# Patient Record
Sex: FEMALE | Race: WHITE | Employment: UNEMPLOYED | URBAN - NONMETROPOLITAN AREA
[De-identification: names, ages, dates, MRNs, and addresses within clinical notes are randomized per-mention and may not be internally consistent; named-entity substitution may affect disease eponyms.]

---

## 2017-01-17 DIAGNOSIS — G89.4 CHRONIC PAIN SYNDROME: ICD-10-CM

## 2017-01-17 DIAGNOSIS — M79.18 MYOFASCIAL PAIN DYSFUNCTION SYNDROME: ICD-10-CM

## 2017-01-17 DIAGNOSIS — M48.061 LUMBAR SPINAL STENOSIS: ICD-10-CM

## 2017-01-17 RX ORDER — OXYCODONE AND ACETAMINOPHEN 10; 325 MG/1; MG/1
1 TABLET ORAL EVERY 6 HOURS PRN
Qty: 120 TABLET | Refills: 0 | Status: SHIPPED | OUTPATIENT
Start: 2017-01-17 | End: 2017-02-16

## 2017-01-17 RX ORDER — TIZANIDINE 4 MG/1
4 TABLET ORAL EVERY 8 HOURS PRN
Qty: 90 TABLET | Refills: 0 | Status: SHIPPED | OUTPATIENT
Start: 2017-01-17 | End: 2017-02-17 | Stop reason: SDUPTHER

## 2017-02-17 DIAGNOSIS — M47.816 SPONDYLOSIS OF LUMBAR REGION WITHOUT MYELOPATHY OR RADICULOPATHY: ICD-10-CM

## 2017-02-17 DIAGNOSIS — M79.18 MYOFASCIAL PAIN DYSFUNCTION SYNDROME: ICD-10-CM

## 2017-02-17 DIAGNOSIS — G89.4 CHRONIC PAIN SYNDROME: ICD-10-CM

## 2017-02-17 DIAGNOSIS — M48.061 LUMBAR SPINAL STENOSIS: ICD-10-CM

## 2017-02-17 RX ORDER — TIZANIDINE 4 MG/1
4 TABLET ORAL EVERY 8 HOURS PRN
Qty: 90 TABLET | Refills: 0 | Status: SHIPPED | OUTPATIENT
Start: 2017-02-17 | End: 2017-03-16 | Stop reason: SDUPTHER

## 2017-02-17 RX ORDER — OXYCODONE AND ACETAMINOPHEN 10; 325 MG/1; MG/1
1 TABLET ORAL EVERY 6 HOURS PRN
Qty: 120 TABLET | Refills: 0 | Status: SHIPPED | OUTPATIENT
Start: 2017-02-17 | End: 2017-03-16 | Stop reason: SDUPTHER

## 2017-03-15 DIAGNOSIS — M48.061 LUMBAR SPINAL STENOSIS: ICD-10-CM

## 2017-03-15 DIAGNOSIS — G89.4 CHRONIC PAIN SYNDROME: ICD-10-CM

## 2017-03-15 DIAGNOSIS — M79.18 MYOFASCIAL PAIN DYSFUNCTION SYNDROME: ICD-10-CM

## 2017-03-16 RX ORDER — TIZANIDINE 4 MG/1
4 TABLET ORAL EVERY 8 HOURS PRN
Qty: 90 TABLET | Refills: 0 | Status: SHIPPED | OUTPATIENT
Start: 2017-03-16 | End: 2017-04-17 | Stop reason: SDUPTHER

## 2017-03-16 RX ORDER — OXYCODONE AND ACETAMINOPHEN 10; 325 MG/1; MG/1
1 TABLET ORAL EVERY 6 HOURS PRN
Qty: 120 TABLET | Refills: 0 | OUTPATIENT
Start: 2017-03-19 | End: 2017-04-18

## 2017-03-16 RX ORDER — TIZANIDINE 4 MG/1
4 TABLET ORAL EVERY 8 HOURS PRN
Qty: 90 TABLET | Refills: 0 | OUTPATIENT
Start: 2017-03-19

## 2017-03-16 RX ORDER — OXYCODONE AND ACETAMINOPHEN 10; 325 MG/1; MG/1
1 TABLET ORAL EVERY 6 HOURS PRN
Qty: 120 TABLET | Refills: 0 | Status: SHIPPED | OUTPATIENT
Start: 2017-03-16 | End: 2017-04-17 | Stop reason: SDUPTHER

## 2017-04-17 ENCOUNTER — OFFICE VISIT (OUTPATIENT)
Dept: PHYSICAL MEDICINE AND REHAB | Age: 47
End: 2017-04-17

## 2017-04-17 VITALS
BODY MASS INDEX: 34.87 KG/M2 | WEIGHT: 217 LBS | SYSTOLIC BLOOD PRESSURE: 146 MMHG | HEIGHT: 66 IN | HEART RATE: 98 BPM | DIASTOLIC BLOOD PRESSURE: 97 MMHG

## 2017-04-17 DIAGNOSIS — M79.18 MYOFASCIAL PAIN DYSFUNCTION SYNDROME: ICD-10-CM

## 2017-04-17 DIAGNOSIS — M48.061 LUMBAR SPINAL STENOSIS: Primary | ICD-10-CM

## 2017-04-17 DIAGNOSIS — G89.4 CHRONIC PAIN SYNDROME: ICD-10-CM

## 2017-04-17 DIAGNOSIS — M47.816 SPONDYLOSIS OF LUMBAR REGION WITHOUT MYELOPATHY OR RADICULOPATHY: ICD-10-CM

## 2017-04-17 PROCEDURE — 99213 OFFICE O/P EST LOW 20 MIN: CPT | Performed by: NURSE PRACTITIONER

## 2017-04-17 RX ORDER — TIZANIDINE 4 MG/1
4 TABLET ORAL EVERY 8 HOURS PRN
Qty: 90 TABLET | Refills: 0 | Status: SHIPPED | OUTPATIENT
Start: 2017-04-17 | End: 2017-06-16 | Stop reason: SDUPTHER

## 2017-04-17 RX ORDER — GABAPENTIN 600 MG/1
600 TABLET ORAL 4 TIMES DAILY
Qty: 120 TABLET | Refills: 2 | Status: SHIPPED | OUTPATIENT
Start: 2017-04-17 | End: 2017-07-17 | Stop reason: SDUPTHER

## 2017-04-17 RX ORDER — OXYCODONE AND ACETAMINOPHEN 10; 325 MG/1; MG/1
1 TABLET ORAL EVERY 6 HOURS PRN
Qty: 120 TABLET | Refills: 0 | Status: SHIPPED | OUTPATIENT
Start: 2017-04-17 | End: 2017-05-12 | Stop reason: SDUPTHER

## 2017-04-17 ASSESSMENT — ENCOUNTER SYMPTOMS
ABDOMINAL PAIN: 0
TROUBLE SWALLOWING: 0
RECTAL PAIN: 0
NAUSEA: 0
FACIAL SWELLING: 0
SINUS PRESSURE: 0
CHOKING: 0
COUGH: 0
APNEA: 0
BLOOD IN STOOL: 0
RHINORRHEA: 0
PHOTOPHOBIA: 0
ABDOMINAL DISTENTION: 0
CHEST TIGHTNESS: 0
VOMITING: 0
EYE DISCHARGE: 0
STRIDOR: 0
BACK PAIN: 1
DIARRHEA: 0
WHEEZING: 0
EYE PAIN: 0
VOICE CHANGE: 0
SHORTNESS OF BREATH: 0
COLOR CHANGE: 0
CONSTIPATION: 0
ANAL BLEEDING: 0
SORE THROAT: 0
EYE ITCHING: 0
EYE REDNESS: 0

## 2017-05-12 DIAGNOSIS — M47.816 SPONDYLOSIS OF LUMBAR REGION WITHOUT MYELOPATHY OR RADICULOPATHY: ICD-10-CM

## 2017-05-12 DIAGNOSIS — G89.4 CHRONIC PAIN SYNDROME: ICD-10-CM

## 2017-05-12 DIAGNOSIS — M48.061 LUMBAR SPINAL STENOSIS: ICD-10-CM

## 2017-05-12 DIAGNOSIS — M79.18 MYOFASCIAL PAIN DYSFUNCTION SYNDROME: ICD-10-CM

## 2017-05-12 RX ORDER — OXYCODONE AND ACETAMINOPHEN 10; 325 MG/1; MG/1
1 TABLET ORAL EVERY 6 HOURS PRN
Qty: 120 TABLET | Refills: 0 | Status: SHIPPED | OUTPATIENT
Start: 2017-05-12 | End: 2017-06-11

## 2017-05-30 ENCOUNTER — TELEPHONE (OUTPATIENT)
Dept: PHYSICAL MEDICINE AND REHAB | Age: 47
End: 2017-05-30

## 2017-05-30 DIAGNOSIS — M48.061 LUMBAR SPINAL STENOSIS: ICD-10-CM

## 2017-05-30 DIAGNOSIS — M51.36 DDD (DEGENERATIVE DISC DISEASE), LUMBAR: Primary | ICD-10-CM

## 2017-06-16 DIAGNOSIS — M47.816 SPONDYLOSIS OF LUMBAR REGION WITHOUT MYELOPATHY OR RADICULOPATHY: ICD-10-CM

## 2017-06-16 DIAGNOSIS — G89.4 CHRONIC PAIN SYNDROME: ICD-10-CM

## 2017-06-16 DIAGNOSIS — M79.18 MYOFASCIAL PAIN DYSFUNCTION SYNDROME: ICD-10-CM

## 2017-06-16 DIAGNOSIS — M48.061 LUMBAR SPINAL STENOSIS: ICD-10-CM

## 2017-06-16 RX ORDER — OXYCODONE AND ACETAMINOPHEN 10; 325 MG/1; MG/1
1 TABLET ORAL EVERY 6 HOURS PRN
Qty: 120 TABLET | Refills: 0 | Status: SHIPPED | OUTPATIENT
Start: 2017-06-16 | End: 2017-07-16

## 2017-06-16 RX ORDER — TIZANIDINE 4 MG/1
4 TABLET ORAL EVERY 8 HOURS PRN
Qty: 90 TABLET | Refills: 0 | Status: SHIPPED | OUTPATIENT
Start: 2017-06-16 | End: 2017-07-17 | Stop reason: SDUPTHER

## 2017-07-11 ENCOUNTER — OFFICE VISIT (OUTPATIENT)
Dept: CARDIOLOGY | Age: 47
End: 2017-07-11

## 2017-07-11 VITALS
HEART RATE: 55 BPM | HEIGHT: 67 IN | BODY MASS INDEX: 32.96 KG/M2 | WEIGHT: 210 LBS | SYSTOLIC BLOOD PRESSURE: 138 MMHG | DIASTOLIC BLOOD PRESSURE: 70 MMHG

## 2017-07-11 DIAGNOSIS — R07.82 INTERCOSTAL PAIN: Primary | ICD-10-CM

## 2017-07-11 DIAGNOSIS — I10 HYPERTENSION, BENIGN: ICD-10-CM

## 2017-07-11 PROCEDURE — 99203 OFFICE O/P NEW LOW 30 MIN: CPT | Performed by: INTERNAL MEDICINE

## 2017-07-11 PROCEDURE — 93000 ELECTROCARDIOGRAM COMPLETE: CPT | Performed by: INTERNAL MEDICINE

## 2017-07-11 RX ORDER — TRAZODONE HYDROCHLORIDE 150 MG/1
150 TABLET ORAL NIGHTLY
COMMUNITY
Start: 2017-04-27 | End: 2019-03-08

## 2017-07-11 RX ORDER — TRIAMTERENE AND HYDROCHLOROTHIAZIDE 37.5; 25 MG/1; MG/1
1 TABLET ORAL DAILY
COMMUNITY
Start: 2017-06-19 | End: 2019-06-11

## 2017-07-11 RX ORDER — PROPRANOLOL HYDROCHLORIDE 80 MG/1
80 TABLET ORAL 2 TIMES DAILY
COMMUNITY
End: 2021-07-29

## 2017-07-13 ENCOUNTER — TELEPHONE (OUTPATIENT)
Dept: ADMINISTRATIVE | Age: 47
End: 2017-07-13

## 2017-07-13 DIAGNOSIS — R07.82 INTERCOSTAL PAIN: Primary | ICD-10-CM

## 2017-07-17 DIAGNOSIS — G89.4 CHRONIC PAIN SYNDROME: ICD-10-CM

## 2017-07-17 DIAGNOSIS — M47.816 SPONDYLOSIS OF LUMBAR REGION WITHOUT MYELOPATHY OR RADICULOPATHY: ICD-10-CM

## 2017-07-17 DIAGNOSIS — M79.18 MYOFASCIAL PAIN DYSFUNCTION SYNDROME: ICD-10-CM

## 2017-07-17 DIAGNOSIS — M48.061 LUMBAR SPINAL STENOSIS: ICD-10-CM

## 2017-07-17 RX ORDER — OXYCODONE AND ACETAMINOPHEN 10; 325 MG/1; MG/1
1 TABLET ORAL EVERY 6 HOURS PRN
Qty: 120 TABLET | Refills: 0 | Status: SHIPPED | OUTPATIENT
Start: 2017-07-17 | End: 2017-08-16 | Stop reason: SDUPTHER

## 2017-07-17 RX ORDER — GABAPENTIN 600 MG/1
600 TABLET ORAL 4 TIMES DAILY
Qty: 120 TABLET | Refills: 2 | Status: SHIPPED | OUTPATIENT
Start: 2017-07-19 | End: 2017-10-13 | Stop reason: SDUPTHER

## 2017-07-17 RX ORDER — TIZANIDINE 4 MG/1
4 TABLET ORAL EVERY 8 HOURS PRN
Qty: 90 TABLET | Refills: 0 | Status: SHIPPED | OUTPATIENT
Start: 2017-07-17 | End: 2017-08-16 | Stop reason: SDUPTHER

## 2017-07-25 ENCOUNTER — HOSPITAL ENCOUNTER (OUTPATIENT)
Dept: GENERAL RADIOLOGY | Age: 47
Discharge: HOME | End: 2017-07-25
Payer: COMMERCIAL

## 2017-07-25 ENCOUNTER — HOSPITAL ENCOUNTER (OUTPATIENT)
Age: 47
Discharge: HOME | End: 2017-07-25
Payer: COMMERCIAL

## 2017-07-25 ENCOUNTER — OFFICE VISIT (OUTPATIENT)
Dept: PHYSICAL MEDICINE AND REHAB | Age: 47
End: 2017-07-25
Payer: COMMERCIAL

## 2017-07-25 VITALS
WEIGHT: 209 LBS | DIASTOLIC BLOOD PRESSURE: 92 MMHG | BODY MASS INDEX: 32.8 KG/M2 | HEIGHT: 67 IN | SYSTOLIC BLOOD PRESSURE: 142 MMHG | HEART RATE: 80 BPM

## 2017-07-25 DIAGNOSIS — M51.26 LUMBAR DISC HERNIATION: ICD-10-CM

## 2017-07-25 DIAGNOSIS — M79.671 RIGHT FOOT PAIN: ICD-10-CM

## 2017-07-25 DIAGNOSIS — M48.061 LUMBAR SPINAL STENOSIS: Primary | ICD-10-CM

## 2017-07-25 DIAGNOSIS — M79.18 MYOFASCIAL PAIN DYSFUNCTION SYNDROME: ICD-10-CM

## 2017-07-25 DIAGNOSIS — M47.816 SPONDYLOSIS OF LUMBAR REGION WITHOUT MYELOPATHY OR RADICULOPATHY: ICD-10-CM

## 2017-07-25 DIAGNOSIS — G89.4 CHRONIC PAIN SYNDROME: ICD-10-CM

## 2017-07-25 DIAGNOSIS — M51.36 DDD (DEGENERATIVE DISC DISEASE), LUMBAR: ICD-10-CM

## 2017-07-25 PROCEDURE — 99213 OFFICE O/P EST LOW 20 MIN: CPT | Performed by: NURSE PRACTITIONER

## 2017-07-25 PROCEDURE — 73630 X-RAY EXAM OF FOOT: CPT

## 2017-07-25 ASSESSMENT — ENCOUNTER SYMPTOMS
DIARRHEA: 0
VOMITING: 0
EYE PAIN: 0
BACK PAIN: 1
SORE THROAT: 0
CONSTIPATION: 0
CHEST TIGHTNESS: 0
SINUS PRESSURE: 0
COUGH: 0
RHINORRHEA: 0
SHORTNESS OF BREATH: 0
NAUSEA: 0
WHEEZING: 0
COLOR CHANGE: 0
PHOTOPHOBIA: 0
ABDOMINAL PAIN: 0

## 2017-07-28 ENCOUNTER — TELEPHONE (OUTPATIENT)
Dept: PHYSICAL MEDICINE AND REHAB | Age: 47
End: 2017-07-28

## 2017-07-28 DIAGNOSIS — F41.9 ANXIETY: Primary | ICD-10-CM

## 2017-07-28 DIAGNOSIS — F32.89 OTHER DEPRESSION: ICD-10-CM

## 2017-08-08 ENCOUNTER — TELEPHONE (OUTPATIENT)
Dept: CARDIOLOGY CLINIC | Age: 47
End: 2017-08-08

## 2017-08-08 DIAGNOSIS — R07.82 INTERCOSTAL PAIN: Primary | ICD-10-CM

## 2017-08-16 DIAGNOSIS — M47.816 SPONDYLOSIS OF LUMBAR REGION WITHOUT MYELOPATHY OR RADICULOPATHY: ICD-10-CM

## 2017-08-16 DIAGNOSIS — M79.18 MYOFASCIAL PAIN DYSFUNCTION SYNDROME: ICD-10-CM

## 2017-08-16 DIAGNOSIS — M48.061 LUMBAR SPINAL STENOSIS: ICD-10-CM

## 2017-08-16 DIAGNOSIS — G89.4 CHRONIC PAIN SYNDROME: ICD-10-CM

## 2017-08-16 RX ORDER — OXYCODONE AND ACETAMINOPHEN 10; 325 MG/1; MG/1
1 TABLET ORAL EVERY 6 HOURS PRN
Qty: 120 TABLET | Refills: 0 | Status: SHIPPED | OUTPATIENT
Start: 2017-08-16 | End: 2017-09-15

## 2017-08-16 RX ORDER — TIZANIDINE 4 MG/1
4 TABLET ORAL EVERY 8 HOURS PRN
Qty: 90 TABLET | Refills: 0 | Status: SHIPPED | OUTPATIENT
Start: 2017-08-16 | End: 2017-09-18 | Stop reason: SDUPTHER

## 2017-09-18 DIAGNOSIS — M48.061 LUMBAR SPINAL STENOSIS: ICD-10-CM

## 2017-09-18 DIAGNOSIS — G89.4 CHRONIC PAIN SYNDROME: ICD-10-CM

## 2017-09-18 DIAGNOSIS — M47.816 SPONDYLOSIS OF LUMBAR REGION WITHOUT MYELOPATHY OR RADICULOPATHY: ICD-10-CM

## 2017-09-18 DIAGNOSIS — M79.18 MYOFASCIAL PAIN DYSFUNCTION SYNDROME: ICD-10-CM

## 2017-09-18 RX ORDER — TIZANIDINE 4 MG/1
4 TABLET ORAL EVERY 8 HOURS PRN
Qty: 90 TABLET | Refills: 0 | Status: SHIPPED | OUTPATIENT
Start: 2017-09-18 | End: 2017-10-13 | Stop reason: SDUPTHER

## 2017-09-18 RX ORDER — OXYCODONE AND ACETAMINOPHEN 10; 325 MG/1; MG/1
1 TABLET ORAL EVERY 6 HOURS PRN
Qty: 120 TABLET | Refills: 0 | Status: SHIPPED | OUTPATIENT
Start: 2017-09-18 | End: 2017-10-13 | Stop reason: SDUPTHER

## 2017-10-04 ENCOUNTER — OFFICE VISIT (OUTPATIENT)
Dept: PSYCHIATRY | Age: 47
End: 2017-10-04
Payer: COMMERCIAL

## 2017-10-04 DIAGNOSIS — F33.1 MODERATE EPISODE OF RECURRENT MAJOR DEPRESSIVE DISORDER (HCC): ICD-10-CM

## 2017-10-04 DIAGNOSIS — F41.1 GAD (GENERALIZED ANXIETY DISORDER): ICD-10-CM

## 2017-10-04 PROCEDURE — 90792 PSYCH DIAG EVAL W/MED SRVCS: CPT | Performed by: PSYCHIATRY & NEUROLOGY
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
## 2017-10-04 NOTE — MR AVS SNAPSHOT
Additional Information about your Body Mass Index (BMI)           Your BMI as listed above is considered obese (30 or more). BMI is an estimate of body fat, calculated from your height and weight. The higher your BMI, the greater your risk of heart disease, high blood pressure, type 2 diabetes, stroke, gallstones, arthritis, sleep apnea, and certain cancers. BMI is not perfect. It may overestimate body fat in athletes and people who are more muscular. Even a small weight loss (between 5 and 10 percent of your current weight) by decreasing your calorie intake and becoming more physically active will help lower your risk of developing or worsening diseases associated with obesity. Learn more at: [employer]             Medications and Orders      Your Current Medications Are              tiZANidine (ZANAFLEX) 4 MG tablet Take 1 tablet by mouth every 8 hours as needed (muscle spasm)    oxyCODONE-acetaminophen (PERCOCET)  MG per tablet Take 1 tablet by mouth every 6 hours as needed for Pain .    gabapentin (NEURONTIN) 600 MG tablet Take 1 tablet by mouth 4 times daily    propranolol (INDERAL) 80 MG tablet Take 80 mg by mouth 2 times daily    traZODone (DESYREL) 150 MG tablet 150 mg nightly    triamterene-hydrochlorothiazide (MAXZIDE-25) 37.5-25 MG per tablet 37.5 tablets daily    pentosan polysulfate (ELMIRON) 100 MG capsule Take 1 capsule by mouth 3 times daily (before meals)    dexlansoprazole (DEXILANT) 60 MG CPDR capsule Take 1 capsule by mouth daily    topiramate (TOPAMAX) 100 MG tablet Take 1 tablet by mouth 2 times daily    albuterol (PROVENTIL HFA;VENTOLIN HFA) 108 (90 BASE) MCG/ACT inhaler Inhale 2 puffs into the lungs every 4 hours as needed for Wheezing or Shortness of Breath Whichever brand is covered by insurance, substitute as necessary.       Allergies              Adhesive Tape Other (See Comments)    blisters    Compazine [Prochlorperazine] Hives Patient has no issues with promethazine    Erythromycin     Lyrica [Pregabalin]     Morphine     Break out in hives    Reglan [Metoclopramide] Other (See Comments)    States \" made me crazy - kicking and screaming - very anxious\"    Sulfa Antibiotics Hives         Additional Information        Basic Information     Date Of Birth Sex Race Ethnicity Preferred Language    1970 Female White Non-/Non  English      Problem List as of 10/4/2017  Date Reviewed: 10/4/2017                Epigastric pain    Abdominal pain, right upper quadrant    GERD (gastroesophageal reflux disease)    Constipation    Moderate episode of recurrent major depressive disorder (HCC)    KELLI (generalized anxiety disorder)    Urinary retention    Gastroesophageal reflux disease with esophagitis    Cellulitis of left axilla    Gastrointestinal hemorrhage    Lumbar spinal stenosis    Major depression    Obesity    Asthma    Insomnia    Nuñez's esophagus    Chest pain    Cough    Migraine headache    Hypertension, benign      Immunizations as of 10/4/2017     Name Date    Influenza Virus Vaccine 11/4/2015, 11/20/2014    Influenza, Glynn Quarles, 3 Years and older, IM 10/19/2016    Pneumococcal Polysaccharide ([id number]) 11/20/2014      Preventive Care        Date Due    HIV screening is recommended for all people regardless of risk factors  aged 15-65 years at least once (lifetime) who have never been HIV tested. 11/26/1983    Tetanus Combination Vaccine (1 - Tdap) 11/26/1987    Pap Smear 11/26/1989    Diabetes Screening 11/26/2008    Yearly Flu Vaccine (1) 9/1/2017    Cholesterol Screening 5/8/2020            MyChart Signup           [employer] allows you to send messages to your doctor, view your test results, renew your prescriptions, schedule appointments, view visit notes, and more. How Do I Sign Up? 1. In your Internet browser, go to [email or web address]

## 2017-10-06 ENCOUNTER — TELEPHONE (OUTPATIENT)
Dept: PSYCHIATRY | Age: 47
End: 2017-10-06

## 2017-10-06 ENCOUNTER — HOSPITAL ENCOUNTER (OUTPATIENT)
Age: 47
Setting detail: OBSERVATION
Discharge: HOME | End: 2017-10-07
Attending: EMERGENCY MEDICINE | Admitting: NUCLEAR MEDICINE
Payer: COMMERCIAL

## 2017-10-06 ENCOUNTER — HOSPITAL ENCOUNTER (EMERGENCY)
Age: 47
Discharge: HOME | End: 2017-10-06
Payer: COMMERCIAL

## 2017-10-06 ENCOUNTER — APPOINTMENT (OUTPATIENT)
Dept: GENERAL RADIOLOGY | Age: 47
End: 2017-10-06
Payer: COMMERCIAL

## 2017-10-06 VITALS
TEMPERATURE: 97.8 F | RESPIRATION RATE: 16 BRPM | SYSTOLIC BLOOD PRESSURE: 146 MMHG | DIASTOLIC BLOOD PRESSURE: 83 MMHG | HEART RATE: 88 BPM | OXYGEN SATURATION: 96 %

## 2017-10-06 DIAGNOSIS — R07.9 CHEST PAIN, UNSPECIFIED TYPE: Primary | ICD-10-CM

## 2017-10-06 DIAGNOSIS — F41.0 ANXIETY ATTACK: ICD-10-CM

## 2017-10-06 LAB
ALBUMIN SERPL-MCNC: 4.2 G/DL (ref 3.5–5.1)
ALP BLD-CCNC: 67 U/L (ref 38–126)
ALT SERPL-CCNC: 42 U/L (ref 11–66)
ANION GAP SERPL CALCULATED.3IONS-SCNC: 13 MEQ/L (ref 8–16)
ANION GAP SERPL CALCULATED.3IONS-SCNC: 14 MEQ/L (ref 8–16)
ANISOCYTOSIS: ABNORMAL
AST SERPL-CCNC: 34 U/L (ref 5–40)
BASOPHILS # BLD: 0.5 %
BASOPHILS # BLD: 1 %
BASOPHILS ABSOLUTE: 0 THOU/MM3 (ref 0–0.1)
BASOPHILS ABSOLUTE: 0.1 THOU/MM3 (ref 0–0.1)
BILIRUB SERPL-MCNC: < 0.2 MG/DL (ref 0.3–1.2)
BILIRUBIN DIRECT: < 0.2 MG/DL (ref 0–0.3)
BUN BLDV-MCNC: 12 MG/DL (ref 7–22)
BUN BLDV-MCNC: 15 MG/DL (ref 7–22)
CALCIUM SERPL-MCNC: 9.2 MG/DL (ref 8.5–10.5)
CALCIUM SERPL-MCNC: 9.5 MG/DL (ref 8.5–10.5)
CHLORIDE BLD-SCNC: 104 MEQ/L (ref 98–111)
CHLORIDE BLD-SCNC: 105 MEQ/L (ref 98–111)
CO2: 25 MEQ/L (ref 23–33)
CO2: 26 MEQ/L (ref 23–33)
CREAT SERPL-MCNC: 0.6 MG/DL (ref 0.4–1.2)
CREAT SERPL-MCNC: 0.6 MG/DL (ref 0.4–1.2)
EOSINOPHIL # BLD: 1.3 %
EOSINOPHIL # BLD: 2.7 %
EOSINOPHILS ABSOLUTE: 0.1 THOU/MM3 (ref 0–0.4)
EOSINOPHILS ABSOLUTE: 0.2 THOU/MM3 (ref 0–0.4)
GFR SERPL CREATININE-BSD FRML MDRD: > 90 ML/MIN/1.73M2
GFR SERPL CREATININE-BSD FRML MDRD: > 90 ML/MIN/1.73M2
GLUCOSE BLD-MCNC: 96 MG/DL (ref 70–108)
GLUCOSE BLD-MCNC: 96 MG/DL (ref 70–108)
HCT VFR BLD CALC: 41.6 % (ref 37–47)
HCT VFR BLD CALC: 43.7 % (ref 37–47)
HEMOGLOBIN: 13.8 GM/DL (ref 12–16)
HEMOGLOBIN: 14.6 GM/DL (ref 12–16)
LIPASE: 44.2 U/L (ref 5.6–51.3)
LYMPHOCYTES # BLD: 39.9 %
LYMPHOCYTES # BLD: 40 %
LYMPHOCYTES ABSOLUTE: 2.1 THOU/MM3 (ref 1–4.8)
LYMPHOCYTES ABSOLUTE: 2.4 THOU/MM3 (ref 1–4.8)
MCH RBC QN AUTO: 28.4 PG (ref 27–31)
MCH RBC QN AUTO: 28.4 PG (ref 27–31)
MCHC RBC AUTO-ENTMCNC: 33.2 GM/DL (ref 33–37)
MCHC RBC AUTO-ENTMCNC: 33.3 GM/DL (ref 33–37)
MCV RBC AUTO: 85.1 FL (ref 81–99)
MCV RBC AUTO: 85.3 FL (ref 81–99)
MONOCYTES # BLD: 10 %
MONOCYTES # BLD: 7.6 %
MONOCYTES ABSOLUTE: 0.4 THOU/MM3 (ref 0.4–1.3)
MONOCYTES ABSOLUTE: 0.6 THOU/MM3 (ref 0.4–1.3)
NUCLEATED RED BLOOD CELLS: 0 /100 WBC
NUCLEATED RED BLOOD CELLS: 0 /100 WBC
OSMOLALITY CALCULATION: 282.7 MOSMOL/KG (ref 275–300)
OSMOLALITY CALCULATION: 289.4 MOSMOL/KG (ref 275–300)
PDW BLD-RTO: 14.3 % (ref 11.5–14.5)
PDW BLD-RTO: 15 % (ref 11.5–14.5)
PLATELET # BLD: 231 THOU/MM3 (ref 130–400)
PLATELET # BLD: 253 THOU/MM3 (ref 130–400)
PMV BLD AUTO: 9.2 MCM (ref 7.4–10.4)
PMV BLD AUTO: 9.9 MCM (ref 7.4–10.4)
POTASSIUM SERPL-SCNC: 3.6 MEQ/L (ref 3.5–5.2)
POTASSIUM SERPL-SCNC: 4.2 MEQ/L (ref 3.5–5.2)
RBC # BLD: 4.88 MILL/MM3 (ref 4.2–5.4)
RBC # BLD: 5.14 MILL/MM3 (ref 4.2–5.4)
RBC # BLD: NORMAL 10*6/UL
RBC # BLD: NORMAL 10*6/UL
SEG NEUTROPHILS: 46.4 %
SEG NEUTROPHILS: 50.6 %
SEGMENTED NEUTROPHILS ABSOLUTE COUNT: 2.7 THOU/MM3 (ref 1.8–7.7)
SEGMENTED NEUTROPHILS ABSOLUTE COUNT: 2.8 THOU/MM3 (ref 1.8–7.7)
SODIUM BLD-SCNC: 142 MEQ/L (ref 135–145)
SODIUM BLD-SCNC: 145 MEQ/L (ref 135–145)
TOTAL PROTEIN: 6.9 G/DL (ref 6.1–8)
TROPONIN T: < 0.01 NG/ML
WBC # BLD: 5.3 THOU/MM3 (ref 4.8–10.8)
WBC # BLD: 6 THOU/MM3 (ref 4.8–10.8)

## 2017-10-06 PROCEDURE — 6370000000 HC RX 637 (ALT 250 FOR IP): Performed by: NUCLEAR MEDICINE

## 2017-10-06 PROCEDURE — 85025 COMPLETE CBC W/AUTO DIFF WBC: CPT

## 2017-10-06 PROCEDURE — 6360000002 HC RX W HCPCS: Performed by: EMERGENCY MEDICINE

## 2017-10-06 PROCEDURE — 99220 PR INITIAL OBSERVATION CARE/DAY 70 MINUTES: CPT | Performed by: NUCLEAR MEDICINE

## 2017-10-06 PROCEDURE — 84484 ASSAY OF TROPONIN QUANT: CPT

## 2017-10-06 PROCEDURE — 93005 ELECTROCARDIOGRAM TRACING: CPT | Performed by: EMERGENCY MEDICINE

## 2017-10-06 PROCEDURE — 6370000000 HC RX 637 (ALT 250 FOR IP): Performed by: EMERGENCY MEDICINE

## 2017-10-06 PROCEDURE — 96376 TX/PRO/DX INJ SAME DRUG ADON: CPT

## 2017-10-06 PROCEDURE — 6360000002 HC RX W HCPCS: Performed by: NUCLEAR MEDICINE

## 2017-10-06 PROCEDURE — 80048 BASIC METABOLIC PNL TOTAL CA: CPT

## 2017-10-06 PROCEDURE — 99285 EMERGENCY DEPT VISIT HI MDM: CPT

## 2017-10-06 PROCEDURE — 83690 ASSAY OF LIPASE: CPT

## 2017-10-06 PROCEDURE — 36415 COLL VENOUS BLD VENIPUNCTURE: CPT

## 2017-10-06 PROCEDURE — 96375 TX/PRO/DX INJ NEW DRUG ADDON: CPT

## 2017-10-06 PROCEDURE — 71020 XR CHEST STANDARD TWO VW: CPT

## 2017-10-06 PROCEDURE — G0378 HOSPITAL OBSERVATION PER HR: HCPCS

## 2017-10-06 PROCEDURE — 96374 THER/PROPH/DIAG INJ IV PUSH: CPT

## 2017-10-06 PROCEDURE — 2580000003 HC RX 258: Performed by: NUCLEAR MEDICINE

## 2017-10-06 PROCEDURE — 96372 THER/PROPH/DIAG INJ SC/IM: CPT

## 2017-10-06 PROCEDURE — 82248 BILIRUBIN DIRECT: CPT

## 2017-10-06 PROCEDURE — 6370000000 HC RX 637 (ALT 250 FOR IP): Performed by: NURSE PRACTITIONER

## 2017-10-06 PROCEDURE — 80053 COMPREHEN METABOLIC PANEL: CPT

## 2017-10-06 PROCEDURE — 6360000002 HC RX W HCPCS: Performed by: NURSE PRACTITIONER

## 2017-10-06 RX ORDER — ASPIRIN 81 MG/1
324 TABLET, CHEWABLE ORAL ONCE
Status: COMPLETED | OUTPATIENT
Start: 2017-10-06 | End: 2017-10-06

## 2017-10-06 RX ORDER — ASPIRIN 81 MG/1
81 TABLET, CHEWABLE ORAL DAILY
Status: DISCONTINUED | OUTPATIENT
Start: 2017-10-07 | End: 2017-10-07 | Stop reason: HOSPADM

## 2017-10-06 RX ORDER — NITROGLYCERIN 0.4 MG/1
0.4 TABLET SUBLINGUAL EVERY 5 MIN PRN
Status: DISCONTINUED | OUTPATIENT
Start: 2017-10-06 | End: 2017-10-06 | Stop reason: HOSPADM

## 2017-10-06 RX ORDER — ONDANSETRON 2 MG/ML
4 INJECTION INTRAMUSCULAR; INTRAVENOUS EVERY 6 HOURS PRN
Status: DISCONTINUED | OUTPATIENT
Start: 2017-10-06 | End: 2017-10-07 | Stop reason: HOSPADM

## 2017-10-06 RX ORDER — TRIAMTERENE AND HYDROCHLOROTHIAZIDE 37.5; 25 MG/1; MG/1
1 TABLET ORAL DAILY
Status: DISCONTINUED | OUTPATIENT
Start: 2017-10-06 | End: 2017-10-07 | Stop reason: HOSPADM

## 2017-10-06 RX ORDER — NITROGLYCERIN 0.4 MG/1
0.4 TABLET SUBLINGUAL EVERY 5 MIN PRN
Status: DISCONTINUED | OUTPATIENT
Start: 2017-10-06 | End: 2017-10-07 | Stop reason: HOSPADM

## 2017-10-06 RX ORDER — OXYCODONE AND ACETAMINOPHEN 10; 325 MG/1; MG/1
1 TABLET ORAL EVERY 6 HOURS PRN
Status: DISCONTINUED | OUTPATIENT
Start: 2017-10-06 | End: 2017-10-07 | Stop reason: HOSPADM

## 2017-10-06 RX ORDER — LORAZEPAM 2 MG/ML
1 INJECTION INTRAMUSCULAR ONCE
Status: COMPLETED | OUTPATIENT
Start: 2017-10-06 | End: 2017-10-06

## 2017-10-06 RX ORDER — SODIUM CHLORIDE 0.9 % (FLUSH) 0.9 %
10 SYRINGE (ML) INJECTION PRN
Status: DISCONTINUED | OUTPATIENT
Start: 2017-10-06 | End: 2017-10-07 | Stop reason: HOSPADM

## 2017-10-06 RX ORDER — TIZANIDINE 4 MG/1
4 TABLET ORAL EVERY 8 HOURS PRN
Status: DISCONTINUED | OUTPATIENT
Start: 2017-10-06 | End: 2017-10-07 | Stop reason: HOSPADM

## 2017-10-06 RX ORDER — TOPIRAMATE 100 MG/1
100 TABLET, FILM COATED ORAL 2 TIMES DAILY
Status: DISCONTINUED | OUTPATIENT
Start: 2017-10-06 | End: 2017-10-07 | Stop reason: HOSPADM

## 2017-10-06 RX ORDER — GABAPENTIN 600 MG/1
600 TABLET ORAL 4 TIMES DAILY
Status: DISCONTINUED | OUTPATIENT
Start: 2017-10-06 | End: 2017-10-07 | Stop reason: HOSPADM

## 2017-10-06 RX ORDER — ACETAMINOPHEN 325 MG/1
650 TABLET ORAL EVERY 4 HOURS PRN
Status: DISCONTINUED | OUTPATIENT
Start: 2017-10-06 | End: 2017-10-07 | Stop reason: HOSPADM

## 2017-10-06 RX ORDER — PROMETHAZINE HYDROCHLORIDE 25 MG/1
12.5 TABLET ORAL ONCE
Status: COMPLETED | OUTPATIENT
Start: 2017-10-06 | End: 2017-10-06

## 2017-10-06 RX ORDER — PANTOPRAZOLE SODIUM 40 MG/1
40 TABLET, DELAYED RELEASE ORAL
Status: DISCONTINUED | OUTPATIENT
Start: 2017-10-06 | End: 2017-10-07 | Stop reason: HOSPADM

## 2017-10-06 RX ORDER — HYDROCODONE BITARTRATE AND ACETAMINOPHEN 5; 325 MG/1; MG/1
1 TABLET ORAL ONCE
Status: COMPLETED | OUTPATIENT
Start: 2017-10-06 | End: 2017-10-06

## 2017-10-06 RX ORDER — ONDANSETRON 2 MG/ML
4 INJECTION INTRAMUSCULAR; INTRAVENOUS ONCE
Status: COMPLETED | OUTPATIENT
Start: 2017-10-06 | End: 2017-10-06

## 2017-10-06 RX ORDER — ALPRAZOLAM 0.25 MG/1
0.25 TABLET ORAL ONCE
Status: COMPLETED | OUTPATIENT
Start: 2017-10-06 | End: 2017-10-06

## 2017-10-06 RX ORDER — DEXLANSOPRAZOLE 60 MG/1
60 CAPSULE, DELAYED RELEASE ORAL DAILY
Status: DISCONTINUED | OUTPATIENT
Start: 2017-10-06 | End: 2017-10-06 | Stop reason: CLARIF

## 2017-10-06 RX ORDER — ALPRAZOLAM 0.25 MG/1
0.25 TABLET ORAL 2 TIMES DAILY PRN
Status: DISCONTINUED | OUTPATIENT
Start: 2017-10-06 | End: 2017-10-07 | Stop reason: HOSPADM

## 2017-10-06 RX ORDER — SODIUM CHLORIDE 0.9 % (FLUSH) 0.9 %
10 SYRINGE (ML) INJECTION EVERY 12 HOURS SCHEDULED
Status: DISCONTINUED | OUTPATIENT
Start: 2017-10-06 | End: 2017-10-07 | Stop reason: HOSPADM

## 2017-10-06 RX ORDER — ALBUTEROL SULFATE 90 UG/1
2 AEROSOL, METERED RESPIRATORY (INHALATION) EVERY 4 HOURS PRN
Status: DISCONTINUED | OUTPATIENT
Start: 2017-10-06 | End: 2017-10-07 | Stop reason: HOSPADM

## 2017-10-06 RX ORDER — PROPRANOLOL HYDROCHLORIDE 40 MG/1
80 TABLET ORAL 2 TIMES DAILY
Status: DISCONTINUED | OUTPATIENT
Start: 2017-10-06 | End: 2017-10-07 | Stop reason: HOSPADM

## 2017-10-06 RX ORDER — TRIAMTERENE AND HYDROCHLOROTHIAZIDE 37.5; 25 MG/1; MG/1
37.5 TABLET ORAL DAILY
Status: DISCONTINUED | OUTPATIENT
Start: 2017-10-06 | End: 2017-10-06 | Stop reason: ALTCHOICE

## 2017-10-06 RX ADMIN — PENTOSAN POLYSULFATE SODIUM 100 MG: 100 CAPSULE, GELATIN COATED ORAL at 17:49

## 2017-10-06 RX ADMIN — PROPRANOLOL HYDROCHLORIDE 80 MG: 40 TABLET ORAL at 21:37

## 2017-10-06 RX ADMIN — TOPIRAMATE 100 MG: 100 TABLET, FILM COATED ORAL at 21:38

## 2017-10-06 RX ADMIN — ONDANSETRON 4 MG: 2 INJECTION INTRAMUSCULAR; INTRAVENOUS at 03:11

## 2017-10-06 RX ADMIN — ALPRAZOLAM 0.25 MG: 0.25 TABLET ORAL at 17:46

## 2017-10-06 RX ADMIN — TRAZODONE HYDROCHLORIDE 150 MG: 100 TABLET ORAL at 21:38

## 2017-10-06 RX ADMIN — NITROGLYCERIN 0.4 MG: 0.4 TABLET SUBLINGUAL at 02:31

## 2017-10-06 RX ADMIN — LORAZEPAM 1 MG: 2 INJECTION INTRAMUSCULAR; INTRAVENOUS at 14:19

## 2017-10-06 RX ADMIN — GABAPENTIN 600 MG: 600 TABLET ORAL at 21:38

## 2017-10-06 RX ADMIN — PANTOPRAZOLE SODIUM 40 MG: 40 TABLET, DELAYED RELEASE ORAL at 17:49

## 2017-10-06 RX ADMIN — HYDROCODONE BITARTRATE AND ACETAMINOPHEN 1 TABLET: 5; 325 TABLET ORAL at 15:07

## 2017-10-06 RX ADMIN — LORAZEPAM 1 MG: 2 INJECTION INTRAMUSCULAR; INTRAVENOUS at 03:11

## 2017-10-06 RX ADMIN — Medication 10 ML: at 21:38

## 2017-10-06 RX ADMIN — NITROGLYCERIN 0.4 MG: 0.4 TABLET SUBLINGUAL at 02:26

## 2017-10-06 RX ADMIN — TIZANIDINE 4 MG: 4 TABLET ORAL at 17:49

## 2017-10-06 RX ADMIN — ASPIRIN 81 MG 324 MG: 81 TABLET ORAL at 02:25

## 2017-10-06 RX ADMIN — ALPRAZOLAM 0.25 MG: 0.25 TABLET ORAL at 21:41

## 2017-10-06 RX ADMIN — ENOXAPARIN SODIUM 40 MG: 40 INJECTION SUBCUTANEOUS at 17:45

## 2017-10-06 RX ADMIN — PROMETHAZINE HYDROCHLORIDE 12.5 MG: 25 TABLET ORAL at 15:07

## 2017-10-06 RX ADMIN — ASPIRIN 81 MG 324 MG: 81 TABLET ORAL at 14:19

## 2017-10-06 RX ADMIN — OXYCODONE HYDROCHLORIDE AND ACETAMINOPHEN 1 TABLET: 10; 325 TABLET ORAL at 17:46

## 2017-10-06 RX ADMIN — TRIAMTERENE AND HYDROCHLOROTHIAZIDE 1 TABLET: 37.5; 25 TABLET ORAL at 17:49

## 2017-10-06 RX ADMIN — LORAZEPAM 1 MG: 2 INJECTION INTRAMUSCULAR; INTRAVENOUS at 04:06

## 2017-10-06 ASSESSMENT — ENCOUNTER SYMPTOMS
VOMITING: 0
RHINORRHEA: 0
ABDOMINAL PAIN: 0
BACK PAIN: 0
BACK PAIN: 0
WHEEZING: 0
VOMITING: 0
EYE PAIN: 0
SHORTNESS OF BREATH: 0
CHEST TIGHTNESS: 0
NAUSEA: 1
EYE PAIN: 0
RHINORRHEA: 0
SORE THROAT: 0
COUGH: 0
SHORTNESS OF BREATH: 0
COUGH: 0
ABDOMINAL PAIN: 0
NAUSEA: 1
DIARRHEA: 0
EYE DISCHARGE: 0
DIARRHEA: 0

## 2017-10-06 ASSESSMENT — PAIN DESCRIPTION - PAIN TYPE
TYPE: ACUTE PAIN

## 2017-10-06 ASSESSMENT — PAIN DESCRIPTION - DESCRIPTORS
DESCRIPTORS: JABBING;STABBING
DESCRIPTORS: CONSTANT;SHARP
DESCRIPTORS: CONSTANT;SHARP;STABBING

## 2017-10-06 ASSESSMENT — PAIN DESCRIPTION - ORIENTATION
ORIENTATION: LEFT

## 2017-10-06 ASSESSMENT — PAIN SCALES - GENERAL
PAINLEVEL_OUTOF10: 9
PAINLEVEL_OUTOF10: 8
PAINLEVEL_OUTOF10: 9
PAINLEVEL_OUTOF10: 9
PAINLEVEL_OUTOF10: 8
PAINLEVEL_OUTOF10: 7
PAINLEVEL_OUTOF10: 8
PAINLEVEL_OUTOF10: 7

## 2017-10-06 ASSESSMENT — PAIN DESCRIPTION - LOCATION
LOCATION: CHEST

## 2017-10-06 ASSESSMENT — PAIN DESCRIPTION - PROGRESSION
CLINICAL_PROGRESSION: NOT CHANGED

## 2017-10-06 ASSESSMENT — PAIN DESCRIPTION - FREQUENCY
FREQUENCY: CONTINUOUS

## 2017-10-06 ASSESSMENT — PAIN DESCRIPTION - ONSET
ONSET: ON-GOING
ONSET: ON-GOING

## 2017-10-06 NOTE — ED PROVIDER NOTES
[address]       Chief Complaint   Patient presents with    Chest Pain       Nurses Notes reviewed and I agree except as noted in the HPI. HISTORY OF PRESENT ILLNESS    Mena Johnson is a 48 y.o. female who presents chest pain. The patient states that she was here in the ER with her elderly mother when she received some surprising and distressing news about her mother's health and the chest pain began. Patient states that the pain is sharp and stabbing in the left side of her chest, and it radiates into her left arm. The patient states that she has a history of hypertension, and has had multiple stress tests, and cardiac catheterization, but denies any findings. The patient states that she feels anxious about her mother's health and she is trembling uncontrollably. The patient states that she is currently having a headache as well as palpitations and nausea. The patient denies any shortness of breath, vision changes, vomiting, or other symptoms. REVIEW OF SYSTEMS     Review of Systems   Constitutional: Negative for chills, fatigue and fever. HENT: Negative for congestion, ear discharge, ear pain, postnasal drip and rhinorrhea. Eyes: Negative for pain and visual disturbance. Respiratory: Negative for cough, chest tightness and shortness of breath. Cardiovascular: Positive for chest pain, palpitations and leg swelling. Gastrointestinal: Positive for nausea. Negative for abdominal pain, diarrhea and vomiting. Genitourinary: Negative for difficulty urinating, dysuria, enuresis, flank pain and hematuria. Musculoskeletal: Negative for back pain and joint swelling. Skin: Negative for pallor and rash. Neurological: Positive for tremors and headaches (frontal). Negative for dizziness, weakness, light-headedness and numbness. Psychiatric/Behavioral: Negative for agitation, behavioral problems and confusion. The patient is nervous/anxious. PAST MEDICAL HISTORY    has a past medical history of Anxiety; Arthritis; Asthma; Nuñez's esophagus; GERD (gastroesophageal reflux disease); Hypertension; Insomnia; Interstitial cystitis; Migraine; and Spinal stenosis of lumbar region. SURGICAL HISTORY      has a past surgical history that includes Abdomen surgery (93); ovarian cyst removal (93); Tubal ligation (93); Colonoscopy (2010); Endoscopy, colon, diagnostic (2010); Patellar tendon repair (Right, 1994); Dilatation, esophagus (2010); Cardiac catheterization (unsure); other surgical history (21 Nov 2014); Cholecystectomy; Appendectomy; other surgical history (N/A, 03-21-16); other surgical history (N/A, 04-04-16); other surgical history (Bilateral, 6-13-16); Upper gastrointestinal endoscopy (2012); and other surgical history (Left, 09/13/2016). CURRENT MEDICATIONS       Previous Medications    ALBUTEROL (PROVENTIL HFA;VENTOLIN HFA) 108 (90 BASE) MCG/ACT INHALER    Inhale 2 puffs into the lungs every 4 hours as needed for Wheezing or Shortness of Breath Whichever brand is covered by insurance, substitute as necessary. DEXLANSOPRAZOLE (DEXILANT) 60 MG CPDR CAPSULE    Take 1 capsule by mouth daily    GABAPENTIN (NEURONTIN) 600 MG TABLET    Take 1 tablet by mouth 4 times daily    OXYCODONE-ACETAMINOPHEN (PERCOCET)  MG PER TABLET    Take 1 tablet by mouth every 6 hours as needed for Pain .     PENTOSAN POLYSULFATE (ELMIRON) 100 MG CAPSULE    Take 1 capsule by mouth 3 times daily (before meals)    PROPRANOLOL (INDERAL) 80 MG TABLET    Take 80 mg by mouth 2 times daily    TIZANIDINE (ZANAFLEX) 4 MG TABLET    Take 1 tablet by mouth every 8 hours as needed (muscle spasm)    TOPIRAMATE (TOPAMAX) 100 MG TABLET    Take 1 tablet by mouth 2 times daily    TRAZODONE (DESYREL) 150 MG TABLET    150 mg nightly    TRIAMTERENE-HYDROCHLOROTHIAZIDE (MAXZIDE-25) 37.5-25 MG PER TABLET    37.5 tablets daily       ALLERGIES     is allergic to adhesive tape; compazine [prochlorperazine]; erythromycin; lyrica [pregabalin]; morphine; reglan [metoclopramide]; and sulfa antibiotics. FAMILY HISTORY     indicated that her mother is alive. She indicated that her father is . She indicated that the status of her brother is unknown.  family history includes Heart Attack in her brother; Heart Disease in her father and mother; Inflam Bowel Dis in her mother. SOCIAL HISTORY      reports that she has never smoked. She has never used smokeless tobacco. She reports that she drinks alcohol. She reports that she does not use illicit drugs. PHYSICAL EXAM     INITIAL VITALS:  temperature is 97.8 °F (36.6 °C). Her blood pressure is 146/83 (abnormal) and her pulse is 88. Her respiration is 16 and oxygen saturation is 96%. Physical Exam   Constitutional: She is oriented to person, place, and time. She appears distressed (emotionally). Patient appears anxious and is having some slight tremors. Patient is continuously gripping the left side of her chest with her right hand. HENT:   Head: Normocephalic and atraumatic. Eyes: Conjunctivae and lids are normal.   Pupils dilated bilaterally    Cardiovascular: S1 normal, S2 normal and intact distal pulses. An irregular rhythm present. Tachycardia present. Patient has 1+ pitting edema over both shins. Pulmonary/Chest: Breath sounds normal. No accessory muscle usage. No respiratory distress. Neurological: She is alert and oriented to person, place, and time. She has normal strength. No sensory deficit. Skin: Skin is warm and dry. No rash noted. Psychiatric: Her mood appears anxious. She is hyperactive.          DIFFERENTIAL DIAGNOSIS:   Including but not limited to anxiety attack, hypertensive crisis, less likely ACS    DIAGNOSTIC RESULTS     EKG: All EKG's are interpreted by the Emergency Department Physician who either signs or Co-signs this chart in the absence of a cardiologist.  none    RADIOLOGY: non-plain film images(s) such as CT, Ultrasound and MRI are read by the radiologist.  Plain radiographic images are visualized and preliminarily interpreted by the emergency physician unless otherwise stated below. XR CHEST STANDARD (2 VW)   Final Result   1. There is no acute cardiopulmonary process. **This report has been created using voice recognition software. It may contain minor errors which are inherent in voice recognition technology. **      Final report electronically signed by Dr. Rogers Moreno on 10/6/2017 5:20 AM            LABS:   Labs Reviewed   CBC WITH AUTO DIFFERENTIAL - Abnormal; Notable for the following:        Result Value    RDW 15.0 (*)     All other components within normal limits   HEPATIC FUNCTION PANEL - Abnormal; Notable for the following: Total Bilirubin <0.2 (*)     All other components within normal limits   BASIC METABOLIC PANEL   LIPASE   TROPONIN   GLOMERULAR FILTRATION RATE, ESTIMATED   ANION GAP   OSMOLALITY   TROPONIN         EMERGENCY DEPARTMENT COURSE AND MEDICAL DECISION MAKING:   Vitals:    Vitals:    10/06/17 0234 10/06/17 0425 10/06/17 0515 10/06/17 0632   BP: (!) 152/100 (!) 153/69 (!) 166/89 (!) 146/83   Pulse:  81 78 88   Resp:  18 16 16   Temp:       SpO2:  98% 98% 96%         Pertinent Labs & Imaging studies reviewed. (See chart for details)    The patient was seen and evaluated. Appropriate labs and imaging were ordered. Patient's symptoms were unrelieved by NTG. She was given IV ativan with some improvement. I greatly believe anxiety to be a large part of the patient's condition. Per her record review, the patient lives with her mother and they rely solely on her mom's social security. She has a lot of fear of her momther dying and her losing that source of income. Repeat troponin was ordered and the patient will be re-evaluated. Repeat troponin was negative. I discussed the results with Marlen APARICIO of cardiology.   He indicates that if the patient DEIRDRE Ochoa NP  10/06/17 [id number]

## 2017-10-06 NOTE — ED NOTES
Patient states last night she was given two PO nitro under the tongue and did not help her pain.       Emory Porter RN  10/06/17 [id number]

## 2017-10-06 NOTE — ED TRIAGE NOTES
Patient was in room 15 with mother when she was told news about her mothers condition painet started to hyperventilated and have chest pain patient brought to room 5 for chest pain resp easy and labored with a rate of 36 patient is tearful at bedside

## 2017-10-06 NOTE — ED NOTES
Patient transported to [facility] via cart in stable condition. Patient monitored on cardiac telemetry.      Contact made with  prior to transport        JESSICA Coe-P  10/06/17 0014

## 2017-10-06 NOTE — ED NOTES
Bed: 017A  Expected date: 10/6/17  Expected time: 12:34 PM  Means of arrival:   Comments:  Rapid response

## 2017-10-06 NOTE — ED AVS SNAPSHOT
After Visit Summary  (Discharge Instructions)    Medication List for Home    Based on the information you provided to us as well as any changes during this visit, the following is your updated medication list.  Compare this with your prescription bottles at home. If you have any questions or concerns, contact your primary care physician's office. Daily Medication List (This medication list can be shared with any Healthcare provider who is helping you manage your medications)      ASK your doctor about these medications if you have questions     albuterol sulfate  (90 Base) MCG/ACT inhaler   Inhale 2 puffs into the lungs every 4 hours as needed for Wheezing or Shortness of Breath Whichever brand is covered by insurance, substitute as necessary. dexlansoprazole 60 MG Cpdr delayed release capsule   Commonly known as:  DEXILANT   Take 1 capsule by mouth daily       gabapentin 600 MG tablet   Commonly known as:  NEURONTIN   Take 1 tablet by mouth 4 times daily       oxyCODONE-acetaminophen  MG per tablet   Commonly known as:  PERCOCET   Take 1 tablet by mouth every 6 hours as needed for Pain .        pentosan polysulfate 100 MG capsule   Commonly known as:  ELMIRON   Take 1 capsule by mouth 3 times daily (before meals)       propranolol 80 MG tablet   Commonly known as:  INDERAL   Take 80 mg by mouth 2 times daily       tiZANidine 4 MG tablet   Commonly known as:  ZANAFLEX   Take 1 tablet by mouth every 8 hours as needed (muscle spasm)       topiramate 100 MG tablet   Commonly known as:  TOPAMAX   Take 1 tablet by mouth 2 times daily       traZODone 150 MG tablet   Commonly known as:  DESYREL   150 mg nightly       triamterene-hydrochlorothiazide 37.5-25 MG per tablet   Commonly known as:  MAXZIDE-25   37.5 tablets daily               Allergies as of 10/6/2017        Reactions    Adhesive Tape Other (See Comments)    blisters    Compazine [Prochlorperazine] Hives possible medical care for you at that time. If you have any questions once you leave the hospital, please call the department phone number listed below. Diagnoses this visit     Your diagnoses were CHEST PAIN, UNSPECIFIED TYPE and ANXIETY ATTACK. Visit Information     Date of Visit Department Dept Phone    10/6/2017 [facility] EMERGENCY DEPT [phone]      You were seen by     You were seen by Stepan Begum NP. Follow-up Appointments    Below is a list of your follow-up and future appointments. This may not be a complete list as you may have made appointments directly with providers that we are not aware of or your providers may have made some for you. Please call your providers to confirm appointments. It is important to keep your appointments. Please bring your current insurance card, photo ID, co-pay, and all medication bottles to your appointment. If self-pay, payment is expected at the time of service. Follow-up Information     Follow up with Salomon De Oliveira MD. Schedule an appointment as soon as possible for a visit in 3 days. Specialty:  Family Medicine    Why:  For follow up    Contact information:    [address] [facility] [address]  [phone]          Follow up with [facility] [place] [place] In 1 day. Specialty:  Cardiology    Why:  For follow up @ 10 am    Contact information:    [address] [facility]  [address] [facility]  [phone]    Additional information:    From the [address] [facility]: Take [address], [place]. Exit at [address] [facility] ([id number]). Turn right onto [address] [facility] [address] signs, until you intersect [address] [place] [address] right onto [facility]. and continue through    downtown [place]. [email or web address] will be on your left past the intersection of [facility]. The office is located inside [address], in the [place] [address] on the second floor,  [address].       From the [place]: Appointment with Ishmael Duong MD at [facility] ([phone])   Please arrive 15 minutes prior to appointment time, bring insurance card and photo ID. Please arrive 15 minutes prior to appointment time, bring insurance card and photo ID.   [address]  [facility] [address]         Preventive Care        Date Due    HIV screening is recommended for all people regardless of risk factors  aged 15-65 years at least once (lifetime) who have never been HIV tested. 11/26/1983    Tetanus Combination Vaccine (1 - Tdap) 11/26/1987    Pap Smear 11/26/1989    Diabetes Screening 11/26/2008    Yearly Flu Vaccine (1) 9/1/2017    Cholesterol Screening 5/8/2020                 Care Plan Once You Return Home    This section includes instructions you will need to follow once you leave the hospital.  Your care team will discuss these with you, so you and those caring for you know how to best care for your health needs at home. This section may also include educational information about certain health topics that may be of help to you. Important Information if you smoke or are exposed to smoking       SMOKING: QUIT SMOKING. THIS IS THE MOST IMPORTANT ACTION YOU CAN TAKE TO IMPROVE YOUR CURRENT AND FUTURE HEALTH. Call the [address] [place] [address] at [place] NOW ([phone])    Smoking harms nonsmokers. When nonsmokers are around people who smoke, they absorb nicotine, carbon monoxide, and other ingredients of tobacco smoke. DO NOT SMOKE AROUND CHILDREN     Children exposed to secondhand smoke are at an increased risk of:  Sudden Infant Death Syndrome (SIDS), acute respiratory infections, inflammation of the middle ear, and severe asthma. Over a longer time, it causes heart disease and lung cancer. There is no safe level of exposure to secondhand smoke.                 [employer] Signup     [employer] allows you to send messages to your doctor, view your test results, renew your prescriptions, schedule appointments, view visit notes, and more. How Do I Sign Up? 1. In your Internet browser, go to [email or web address]  2. Click on the Sign Up Now link in the Sign In box. You will see the New Member Sign Up page. 3. Enter your Chompt Access Code exactly as it appears below. You will not need to use this code after youve completed the sign-up process. If you do not sign up before the expiration date, you must request a new code. Mixamo Access Code: [email or web address]  Expires: 12/3/2017  4:30 PM    4. Enter your Social Security Number ([id number]) and Date of Birth (mm/dd/yyyy) as indicated and click Submit. You will be taken to the next sign-up page. 5. Create a Mixamo ID. This will be your Mixamo login ID and cannot be changed, so think of one that is secure and easy to remember. 6. Create a Mixamo password. You can change your password at any time. 7. Enter your Password Reset Question and Answer. This can be used at a later time if you forget your password. 8. Enter your e-mail address. You will receive e-mail notification when new information is available in [address]. 9. Click Sign Up. You can now view your medical record. Additional Information  If you have questions, please contact the physician practice where you receive care. Remember, Mixamo is NOT to be used for urgent needs. For medical emergencies, dial 911. For questions regarding your Mixamo account call [phone]. If you have a clinical question, please call your doctor's office. View your information online  ? Review your current list of  medications, immunization, and allergies. ? Review your future test results online . ?  Review your discharge instructions provided by your caregivers at discharge    Certain functionality such as prescription refills, scheduling appointments or sending messages to your provider are not activated if your provider does not use [facility] in his/her office    For questions regarding your MyChart account call [phone]. If you have a clinical question, please call your doctor's office. The information on all pages of the After Visit Summary has been reviewed with me, the patient and/or responsible adult, by my health care provider(s). I had the opportunity to ask questions regarding this information. I understand I should dispose of my armband safely at home to protect my health information. A complete copy of the After Visit Summary has been given to me, the patient and/or responsible adult. Patient Signature/Responsible Adult: ___________________________________    Nurse Signature: ___________________________________  Resident/MLP Signature: ___________________________________  Attending Signature: ___________________________________    Date:____________Time:____________              Discharge Instructions            Chest Pain: Care Instructions  Your Care Instructions  There are many things that can cause chest pain. Some are not serious and will get better on their own in a few days. But some kinds of chest pain need more testing and treatment. Your doctor may have recommended a follow-up visit in the next 8 to 12 hours. If you are not getting better, you may need more tests or treatment. Even though your doctor has released you, you still need to watch for any problems. The doctor carefully checked you, but sometimes problems can develop later. If you have new symptoms or if your symptoms do not get better, get medical care right away. If you have worse or different chest pain or pressure that lasts more than 5 minutes or you passed out (lost consciousness), call 911 or seek other emergency help right away. A medical visit is only one step in your treatment.  Even if you feel better, you still need to do what your doctor recommends, such as going to all suggested follow-up appointments and taking medicines exactly as directed. This will help you recover and help prevent future problems. How can you care for yourself at home? · Rest until you feel better. · Take your medicine exactly as prescribed. Call your doctor if you think you are having a problem with your medicine. · Do not drive after taking a prescription pain medicine. When should you call for help? Call 911 if:  · You passed out (lost consciousness). · You have severe difficulty breathing. · You have symptoms of a heart attack. These may include:  ¨ Chest pain or pressure, or a strange feeling in your chest.  ¨ Sweating. ¨ Shortness of breath. ¨ Nausea or vomiting. ¨ Pain, pressure, or a strange feeling in your back, neck, jaw, or upper belly or in one or both shoulders or arms. ¨ Lightheadedness or sudden weakness. ¨ A fast or irregular heartbeat. After you call 911, the  may tell you to chew 1 adult-strength or 2 to 4 low-dose aspirin. Wait for an ambulance. Do not try to drive yourself. Call your doctor today if:  · You have any trouble breathing. · Your chest pain gets worse. · You are dizzy or lightheaded, or you feel like you may faint. · You are not getting better as expected. · You are having new or different chest pain. Where can you learn more? Go to [email or web address] and sign in to your [employer] Mark[employer] account. Enter A120 in the [facility] box to learn more about \"Chest Pain: Care Instructions. \"     If you do not have an account, please click on the \"Sign Up Now\" link. Current as of: March 20, 2017  Content Version: 11.3  © 5166-2738 [employer]. Care instructions adapted under license by [facility] ([facility]). If you have questions about a medical condition or this instruction, always ask your healthcare professional. [place] [address] any warranty or liability for your use of this information. Talking to others sometimes relieves stress. · Get involved in social groups, or volunteer to help others. Being alone sometimes makes things seem worse than they are. · Get at least 30 minutes of exercise on most days of the week to relieve stress. Walking is a good choice. You also may want to do other activities, such as running, swimming, cycling, or playing tennis or team sports. Relaxation techniques  Do relaxation exercises for 10 to 20 minutes a day. You can play soothing, relaxing music while you do them, if you wish. · Tell others in your house that you are going to do your relaxation exercises. Ask them not to disturb you. · Find a comfortable place, away from all distractions and noise. · Lie down on your back, or sit with your back straight. · Focus on your breathing. Make it slow and steady. · Breathe in through your nose. Breathe out through either your nose or mouth. · Breathe deeply, filling up the area between your navel and your rib cage. Breathe so that your belly goes up and down. · Do not hold your breath. · Breathe like this for 5 to 10 minutes. Notice the feeling of calmness throughout your whole body. As you continue to breathe slowly and deeply, relax by doing the following for another 5 to 10 minutes:  · Tighten and relax each muscle group in your body. You can begin at your toes and work your way up to your head. · Imagine your muscle groups relaxing and becoming heavy. · Empty your mind of all thoughts. · Let yourself relax more and more deeply. · Become aware of the state of calmness that surrounds you. · When your relaxation time is over, you can bring yourself back to alertness by moving your fingers and toes and then your hands and feet and then stretching and moving your entire body. Sometimes people fall asleep during relaxation, but they usually wake up shortly afterward.   · Always give yourself time to return to full alertness before you drive a car or do anything that might cause an accident if you are not fully alert. Never play a relaxation tape while driving a car. When should you call for help? Call 911 anytime you think you may need emergency care. For example, call if:  · You feel you cannot stop from hurting yourself or someone else. Watch closely for changes in your health, and be sure to contact your doctor if:  · Your panic attacks get worse. · You have new or different anxiety. · You are not getting better as expected. Where can you learn more? Go to https://Lightscape [email or web address] [email or web address] org and sign in to your [employer]ana account. Enter H601 in the [employer] box to learn more about \"Panic Attacks: Care Instructions. \"     If you do not have an account, please click on the \"Sign Up Now\" link. Current as of: July 26, 2016  Content Version: 11.3  © 7605-1586 [employer]. Care instructions adapted under license by [facility] ([facility]). If you have questions about a medical condition or this instruction, always ask your healthcare professional. [place] [address] any warranty or liability for your use of this information.

## 2017-10-06 NOTE — ED PROVIDER NOTES
[facility]     eMERGENCY dEPARTMENT eNCOUnter         Pt Name: Bunny Ortiz  MRN: [id number]  [place] 1970  Date of evaluation: 10/6/2017  Provider: Christopher Peters DO    CHIEF COMPLAINT       Chief Complaint   Patient presents with    Chest Pain    Anxiety       Nurses Notes reviewed and I agree except as noted in the HPI. HISTORY OF PRESENT ILLNESS    Bunny Ortiz is a 48 y.o. female who presents with chest pain, onset today. She rates it a 9/10 in severity and describes the pain as \"stabbing\". The patient was seen in our ED this morning, but was cleared. 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 She states her chest pain started at this time, and believes it is because of her anxiety. The patient admits to nausea and anxiety. She has history of DDD, lumbar spinal stenosis, and GERD. She denies shortness of breath, cough, vomiting, or other symptoms currently. This HPI was provided by the patient. REVIEW OF SYSTEMS     Review of Systems   Constitutional: Negative for appetite change, chills, fatigue and fever. HENT: Negative for congestion, ear pain, rhinorrhea and sore throat. Eyes: Negative for pain, discharge and visual disturbance. Respiratory: Negative for cough, shortness of breath and wheezing. Cardiovascular: Positive for chest pain. Negative for palpitations and leg swelling. Gastrointestinal: Positive for nausea. Negative for abdominal pain, diarrhea and vomiting. Genitourinary: Negative for difficulty urinating, dysuria and vaginal discharge. Musculoskeletal: Negative for arthralgias, back pain, joint swelling and neck pain. Skin: Negative for pallor and rash. Neurological: Negative for dizziness, syncope, weakness, light-headedness and headaches. Hematological: Negative for adenopathy.    Psychiatric/Behavioral: Negative for confusion, dysphoric mood and suicidal ideas. The patient is nervous/anxious. PAST MEDICAL HISTORY    has a past medical history of Anxiety; Arthritis; Asthma; Nuñez's esophagus; GERD (gastroesophageal reflux disease); Hypertension; Insomnia; Interstitial cystitis; Migraine; and Spinal stenosis of lumbar region. SURGICAL HISTORY      has a past surgical history that includes Abdomen surgery (93); ovarian cyst removal (93); Tubal ligation (93); Colonoscopy (2010); Endoscopy, colon, diagnostic (2010); Patellar tendon repair (Right, 1994); Dilatation, esophagus (2010); Cardiac catheterization (unsure); other surgical history (21 Nov 2014); Cholecystectomy; Appendectomy; other surgical history (N/A, 03-21-16); other surgical history (N/A, 04-04-16); other surgical history (Bilateral, 6-13-16); Upper gastrointestinal endoscopy (2012); and other surgical history (Left, 09/13/2016). CURRENT MEDICATIONS       Current Discharge Medication List      CONTINUE these medications which have NOT CHANGED    Details   tiZANidine (ZANAFLEX) 4 MG tablet Take 1 tablet by mouth every 8 hours as needed (muscle spasm)  Qty: 90 tablet, Refills: 0    Associated Diagnoses: Lumbar spinal stenosis; Myofascial pain dysfunction syndrome; Chronic pain syndrome      oxyCODONE-acetaminophen (PERCOCET)  MG per tablet Take 1 tablet by mouth every 6 hours as needed for Pain . Qty: 120 tablet, Refills: 0      gabapentin (NEURONTIN) 600 MG tablet Take 1 tablet by mouth 4 times daily  Qty: 120 tablet, Refills: 2    Associated Diagnoses: Lumbar spinal stenosis;  Myofascial pain dysfunction syndrome; Chronic pain syndrome; Spondylosis of lumbar region without myelopathy or radiculopathy      propranolol (INDERAL) 80 MG tablet Take 80 mg by mouth 2 times daily      traZODone (DESYREL) 150 MG tablet 150 mg nightly      triamterene-hydrochlorothiazide (MAXZIDE-25) 37.5-25 MG per tablet 37.5 tablets daily      pentosan polysulfate (ELMIRON) 100 MG capsule Take 1 capsule by mouth 3 times daily (before meals)  Qty: 90 capsule, Refills: 5    Associated Diagnoses: Interstitial cystitis      dexlansoprazole (DEXILANT) 60 MG CPDR capsule Take 1 capsule by mouth daily  Qty: 30 capsule, Refills: 5    Associated Diagnoses: Gastroesophageal reflux disease with esophagitis; Nuñez's esophagus with dysplasia; Gastrointestinal hemorrhage, unspecified gastrointestinal hemorrhage type      topiramate (TOPAMAX) 100 MG tablet Take 1 tablet by mouth 2 times daily  Qty: 60 tablet, Refills: 5      albuterol (PROVENTIL HFA;VENTOLIN HFA) 108 (90 BASE) MCG/ACT inhaler Inhale 2 puffs into the lungs every 4 hours as needed for Wheezing or Shortness of Breath Whichever brand is covered by insurance, substitute as necessary. Qty: 1 Inhaler, Refills: 2    Associated Diagnoses: Asthma             ALLERGIES     is allergic to adhesive tape; compazine [prochlorperazine]; erythromycin; lyrica [pregabalin]; morphine; reglan [metoclopramide]; and sulfa antibiotics. FAMILY HISTORY     indicated that her mother is alive. She indicated that her father is . She indicated that the status of her brother is unknown.  family history includes Heart Attack in her brother; Heart Disease in her father and mother; Inflam Bowel Dis in her mother. SOCIAL HISTORY      reports that she has never smoked. She has never used smokeless tobacco. She reports that she drinks alcohol. She reports that she does not use illicit drugs. PHYSICAL EXAM     ED Triage Vitals   BP Temp Temp Source Pulse Resp SpO2 Height Weight   10/06/17 1255 10/06/17 1250 10/06/17 1250 10/06/17 1250 10/06/17 1250 10/06/17 1250 10/06/17 1250 10/06/17 1250   154/120 98.4 °F (36.9 °C) Oral 83 22 100 % 5' 6\" (1.676 m) 195 lb (88.5 kg)      Physical Exam   Constitutional: She is oriented to person, place, and time. She appears well-developed and well-nourished. HENT:   Head: Normocephalic and atraumatic.    Right Ear: External ear normal.   Left Ear: External ear normal.   Eyes: Conjunctivae are normal. Right eye exhibits no discharge. Left eye exhibits no discharge. No scleral icterus. Neck: Normal range of motion. Neck supple. No JVD present. Cardiovascular: Normal rate, regular rhythm and normal heart sounds. Exam reveals no gallop and no friction rub. No murmur heard. Pulmonary/Chest: Effort normal and breath sounds normal. No respiratory distress. She has no decreased breath sounds. She has no wheezes. She has no rhonchi. She has no rales. She exhibits no tenderness. Abdominal: Soft. She exhibits no distension. There is no tenderness. There is no rebound and no guarding. Musculoskeletal: Normal range of motion. She exhibits no edema. Neurological: She is alert and oriented to person, place, and time. She exhibits normal muscle tone. She displays no seizure activity. GCS eye subscore is 4. GCS verbal subscore is 5. GCS motor subscore is 6. Skin: Skin is warm and dry. No rash noted. She is not diaphoretic. Psychiatric: She has a normal mood and affect. Her behavior is normal. Thought content normal.   Nursing note and vitals reviewed. DIFFERENTIAL DIAGNOSIS:   Including but not limited to: ACS, musculoskeletal disease or anxiety. DIAGNOSTIC RESULTS     EKG: All EKG's are interpreted by the Emergency Department Physician who either signs or Co-signs this chart in the absence of a cardiologist.  EKG interpreted by Jamal Barahona DO:    [place]. Rate: 84 bpm  OK interval: 150 ms  QRS duration: 86 ms  QTc: 456 ms  P-R-T axes: 34, 61, 25  Normal sinus rhythm  No STEMI    RADIOLOGY: non-plain film images(s) such as CT, Ultrasound and MRI are read by the radiologist.    XR CHEST STANDARD (2 VW)   Final Result   No acute findings, unchanged from the previous study done earlier in the day. **This report has been created using voice recognition software.   It may contain minor errors which are inherent in voice recognition technology. **      Final report electronically signed by Dr. Po Abdi on 10/6/2017 2:36 PM          [x] Visualized and interpreted by me   [x] Radiologist's Wet Read Report Reviewed   [] Discussed with RadiologistNatalia Harding:   Results for orders placed or performed during the hospital encounter of 10/06/17   CBC auto differential   Result Value Ref Range    WBC 5.3 4.8 - 10.8 thou/mm3    RBC 5.14 4.20 - 5.40 mill/mm3    Hemoglobin 14.6 12.0 - 16.0 gm/dl    Hematocrit 43.7 37.0 - 47.0 %    MCV 85.1 81.0 - 99.0 fL    MCH 28.4 27.0 - 31.0 pg    MCHC 33.3 33.0 - 37.0 gm/dl    RDW 14.3 11.5 - 14.5 %    Platelets 324 923 - 056 thou/mm3    MPV 9.2 7.4 - 10.4 mcm    RBC Morphology NORMAL     Seg Neutrophils 50.6 %    Lymphocytes 40.0 %    Monocytes 7.6 %    Eosinophils 1.3 %    Basophils 0.5 %    nRBC 0 /100 wbc    Segs Absolute 2.7 1.8 - 7.7 thou/mm3    Lymphocytes # 2.1 1.0 - 4.8 thou/mm3    Monocytes # 0.4 0.4 - 1.3 thou/mm3    Eosinophils # 0.1 0.0 - 0.4 thou/mm3    Basophils # 0.0 0.0 - 0.1 thou/mm3   Basic metabolic panel   Result Value Ref Range    Sodium 142 135 - 145 meq/L    Potassium 4.2 3.5 - 5.2 meq/L    Chloride 104 98 - 111 meq/L    CO2 25 23 - 33 meq/L    Glucose 96 70 - 108 mg/dL    BUN 12 7 - 22 mg/dL    CREATININE 0.6 0.4 - 1.2 mg/dL    Calcium 9.5 8.5 - 10.5 mg/dL   Troponin   Result Value Ref Range    Troponin T < 0.010 ng/ml   Anion Gap   Result Value Ref Range    Anion Gap 13.0 8.0 - 16.0 meq/L   Osmolality   Result Value Ref Range    Osmolality Calc 282.7 275.0 - 300 mOsmol/kg   Glomerular Filtration Rate, Estimated   Result Value Ref Range    Est, Glom Filt Rate >90 ml/min/1.73m2       EMERGENCY DEPARTMENT COURSE:   Vitals:    Vitals:    10/06/17 1250 10/06/17 1255 10/06/17 1418 10/06/17 1510   BP:  (!) 154/120 (!) 166/107 95/66   Pulse: 83  91 88   Resp: 22 17 16   Temp: 98.4 °F (36.9 °C)      TempSrc: Oral      SpO2: 100%  99% 100%   Weight: 195 lb (88.5 kg)      Height: 5' 6\" (1.676 m) Orders Placed This Encounter   Medications    aspirin chewable tablet 324 mg    LORazepam (ATIVAN) injection 1 mg    HYDROcodone-acetaminophen (NORCO) 5-325 MG per tablet 1 tablet    promethazine (PHENERGAN) tablet 12.5 mg       MDM: The patient is a 49-year-old female who is complaining of chest pain. Patient has had a cardiac catheter past but does not have any stents. Patient does appear very anxious on exam.  We will do a cardiac workup and obtain a CBC and BMP. We'll see the patient Ativan and nausea control. Emergency Department course: Workup was unremarkable. The patient's cardiologist is Dr. Zofia Dexter who scheduled to have the patient receive a stress test in the future. Patient was discussed with Dr. Yeimy Tierney who agreed to admit the patient. CRITICAL CARE:  None. CONSULTS:  None. PROCEDURES:  None. FINAL IMPRESSION      1. Chest pain, unspecified type          DISPOSITION/PLAN   admitted    PATIENT REFERRED TO:  Zak Garcia MD  [facility] [phone]            DISCHARGE MEDICATIONS:  Current Discharge Medication List          Scribe:  Shea Waters 10/6/17 1:46 PM Scribing for and in the presence of Jamal Barahona DO. Signed by: Clover Beal, 10/06/17 3:42 PM    Provider:  I personally performed the services described in the documentation, reviewed and edited the documentation which was dictated to the scribe in my presence, and it accurately records my words and actions.     Jamal Barahona DO 10/6/17 3:42 PM                   Jamal Barahona DO  Resident  10/06/17 [id number]

## 2017-10-06 NOTE — ED NOTES
Patient states \"the only thing that really helped me last night was the ativan\"     Carlos Medina RN  10/06/17 [id number]

## 2017-10-07 VITALS
HEIGHT: 66 IN | SYSTOLIC BLOOD PRESSURE: 106 MMHG | WEIGHT: 195 LBS | OXYGEN SATURATION: 98 % | TEMPERATURE: 98.7 F | BODY MASS INDEX: 31.34 KG/M2 | RESPIRATION RATE: 18 BRPM | DIASTOLIC BLOOD PRESSURE: 71 MMHG | HEART RATE: 67 BPM

## 2017-10-07 LAB
CHOLESTEROL, TOTAL: 172 MG/DL (ref 100–199)
EKG ATRIAL RATE: 84 BPM
EKG ATRIAL RATE: 87 BPM
EKG P AXIS: 34 DEGREES
EKG P AXIS: 71 DEGREES
EKG P-R INTERVAL: 150 MS
EKG P-R INTERVAL: 150 MS
EKG Q-T INTERVAL: 364 MS
EKG Q-T INTERVAL: 386 MS
EKG QRS DURATION: 82 MS
EKG QRS DURATION: 86 MS
EKG QTC CALCULATION (BAZETT): 438 MS
EKG QTC CALCULATION (BAZETT): 456 MS
EKG R AXIS: 61 DEGREES
EKG R AXIS: 67 DEGREES
EKG T AXIS: 16 DEGREES
EKG T AXIS: 25 DEGREES
EKG VENTRICULAR RATE: 84 BPM
EKG VENTRICULAR RATE: 87 BPM
HDLC SERPL-MCNC: 42 MG/DL
LDL CHOLESTEROL CALCULATED: 99 MG/DL
LV EF: 60 %
LVEF MODALITY: NORMAL
TRIGL SERPL-MCNC: 154 MG/DL (ref 0–199)

## 2017-10-07 PROCEDURE — 6360000002 HC RX W HCPCS: Performed by: NURSE PRACTITIONER

## 2017-10-07 PROCEDURE — 96375 TX/PRO/DX INJ NEW DRUG ADDON: CPT

## 2017-10-07 PROCEDURE — 78452 HT MUSCLE IMAGE SPECT MULT: CPT

## 2017-10-07 PROCEDURE — G0378 HOSPITAL OBSERVATION PER HR: HCPCS

## 2017-10-07 PROCEDURE — 93017 CV STRESS TEST TRACING ONLY: CPT

## 2017-10-07 PROCEDURE — 3430000000 HC RX DIAGNOSTIC RADIOPHARMACEUTICAL: Performed by: NUCLEAR MEDICINE

## 2017-10-07 PROCEDURE — 93306 TTE W/DOPPLER COMPLETE: CPT

## 2017-10-07 PROCEDURE — A9500 TC99M SESTAMIBI: HCPCS | Performed by: NUCLEAR MEDICINE

## 2017-10-07 PROCEDURE — 6360000002 HC RX W HCPCS

## 2017-10-07 PROCEDURE — 96374 THER/PROPH/DIAG INJ IV PUSH: CPT | Performed by: NUCLEAR MEDICINE

## 2017-10-07 PROCEDURE — 93017 CV STRESS TEST TRACING ONLY: CPT | Performed by: NUCLEAR MEDICINE

## 2017-10-07 PROCEDURE — 80061 LIPID PANEL: CPT

## 2017-10-07 PROCEDURE — 99217 PR OBSERVATION CARE DISCHARGE MANAGEMENT: CPT | Performed by: NURSE PRACTITIONER

## 2017-10-07 PROCEDURE — 36415 COLL VENOUS BLD VENIPUNCTURE: CPT

## 2017-10-07 PROCEDURE — 6370000000 HC RX 637 (ALT 250 FOR IP): Performed by: NUCLEAR MEDICINE

## 2017-10-07 PROCEDURE — 2580000003 HC RX 258: Performed by: NUCLEAR MEDICINE

## 2017-10-07 RX ORDER — KETOROLAC TROMETHAMINE 30 MG/ML
30 INJECTION, SOLUTION INTRAMUSCULAR; INTRAVENOUS ONCE
Status: COMPLETED | OUTPATIENT
Start: 2017-10-07 | End: 2017-10-07

## 2017-10-07 RX ORDER — METHYLPREDNISOLONE SODIUM SUCCINATE 40 MG/ML
40 INJECTION, POWDER, LYOPHILIZED, FOR SOLUTION INTRAMUSCULAR; INTRAVENOUS EVERY 6 HOURS
Status: DISCONTINUED | OUTPATIENT
Start: 2017-10-07 | End: 2017-10-07 | Stop reason: HOSPADM

## 2017-10-07 RX ADMIN — ASPIRIN 81 MG: 81 TABLET, CHEWABLE ORAL at 07:59

## 2017-10-07 RX ADMIN — PENTOSAN POLYSULFATE SODIUM 100 MG: 100 CAPSULE, GELATIN COATED ORAL at 11:59

## 2017-10-07 RX ADMIN — PROPRANOLOL HYDROCHLORIDE 80 MG: 40 TABLET ORAL at 11:34

## 2017-10-07 RX ADMIN — METHYLPREDNISOLONE SODIUM SUCCINATE 40 MG: 40 INJECTION, POWDER, FOR SOLUTION INTRAMUSCULAR; INTRAVENOUS at 11:31

## 2017-10-07 RX ADMIN — Medication 10 ML: at 08:05

## 2017-10-07 RX ADMIN — GABAPENTIN 600 MG: 600 TABLET ORAL at 08:03

## 2017-10-07 RX ADMIN — KETOROLAC TROMETHAMINE 30 MG: 30 INJECTION, SOLUTION INTRAMUSCULAR at 11:30

## 2017-10-07 RX ADMIN — ALPRAZOLAM 0.25 MG: 0.25 TABLET ORAL at 04:00

## 2017-10-07 RX ADMIN — TOPIRAMATE 100 MG: 100 TABLET, FILM COATED ORAL at 08:02

## 2017-10-07 RX ADMIN — PENTOSAN POLYSULFATE SODIUM 100 MG: 100 CAPSULE, GELATIN COATED ORAL at 08:03

## 2017-10-07 RX ADMIN — Medication 35 MILLICURIE: at 10:10

## 2017-10-07 RX ADMIN — OXYCODONE HYDROCHLORIDE AND ACETAMINOPHEN 1 TABLET: 10; 325 TABLET ORAL at 04:00

## 2017-10-07 RX ADMIN — TRIAMTERENE AND HYDROCHLOROTHIAZIDE 1 TABLET: 37.5; 25 TABLET ORAL at 11:35

## 2017-10-07 RX ADMIN — Medication 10.7 MILLICURIE: at 08:35

## 2017-10-07 RX ADMIN — PANTOPRAZOLE SODIUM 40 MG: 40 TABLET, DELAYED RELEASE ORAL at 08:03

## 2017-10-07 RX ADMIN — Medication 10 ML: at 11:38

## 2017-10-07 RX ADMIN — GABAPENTIN 600 MG: 600 TABLET ORAL at 11:30

## 2017-10-07 ASSESSMENT — PAIN SCALES - GENERAL
PAINLEVEL_OUTOF10: 7
PAINLEVEL_OUTOF10: 3
PAINLEVEL_OUTOF10: 3

## 2017-10-07 ASSESSMENT — PAIN DESCRIPTION - FREQUENCY: FREQUENCY: CONTINUOUS

## 2017-10-07 ASSESSMENT — PAIN DESCRIPTION - ORIENTATION: ORIENTATION: LEFT

## 2017-10-07 ASSESSMENT — PAIN DESCRIPTION - PAIN TYPE: TYPE: ACUTE PAIN

## 2017-10-07 ASSESSMENT — PAIN DESCRIPTION - ONSET: ONSET: ON-GOING

## 2017-10-07 ASSESSMENT — PAIN DESCRIPTION - LOCATION: LOCATION: CHEST

## 2017-10-07 ASSESSMENT — PAIN DESCRIPTION - DESCRIPTORS: DESCRIPTORS: SHARP;SHOOTING

## 2017-10-07 NOTE — PROGRESS NOTES
Educated on discharge instructions, medications, and follow up appointments. No further questions or concerns voiced. Discharged home.

## 2017-10-09 RX ORDER — LORAZEPAM 0.5 MG/1
0.5 TABLET ORAL EVERY 8 HOURS PRN
Qty: 21 TABLET | Refills: 0 | Status: SHIPPED | OUTPATIENT
Start: 2017-10-09 | End: 2017-10-16

## 2017-10-09 NOTE — TELEPHONE ENCOUNTER
----- Message from Michael Cook MD sent at 10/6/2017  5:12 PM EDT -----  Ativan 0.5 tid prn, 1 week only

## 2017-10-12 ENCOUNTER — TELEPHONE (OUTPATIENT)
Dept: PSYCHIATRY | Age: 47
End: 2017-10-12

## 2017-10-12 NOTE — TELEPHONE ENCOUNTER
Patient states she's still having really bad anxiety, stress and scared she's . Believe ATIVAN 0.5MG is not working, wants to increase dosage. Patient also states she is having chest pain and went to ED twice regarding chest pains. I did let patient know Ativan can take a few weeks before she starts to notices changes with condition. I also offered earlier appointment so patient can discuss with you but she wanted to wait until after encounter was addressed.

## 2017-10-13 DIAGNOSIS — M47.816 SPONDYLOSIS OF LUMBAR REGION WITHOUT MYELOPATHY OR RADICULOPATHY: ICD-10-CM

## 2017-10-13 DIAGNOSIS — M79.18 MYOFASCIAL PAIN DYSFUNCTION SYNDROME: ICD-10-CM

## 2017-10-13 DIAGNOSIS — G89.4 CHRONIC PAIN SYNDROME: ICD-10-CM

## 2017-10-13 DIAGNOSIS — M48.061 LUMBAR SPINAL STENOSIS: ICD-10-CM

## 2017-10-13 RX ORDER — OXYCODONE AND ACETAMINOPHEN 10; 325 MG/1; MG/1
1 TABLET ORAL EVERY 6 HOURS PRN
Qty: 120 TABLET | Refills: 0 | Status: SHIPPED | OUTPATIENT
Start: 2017-10-18 | End: 2017-11-13 | Stop reason: SDUPTHER

## 2017-10-13 RX ORDER — GABAPENTIN 600 MG/1
600 TABLET ORAL 4 TIMES DAILY
Qty: 120 TABLET | Refills: 2 | Status: SHIPPED | OUTPATIENT
Start: 2017-10-21 | End: 2018-02-19 | Stop reason: SDUPTHER

## 2017-10-13 RX ORDER — TIZANIDINE 4 MG/1
4 TABLET ORAL EVERY 8 HOURS PRN
Qty: 90 TABLET | Refills: 0 | Status: SHIPPED | OUTPATIENT
Start: 2017-10-18 | End: 2017-11-13 | Stop reason: SDUPTHER

## 2017-10-17 ENCOUNTER — TELEPHONE (OUTPATIENT)
Dept: PSYCHIATRY | Age: 47
End: 2017-10-17

## 2017-10-19 NOTE — DISCHARGE SUMMARY
triamterene-hydrochlorothiazide  1 tablet Oral Daily         Infusions Meds           PRN Medications       sodium chloride flush 10 mL PRN   acetaminophen 650 mg Q4H PRN   magnesium hydroxide 30 mL Daily PRN   ondansetron 4 mg Q6H PRN   nitroGLYCERIN 0.4 mg Q5 Min PRN   ALPRAZolam 0.25 mg BID PRN   albuterol sulfate HFA 2 puff Q4H PRN   oxyCODONE-acetaminophen 1 tablet Q6H PRN   tiZANidine 4 mg Q8H PRN            Diagnostics:  EK-OCT-2017 12:46:11 [facility]  Normal sinus rhythm  Normal ECG  When compared with ECG of 06-OCT-2017 01:21,  No significant change was found     Echo: 2015  Left ventricle:  Size was normal.  Systolic function was normal. Ejection fraction was estimated in the range of 55 % to 65 %. There were no regional wall motion abnormalities. Right ventricle:  Systolic pressure was mildly increased. Estimated peak pressure was in the range of 30 mmHg to 35 mmHg. Mitral valve: There was mild regurgitation. Tricuspid valve: There was mild to moderate regurgitation. Pericardium:  Cannot rule out a small pericardial effusion was along the right ventricular free wall.     Pulmonary arteries:  Systolic pressure was at the upper limits of normal.    Prepared and signed by    Dilia Barragan MD  Signed [id number]         Stress: has been scheduled but she never shown to have done     Left Heart Cath: unknown        Lab Data:     Cardiac Enzymes:  No results for input(s): CKTOTAL, CKMB, CKMBINDEX, TROPONINI in the last 72 hours.     CBC:         Lab Results   Component Value Date     WBC 5.3 10/06/2017     RBC 5.14 10/06/2017     HGB 14.6 10/06/2017     HCT 43.7 10/06/2017      10/06/2017               CMP:  Lab Results   Component Value Date      10/06/2017     K 4.2 10/06/2017      10/06/2017     CO2 25 10/06/2017     BUN 12 10/06/2017     CREATININE 0.6 10/06/2017     LABGLOM >90 10/06/2017     GLUCOSE 96 10/06/2017     CALCIUM 9.5 10/06/2017        Hepatic Function Panel:  Lab Results   Component Value Date     ALKPHOS 67 10/06/2017     ALT 42 10/06/2017     AST 34 10/06/2017     PROT 6.9 10/06/2017     BILITOT <0.2 10/06/2017     BILIDIR <0.2 10/06/2017     LABALBU 4.2 10/06/2017         Magnesium:          Lab Results   Component Value Date     MG 2.1 07/11/2017         PT/INR:          Lab Results   Component Value Date     INR 1.00 05/28/2017         HgBA1c:  No results found for: LABA1C           FLP:  Lab Results   Component Value Date     TRIG 154 10/07/2017     HDL 42 10/07/2017     LDLCALC 99 10/07/2017         TSH:          Lab Results   Component Value Date     TSH 1.220 07/11/2017            Assessment:     Chest pains - atypical                         Trop negative        Hx DDD  Hx anxiety / depression - followed by Dr. Mohsen Jane  Hx GERD / Barretts esophagus        Plan:     Stress and echo today  Chest pains are muscular in nature- treat with toradol and solumedrol     Should stress be normal then she can be DC and follow up as OP              Electronically signed by Cher Andrade CNP on 10/7/2017 at 7:46 AM

## 2017-11-13 ENCOUNTER — OFFICE VISIT (OUTPATIENT)
Dept: PHYSICAL MEDICINE AND REHAB | Age: 47
End: 2017-11-13
Payer: COMMERCIAL

## 2017-11-13 VITALS
HEART RATE: 89 BPM | DIASTOLIC BLOOD PRESSURE: 93 MMHG | SYSTOLIC BLOOD PRESSURE: 144 MMHG | BODY MASS INDEX: 31.36 KG/M2 | HEIGHT: 66 IN | WEIGHT: 195.11 LBS

## 2017-11-13 DIAGNOSIS — M51.36 DDD (DEGENERATIVE DISC DISEASE), LUMBAR: ICD-10-CM

## 2017-11-13 DIAGNOSIS — M51.26 LUMBAR DISC HERNIATION: ICD-10-CM

## 2017-11-13 DIAGNOSIS — M79.18 MYOFASCIAL PAIN DYSFUNCTION SYNDROME: ICD-10-CM

## 2017-11-13 DIAGNOSIS — M48.062 SPINAL STENOSIS OF LUMBAR REGION WITH NEUROGENIC CLAUDICATION: Primary | ICD-10-CM

## 2017-11-13 DIAGNOSIS — G89.4 CHRONIC PAIN SYNDROME: ICD-10-CM

## 2017-11-13 DIAGNOSIS — M47.816 SPONDYLOSIS OF LUMBAR REGION WITHOUT MYELOPATHY OR RADICULOPATHY: ICD-10-CM

## 2017-11-13 PROCEDURE — 1036F TOBACCO NON-USER: CPT | Performed by: NURSE PRACTITIONER

## 2017-11-13 PROCEDURE — G8484 FLU IMMUNIZE NO ADMIN: HCPCS | Performed by: NURSE PRACTITIONER

## 2017-11-13 PROCEDURE — G8427 DOCREV CUR MEDS BY ELIG CLIN: HCPCS | Performed by: NURSE PRACTITIONER

## 2017-11-13 PROCEDURE — 99213 OFFICE O/P EST LOW 20 MIN: CPT | Performed by: NURSE PRACTITIONER

## 2017-11-13 PROCEDURE — G8417 CALC BMI ABV UP PARAM F/U: HCPCS | Performed by: NURSE PRACTITIONER

## 2017-11-13 RX ORDER — OXYCODONE AND ACETAMINOPHEN 10; 325 MG/1; MG/1
1 TABLET ORAL EVERY 6 HOURS PRN
Qty: 120 TABLET | Refills: 0 | Status: SHIPPED | OUTPATIENT
Start: 2017-11-17 | End: 2017-12-17

## 2017-11-13 RX ORDER — TIZANIDINE 4 MG/1
4 TABLET ORAL EVERY 6 HOURS PRN
Qty: 120 TABLET | Refills: 0 | Status: SHIPPED | OUTPATIENT
Start: 2017-11-17 | End: 2017-12-18 | Stop reason: SDUPTHER

## 2017-11-13 ASSESSMENT — ENCOUNTER SYMPTOMS: BACK PAIN: 1

## 2017-11-13 NOTE — PROGRESS NOTES
[facility]  [address]  Dept: [phone]  Dept Fax: [phone]: [phone]    Visit Date: 11/13/2017    Functionality Assessment/Goals Worksheet     On a scale of 0 (Does not Interfere) to 10 (Completely Interferes)     1. Which number describes how during the past week pain has interfered with       the following:  A. General Activity:  6  B. Mood: 6  C. Walking Ability:  6  D. Normal Work (Includes both work outside the home and housework):  9  E. Relations with Other People:   2  F. Sleep:   6  G. Enjoyment of Life:   6    2. Patient Prefers to Take their Pain Medications:     [x]  On a regular basis   []  Only when necessary    []  Does not take pain medications    3. What are the Patient's Goals/Expectations for Visiting Pain Management? []  Learn about my pain    [x]  Receive Medication   []  Physical Therapy     []  Treat Depression   []  Receive Injections    []  Treat Sleep   []  Deal with Anxiety and Stress   []  Treat Opoid Dependence/Addiction   []  Other:      HPI:   Terry Sweet is a 48 y.o. female is here today for    Chief Complaint:  Lower back pain     HPI   3.5 month FU. Continues to have lower back pain radiates down bilateral legs. Patient states that she has lost 40 lbs so pain is improved since last visit. Patient trying to get a second opinion from another ortho physician. Percocet QID prn is effective along with Neurontin. Taking Mobic which helps     Medications reviewed. Patient denies  side effects with medications. Patient states she is  taking medications as prescribed. She denies receiving pain medications from other sources. She had 2 ER visits for chest pain any ER visits since last visit. Pain scale with out pain medications is 8/10. Pain scale with pain medications is  4/10.   Last dose of Percocet was 11/12/2017- pm     Drug screen reviewed from 7/25/2017- + for Oxycodone and Norhydrocodone- patient denies taking anything else but her medications- educated in past     The patient is allergic to adhesive tape; compazine [prochlorperazine]; erythromycin; lyrica [pregabalin]; morphine; reglan [metoclopramide]; and sulfa antibiotics. Past Medical History  Gail Quarles  has a past medical history of Anxiety; Arthritis; Asthma; Nuñez's esophagus; GERD (gastroesophageal reflux disease); Hypertension; Insomnia; Interstitial cystitis; Migraine; and Spinal stenosis of lumbar region. Past Surgical History  The patient  has a past surgical history that includes Abdomen surgery (93); ovarian cyst removal (93); Tubal ligation (93); Colonoscopy (2010); Endoscopy, colon, diagnostic (2010); Patellar tendon repair (Right, 1994); Dilatation, esophagus (2010); Cardiac catheterization (unsure); other surgical history (21 Nov 2014); Cholecystectomy; Appendectomy; other surgical history (N/A, 03-21-16); other surgical history (N/A, 04-04-16); other surgical history (Bilateral, 6-13-16); Upper gastrointestinal endoscopy (2012); and other surgical history (Left, 09/13/2016). Family History  This patient's family history includes Heart Attack in her brother; Heart Disease in her father and mother; Inflam Bowel Dis in her mother. Social History  Gail Quarles  reports that she has never smoked. She has never used smokeless tobacco. She reports that she drinks alcohol. She reports that she does not use drugs.     Medications    Current Outpatient Prescriptions:     [START ON 11/17/2017] tiZANidine (ZANAFLEX) 4 MG tablet, Take 1 tablet by mouth every 6 hours as needed (muscle spasm), Disp: 120 tablet, Rfl: 0    [START ON 11/17/2017] oxyCODONE-acetaminophen (PERCOCET)  MG per tablet, Take 1 tablet by mouth every 6 hours as needed for Pain ., Disp: 120 tablet, Rfl: 0    gabapentin (NEURONTIN) 600 MG tablet, Take 1 tablet by mouth 4 times daily Fill on or after 10/21/2017, Disp: 120 tablet, Rfl: 2    propranolol (INDERAL) 80 MG tablet, Take 80 mg by mouth 2 times daily, Disp: , Rfl:     traZODone (DESYREL) 150 MG tablet, 150 mg nightly, Disp: , Rfl:     triamterene-hydrochlorothiazide (MAXZIDE-25) 37.5-25 MG per tablet, 1 tablet daily , Disp: , Rfl:     pentosan polysulfate (ELMIRON) 100 MG capsule, Take 1 capsule by mouth 3 times daily (before meals), Disp: 90 capsule, Rfl: 5    dexlansoprazole (DEXILANT) 60 MG CPDR capsule, Take 1 capsule by mouth daily, Disp: 30 capsule, Rfl: 5    topiramate (TOPAMAX) 100 MG tablet, Take 1 tablet by mouth 2 times daily, Disp: 60 tablet, Rfl: 5    albuterol (PROVENTIL HFA;VENTOLIN HFA) 108 (90 BASE) MCG/ACT inhaler, Inhale 2 puffs into the lungs every 4 hours as needed for Wheezing or Shortness of Breath Whichever brand is covered by insurance, substitute as necessary. , Disp: 1 Inhaler, Rfl: 2    Subjective:      Review of Systems   Musculoskeletal: Positive for arthralgias, back pain, gait problem and myalgias. Neurological: Positive for weakness and numbness. Objective:     Vitals:    11/13/17 1140   BP: (!) 144/93   Site: Left Arm   Position: Sitting   Pulse: 89   Weight: 195 lb 1.7 oz (88.5 kg)   Height: 5' 5.75\" (1.67 m)       Physical Exam   Constitutional: She is oriented to person, place, and time. She appears well-developed and well-nourished. No distress. HENT:   Head: Normocephalic and atraumatic. Right Ear: External ear normal.   Left Ear: External ear normal.   Nose: Nose normal.   Mouth/Throat: Oropharynx is clear and moist. No oropharyngeal exudate. Eyes: Conjunctivae and EOM are normal. Pupils are equal, round, and reactive to light. Right eye exhibits no discharge. Left eye exhibits no discharge. No scleral icterus. Neck: Normal range of motion and full passive range of motion without pain. Neck supple. No muscular tenderness present. No neck rigidity. No edema, no erythema and normal range of motion present.  No thyromegaly 4. DDD (degenerative disc disease), lumbar    5. Lumbar disc herniation    6. Chronic pain syndrome            Plan:      · OARRS reviewed. · Discussed long term side effects of medications. · Discussed tolerance, dependency and addiction. · Previous UDS reviewed. · UDS preformed today for compliance. · Patient told can not receive any pain medications from any other source. · Discussed with pt.may not be pain free. · No evidence of abuse, diversion or aberrant behavior. · Medications and/or procedures improve function and quality of life. · Has had L-facet MBB and LESI with no improvement   · Nothing more to offer at this time with procedures   · Continue Percocet 10/325 QID prn  · Continue Neurontin  · Dr. Max Markham recommended surgery, patient wants a second opinion   · Ordered refill of medications  · Increased Zanaflex to 4 mg QID prn for spasms     Meds. Prescribed:   Orders Placed This Encounter   Medications    tiZANidine (ZANAFLEX) 4 MG tablet     Sig: Take 1 tablet by mouth every 6 hours as needed (muscle spasm)     Dispense:  120 tablet     Refill:  0    oxyCODONE-acetaminophen (PERCOCET)  MG per tablet     Sig: Take 1 tablet by mouth every 6 hours as needed for Pain . Dispense:  120 tablet     Refill:  0       Return in about 10 weeks (around 1/22/2018), or if symptoms worsen or fail to improve, for follow up  for medications.          Electronically signed by Gail Rosario CNP on 11/13/2017 at 12:17 PM

## 2017-12-18 DIAGNOSIS — G89.4 CHRONIC PAIN SYNDROME: ICD-10-CM

## 2017-12-18 DIAGNOSIS — M79.18 MYOFASCIAL PAIN DYSFUNCTION SYNDROME: ICD-10-CM

## 2017-12-18 DIAGNOSIS — M48.062 SPINAL STENOSIS OF LUMBAR REGION WITH NEUROGENIC CLAUDICATION: ICD-10-CM

## 2017-12-18 RX ORDER — TIZANIDINE 4 MG/1
4 TABLET ORAL EVERY 6 HOURS PRN
Qty: 120 TABLET | Refills: 0 | Status: SHIPPED | OUTPATIENT
Start: 2017-12-18 | End: 2018-01-18 | Stop reason: SDUPTHER

## 2017-12-18 RX ORDER — OXYCODONE AND ACETAMINOPHEN 10; 325 MG/1; MG/1
1 TABLET ORAL EVERY 6 HOURS PRN
Qty: 120 TABLET | Refills: 0 | Status: SHIPPED | OUTPATIENT
Start: 2017-12-18 | End: 2018-01-18 | Stop reason: SDUPTHER

## 2018-01-18 DIAGNOSIS — M79.18 MYOFASCIAL PAIN DYSFUNCTION SYNDROME: ICD-10-CM

## 2018-01-18 DIAGNOSIS — M48.062 SPINAL STENOSIS OF LUMBAR REGION WITH NEUROGENIC CLAUDICATION: ICD-10-CM

## 2018-01-18 DIAGNOSIS — G89.4 CHRONIC PAIN SYNDROME: ICD-10-CM

## 2018-01-18 RX ORDER — OXYCODONE AND ACETAMINOPHEN 10; 325 MG/1; MG/1
1 TABLET ORAL EVERY 6 HOURS PRN
Qty: 120 TABLET | Refills: 0 | Status: SHIPPED | OUTPATIENT
Start: 2018-01-18 | End: 2018-02-17

## 2018-01-18 RX ORDER — TIZANIDINE 4 MG/1
4 TABLET ORAL EVERY 6 HOURS PRN
Qty: 120 TABLET | Refills: 0 | Status: SHIPPED | OUTPATIENT
Start: 2018-01-18 | End: 2018-02-19 | Stop reason: SDUPTHER

## 2018-01-29 ENCOUNTER — OFFICE VISIT (OUTPATIENT)
Dept: PHYSICAL MEDICINE AND REHAB | Age: 48
End: 2018-01-29
Payer: COMMERCIAL

## 2018-01-29 VITALS
WEIGHT: 195.11 LBS | SYSTOLIC BLOOD PRESSURE: 139 MMHG | HEART RATE: 81 BPM | HEIGHT: 66 IN | DIASTOLIC BLOOD PRESSURE: 89 MMHG | BODY MASS INDEX: 31.36 KG/M2

## 2018-01-29 DIAGNOSIS — R20.2 LEFT HAND PARESTHESIA: ICD-10-CM

## 2018-01-29 DIAGNOSIS — M48.062 SPINAL STENOSIS OF LUMBAR REGION WITH NEUROGENIC CLAUDICATION: Primary | ICD-10-CM

## 2018-01-29 DIAGNOSIS — M51.26 LUMBAR DISC HERNIATION: ICD-10-CM

## 2018-01-29 DIAGNOSIS — R20.2 NUMBNESS AND TINGLING IN LEFT HAND: ICD-10-CM

## 2018-01-29 DIAGNOSIS — R20.0 NUMBNESS AND TINGLING IN LEFT HAND: ICD-10-CM

## 2018-01-29 DIAGNOSIS — M79.18 MYOFASCIAL PAIN DYSFUNCTION SYNDROME: ICD-10-CM

## 2018-01-29 DIAGNOSIS — G89.4 CHRONIC PAIN SYNDROME: ICD-10-CM

## 2018-01-29 DIAGNOSIS — M54.16 LUMBAR RADICULITIS: ICD-10-CM

## 2018-01-29 DIAGNOSIS — M47.816 SPONDYLOSIS OF LUMBAR REGION WITHOUT MYELOPATHY OR RADICULOPATHY: ICD-10-CM

## 2018-01-29 PROCEDURE — 1036F TOBACCO NON-USER: CPT | Performed by: NURSE PRACTITIONER

## 2018-01-29 PROCEDURE — G8427 DOCREV CUR MEDS BY ELIG CLIN: HCPCS | Performed by: NURSE PRACTITIONER

## 2018-01-29 PROCEDURE — G8417 CALC BMI ABV UP PARAM F/U: HCPCS | Performed by: NURSE PRACTITIONER

## 2018-01-29 PROCEDURE — 99213 OFFICE O/P EST LOW 20 MIN: CPT | Performed by: NURSE PRACTITIONER

## 2018-01-29 PROCEDURE — G8484 FLU IMMUNIZE NO ADMIN: HCPCS | Performed by: NURSE PRACTITIONER

## 2018-01-29 RX ORDER — MELOXICAM 15 MG/1
15 TABLET ORAL DAILY
Qty: 30 TABLET | Refills: 1 | Status: SHIPPED | OUTPATIENT
Start: 2018-01-29 | End: 2018-06-18 | Stop reason: SDUPTHER

## 2018-01-29 ASSESSMENT — ENCOUNTER SYMPTOMS: BACK PAIN: 1

## 2018-01-29 NOTE — PROGRESS NOTES
[facility]  [facility]  [address]  Dept: [phone]  Dept Fax: [phone]  Loc: [phone]    Visit Date: 1/29/2018    Functionality Assessment/Goals Worksheet     On a scale of 0 (Does not Interfere) to 10 (Completely Interferes)     1. Which number describes how during the past week pain has interfered with       the following:  A. General Activity:  6  B. Mood: 6  C. Walking Ability:  8  D. Normal Work (Includes both work outside the home and housework):  8  E. Relations with Other People:   4  F. Sleep:   7  G. Enjoyment of Life:   5    2. Patient Prefers to Take their Pain Medications:     [x]  On a regular basis   []  Only when necessary    []  Does not take pain medications    3. What are the Patient's Goals/Expectations for Visiting Pain Management? []  Learn about my pain    []  Receive Medication   []  Physical Therapy     []  Treat Depression   []  Receive Injections    []  Treat Sleep   []  Deal with Anxiety and Stress   []  Treat Opoid Dependence/Addiction   [x]  Other: To prevent surgery      HPI:   Isabel Mancia is a 50 y.o. female is here today for    Chief Complaint:  Lower back pain, left hand numbness     HPI   3 month FU. Continues to have lower back pain radiating down bilateral legs, and left hand and finger numbness and tingling. Is scheduled for EMG [facility] with Dr. Mannie Martinez second week of February. Denies any neck pain. Percocet QID prn and Neurontin helping. Medications reviewed. Patient denies  side effects with medications. Patient states she is  taking medications as prescribed. She denies receiving pain medications from other sources. She had 1 ER visist for chest pain   since last visit. Pain scale with out pain medications is 8-10/10. Pain scale with pain medications is  4-5/10.   Last dose of Percocet was 1/28/2018- pm     Drug screen reviewed from 11/13/2017- [place]     The patient is allergic to adhesive tape; compazine [prochlorperazine]; erythromycin; lyrica [pregabalin]; morphine; reglan [metoclopramide]; and sulfa antibiotics. Past Medical History  Eligah Lundborg  has a past medical history of Anxiety; Arthritis; Asthma; Nuñez's esophagus; GERD (gastroesophageal reflux disease); Hypertension; Insomnia; Interstitial cystitis; Migraine; and Spinal stenosis of lumbar region. Past Surgical History  The patient  has a past surgical history that includes Abdomen surgery (93); ovarian cyst removal (93); Tubal ligation (93); Colonoscopy (2010); Endoscopy, colon, diagnostic (2010); Patellar tendon repair (Right, 1994); Dilatation, esophagus (2010); Cardiac catheterization (unsure); other surgical history (21 Nov 2014); Cholecystectomy; Appendectomy; other surgical history (N/A, 03-21-16); other surgical history (N/A, 04-04-16); other surgical history (Bilateral, 6-13-16); Upper gastrointestinal endoscopy (2012); and other surgical history (Left, 09/13/2016). Family History  This patient's family history includes Heart Attack in her brother; Heart Disease in her father and mother; Inflam Bowel Dis in her mother. Social History  Eligah Lundborg  reports that she has never smoked. She has never used smokeless tobacco. She reports that she drinks alcohol. She reports that she does not use drugs. Medications    Current Outpatient Prescriptions:     meloxicam (MOBIC) 15 MG tablet, Take 1 tablet by mouth daily, Disp: 30 tablet, Rfl: 1    oxyCODONE-acetaminophen (PERCOCET)  MG per tablet, Take 1 tablet by mouth every 6 hours as needed for Pain for up to 30 days. , Disp: 120 tablet, Rfl: 0    tiZANidine (ZANAFLEX) 4 MG tablet, Take 1 tablet by mouth every 6 hours as needed (muscle spasm), Disp: 120 tablet, Rfl: 0    gabapentin (NEURONTIN) 600 MG tablet, Take 1 tablet by mouth 4 times daily Fill on or after 10/21/2017, Disp: 120 tablet, Rfl: 2    propranolol (INDERAL) 80 MG tablet, Take 80 mg by

## 2018-02-19 DIAGNOSIS — M48.062 SPINAL STENOSIS OF LUMBAR REGION WITH NEUROGENIC CLAUDICATION: ICD-10-CM

## 2018-02-19 DIAGNOSIS — G89.4 CHRONIC PAIN SYNDROME: Primary | ICD-10-CM

## 2018-02-19 DIAGNOSIS — M47.816 SPONDYLOSIS OF LUMBAR REGION WITHOUT MYELOPATHY OR RADICULOPATHY: ICD-10-CM

## 2018-02-19 DIAGNOSIS — M79.18 MYOFASCIAL PAIN DYSFUNCTION SYNDROME: ICD-10-CM

## 2018-02-19 RX ORDER — OXYCODONE AND ACETAMINOPHEN 10; 325 MG/1; MG/1
1 TABLET ORAL EVERY 6 HOURS PRN
Qty: 120 TABLET | Refills: 0 | Status: SHIPPED | OUTPATIENT
Start: 2018-02-19 | End: 2018-03-19 | Stop reason: SDUPTHER

## 2018-02-19 RX ORDER — TIZANIDINE 4 MG/1
4 TABLET ORAL EVERY 6 HOURS PRN
Qty: 120 TABLET | Refills: 0 | Status: SHIPPED | OUTPATIENT
Start: 2018-02-19 | End: 2018-03-19 | Stop reason: SDUPTHER

## 2018-02-19 RX ORDER — GABAPENTIN 600 MG/1
600 TABLET ORAL 4 TIMES DAILY
Qty: 120 TABLET | Refills: 2 | Status: SHIPPED | OUTPATIENT
Start: 2018-02-19 | End: 2018-06-18 | Stop reason: SDUPTHER

## 2018-02-19 NOTE — TELEPHONE ENCOUNTER
OARRS reviewed. UDS: positive for the percocet and gabapentin as well as Lyrica. Last seen: 1/29/2018.  Follow-up: 2/26/2018

## 2018-02-27 ENCOUNTER — TELEPHONE (OUTPATIENT)
Dept: PHYSICAL MEDICINE AND REHAB | Age: 48
End: 2018-02-27

## 2018-03-19 ENCOUNTER — TELEPHONE (OUTPATIENT)
Dept: PHYSICAL MEDICINE AND REHAB | Age: 48
End: 2018-03-19

## 2018-03-19 DIAGNOSIS — M79.18 MYOFASCIAL PAIN DYSFUNCTION SYNDROME: ICD-10-CM

## 2018-03-19 DIAGNOSIS — G89.4 CHRONIC PAIN SYNDROME: ICD-10-CM

## 2018-03-19 DIAGNOSIS — M48.062 SPINAL STENOSIS OF LUMBAR REGION WITH NEUROGENIC CLAUDICATION: ICD-10-CM

## 2018-03-19 RX ORDER — TIZANIDINE 4 MG/1
TABLET ORAL
Qty: 120 TABLET | Refills: 0 | Status: SHIPPED | OUTPATIENT
Start: 2018-03-21 | End: 2018-04-19 | Stop reason: SDUPTHER

## 2018-03-19 RX ORDER — OXYCODONE AND ACETAMINOPHEN 10; 325 MG/1; MG/1
1 TABLET ORAL EVERY 6 HOURS PRN
Qty: 120 TABLET | Refills: 0 | Status: SHIPPED | OUTPATIENT
Start: 2018-03-21 | End: 2018-04-19 | Stop reason: SDUPTHER

## 2018-03-19 NOTE — TELEPHONE ENCOUNTER
OARRS reviewed. UDS: positive for the percocet and gabapentin we have her on as well as lyrica. Last seen: 1/29/2018.  Follow-up: after EMG

## 2018-04-19 DIAGNOSIS — M48.062 SPINAL STENOSIS OF LUMBAR REGION WITH NEUROGENIC CLAUDICATION: ICD-10-CM

## 2018-04-19 DIAGNOSIS — G89.4 CHRONIC PAIN SYNDROME: ICD-10-CM

## 2018-04-19 DIAGNOSIS — M79.18 MYOFASCIAL PAIN DYSFUNCTION SYNDROME: ICD-10-CM

## 2018-04-19 DIAGNOSIS — M47.816 SPONDYLOSIS OF LUMBAR REGION WITHOUT MYELOPATHY OR RADICULOPATHY: ICD-10-CM

## 2018-04-19 RX ORDER — TIZANIDINE 4 MG/1
TABLET ORAL
Qty: 120 TABLET | Refills: 0 | Status: SHIPPED | OUTPATIENT
Start: 2018-04-20 | End: 2018-05-17 | Stop reason: SDUPTHER

## 2018-04-19 RX ORDER — OXYCODONE AND ACETAMINOPHEN 10; 325 MG/1; MG/1
1 TABLET ORAL EVERY 6 HOURS PRN
Qty: 120 TABLET | Refills: 0 | Status: SHIPPED | OUTPATIENT
Start: 2018-04-20 | End: 2018-05-21 | Stop reason: SDUPTHER

## 2018-05-17 DIAGNOSIS — M79.18 MYOFASCIAL PAIN DYSFUNCTION SYNDROME: ICD-10-CM

## 2018-05-17 DIAGNOSIS — M48.062 SPINAL STENOSIS OF LUMBAR REGION WITH NEUROGENIC CLAUDICATION: ICD-10-CM

## 2018-05-17 DIAGNOSIS — G89.4 CHRONIC PAIN SYNDROME: ICD-10-CM

## 2018-05-18 RX ORDER — TIZANIDINE 4 MG/1
TABLET ORAL
Qty: 120 TABLET | Refills: 0 | Status: SHIPPED | OUTPATIENT
Start: 2018-05-20 | End: 2018-06-18 | Stop reason: SDUPTHER

## 2018-05-21 ENCOUNTER — OFFICE VISIT (OUTPATIENT)
Dept: PHYSICAL MEDICINE AND REHAB | Age: 48
End: 2018-05-21
Payer: COMMERCIAL

## 2018-05-21 VITALS
DIASTOLIC BLOOD PRESSURE: 85 MMHG | SYSTOLIC BLOOD PRESSURE: 144 MMHG | HEIGHT: 66 IN | HEART RATE: 68 BPM | WEIGHT: 195.11 LBS | BODY MASS INDEX: 31.36 KG/M2

## 2018-05-21 DIAGNOSIS — M51.26 LUMBAR DISC HERNIATION: ICD-10-CM

## 2018-05-21 DIAGNOSIS — M51.36 DDD (DEGENERATIVE DISC DISEASE), LUMBAR: ICD-10-CM

## 2018-05-21 DIAGNOSIS — M48.062 SPINAL STENOSIS OF LUMBAR REGION WITH NEUROGENIC CLAUDICATION: Primary | ICD-10-CM

## 2018-05-21 DIAGNOSIS — M47.816 SPONDYLOSIS OF LUMBAR REGION WITHOUT MYELOPATHY OR RADICULOPATHY: ICD-10-CM

## 2018-05-21 DIAGNOSIS — G89.4 CHRONIC PAIN SYNDROME: ICD-10-CM

## 2018-05-21 DIAGNOSIS — M54.16 LUMBAR RADICULITIS: ICD-10-CM

## 2018-05-21 DIAGNOSIS — R20.2 LEFT HAND PARESTHESIA: ICD-10-CM

## 2018-05-21 PROCEDURE — G8417 CALC BMI ABV UP PARAM F/U: HCPCS | Performed by: NURSE PRACTITIONER

## 2018-05-21 PROCEDURE — G8427 DOCREV CUR MEDS BY ELIG CLIN: HCPCS | Performed by: NURSE PRACTITIONER

## 2018-05-21 PROCEDURE — 99213 OFFICE O/P EST LOW 20 MIN: CPT | Performed by: NURSE PRACTITIONER

## 2018-05-21 PROCEDURE — 1036F TOBACCO NON-USER: CPT | Performed by: NURSE PRACTITIONER

## 2018-05-21 RX ORDER — OXYCODONE AND ACETAMINOPHEN 10; 325 MG/1; MG/1
1 TABLET ORAL EVERY 6 HOURS PRN
Qty: 120 TABLET | Refills: 0 | Status: SHIPPED | OUTPATIENT
Start: 2018-05-21 | End: 2018-06-18 | Stop reason: SDUPTHER

## 2018-05-21 ASSESSMENT — ENCOUNTER SYMPTOMS: BACK PAIN: 1

## 2018-06-18 DIAGNOSIS — G89.4 CHRONIC PAIN SYNDROME: ICD-10-CM

## 2018-06-18 DIAGNOSIS — M47.816 SPONDYLOSIS OF LUMBAR REGION WITHOUT MYELOPATHY OR RADICULOPATHY: ICD-10-CM

## 2018-06-18 DIAGNOSIS — M79.18 MYOFASCIAL PAIN DYSFUNCTION SYNDROME: ICD-10-CM

## 2018-06-18 DIAGNOSIS — M48.062 SPINAL STENOSIS OF LUMBAR REGION WITH NEUROGENIC CLAUDICATION: ICD-10-CM

## 2018-06-18 RX ORDER — OXYCODONE AND ACETAMINOPHEN 10; 325 MG/1; MG/1
1 TABLET ORAL EVERY 6 HOURS PRN
Qty: 120 TABLET | Refills: 0 | Status: SHIPPED | OUTPATIENT
Start: 2018-06-20 | End: 2018-07-17 | Stop reason: SDUPTHER

## 2018-06-18 RX ORDER — GABAPENTIN 600 MG/1
600 TABLET ORAL 4 TIMES DAILY
Qty: 120 TABLET | Refills: 2 | Status: SHIPPED | OUTPATIENT
Start: 2018-06-18 | End: 2018-08-21 | Stop reason: SDUPTHER

## 2018-06-18 RX ORDER — MELOXICAM 15 MG/1
15 TABLET ORAL DAILY
Qty: 30 TABLET | Refills: 1 | Status: SHIPPED | OUTPATIENT
Start: 2018-06-18 | End: 2018-09-19 | Stop reason: SDUPTHER

## 2018-06-18 RX ORDER — TIZANIDINE 4 MG/1
TABLET ORAL
Qty: 120 TABLET | Refills: 0 | Status: SHIPPED | OUTPATIENT
Start: 2018-06-18 | End: 2018-07-17 | Stop reason: SDUPTHER

## 2018-07-17 DIAGNOSIS — G89.4 CHRONIC PAIN SYNDROME: ICD-10-CM

## 2018-07-17 DIAGNOSIS — M79.18 MYOFASCIAL PAIN DYSFUNCTION SYNDROME: ICD-10-CM

## 2018-07-17 DIAGNOSIS — M48.062 SPINAL STENOSIS OF LUMBAR REGION WITH NEUROGENIC CLAUDICATION: ICD-10-CM

## 2018-07-18 RX ORDER — OXYCODONE AND ACETAMINOPHEN 10; 325 MG/1; MG/1
1 TABLET ORAL EVERY 6 HOURS PRN
Qty: 120 TABLET | Refills: 0 | Status: SHIPPED | OUTPATIENT
Start: 2018-07-20 | End: 2018-08-21 | Stop reason: SDUPTHER

## 2018-07-18 RX ORDER — TIZANIDINE 4 MG/1
TABLET ORAL
Qty: 120 TABLET | Refills: 0 | Status: SHIPPED | OUTPATIENT
Start: 2018-07-18 | End: 2018-08-21 | Stop reason: SDUPTHER

## 2018-07-20 ENCOUNTER — TELEPHONE (OUTPATIENT)
Dept: PHYSICAL MEDICINE AND REHAB | Age: 48
End: 2018-07-20

## 2018-08-21 ENCOUNTER — OFFICE VISIT (OUTPATIENT)
Dept: PHYSICAL MEDICINE AND REHAB | Age: 48
End: 2018-08-21
Payer: COMMERCIAL

## 2018-08-21 VITALS
HEIGHT: 66 IN | HEART RATE: 74 BPM | BODY MASS INDEX: 31.34 KG/M2 | SYSTOLIC BLOOD PRESSURE: 133 MMHG | DIASTOLIC BLOOD PRESSURE: 82 MMHG | WEIGHT: 195 LBS

## 2018-08-21 DIAGNOSIS — I63.9 PERSISTENT MIGRAINE AURA WITH CEREBRAL INFARCTION AND WITHOUT STATUS MIGRAINOSUS, NOT INTRACTABLE (HCC): ICD-10-CM

## 2018-08-21 DIAGNOSIS — M51.26 LUMBAR DISC HERNIATION: ICD-10-CM

## 2018-08-21 DIAGNOSIS — M47.816 SPONDYLOSIS OF LUMBAR REGION WITHOUT MYELOPATHY OR RADICULOPATHY: ICD-10-CM

## 2018-08-21 DIAGNOSIS — G43.609 PERSISTENT MIGRAINE AURA WITH CEREBRAL INFARCTION AND WITHOUT STATUS MIGRAINOSUS, NOT INTRACTABLE (HCC): ICD-10-CM

## 2018-08-21 DIAGNOSIS — M48.062 SPINAL STENOSIS OF LUMBAR REGION WITH NEUROGENIC CLAUDICATION: Primary | ICD-10-CM

## 2018-08-21 DIAGNOSIS — M54.16 LUMBAR RADICULITIS: ICD-10-CM

## 2018-08-21 DIAGNOSIS — M79.18 MYOFASCIAL PAIN DYSFUNCTION SYNDROME: ICD-10-CM

## 2018-08-21 DIAGNOSIS — G89.4 CHRONIC PAIN SYNDROME: ICD-10-CM

## 2018-08-21 PROCEDURE — G8427 DOCREV CUR MEDS BY ELIG CLIN: HCPCS | Performed by: NURSE PRACTITIONER

## 2018-08-21 PROCEDURE — G8599 NO ASA/ANTIPLAT THER USE RNG: HCPCS | Performed by: NURSE PRACTITIONER

## 2018-08-21 PROCEDURE — 99213 OFFICE O/P EST LOW 20 MIN: CPT | Performed by: NURSE PRACTITIONER

## 2018-08-21 PROCEDURE — G8417 CALC BMI ABV UP PARAM F/U: HCPCS | Performed by: NURSE PRACTITIONER

## 2018-08-21 PROCEDURE — 1036F TOBACCO NON-USER: CPT | Performed by: NURSE PRACTITIONER

## 2018-08-21 RX ORDER — FLUOXETINE HYDROCHLORIDE 40 MG/1
40 CAPSULE ORAL DAILY
COMMUNITY
Start: 2018-06-22 | End: 2019-03-08

## 2018-08-21 RX ORDER — GABAPENTIN 600 MG/1
600 TABLET ORAL 4 TIMES DAILY
Qty: 120 TABLET | Refills: 0 | Status: SHIPPED | OUTPATIENT
Start: 2018-08-21 | End: 2018-09-19 | Stop reason: SDUPTHER

## 2018-08-21 RX ORDER — TIZANIDINE 4 MG/1
TABLET ORAL
Qty: 120 TABLET | Refills: 0 | Status: SHIPPED | OUTPATIENT
Start: 2018-08-21 | End: 2018-09-19 | Stop reason: SDUPTHER

## 2018-08-21 RX ORDER — OXYCODONE AND ACETAMINOPHEN 10; 325 MG/1; MG/1
1 TABLET ORAL EVERY 6 HOURS PRN
Qty: 120 TABLET | Refills: 0 | Status: SHIPPED | OUTPATIENT
Start: 2018-08-21 | End: 2018-09-19 | Stop reason: SDUPTHER

## 2018-08-21 ASSESSMENT — ENCOUNTER SYMPTOMS: BACK PAIN: 1

## 2018-08-21 NOTE — PROGRESS NOTES
Percocet and Neurontin was yesterday  Drug screen reviewed from 5/21/2018 and was appropriate  Pill count was completed today and was appropriate    The patient is allergic to adhesive tape; compazine [prochlorperazine]; erythromycin; lyrica [pregabalin]; morphine; reglan [metoclopramide]; and sulfa antibiotics. Past Medical History  Dre Reed  has a past medical history of Anxiety; Arthritis; Asthma; Nuñez's esophagus; GERD (gastroesophageal reflux disease); Hypertension; Insomnia; Interstitial cystitis; Migraine; and Spinal stenosis of lumbar region. Past Surgical History  The patient  has a past surgical history that includes Abdomen surgery (93); ovarian cyst removal (93); Tubal ligation (93); Colonoscopy (2010); Endoscopy, colon, diagnostic (2010); Patellar tendon repair (Right, 1994); Dilatation, esophagus (2010); Cardiac catheterization (unsure); other surgical history (21 Nov 2014); Cholecystectomy; Appendectomy; other surgical history (N/A, 03-21-16); other surgical history (N/A, 04-04-16); other surgical history (Bilateral, 6-13-16); Upper gastrointestinal endoscopy (2012); other surgical history (Left, 09/13/2016); and Carpal tunnel release (Left). Family History  This patient's family history includes Heart Attack in her brother; Heart Disease in her father and mother; Inflam Bowel Dis in her mother. Social History  Dre Reed  reports that she has never smoked. She has never used smokeless tobacco. She reports that she drinks alcohol. She reports that she does not use drugs. Medications    Current Outpatient Prescriptions:     FLUoxetine (PROZAC) 40 MG capsule, Take 40 mg by mouth daily , Disp: , Rfl:     SUMAtriptan Succinate (IMITREX PO), Take by mouth, Disp: , Rfl:     tiZANidine (ZANAFLEX) 4 MG tablet, TAKE 1 TABLET BY MOUTH EVERY 6 HOURS AS NEEDED FOR MUSCLE SPASM, Disp: 120 tablet, Rfl: 0    gabapentin (NEURONTIN) 600 MG tablet, Take 1 tablet by mouth 4 times daily for 30 days. Fill on or after 10/21/2017., Disp: 120 tablet, Rfl: 0    oxyCODONE-acetaminophen (PERCOCET)  MG per tablet, Take 1 tablet by mouth every 6 hours as needed for Pain for up to 30 days. ., Disp: 120 tablet, Rfl: 0    meloxicam (MOBIC) 15 MG tablet, Take 1 tablet by mouth daily, Disp: 30 tablet, Rfl: 1    propranolol (INDERAL) 80 MG tablet, Take 80 mg by mouth 2 times daily, Disp: , Rfl:     traZODone (DESYREL) 150 MG tablet, 150 mg nightly, Disp: , Rfl:     triamterene-hydrochlorothiazide (MAXZIDE-25) 37.5-25 MG per tablet, 1 tablet daily , Disp: , Rfl:     pentosan polysulfate (ELMIRON) 100 MG capsule, Take 1 capsule by mouth 3 times daily (before meals), Disp: 90 capsule, Rfl: 5    dexlansoprazole (DEXILANT) 60 MG CPDR capsule, Take 1 capsule by mouth daily, Disp: 30 capsule, Rfl: 5    topiramate (TOPAMAX) 100 MG tablet, Take 1 tablet by mouth 2 times daily, Disp: 60 tablet, Rfl: 5    albuterol (PROVENTIL HFA;VENTOLIN HFA) 108 (90 BASE) MCG/ACT inhaler, Inhale 2 puffs into the lungs every 4 hours as needed for Wheezing or Shortness of Breath Whichever brand is covered by insurance, substitute as necessary. , Disp: 1 Inhaler, Rfl: 2    Subjective:      Review of Systems   Musculoskeletal: Positive for arthralgias, back pain, myalgias, neck pain and neck stiffness. Neurological: Positive for weakness, numbness and headaches. Objective:     Vitals:    08/21/18 0840   BP: 133/82   Site: Left Arm   Position: Sitting   Pulse: 74   Weight: 195 lb (88.5 kg)   Height: 5' 6\" (1.676 m)       Physical Exam   Constitutional: She is oriented to person, place, and time. She appears well-developed and well-nourished. No distress. HENT:   Head: Normocephalic and atraumatic. Right Ear: External ear normal.   Left Ear: External ear normal.   Nose: Nose normal.   Mouth/Throat: Oropharynx is clear and moist. No oropharyngeal exudate. Eyes: Pupils are equal, round, and reactive to light.  Conjunctivae and EOM exhibit a depressed mood. She expresses no homicidal and no suicidal ideation. She expresses no suicidal plans and no homicidal plans. She is communicative. She exhibits normal recent memory and normal remote memory. She is attentive. Nursing note and vitals reviewed. JOYCE test: negative   Yeomans test: negative   Gaenslen test: negative      Assessment:     1. Spinal stenosis of lumbar region with neurogenic claudication    2. Lumbar radiculitis    3. Lumbar disc herniation    4. Myofascial pain dysfunction syndrome    5. Persistent migraine aura with cerebral infarction and without status migrainosus, not intractable ([address]    6. Chronic pain syndrome    7. Spondylosis of lumbar region without myelopathy or radiculopathy            Plan:      · OARRS reviewed. · Discussed long term side effects of medications. · Discussed tolerance, dependency and addiction. · Previous UDS reviewed  · UDS preformed today for compliance. · Patient told can not receive any pain medications from any other source. · Discussed with patient that they may not be pain free. · No evidence of abuse, diversion or aberrant behavior. · Medications and/or procedures improve function and quality of life. · Procedure notes reviewed in detail. · Has had L-facet MBB and LESI with no improvement   · Nothing more to offer at this time with procedures   · Continue Percocet 10/325 QID prn, Continue Neurontin, Zanaflex. Patient remains compliant. ·  Dr. Edilma Salazar recommended surgery, patient still not wanting   · Discussed neurology referral and possible Botox in future to headache. Meds. Prescribed:   Orders Placed This Encounter   Medications    tiZANidine (ZANAFLEX) 4 MG tablet     Sig: TAKE 1 TABLET BY MOUTH EVERY 6 HOURS AS NEEDED FOR MUSCLE SPASM     Dispense:  120 tablet     Refill:  0    gabapentin (NEURONTIN) 600 MG tablet     Sig: Take 1 tablet by mouth 4 times daily for 30 days. Fill on or after 10/21/2017.      Dispense: 120 tablet     Refill:  0    oxyCODONE-acetaminophen (PERCOCET)  MG per tablet     Sig: Take 1 tablet by mouth every 6 hours as needed for Pain for up to 30 days. .     Dispense:  120 tablet     Refill:  0       Return in about 3 months (around 11/21/2018), or if symptoms worsen or fail to improve, for follow up  for medications.          Electronically signed by RENNY Masters CNP on 8/21/2018 at 8:57 AM

## 2018-09-19 DIAGNOSIS — M79.18 MYOFASCIAL PAIN DYSFUNCTION SYNDROME: ICD-10-CM

## 2018-09-19 DIAGNOSIS — M47.816 SPONDYLOSIS OF LUMBAR REGION WITHOUT MYELOPATHY OR RADICULOPATHY: ICD-10-CM

## 2018-09-19 DIAGNOSIS — M48.062 SPINAL STENOSIS OF LUMBAR REGION WITH NEUROGENIC CLAUDICATION: ICD-10-CM

## 2018-09-19 DIAGNOSIS — G89.4 CHRONIC PAIN SYNDROME: ICD-10-CM

## 2018-09-19 RX ORDER — MELOXICAM 15 MG/1
15 TABLET ORAL DAILY
Qty: 30 TABLET | Refills: 1 | Status: SHIPPED | OUTPATIENT
Start: 2018-09-19 | End: 2018-11-21 | Stop reason: SDUPTHER

## 2018-09-19 RX ORDER — GABAPENTIN 600 MG/1
600 TABLET ORAL 4 TIMES DAILY
Qty: 120 TABLET | Refills: 0 | Status: SHIPPED | OUTPATIENT
Start: 2018-09-19 | End: 2018-10-18 | Stop reason: SDUPTHER

## 2018-09-19 RX ORDER — TIZANIDINE 4 MG/1
TABLET ORAL
Qty: 120 TABLET | Refills: 0 | Status: SHIPPED | OUTPATIENT
Start: 2018-09-19 | End: 2018-10-18 | Stop reason: SDUPTHER

## 2018-09-19 RX ORDER — OXYCODONE AND ACETAMINOPHEN 10; 325 MG/1; MG/1
1 TABLET ORAL EVERY 6 HOURS PRN
Qty: 120 TABLET | Refills: 0 | Status: SHIPPED | OUTPATIENT
Start: 2018-09-20 | End: 2018-10-18 | Stop reason: SDUPTHER

## 2018-10-18 DIAGNOSIS — G89.4 CHRONIC PAIN SYNDROME: ICD-10-CM

## 2018-10-18 DIAGNOSIS — M47.816 SPONDYLOSIS OF LUMBAR REGION WITHOUT MYELOPATHY OR RADICULOPATHY: ICD-10-CM

## 2018-10-18 DIAGNOSIS — M48.062 SPINAL STENOSIS OF LUMBAR REGION WITH NEUROGENIC CLAUDICATION: ICD-10-CM

## 2018-10-18 DIAGNOSIS — M79.18 MYOFASCIAL PAIN DYSFUNCTION SYNDROME: ICD-10-CM

## 2018-10-18 RX ORDER — GABAPENTIN 600 MG/1
600 TABLET ORAL 4 TIMES DAILY
Qty: 120 TABLET | Refills: 0 | Status: SHIPPED | OUTPATIENT
Start: 2018-10-20 | End: 2018-11-21 | Stop reason: SDUPTHER

## 2018-10-18 RX ORDER — OXYCODONE AND ACETAMINOPHEN 10; 325 MG/1; MG/1
1 TABLET ORAL EVERY 6 HOURS PRN
Qty: 120 TABLET | Refills: 0 | Status: SHIPPED | OUTPATIENT
Start: 2018-10-20 | End: 2018-11-21 | Stop reason: SDUPTHER

## 2018-10-18 RX ORDER — TIZANIDINE 4 MG/1
TABLET ORAL
Qty: 120 TABLET | Refills: 0 | Status: SHIPPED | OUTPATIENT
Start: 2018-10-18 | End: 2018-11-21 | Stop reason: SDUPTHER

## 2018-11-14 ENCOUNTER — APPOINTMENT (OUTPATIENT)
Dept: GENERAL RADIOLOGY | Age: 48
DRG: 203 | End: 2018-11-14
Payer: COMMERCIAL

## 2018-11-14 ENCOUNTER — HOSPITAL ENCOUNTER (INPATIENT)
Age: 48
LOS: 1 days | Discharge: HOME | DRG: 203 | End: 2018-11-15
Attending: FAMILY MEDICINE | Admitting: INTERNAL MEDICINE
Payer: COMMERCIAL

## 2018-11-14 ENCOUNTER — APPOINTMENT (OUTPATIENT)
Dept: CT IMAGING | Age: 48
DRG: 203 | End: 2018-11-14
Payer: COMMERCIAL

## 2018-11-14 DIAGNOSIS — F41.9 ANXIETY: ICD-10-CM

## 2018-11-14 DIAGNOSIS — R07.9 CHEST PAIN, UNSPECIFIED TYPE: Primary | ICD-10-CM

## 2018-11-14 PROBLEM — I24.9 ACS (ACUTE CORONARY SYNDROME) (HCC): Status: ACTIVE | Noted: 2018-11-14

## 2018-11-14 LAB
ALBUMIN SERPL-MCNC: 4.3 G/DL (ref 3.5–5.1)
ALP BLD-CCNC: 82 U/L (ref 38–126)
ALT SERPL-CCNC: 25 U/L (ref 11–66)
ANION GAP SERPL CALCULATED.3IONS-SCNC: 14 MEQ/L (ref 8–16)
APTT: 30 SECONDS (ref 22–38)
AST SERPL-CCNC: 30 U/L (ref 5–40)
BASOPHILS # BLD: 0.4 %
BASOPHILS ABSOLUTE: 0 THOU/MM3 (ref 0–0.1)
BILIRUB SERPL-MCNC: < 0.2 MG/DL (ref 0.3–1.2)
BUN BLDV-MCNC: 28 MG/DL (ref 7–22)
CALCIUM SERPL-MCNC: 9.2 MG/DL (ref 8.5–10.5)
CHLORIDE BLD-SCNC: 107 MEQ/L (ref 98–111)
CO2: 21 MEQ/L (ref 23–33)
CREAT SERPL-MCNC: 1 MG/DL (ref 0.4–1.2)
D-DIMER QUANTITATIVE: 736 NG/ML FEU (ref 0–500)
EKG ATRIAL RATE: 86 BPM
EKG P AXIS: 69 DEGREES
EKG P-R INTERVAL: 150 MS
EKG Q-T INTERVAL: 394 MS
EKG QRS DURATION: 80 MS
EKG QTC CALCULATION (BAZETT): 471 MS
EKG R AXIS: 59 DEGREES
EKG T AXIS: 56 DEGREES
EKG VENTRICULAR RATE: 86 BPM
EOSINOPHIL # BLD: 1.9 %
EOSINOPHILS ABSOLUTE: 0.1 THOU/MM3 (ref 0–0.4)
ERYTHROCYTE [DISTWIDTH] IN BLOOD BY AUTOMATED COUNT: 13.5 % (ref 11.5–14.5)
ERYTHROCYTE [DISTWIDTH] IN BLOOD BY AUTOMATED COUNT: 44.5 FL (ref 35–45)
ETHYL ALCOHOL, SERUM: < 0.01 %
GFR SERPL CREATININE-BSD FRML MDRD: 59 ML/MIN/1.73M2
GLUCOSE BLD-MCNC: 67 MG/DL (ref 70–108)
HCT VFR BLD CALC: 40.2 % (ref 37–47)
HEMOGLOBIN: 12.9 GM/DL (ref 12–16)
IMMATURE GRANS (ABS): 0.01 THOU/MM3 (ref 0–0.07)
IMMATURE GRANULOCYTES: 0.1 %
INR BLD: 0.86 (ref 0.85–1.13)
LYMPHOCYTES # BLD: 41.4 %
LYMPHOCYTES ABSOLUTE: 2.9 THOU/MM3 (ref 1–4.8)
MAGNESIUM: 2.2 MG/DL (ref 1.6–2.4)
MCH RBC QN AUTO: 28.9 PG (ref 26–33)
MCHC RBC AUTO-ENTMCNC: 32.1 GM/DL (ref 32.2–35.5)
MCV RBC AUTO: 90.1 FL (ref 81–99)
MONOCYTES # BLD: 6.5 %
MONOCYTES ABSOLUTE: 0.4 THOU/MM3 (ref 0.4–1.3)
NUCLEATED RED BLOOD CELLS: 0 /100 WBC
OSMOLALITY CALCULATION: 286.8 MOSMOL/KG (ref 275–300)
PLATELET # BLD: 221 THOU/MM3 (ref 130–400)
PMV BLD AUTO: 10.3 FL (ref 9.4–12.4)
POTASSIUM SERPL-SCNC: 4.1 MEQ/L (ref 3.5–5.2)
PRO-BNP: 100.1 PG/ML (ref 0–450)
RBC # BLD: 4.46 MILL/MM3 (ref 4.2–5.4)
SEG NEUTROPHILS: 49.7 %
SEGMENTED NEUTROPHILS ABSOLUTE COUNT: 3.4 THOU/MM3 (ref 1.8–7.7)
SODIUM BLD-SCNC: 142 MEQ/L (ref 135–145)
TOTAL PROTEIN: 7.3 G/DL (ref 6.1–8)
TROPONIN T: < 0.01 NG/ML
TROPONIN T: < 0.01 NG/ML
WBC # BLD: 6.9 THOU/MM3 (ref 4.8–10.8)

## 2018-11-14 PROCEDURE — 99285 EMERGENCY DEPT VISIT HI MDM: CPT

## 2018-11-14 PROCEDURE — 85379 FIBRIN DEGRADATION QUANT: CPT

## 2018-11-14 PROCEDURE — 81003 URINALYSIS AUTO W/O SCOPE: CPT

## 2018-11-14 PROCEDURE — 96375 TX/PRO/DX INJ NEW DRUG ADDON: CPT

## 2018-11-14 PROCEDURE — 96374 THER/PROPH/DIAG INJ IV PUSH: CPT

## 2018-11-14 PROCEDURE — 6360000004 HC RX CONTRAST MEDICATION: Performed by: INTERNAL MEDICINE

## 2018-11-14 PROCEDURE — 80053 COMPREHEN METABOLIC PANEL: CPT

## 2018-11-14 PROCEDURE — 80307 DRUG TEST PRSMV CHEM ANLYZR: CPT

## 2018-11-14 PROCEDURE — 1200000003 HC TELEMETRY R&B

## 2018-11-14 PROCEDURE — 2709999900 HC NON-CHARGEABLE SUPPLY

## 2018-11-14 PROCEDURE — 85730 THROMBOPLASTIN TIME PARTIAL: CPT

## 2018-11-14 PROCEDURE — G0378 HOSPITAL OBSERVATION PER HR: HCPCS

## 2018-11-14 PROCEDURE — 83735 ASSAY OF MAGNESIUM: CPT

## 2018-11-14 PROCEDURE — 85610 PROTHROMBIN TIME: CPT

## 2018-11-14 PROCEDURE — 94760 N-INVAS EAR/PLS OXIMETRY 1: CPT

## 2018-11-14 PROCEDURE — 36415 COLL VENOUS BLD VENIPUNCTURE: CPT

## 2018-11-14 PROCEDURE — 96372 THER/PROPH/DIAG INJ SC/IM: CPT

## 2018-11-14 PROCEDURE — 96366 THER/PROPH/DIAG IV INF ADDON: CPT

## 2018-11-14 PROCEDURE — 93005 ELECTROCARDIOGRAM TRACING: CPT | Performed by: FAMILY MEDICINE

## 2018-11-14 PROCEDURE — 2500000003 HC RX 250 WO HCPCS: Performed by: INTERNAL MEDICINE

## 2018-11-14 PROCEDURE — 85025 COMPLETE CBC W/AUTO DIFF WBC: CPT

## 2018-11-14 PROCEDURE — 71046 X-RAY EXAM CHEST 2 VIEWS: CPT

## 2018-11-14 PROCEDURE — 83880 ASSAY OF NATRIURETIC PEPTIDE: CPT

## 2018-11-14 PROCEDURE — 6370000000 HC RX 637 (ALT 250 FOR IP): Performed by: INTERNAL MEDICINE

## 2018-11-14 PROCEDURE — 71275 CT ANGIOGRAPHY CHEST: CPT

## 2018-11-14 PROCEDURE — 6370000000 HC RX 637 (ALT 250 FOR IP): Performed by: FAMILY MEDICINE

## 2018-11-14 PROCEDURE — 84484 ASSAY OF TROPONIN QUANT: CPT

## 2018-11-14 PROCEDURE — 96365 THER/PROPH/DIAG IV INF INIT: CPT

## 2018-11-14 PROCEDURE — G0480 DRUG TEST DEF 1-7 CLASSES: HCPCS

## 2018-11-14 PROCEDURE — 93010 ELECTROCARDIOGRAM REPORT: CPT | Performed by: INTERNAL MEDICINE

## 2018-11-14 PROCEDURE — 93005 ELECTROCARDIOGRAM TRACING: CPT | Performed by: INTERNAL MEDICINE

## 2018-11-14 PROCEDURE — 6360000002 HC RX W HCPCS: Performed by: FAMILY MEDICINE

## 2018-11-14 RX ORDER — DEXLANSOPRAZOLE 60 MG/1
60 CAPSULE, DELAYED RELEASE ORAL DAILY
Status: DISCONTINUED | OUTPATIENT
Start: 2018-11-14 | End: 2018-11-14 | Stop reason: CLARIF

## 2018-11-14 RX ORDER — ASPIRIN 81 MG/1
324 TABLET, CHEWABLE ORAL ONCE
Status: COMPLETED | OUTPATIENT
Start: 2018-11-14 | End: 2018-11-14

## 2018-11-14 RX ORDER — TOPIRAMATE 100 MG/1
100 TABLET, FILM COATED ORAL 2 TIMES DAILY
Status: DISCONTINUED | OUTPATIENT
Start: 2018-11-15 | End: 2018-11-15 | Stop reason: HOSPADM

## 2018-11-14 RX ORDER — PANTOPRAZOLE SODIUM 40 MG/1
40 TABLET, DELAYED RELEASE ORAL DAILY
Status: DISCONTINUED | OUTPATIENT
Start: 2018-11-15 | End: 2018-11-15 | Stop reason: HOSPADM

## 2018-11-14 RX ORDER — OXYCODONE AND ACETAMINOPHEN 10; 325 MG/1; MG/1
1 TABLET ORAL EVERY 6 HOURS PRN
Status: DISCONTINUED | OUTPATIENT
Start: 2018-11-14 | End: 2018-11-15 | Stop reason: HOSPADM

## 2018-11-14 RX ORDER — PROPRANOLOL HYDROCHLORIDE 20 MG/1
80 TABLET ORAL 2 TIMES DAILY
Status: DISCONTINUED | OUTPATIENT
Start: 2018-11-15 | End: 2018-11-15 | Stop reason: HOSPADM

## 2018-11-14 RX ORDER — ALPRAZOLAM 0.5 MG/1
0.5 TABLET ORAL 3 TIMES DAILY PRN
Status: DISCONTINUED | OUTPATIENT
Start: 2018-11-14 | End: 2018-11-15 | Stop reason: HOSPADM

## 2018-11-14 RX ORDER — NITROGLYCERIN 20 MG/100ML
10 INJECTION INTRAVENOUS CONTINUOUS
Status: DISCONTINUED | OUTPATIENT
Start: 2018-11-14 | End: 2018-11-15 | Stop reason: HOSPADM

## 2018-11-14 RX ORDER — TIZANIDINE 4 MG/1
4 TABLET ORAL EVERY 6 HOURS PRN
Status: DISCONTINUED | OUTPATIENT
Start: 2018-11-14 | End: 2018-11-15 | Stop reason: HOSPADM

## 2018-11-14 RX ORDER — ASPIRIN 81 MG/1
81 TABLET, CHEWABLE ORAL DAILY
Status: DISCONTINUED | OUTPATIENT
Start: 2018-11-15 | End: 2018-11-15 | Stop reason: HOSPADM

## 2018-11-14 RX ORDER — PROMETHAZINE HYDROCHLORIDE 25 MG/ML
25 INJECTION, SOLUTION INTRAMUSCULAR; INTRAVENOUS ONCE
Status: COMPLETED | OUTPATIENT
Start: 2018-11-14 | End: 2018-11-14

## 2018-11-14 RX ORDER — FLUOXETINE HYDROCHLORIDE 20 MG/1
40 CAPSULE ORAL DAILY
Status: DISCONTINUED | OUTPATIENT
Start: 2018-11-15 | End: 2018-11-15 | Stop reason: HOSPADM

## 2018-11-14 RX ORDER — ONDANSETRON 2 MG/ML
4 INJECTION INTRAMUSCULAR; INTRAVENOUS ONCE
Status: COMPLETED | OUTPATIENT
Start: 2018-11-14 | End: 2018-11-14

## 2018-11-14 RX ORDER — NITROGLYCERIN 0.4 MG/1
0.4 TABLET SUBLINGUAL EVERY 5 MIN PRN
Status: DISCONTINUED | OUTPATIENT
Start: 2018-11-14 | End: 2018-11-14 | Stop reason: HOSPADM

## 2018-11-14 RX ORDER — GABAPENTIN 600 MG/1
600 TABLET ORAL 4 TIMES DAILY
Status: DISCONTINUED | OUTPATIENT
Start: 2018-11-15 | End: 2018-11-15 | Stop reason: HOSPADM

## 2018-11-14 RX ORDER — ALBUTEROL SULFATE 90 UG/1
2 AEROSOL, METERED RESPIRATORY (INHALATION) EVERY 4 HOURS PRN
Status: DISCONTINUED | OUTPATIENT
Start: 2018-11-14 | End: 2018-11-15 | Stop reason: HOSPADM

## 2018-11-14 RX ADMIN — NITROGLYCERIN 0.4 MG: 0.4 TABLET, ORALLY DISINTEGRATING SUBLINGUAL at 17:46

## 2018-11-14 RX ADMIN — NITROGLYCERIN 0.4 MG: 0.4 TABLET, ORALLY DISINTEGRATING SUBLINGUAL at 17:41

## 2018-11-14 RX ADMIN — NITROGLYCERIN 10 MCG/MIN: 20 INJECTION INTRAVENOUS at 20:57

## 2018-11-14 RX ADMIN — HYDROMORPHONE HYDROCHLORIDE 0.5 MG: 1 INJECTION, SOLUTION INTRAMUSCULAR; INTRAVENOUS; SUBCUTANEOUS at 18:32

## 2018-11-14 RX ADMIN — ONDANSETRON 4 MG: 2 INJECTION INTRAMUSCULAR; INTRAVENOUS at 17:40

## 2018-11-14 RX ADMIN — IOPAMIDOL 80 ML: 755 INJECTION, SOLUTION INTRAVENOUS at 22:43

## 2018-11-14 RX ADMIN — PROMETHAZINE HYDROCHLORIDE 25 MG: 25 INJECTION INTRAMUSCULAR; INTRAVENOUS at 18:46

## 2018-11-14 RX ADMIN — ASPIRIN 81 MG 324 MG: 81 TABLET ORAL at 17:40

## 2018-11-14 RX ADMIN — OXYCODONE HYDROCHLORIDE AND ACETAMINOPHEN 1 TABLET: 10; 325 TABLET ORAL at 23:08

## 2018-11-14 RX ADMIN — NITROGLYCERIN 0.4 MG: 0.4 TABLET, ORALLY DISINTEGRATING SUBLINGUAL at 17:51

## 2018-11-14 ASSESSMENT — PAIN DESCRIPTION - ORIENTATION
ORIENTATION: LEFT

## 2018-11-14 ASSESSMENT — ENCOUNTER SYMPTOMS
ORTHOPNEA: 0
HEARTBURN: 0
ABDOMINAL DISTENTION: 0
SORE THROAT: 0
ABDOMINAL PAIN: 0
TROUBLE SWALLOWING: 0
DIARRHEA: 0
SHORTNESS OF BREATH: 0
NAUSEA: 0
RHINORRHEA: 0
PHOTOPHOBIA: 0
CONSTIPATION: 0
BACK PAIN: 0
VOMITING: 0

## 2018-11-14 ASSESSMENT — PAIN SCALES - GENERAL
PAINLEVEL_OUTOF10: 9

## 2018-11-14 ASSESSMENT — PAIN DESCRIPTION - PAIN TYPE
TYPE: ACUTE PAIN

## 2018-11-14 ASSESSMENT — HEART SCORE: ECG: 1

## 2018-11-14 ASSESSMENT — PAIN DESCRIPTION - PROGRESSION: CLINICAL_PROGRESSION: NOT CHANGED

## 2018-11-14 ASSESSMENT — PAIN DESCRIPTION - FREQUENCY: FREQUENCY: INTERMITTENT

## 2018-11-14 ASSESSMENT — PAIN DESCRIPTION - LOCATION
LOCATION: CHEST

## 2018-11-14 ASSESSMENT — PAIN DESCRIPTION - DESCRIPTORS
DESCRIPTORS: STABBING;DULL
DESCRIPTORS: SHARP;STABBING
DESCRIPTORS: SHARP;STABBING

## 2018-11-14 NOTE — ED PROVIDER NOTES
clear and moist. No oropharyngeal exudate. Eyes: Pupils are equal, round, and reactive to light. EOM are normal. Right eye exhibits no discharge. Left eye exhibits no discharge. Neck: Normal range of motion. Neck supple. No JVD present. No tracheal deviation present. Cardiovascular: Normal rate, regular rhythm and normal heart sounds. Exam reveals no friction rub. No murmur heard. Pulmonary/Chest: Effort normal and breath sounds normal. No stridor. No respiratory distress. She has no wheezes. She has no rales. Abdominal: Soft. She exhibits no distension and no mass. There is no tenderness. There is no guarding. Musculoskeletal: She exhibits no edema, tenderness or deformity. Neurological: She is alert and oriented to person, place, and time. No sensory deficit. She exhibits normal muscle tone. Skin: Skin is warm and dry. No rash noted. She is not diaphoretic. No erythema. No pallor. [address] BLOCK[address] PRIOR TO ARRIVAL [x]No []Yes    Variable  Score   Variable  Score  Eye opening [x]Spontaneous 4 Verbal  [x]Oriented  5     []To voice  3   []Confused  4    []To pain  2   []Inapp words  3    []None  1   []Incomp words 2       []None  1   Motor   [x]Obeys  6    []Localizes pain 5    []Withdraws(pain) 4    []Flexion(pain) 3  []Extension(pain) 2    []None  1     GCS Total = 15        DIFFERENTIAL DIAGNOSIS:   Diagnoses discussed with the patient and includedbut not limited to ACS, MI, PE, pneumonia, takasubo     DIAGNOSTIC RESULTS     EKG: AllEKG's are interpreted by the Emergency Department Physician who either signs or Co-signs this chart in the absence of a cardiologist.  Normal sinus rhythm, ventricular rate 86, , QRS 80, . Normal axis. No ST changes. Compared to previous, no significant changes noted.     RADIOLOGY: non-plain film images(s) such as CT, Ultrasound and MRI are read by the radiologist.    XR CHEST STANDARD (2 VW)   Final Result   There is chest pain despite aspirin and nitroglycerin. She does have some cardiac risk factors. This could likely be secondary to stress to the patient's current condition and status with persistent chest pain, will admit for further evaluation and cardiac workup case was discussed with Dr. Christel Armenta who will kindly admit the patient for further evaluation. Case discussed and precepted with Dr. Lillian Hayneshand:   n/a     CONSULTS:  hospitalist    PROCEDURES:  None    FINAL IMPRESSION      1.  Chest pain, unspecified type          DISPOSITION/PLAN   admit    PATIENT REFERRED TO:  Cordelia Kebede MD  [facility] [place] [phone]    Schedule an appointment as soon as possible for a visit in 2 days      [facility] [address] [address] EMERGENCY DEPT  [address]  Go to   As needed, If symptoms worsen      DISCHARGE MEDICATIONS:  New Prescriptions    No medications on file       (Please note that portions of this note were completed with a voice recognition program.  Efforts were made to edit the dictations but occasionally words aremis-transcribed.)        David Ayers MD  11/14/18 2022

## 2018-11-15 ENCOUNTER — APPOINTMENT (OUTPATIENT)
Dept: NON INVASIVE DIAGNOSTICS | Age: 48
DRG: 203 | End: 2018-11-15
Payer: COMMERCIAL

## 2018-11-15 VITALS
BODY MASS INDEX: 32.54 KG/M2 | OXYGEN SATURATION: 97 % | TEMPERATURE: 98.2 F | WEIGHT: 202.5 LBS | HEIGHT: 66 IN | DIASTOLIC BLOOD PRESSURE: 54 MMHG | SYSTOLIC BLOOD PRESSURE: 87 MMHG | RESPIRATION RATE: 16 BRPM | HEART RATE: 64 BPM

## 2018-11-15 LAB
AMPHETAMINE+METHAMPHETAMINE URINE SCREEN: NEGATIVE
BARBITURATE QUANTITATIVE URINE: NEGATIVE
BENZODIAZEPINE QUANTITATIVE URINE: NEGATIVE
BILIRUBIN URINE: NEGATIVE
BLOOD, URINE: NEGATIVE
CANNABINOID QUANTITATIVE URINE: NEGATIVE
CHARACTER, URINE: CLEAR
COCAINE METABOLITE QUANTITATIVE URINE: NEGATIVE
COLOR: YELLOW
EKG ATRIAL RATE: 72 BPM
EKG P AXIS: 59 DEGREES
EKG P-R INTERVAL: 172 MS
EKG Q-T INTERVAL: 424 MS
EKG QRS DURATION: 86 MS
EKG QTC CALCULATION (BAZETT): 464 MS
EKG R AXIS: 31 DEGREES
EKG T AXIS: 33 DEGREES
EKG VENTRICULAR RATE: 72 BPM
GLUCOSE URINE: NEGATIVE MG/DL
KETONES, URINE: NEGATIVE
LEUKOCYTE ESTERASE, URINE: NEGATIVE
NITRITE, URINE: NEGATIVE
OPIATES, URINE: NEGATIVE
OXYCODONE: POSITIVE
PH UA: 6.5
PHENCYCLIDINE QUANTITATIVE URINE: NEGATIVE
PROTEIN UA: NEGATIVE
SPECIFIC GRAVITY, URINE: 1.01 (ref 1–1.03)
UROBILINOGEN, URINE: 0.2 EU/DL

## 2018-11-15 PROCEDURE — 3430000000 HC RX DIAGNOSTIC RADIOPHARMACEUTICAL: Performed by: INTERNAL MEDICINE

## 2018-11-15 PROCEDURE — 93010 ELECTROCARDIOGRAM REPORT: CPT | Performed by: INTERNAL MEDICINE

## 2018-11-15 PROCEDURE — 96372 THER/PROPH/DIAG INJ SC/IM: CPT

## 2018-11-15 PROCEDURE — G0378 HOSPITAL OBSERVATION PER HR: HCPCS

## 2018-11-15 PROCEDURE — 6370000000 HC RX 637 (ALT 250 FOR IP): Performed by: INTERNAL MEDICINE

## 2018-11-15 PROCEDURE — A9500 TC99M SESTAMIBI: HCPCS | Performed by: INTERNAL MEDICINE

## 2018-11-15 PROCEDURE — 96366 THER/PROPH/DIAG IV INF ADDON: CPT

## 2018-11-15 PROCEDURE — 6360000002 HC RX W HCPCS

## 2018-11-15 PROCEDURE — 93017 CV STRESS TEST TRACING ONLY: CPT

## 2018-11-15 PROCEDURE — 2709999900 HC NON-CHARGEABLE SUPPLY

## 2018-11-15 PROCEDURE — 78452 HT MUSCLE IMAGE SPECT MULT: CPT

## 2018-11-15 PROCEDURE — 6360000002 HC RX W HCPCS: Performed by: INTERNAL MEDICINE

## 2018-11-15 RX ORDER — ALPRAZOLAM 0.5 MG/1
0.25 TABLET ORAL 3 TIMES DAILY PRN
Qty: 20 TABLET | Refills: 0 | Status: SHIPPED | OUTPATIENT
Start: 2018-11-15 | End: 2018-12-15

## 2018-11-15 RX ADMIN — TOPIRAMATE 100 MG: 100 TABLET, FILM COATED ORAL at 08:47

## 2018-11-15 RX ADMIN — Medication 31.3 MILLICURIE: at 11:15

## 2018-11-15 RX ADMIN — GABAPENTIN 600 MG: 600 TABLET, FILM COATED ORAL at 16:14

## 2018-11-15 RX ADMIN — GABAPENTIN 600 MG: 600 TABLET, FILM COATED ORAL at 08:48

## 2018-11-15 RX ADMIN — PENTOSAN POLYSULFATE SODIUM 100 MG: 100 CAPSULE, GELATIN COATED ORAL at 06:57

## 2018-11-15 RX ADMIN — PENTOSAN POLYSULFATE SODIUM 100 MG: 100 CAPSULE, GELATIN COATED ORAL at 13:10

## 2018-11-15 RX ADMIN — OXYCODONE HYDROCHLORIDE AND ACETAMINOPHEN 1 TABLET: 10; 325 TABLET ORAL at 13:18

## 2018-11-15 RX ADMIN — PANTOPRAZOLE SODIUM 40 MG: 40 TABLET, DELAYED RELEASE ORAL at 06:57

## 2018-11-15 RX ADMIN — PENTOSAN POLYSULFATE SODIUM 100 MG: 100 CAPSULE, GELATIN COATED ORAL at 16:14

## 2018-11-15 RX ADMIN — GABAPENTIN 600 MG: 600 TABLET, FILM COATED ORAL at 13:07

## 2018-11-15 RX ADMIN — ASPIRIN 81 MG 81 MG: 81 TABLET ORAL at 08:53

## 2018-11-15 RX ADMIN — ALPRAZOLAM 0.5 MG: 0.5 TABLET ORAL at 00:08

## 2018-11-15 RX ADMIN — ENOXAPARIN SODIUM 90 MG: 100 INJECTION SUBCUTANEOUS at 13:08

## 2018-11-15 RX ADMIN — Medication 9.2 MILLICURIE: at 10:07

## 2018-11-15 RX ADMIN — ALPRAZOLAM 0.5 MG: 0.5 TABLET ORAL at 13:07

## 2018-11-15 RX ADMIN — PROPRANOLOL HYDROCHLORIDE 80 MG: 20 TABLET ORAL at 08:47

## 2018-11-15 RX ADMIN — FLUOXETINE HYDROCHLORIDE 40 MG: 20 CAPSULE ORAL at 08:48

## 2018-11-15 ASSESSMENT — PAIN DESCRIPTION - FREQUENCY
FREQUENCY: INTERMITTENT

## 2018-11-15 ASSESSMENT — PAIN DESCRIPTION - ORIENTATION
ORIENTATION: LEFT;ANTERIOR
ORIENTATION: LEFT;ANTERIOR
ORIENTATION: LEFT
ORIENTATION: LEFT;ANTERIOR

## 2018-11-15 ASSESSMENT — PAIN SCALES - GENERAL
PAINLEVEL_OUTOF10: 6
PAINLEVEL_OUTOF10: 7
PAINLEVEL_OUTOF10: 5
PAINLEVEL_OUTOF10: 6
PAINLEVEL_OUTOF10: 6

## 2018-11-15 ASSESSMENT — PAIN DESCRIPTION - PROGRESSION
CLINICAL_PROGRESSION: NOT CHANGED

## 2018-11-15 ASSESSMENT — PAIN DESCRIPTION - PAIN TYPE
TYPE: ACUTE PAIN

## 2018-11-15 ASSESSMENT — PAIN DESCRIPTION - LOCATION
LOCATION: CHEST

## 2018-11-15 ASSESSMENT — PAIN DESCRIPTION - DESCRIPTORS
DESCRIPTORS: STABBING

## 2018-11-15 NOTE — DISCHARGE SUMMARY
Dr. Saravanan Wang Discharge Summary  11/15/2018  5:23 PM    Patient:  Carlos Caceres  YOB: 1970    MRN: [id number]   Acct: [de-identified]   [id number]   Primary Care Physician: Agnes Gregorio MD    Admit date:  11/14/2018    Discharge date:  11/15/2018    Discharge Diagnoses:    Patient Active Problem List   Diagnosis    Epigastric pain    Abdominal pain, right upper quadrant    Migraine headache    Hypertension, benign    GERD (gastroesophageal reflux disease)    Constipation    Abdominal pain    Insomnia    Nuñez's esophagus    Chest pain    Asthma    Obesity    Major depression    Lumbar spinal stenosis    Gastrointestinal hemorrhage    Cellulitis of left axilla    Gastroesophageal reflux disease with esophagitis    Urinary retention    Moderate episode of recurrent major depressive disorder [address] [address]    KELLI (generalized anxiety disorder)    ACS (acute coronary syndrome) ([facility]       Discharge Medications:     Philomena Booker   [place] Medication Instructions [facility]:[phone]    Printed on:11/15/18 6163   Medication Information                      albuterol (PROVENTIL HFA;VENTOLIN HFA) 108 (90 BASE) MCG/ACT inhaler  Inhale 2 puffs into the lungs every 4 hours as needed for Wheezing or Shortness of Breath Whichever brand is covered by insurance, substitute as necessary. ALPRAZolam (XANAX) 0.5 MG tablet  Take 0.5 tablets by mouth 3 times daily as needed for Anxiety for up to 30 days. Duane Bourdon dexlansoprazole (DEXILANT) 60 MG CPDR capsule  Take 1 capsule by mouth daily             FLUoxetine (PROZAC) 40 MG capsule  Take 40 mg by mouth daily              gabapentin (NEURONTIN) 600 MG tablet  Take 1 tablet by mouth 4 times daily for 30 days. Fill on or after 9/20/2018.              meloxicam (MOBIC) 15 MG tablet  Take 1 tablet by mouth daily             oxyCODONE-acetaminophen (PERCOCET)  MG per tablet  Take 1 tablet by mouth every 6 hours as needed for

## 2018-11-15 NOTE — FLOWSHEET NOTE
11/14/18 2341   Provider Notification   Reason for Communication Review case   Provider Name Dr. Jimbo Swenson   Provider Notification Physician   Method of Communication Secure Message   Response Waiting for response   Notification Time [phone] Perfect serve message to Dr. Jimbo Swenson regarding patient having no relief of chest pain since admission and very anxious and tearful. Perfect serve message as followed: \"Pt that was admitted tonight for acute coronary syndrome/chest pain. Nitro SL given in ED with no relief. Started on [employer] and stated no relief of chest pain. Just gave Percocet. Anxious about mother being admitted today and not being able to be over there. Anything we can get for anxiety? States she used to take Xanax for panic attacks. Also do you want Nitro gtt continued? Thank you\" Waiting for response. 0075 Call back from Dr. Jimbo Swenson. Telephone orders given for 0.5 mg Xanax 3x daily PRN and continue Nitro gtt.

## 2018-11-21 ENCOUNTER — OFFICE VISIT (OUTPATIENT)
Dept: PHYSICAL MEDICINE AND REHAB | Age: 48
End: 2018-11-21
Payer: COMMERCIAL

## 2018-11-21 VITALS
HEIGHT: 66 IN | HEART RATE: 87 BPM | DIASTOLIC BLOOD PRESSURE: 93 MMHG | BODY MASS INDEX: 32.56 KG/M2 | SYSTOLIC BLOOD PRESSURE: 162 MMHG | WEIGHT: 202.6 LBS

## 2018-11-21 DIAGNOSIS — M51.36 DDD (DEGENERATIVE DISC DISEASE), LUMBAR: ICD-10-CM

## 2018-11-21 DIAGNOSIS — G89.4 CHRONIC PAIN SYNDROME: ICD-10-CM

## 2018-11-21 DIAGNOSIS — I63.9 PERSISTENT MIGRAINE AURA WITH CEREBRAL INFARCTION AND WITHOUT STATUS MIGRAINOSUS, NOT INTRACTABLE (HCC): ICD-10-CM

## 2018-11-21 DIAGNOSIS — M54.16 LUMBAR RADICULITIS: ICD-10-CM

## 2018-11-21 DIAGNOSIS — G43.609 PERSISTENT MIGRAINE AURA WITH CEREBRAL INFARCTION AND WITHOUT STATUS MIGRAINOSUS, NOT INTRACTABLE (HCC): ICD-10-CM

## 2018-11-21 DIAGNOSIS — M79.18 MYOFASCIAL PAIN DYSFUNCTION SYNDROME: ICD-10-CM

## 2018-11-21 DIAGNOSIS — M51.26 LUMBAR DISC HERNIATION: ICD-10-CM

## 2018-11-21 DIAGNOSIS — M47.816 SPONDYLOSIS OF LUMBAR REGION WITHOUT MYELOPATHY OR RADICULOPATHY: ICD-10-CM

## 2018-11-21 DIAGNOSIS — M48.062 SPINAL STENOSIS OF LUMBAR REGION WITH NEUROGENIC CLAUDICATION: Primary | ICD-10-CM

## 2018-11-21 PROCEDURE — G8598 ASA/ANTIPLAT THER USED: HCPCS | Performed by: NURSE PRACTITIONER

## 2018-11-21 PROCEDURE — G8484 FLU IMMUNIZE NO ADMIN: HCPCS | Performed by: NURSE PRACTITIONER

## 2018-11-21 PROCEDURE — G8427 DOCREV CUR MEDS BY ELIG CLIN: HCPCS | Performed by: NURSE PRACTITIONER

## 2018-11-21 PROCEDURE — 99213 OFFICE O/P EST LOW 20 MIN: CPT | Performed by: NURSE PRACTITIONER

## 2018-11-21 PROCEDURE — G8417 CALC BMI ABV UP PARAM F/U: HCPCS | Performed by: NURSE PRACTITIONER

## 2018-11-21 PROCEDURE — 1036F TOBACCO NON-USER: CPT | Performed by: NURSE PRACTITIONER

## 2018-11-21 PROCEDURE — 1111F DSCHRG MED/CURRENT MED MERGE: CPT | Performed by: NURSE PRACTITIONER

## 2018-11-21 RX ORDER — TIZANIDINE 4 MG/1
TABLET ORAL
Qty: 120 TABLET | Refills: 0 | Status: SHIPPED | OUTPATIENT
Start: 2018-11-21 | End: 2018-12-20 | Stop reason: SDUPTHER

## 2018-11-21 RX ORDER — GABAPENTIN 600 MG/1
600 TABLET ORAL 4 TIMES DAILY
Qty: 120 TABLET | Refills: 0 | Status: SHIPPED | OUTPATIENT
Start: 2018-11-21 | End: 2018-12-20 | Stop reason: SDUPTHER

## 2018-11-21 RX ORDER — MELOXICAM 15 MG/1
15 TABLET ORAL DAILY
Qty: 30 TABLET | Refills: 1 | Status: SHIPPED | OUTPATIENT
Start: 2018-11-21 | End: 2018-12-20 | Stop reason: SDUPTHER

## 2018-11-21 RX ORDER — OXYCODONE AND ACETAMINOPHEN 10; 325 MG/1; MG/1
1 TABLET ORAL EVERY 6 HOURS PRN
Qty: 120 TABLET | Refills: 0 | Status: SHIPPED | OUTPATIENT
Start: 2018-11-21 | End: 2018-11-27 | Stop reason: SDUPTHER

## 2018-11-21 ASSESSMENT — ENCOUNTER SYMPTOMS: BACK PAIN: 1

## 2018-11-21 NOTE — PROGRESS NOTES
reactive to light. Conjunctivae and EOM are normal. Right eye exhibits no discharge. Left eye exhibits no discharge. No scleral icterus. Neck: Normal range of motion and full passive range of motion without pain. Neck supple. No muscular tenderness present. No neck rigidity. No edema, no erythema and normal range of motion present. No thyromegaly present. Cardiovascular: Normal rate, regular rhythm, normal heart sounds and intact distal pulses. Exam reveals no gallop and no friction rub. No murmur heard. Pulmonary/Chest: Effort normal and breath sounds normal. No respiratory distress. She has no wheezes. She has no rales. She exhibits no tenderness. Abdominal: Soft. Bowel sounds are normal. She exhibits no distension. There is no tenderness. There is no rebound and no guarding. Musculoskeletal:        Cervical back: She exhibits normal range of motion. Thoracic back: She exhibits normal range of motion. Lumbar back: She exhibits decreased range of motion, tenderness, pain and spasm. Neurological: She is alert and oriented to person, place, and time. She has normal strength and normal reflexes. She is not disoriented. She displays no atrophy. A sensory deficit is present. No cranial nerve deficit. She exhibits normal muscle tone. She displays a negative Romberg sign. Gait abnormal. Coordination normal.   SLR + 90 degrees sitting    Left hand and finger numbness    Skin: Skin is warm. No rash noted. She is not diaphoretic. No erythema. No pallor. Psychiatric: Her mood appears anxious. Her affect is not angry, not blunt, not labile and not inappropriate. Her speech is not rapid and/or pressured, not delayed, not tangential and not slurred. She is not agitated, not aggressive, not hyperactive, not slowed, not withdrawn, not actively hallucinating and not combative. Thought content is not paranoid and not delusional. Cognition and memory are not impaired.  She does not express impulsivity or (PERCOCET)  MG per tablet     Sig: Take 1 tablet by mouth every 6 hours as needed for Pain for up to 30 days. .     Dispense:  120 tablet     Refill:  0    tiZANidine (ZANAFLEX) 4 MG tablet     Sig: TAKE 1 TABLET BY MOUTH EVERY 6 HOURS AS NEEDED FOR MUSCLE SPASM     Dispense:  120 tablet     Refill:  0    meloxicam (MOBIC) 15 MG tablet     Sig: Take 1 tablet by mouth daily     Dispense:  30 tablet     Refill:  1    gabapentin (NEURONTIN) 600 MG tablet     Sig: Take 1 tablet by mouth 4 times daily for 30 days. Fill on or after 9/20/2018. Dispense:  120 tablet     Refill:  0       Return in about 3 months (around 2/21/2019), or if symptoms worsen or fail to improve, for follow up  for medications.          Electronically signed by RENNY Chaidez CNP on11/21/2018 at 9:42 AM

## 2018-11-27 RX ORDER — OXYCODONE AND ACETAMINOPHEN 10; 325 MG/1; MG/1
1 TABLET ORAL EVERY 6 HOURS PRN
Qty: 120 TABLET | Refills: 0 | Status: SHIPPED | OUTPATIENT
Start: 2018-11-27 | End: 2018-12-20 | Stop reason: SDUPTHER

## 2018-11-27 NOTE — TELEPHONE ENCOUNTER
Received PA approval for Percocet. Pt. Filled 7 days supply so will need new rx. OARRS reviewed. UDS: consistent. Last seen: 11/21/2018.  Follow-up: 2/25/2018

## 2018-12-20 DIAGNOSIS — G89.4 CHRONIC PAIN SYNDROME: ICD-10-CM

## 2018-12-20 DIAGNOSIS — M79.18 MYOFASCIAL PAIN DYSFUNCTION SYNDROME: ICD-10-CM

## 2018-12-20 DIAGNOSIS — M47.816 SPONDYLOSIS OF LUMBAR REGION WITHOUT MYELOPATHY OR RADICULOPATHY: ICD-10-CM

## 2018-12-20 DIAGNOSIS — M48.062 SPINAL STENOSIS OF LUMBAR REGION WITH NEUROGENIC CLAUDICATION: ICD-10-CM

## 2018-12-20 RX ORDER — GABAPENTIN 600 MG/1
600 TABLET ORAL 4 TIMES DAILY
Qty: 120 TABLET | Refills: 0 | Status: SHIPPED | OUTPATIENT
Start: 2018-12-21 | End: 2019-01-25 | Stop reason: SDUPTHER

## 2018-12-20 RX ORDER — TIZANIDINE 4 MG/1
TABLET ORAL
Qty: 120 TABLET | Refills: 0 | Status: SHIPPED | OUTPATIENT
Start: 2018-12-20 | End: 2019-01-25 | Stop reason: SDUPTHER

## 2018-12-20 RX ORDER — OXYCODONE AND ACETAMINOPHEN 10; 325 MG/1; MG/1
1 TABLET ORAL EVERY 6 HOURS PRN
Qty: 120 TABLET | Refills: 0 | Status: SHIPPED | OUTPATIENT
Start: 2018-12-27 | End: 2019-01-25 | Stop reason: SDUPTHER

## 2018-12-20 RX ORDER — MELOXICAM 15 MG/1
15 TABLET ORAL DAILY
Qty: 30 TABLET | Refills: 1 | Status: SHIPPED | OUTPATIENT
Start: 2018-12-20 | End: 2019-02-26 | Stop reason: SDUPTHER

## 2019-01-25 DIAGNOSIS — M79.18 MYOFASCIAL PAIN DYSFUNCTION SYNDROME: ICD-10-CM

## 2019-01-25 DIAGNOSIS — M48.062 SPINAL STENOSIS OF LUMBAR REGION WITH NEUROGENIC CLAUDICATION: ICD-10-CM

## 2019-01-25 DIAGNOSIS — G89.4 CHRONIC PAIN SYNDROME: ICD-10-CM

## 2019-01-25 DIAGNOSIS — M47.816 SPONDYLOSIS OF LUMBAR REGION WITHOUT MYELOPATHY OR RADICULOPATHY: ICD-10-CM

## 2019-01-26 ENCOUNTER — TELEPHONE (OUTPATIENT)
Dept: PHYSICAL MEDICINE AND REHAB | Age: 49
End: 2019-01-26

## 2019-01-28 RX ORDER — OXYCODONE AND ACETAMINOPHEN 10; 325 MG/1; MG/1
1 TABLET ORAL EVERY 6 HOURS PRN
Qty: 120 TABLET | Refills: 0 | Status: SHIPPED | OUTPATIENT
Start: 2019-01-28 | End: 2019-02-26 | Stop reason: SDUPTHER

## 2019-01-28 RX ORDER — TIZANIDINE 4 MG/1
TABLET ORAL
Qty: 120 TABLET | Refills: 0 | Status: SHIPPED | OUTPATIENT
Start: 2019-01-28 | End: 2019-02-26 | Stop reason: SDUPTHER

## 2019-01-28 RX ORDER — GABAPENTIN 600 MG/1
600 TABLET ORAL 4 TIMES DAILY
Qty: 120 TABLET | Refills: 0 | Status: SHIPPED | OUTPATIENT
Start: 2019-01-28 | End: 2019-02-26 | Stop reason: SDUPTHER

## 2019-02-26 ENCOUNTER — TELEPHONE (OUTPATIENT)
Dept: PHYSICAL MEDICINE AND REHAB | Age: 49
End: 2019-02-26

## 2019-02-26 ENCOUNTER — OFFICE VISIT (OUTPATIENT)
Dept: PHYSICAL MEDICINE AND REHAB | Age: 49
End: 2019-02-26
Payer: COMMERCIAL

## 2019-02-26 VITALS
DIASTOLIC BLOOD PRESSURE: 88 MMHG | BODY MASS INDEX: 33.14 KG/M2 | HEART RATE: 71 BPM | WEIGHT: 206.2 LBS | SYSTOLIC BLOOD PRESSURE: 140 MMHG | HEIGHT: 66 IN

## 2019-02-26 DIAGNOSIS — M48.062 SPINAL STENOSIS OF LUMBAR REGION WITH NEUROGENIC CLAUDICATION: Primary | ICD-10-CM

## 2019-02-26 DIAGNOSIS — M47.816 SPONDYLOSIS OF LUMBAR REGION WITHOUT MYELOPATHY OR RADICULOPATHY: ICD-10-CM

## 2019-02-26 DIAGNOSIS — M51.26 LUMBAR DISC HERNIATION: ICD-10-CM

## 2019-02-26 DIAGNOSIS — M51.36 DDD (DEGENERATIVE DISC DISEASE), LUMBAR: ICD-10-CM

## 2019-02-26 DIAGNOSIS — M79.18 MYOFASCIAL PAIN DYSFUNCTION SYNDROME: ICD-10-CM

## 2019-02-26 DIAGNOSIS — I63.9 PERSISTENT MIGRAINE AURA WITH CEREBRAL INFARCTION AND WITHOUT STATUS MIGRAINOSUS, NOT INTRACTABLE (HCC): ICD-10-CM

## 2019-02-26 DIAGNOSIS — M54.16 LUMBAR RADICULITIS: ICD-10-CM

## 2019-02-26 DIAGNOSIS — G43.609 PERSISTENT MIGRAINE AURA WITH CEREBRAL INFARCTION AND WITHOUT STATUS MIGRAINOSUS, NOT INTRACTABLE (HCC): ICD-10-CM

## 2019-02-26 DIAGNOSIS — G89.4 CHRONIC PAIN SYNDROME: ICD-10-CM

## 2019-02-26 PROCEDURE — G8599 NO ASA/ANTIPLAT THER USE RNG: HCPCS | Performed by: NURSE PRACTITIONER

## 2019-02-26 PROCEDURE — G8427 DOCREV CUR MEDS BY ELIG CLIN: HCPCS | Performed by: NURSE PRACTITIONER

## 2019-02-26 PROCEDURE — G8417 CALC BMI ABV UP PARAM F/U: HCPCS | Performed by: NURSE PRACTITIONER

## 2019-02-26 PROCEDURE — 99214 OFFICE O/P EST MOD 30 MIN: CPT | Performed by: NURSE PRACTITIONER

## 2019-02-26 PROCEDURE — G8484 FLU IMMUNIZE NO ADMIN: HCPCS | Performed by: NURSE PRACTITIONER

## 2019-02-26 PROCEDURE — 1036F TOBACCO NON-USER: CPT | Performed by: NURSE PRACTITIONER

## 2019-02-26 PROCEDURE — 3017F COLORECTAL CA SCREEN DOC REV: CPT | Performed by: NURSE PRACTITIONER

## 2019-02-26 RX ORDER — NALOXONE HYDROCHLORIDE 4 MG/.1ML
1 SPRAY NASAL PRN
Qty: 1 EACH | Refills: 0 | Status: SHIPPED | OUTPATIENT
Start: 2019-02-26

## 2019-02-26 RX ORDER — MELOXICAM 15 MG/1
15 TABLET ORAL DAILY
Qty: 30 TABLET | Refills: 1 | Status: SHIPPED | OUTPATIENT
Start: 2019-02-26 | End: 2019-03-26 | Stop reason: SDUPTHER

## 2019-02-26 RX ORDER — OXYCODONE AND ACETAMINOPHEN 10; 325 MG/1; MG/1
1 TABLET ORAL EVERY 6 HOURS PRN
Qty: 120 TABLET | Refills: 0 | Status: SHIPPED | OUTPATIENT
Start: 2019-02-27 | End: 2019-03-26 | Stop reason: SDUPTHER

## 2019-02-26 RX ORDER — TIZANIDINE 4 MG/1
TABLET ORAL
Qty: 120 TABLET | Refills: 0 | Status: SHIPPED | OUTPATIENT
Start: 2019-02-27 | End: 2019-03-26 | Stop reason: SDUPTHER

## 2019-02-26 RX ORDER — GABAPENTIN 600 MG/1
600 TABLET ORAL 4 TIMES DAILY
Qty: 120 TABLET | Refills: 0 | Status: SHIPPED | OUTPATIENT
Start: 2019-02-27 | End: 2019-03-26 | Stop reason: SDUPTHER

## 2019-02-26 ASSESSMENT — ENCOUNTER SYMPTOMS: BACK PAIN: 1

## 2019-03-08 ENCOUNTER — OFFICE VISIT (OUTPATIENT)
Dept: PSYCHIATRY | Age: 49
End: 2019-03-08
Payer: COMMERCIAL

## 2019-03-08 DIAGNOSIS — F41.0 PANIC DISORDER: Primary | ICD-10-CM

## 2019-03-08 PROCEDURE — 90792 PSYCH DIAG EVAL W/MED SRVCS: CPT | Performed by: PSYCHIATRY & NEUROLOGY

## 2019-03-08 PROCEDURE — 1036F TOBACCO NON-USER: CPT | Performed by: PSYCHIATRY & NEUROLOGY

## 2019-03-08 RX ORDER — QUETIAPINE FUMARATE 25 MG/1
25 TABLET, FILM COATED ORAL NIGHTLY
Qty: 30 TABLET | Refills: 1 | Status: SHIPPED | OUTPATIENT
Start: 2019-03-08 | End: 2019-03-20

## 2019-03-08 RX ORDER — OLANZAPINE 5 MG/1
5 TABLET ORAL NIGHTLY
COMMUNITY
End: 2019-03-08 | Stop reason: SINTOL

## 2019-03-08 RX ORDER — ALPRAZOLAM 0.25 MG/1
0.25 TABLET ORAL PRN
Qty: 15 TABLET | Refills: 0 | Status: SHIPPED | OUTPATIENT
Start: 2019-03-08 | End: 2019-03-29

## 2019-03-08 RX ORDER — DULOXETIN HYDROCHLORIDE 20 MG/1
20 CAPSULE, DELAYED RELEASE ORAL DAILY
Qty: 30 CAPSULE | Refills: 1 | Status: SHIPPED | OUTPATIENT
Start: 2019-03-08 | End: 2019-04-30

## 2019-03-11 ENCOUNTER — TELEPHONE (OUTPATIENT)
Dept: PSYCHIATRY | Age: 49
End: 2019-03-11

## 2019-03-14 DIAGNOSIS — G89.4 CHRONIC PAIN SYNDROME: Primary | ICD-10-CM

## 2019-03-15 RX ORDER — DIAZEPAM 5 MG/1
5 TABLET ORAL ONCE
Qty: 1 TABLET | Refills: 0 | Status: SHIPPED | OUTPATIENT
Start: 2019-03-18 | End: 2019-03-18

## 2019-03-18 ENCOUNTER — TELEPHONE (OUTPATIENT)
Dept: PSYCHIATRY | Age: 49
End: 2019-03-18

## 2019-03-18 ENCOUNTER — HOSPITAL ENCOUNTER (OUTPATIENT)
Dept: MRI IMAGING | Age: 49
Discharge: HOME | End: 2019-03-18
Payer: COMMERCIAL

## 2019-03-18 DIAGNOSIS — M51.36 DDD (DEGENERATIVE DISC DISEASE), LUMBAR: ICD-10-CM

## 2019-03-18 DIAGNOSIS — M47.816 SPONDYLOSIS OF LUMBAR REGION WITHOUT MYELOPATHY OR RADICULOPATHY: ICD-10-CM

## 2019-03-18 DIAGNOSIS — M48.062 SPINAL STENOSIS OF LUMBAR REGION WITH NEUROGENIC CLAUDICATION: ICD-10-CM

## 2019-03-18 DIAGNOSIS — G89.4 CHRONIC PAIN SYNDROME: ICD-10-CM

## 2019-03-18 DIAGNOSIS — M51.26 LUMBAR DISC HERNIATION: ICD-10-CM

## 2019-03-18 DIAGNOSIS — M54.16 LUMBAR RADICULITIS: ICD-10-CM

## 2019-03-18 PROCEDURE — 72148 MRI LUMBAR SPINE W/O DYE: CPT

## 2019-03-20 ENCOUNTER — TELEPHONE (OUTPATIENT)
Dept: PSYCHIATRY | Age: 49
End: 2019-03-20

## 2019-03-20 RX ORDER — QUETIAPINE FUMARATE 50 MG/1
50 TABLET, FILM COATED ORAL NIGHTLY
Qty: 30 TABLET | Refills: 1 | Status: SHIPPED | OUTPATIENT
Start: 2019-03-20 | End: 2019-04-30

## 2019-03-22 DIAGNOSIS — F51.05 INSOMNIA DUE TO OTHER MENTAL DISORDER: Primary | ICD-10-CM

## 2019-03-22 DIAGNOSIS — F99 INSOMNIA DUE TO OTHER MENTAL DISORDER: Primary | ICD-10-CM

## 2019-03-22 RX ORDER — ALPRAZOLAM 0.5 MG/1
TABLET ORAL
Qty: 30 TABLET | Refills: 0 | Status: SHIPPED | OUTPATIENT
Start: 2019-03-22 | End: 2019-04-09 | Stop reason: SDUPTHER

## 2019-03-25 DIAGNOSIS — M48.062 SPINAL STENOSIS OF LUMBAR REGION WITH NEUROGENIC CLAUDICATION: ICD-10-CM

## 2019-03-25 DIAGNOSIS — M79.18 MYOFASCIAL PAIN DYSFUNCTION SYNDROME: ICD-10-CM

## 2019-03-25 DIAGNOSIS — G89.4 CHRONIC PAIN SYNDROME: ICD-10-CM

## 2019-03-25 DIAGNOSIS — M47.816 SPONDYLOSIS OF LUMBAR REGION WITHOUT MYELOPATHY OR RADICULOPATHY: ICD-10-CM

## 2019-03-26 RX ORDER — MELOXICAM 15 MG/1
15 TABLET ORAL DAILY
Qty: 30 TABLET | Refills: 1 | Status: ON HOLD | OUTPATIENT
Start: 2019-03-28 | End: 2019-04-03 | Stop reason: HOSPADM

## 2019-03-26 RX ORDER — OXYCODONE AND ACETAMINOPHEN 10; 325 MG/1; MG/1
1 TABLET ORAL EVERY 6 HOURS PRN
Qty: 120 TABLET | Refills: 0 | Status: SHIPPED | OUTPATIENT
Start: 2019-03-28 | End: 2019-04-26 | Stop reason: SDUPTHER

## 2019-03-26 RX ORDER — TIZANIDINE 4 MG/1
TABLET ORAL
Qty: 120 TABLET | Refills: 0 | Status: SHIPPED | OUTPATIENT
Start: 2019-03-28 | End: 2019-04-26 | Stop reason: SDUPTHER

## 2019-03-26 RX ORDER — GABAPENTIN 600 MG/1
600 TABLET ORAL 4 TIMES DAILY
Qty: 120 TABLET | Refills: 0 | Status: SHIPPED | OUTPATIENT
Start: 2019-03-28 | End: 2019-04-26 | Stop reason: SDUPTHER

## 2019-03-30 ENCOUNTER — APPOINTMENT (OUTPATIENT)
Dept: GENERAL RADIOLOGY | Age: 49
DRG: 720 | End: 2019-03-30
Payer: COMMERCIAL

## 2019-03-30 ENCOUNTER — APPOINTMENT (OUTPATIENT)
Dept: CT IMAGING | Age: 49
DRG: 720 | End: 2019-03-30
Payer: COMMERCIAL

## 2019-03-30 ENCOUNTER — HOSPITAL ENCOUNTER (INPATIENT)
Age: 49
LOS: 4 days | Discharge: HOME HEALTH | DRG: 720 | End: 2019-04-03
Attending: FAMILY MEDICINE | Admitting: INTERNAL MEDICINE
Payer: COMMERCIAL

## 2019-03-30 DIAGNOSIS — J18.9 PNEUMONIA DUE TO ORGANISM: Primary | ICD-10-CM

## 2019-03-30 DIAGNOSIS — K92.2 GASTROINTESTINAL HEMORRHAGE, UNSPECIFIED GASTROINTESTINAL HEMORRHAGE TYPE: ICD-10-CM

## 2019-03-30 PROBLEM — R74.8 ELEVATED LIVER ENZYMES: Status: ACTIVE | Noted: 2019-03-30

## 2019-03-30 PROBLEM — G89.29 CHRONIC LOW BACK PAIN: Status: ACTIVE | Noted: 2019-03-30

## 2019-03-30 PROBLEM — J15.9 COMMUNITY ACQUIRED BACTERIAL PNEUMONIA: Status: ACTIVE | Noted: 2019-03-30

## 2019-03-30 PROBLEM — M54.50 CHRONIC LOW BACK PAIN: Status: ACTIVE | Noted: 2019-03-30

## 2019-03-30 LAB
ALBUMIN SERPL-MCNC: 3.2 G/DL (ref 3.5–5.1)
ALP BLD-CCNC: 67 U/L (ref 38–126)
ALT SERPL-CCNC: 79 U/L (ref 11–66)
AMPHETAMINE+METHAMPHETAMINE URINE SCREEN: NEGATIVE
ANION GAP SERPL CALCULATED.3IONS-SCNC: 16 MEQ/L (ref 8–16)
AST SERPL-CCNC: 144 U/L (ref 5–40)
BACTERIA: ABNORMAL /HPF
BARBITURATE QUANTITATIVE URINE: NEGATIVE
BASOPHILIA: ABNORMAL
BASOPHILS # BLD: 0.3 %
BASOPHILS ABSOLUTE: 0 THOU/MM3 (ref 0–0.1)
BENZODIAZEPINE QUANTITATIVE URINE: POSITIVE
BILIRUB SERPL-MCNC: 0.3 MG/DL (ref 0.3–1.2)
BILIRUBIN DIRECT: < 0.2 MG/DL (ref 0–0.3)
BILIRUBIN URINE: NEGATIVE
BLOOD, URINE: ABNORMAL
BUN BLDV-MCNC: 14 MG/DL (ref 7–22)
CALCIUM SERPL-MCNC: 8.6 MG/DL (ref 8.5–10.5)
CANNABINOID QUANTITATIVE URINE: NEGATIVE
CASTS 2: ABNORMAL /LPF
CASTS UA: ABNORMAL /LPF
CHARACTER, URINE: ABNORMAL
CHLORIDE BLD-SCNC: 98 MEQ/L (ref 98–111)
CO2: 24 MEQ/L (ref 23–33)
COCAINE METABOLITE QUANTITATIVE URINE: NEGATIVE
COLOR: YELLOW
CREAT SERPL-MCNC: 0.6 MG/DL (ref 0.4–1.2)
CRYSTALS, UA: ABNORMAL
EKG ATRIAL RATE: 109 BPM
EKG P AXIS: 59 DEGREES
EKG P-R INTERVAL: 162 MS
EKG Q-T INTERVAL: 340 MS
EKG QRS DURATION: 80 MS
EKG QTC CALCULATION (BAZETT): 457 MS
EKG R AXIS: 27 DEGREES
EKG T AXIS: 31 DEGREES
EKG VENTRICULAR RATE: 109 BPM
EOSINOPHIL # BLD: 0.2 %
EOSINOPHILS ABSOLUTE: 0 THOU/MM3 (ref 0–0.4)
EPITHELIAL CELLS, UA: ABNORMAL /HPF
ERYTHROCYTE [DISTWIDTH] IN BLOOD BY AUTOMATED COUNT: 13 % (ref 11.5–14.5)
ERYTHROCYTE [DISTWIDTH] IN BLOOD BY AUTOMATED COUNT: 39.7 FL (ref 35–45)
FLU A ANTIGEN: NEGATIVE
FLU B ANTIGEN: NEGATIVE
GFR SERPL CREATININE-BSD FRML MDRD: > 90 ML/MIN/1.73M2
GLUCOSE BLD-MCNC: 94 MG/DL (ref 70–108)
GLUCOSE URINE: NEGATIVE MG/DL
HAV IGM SER IA-ACNC: NEGATIVE
HCT VFR BLD CALC: 34 % (ref 37–47)
HEMOCCULT STL QL: POSITIVE
HEMOGLOBIN: 11.2 GM/DL (ref 12–16)
HEPATITIS B CORE IGM ANTIBODY: NEGATIVE
HEPATITIS B SURFACE ANTIGEN: NEGATIVE
HEPATITIS C ANTIBODY: NEGATIVE
IMMATURE GRANS (ABS): 0.11 THOU/MM3 (ref 0–0.07)
IMMATURE GRANULOCYTES: 1.1 %
KETONES, URINE: 15
LACTIC ACID: 1.3 MMOL/L (ref 0.5–2.2)
LEUKOCYTE ESTERASE, URINE: NEGATIVE
LIPASE: 31 U/L (ref 5.6–51.3)
LYMPHOCYTES # BLD: 15.8 %
LYMPHOCYTES ABSOLUTE: 1.6 THOU/MM3 (ref 1–4.8)
MCH RBC QN AUTO: 27.4 PG (ref 26–33)
MCHC RBC AUTO-ENTMCNC: 32.9 GM/DL (ref 32.2–35.5)
MCV RBC AUTO: 83.1 FL (ref 81–99)
MISCELLANEOUS 2: ABNORMAL
MONOCYTES # BLD: 5.7 %
MONOCYTES ABSOLUTE: 0.6 THOU/MM3 (ref 0.4–1.3)
MRSA SCREEN RT-PCR: NEGATIVE
NITRITE, URINE: NEGATIVE
NUCLEATED RED BLOOD CELLS: 0 /100 WBC
OPIATES, URINE: NEGATIVE
OSMOLALITY CALCULATION: 275.9 MOSMOL/KG (ref 275–300)
OXYCODONE: NEGATIVE
PH UA: 6 (ref 5–9)
PHENCYCLIDINE QUANTITATIVE URINE: NEGATIVE
PLATELET # BLD: 264 THOU/MM3 (ref 130–400)
PLATELET ESTIMATE: ADEQUATE
PMV BLD AUTO: 9.7 FL (ref 9.4–12.4)
POTASSIUM SERPL-SCNC: 3.3 MEQ/L (ref 3.5–5.2)
PREGNANCY, SERUM: NEGATIVE
PROCALCITONIN: 10 NG/ML (ref 0.01–0.09)
PROTEIN UA: 30
RBC # BLD: 4.09 MILL/MM3 (ref 4.2–5.4)
RBC URINE: ABNORMAL /HPF
RENAL EPITHELIAL, UA: ABNORMAL
SCAN OF BLOOD SMEAR: NORMAL
SEG NEUTROPHILS: 76.9 %
SEGMENTED NEUTROPHILS ABSOLUTE COUNT: 7.8 THOU/MM3 (ref 1.8–7.7)
SODIUM BLD-SCNC: 138 MEQ/L (ref 135–145)
SPECIFIC GRAVITY, URINE: 1.01 (ref 1–1.03)
TOTAL PROTEIN: 7 G/DL (ref 6.1–8)
TOXIC GRANULATION: PRESENT
UROBILINOGEN, URINE: 1 EU/DL (ref 0–1)
VANCOMYCIN RESISTANT ENTEROCOCCUS: NEGATIVE
WBC # BLD: 10.2 THOU/MM3 (ref 4.8–10.8)
WBC UA: ABNORMAL /HPF
YEAST: ABNORMAL

## 2019-03-30 PROCEDURE — 82272 OCCULT BLD FECES 1-3 TESTS: CPT

## 2019-03-30 PROCEDURE — 2580000003 HC RX 258: Performed by: PHYSICIAN ASSISTANT

## 2019-03-30 PROCEDURE — 99223 1ST HOSP IP/OBS HIGH 75: CPT | Performed by: PHYSICIAN ASSISTANT

## 2019-03-30 PROCEDURE — 2060000000 HC ICU INTERMEDIATE R&B

## 2019-03-30 PROCEDURE — 96365 THER/PROPH/DIAG IV INF INIT: CPT

## 2019-03-30 PROCEDURE — 87641 MR-STAPH DNA AMP PROBE: CPT

## 2019-03-30 PROCEDURE — 84145 PROCALCITONIN (PCT): CPT

## 2019-03-30 PROCEDURE — 93010 ELECTROCARDIOGRAM REPORT: CPT | Performed by: INTERNAL MEDICINE

## 2019-03-30 PROCEDURE — 74177 CT ABD & PELVIS W/CONTRAST: CPT

## 2019-03-30 PROCEDURE — 6370000000 HC RX 637 (ALT 250 FOR IP): Performed by: PHYSICIAN ASSISTANT

## 2019-03-30 PROCEDURE — 2580000003 HC RX 258: Performed by: FAMILY MEDICINE

## 2019-03-30 PROCEDURE — 96376 TX/PRO/DX INJ SAME DRUG ADON: CPT

## 2019-03-30 PROCEDURE — 6360000004 HC RX CONTRAST MEDICATION: Performed by: FAMILY MEDICINE

## 2019-03-30 PROCEDURE — 83690 ASSAY OF LIPASE: CPT

## 2019-03-30 PROCEDURE — 6370000000 HC RX 637 (ALT 250 FOR IP): Performed by: FAMILY MEDICINE

## 2019-03-30 PROCEDURE — 87081 CULTURE SCREEN ONLY: CPT

## 2019-03-30 PROCEDURE — 80307 DRUG TEST PRSMV CHEM ANLYZR: CPT

## 2019-03-30 PROCEDURE — 93005 ELECTROCARDIOGRAM TRACING: CPT | Performed by: FAMILY MEDICINE

## 2019-03-30 PROCEDURE — 87500 VANOMYCIN DNA AMP PROBE: CPT

## 2019-03-30 PROCEDURE — 80074 ACUTE HEPATITIS PANEL: CPT

## 2019-03-30 PROCEDURE — 87040 BLOOD CULTURE FOR BACTERIA: CPT

## 2019-03-30 PROCEDURE — 87804 INFLUENZA ASSAY W/OPTIC: CPT

## 2019-03-30 PROCEDURE — 81001 URINALYSIS AUTO W/SCOPE: CPT

## 2019-03-30 PROCEDURE — 99285 EMERGENCY DEPT VISIT HI MDM: CPT

## 2019-03-30 PROCEDURE — 71046 X-RAY EXAM CHEST 2 VIEWS: CPT

## 2019-03-30 PROCEDURE — 85025 COMPLETE CBC W/AUTO DIFF WBC: CPT

## 2019-03-30 PROCEDURE — 83605 ASSAY OF LACTIC ACID: CPT

## 2019-03-30 PROCEDURE — 87507 IADNA-DNA/RNA PROBE TQ 12-25: CPT

## 2019-03-30 PROCEDURE — 96375 TX/PRO/DX INJ NEW DRUG ADDON: CPT

## 2019-03-30 PROCEDURE — 84703 CHORIONIC GONADOTROPIN ASSAY: CPT

## 2019-03-30 PROCEDURE — 80048 BASIC METABOLIC PNL TOTAL CA: CPT

## 2019-03-30 PROCEDURE — 36415 COLL VENOUS BLD VENIPUNCTURE: CPT

## 2019-03-30 PROCEDURE — 6360000002 HC RX W HCPCS: Performed by: FAMILY MEDICINE

## 2019-03-30 PROCEDURE — 96367 TX/PROPH/DG ADDL SEQ IV INF: CPT

## 2019-03-30 PROCEDURE — 80076 HEPATIC FUNCTION PANEL: CPT

## 2019-03-30 RX ORDER — ONDANSETRON 2 MG/ML
4 INJECTION INTRAMUSCULAR; INTRAVENOUS EVERY 6 HOURS PRN
Status: DISCONTINUED | OUTPATIENT
Start: 2019-03-30 | End: 2019-04-03 | Stop reason: HOSPADM

## 2019-03-30 RX ORDER — BENZONATATE 100 MG/1
200 CAPSULE ORAL ONCE
Status: COMPLETED | OUTPATIENT
Start: 2019-03-30 | End: 2019-03-30

## 2019-03-30 RX ORDER — QUETIAPINE FUMARATE 25 MG/1
50 TABLET, FILM COATED ORAL NIGHTLY
Status: DISCONTINUED | OUTPATIENT
Start: 2019-03-30 | End: 2019-04-03 | Stop reason: HOSPADM

## 2019-03-30 RX ORDER — ONDANSETRON 2 MG/ML
4 INJECTION INTRAMUSCULAR; INTRAVENOUS ONCE
Status: COMPLETED | OUTPATIENT
Start: 2019-03-30 | End: 2019-03-30

## 2019-03-30 RX ORDER — 0.9 % SODIUM CHLORIDE 0.9 %
2000 INTRAVENOUS SOLUTION INTRAVENOUS ONCE
Status: COMPLETED | OUTPATIENT
Start: 2019-03-30 | End: 2019-03-30

## 2019-03-30 RX ORDER — SODIUM CHLORIDE 0.9 % (FLUSH) 0.9 %
10 SYRINGE (ML) INJECTION PRN
Status: DISCONTINUED | OUTPATIENT
Start: 2019-03-30 | End: 2019-04-03 | Stop reason: HOSPADM

## 2019-03-30 RX ORDER — DULOXETIN HYDROCHLORIDE 20 MG/1
20 CAPSULE, DELAYED RELEASE ORAL DAILY
Status: DISCONTINUED | OUTPATIENT
Start: 2019-03-30 | End: 2019-04-03 | Stop reason: HOSPADM

## 2019-03-30 RX ORDER — PANTOPRAZOLE SODIUM 40 MG/1
40 TABLET, DELAYED RELEASE ORAL
Status: DISCONTINUED | OUTPATIENT
Start: 2019-03-31 | End: 2019-04-01

## 2019-03-30 RX ORDER — OXYCODONE AND ACETAMINOPHEN 10; 325 MG/1; MG/1
1 TABLET ORAL EVERY 6 HOURS PRN
Status: DISCONTINUED | OUTPATIENT
Start: 2019-03-30 | End: 2019-03-30

## 2019-03-30 RX ORDER — PROPRANOLOL HYDROCHLORIDE 20 MG/1
80 TABLET ORAL 2 TIMES DAILY
Status: DISCONTINUED | OUTPATIENT
Start: 2019-03-30 | End: 2019-04-03 | Stop reason: HOSPADM

## 2019-03-30 RX ORDER — SODIUM CHLORIDE 9 MG/ML
INJECTION, SOLUTION INTRAVENOUS CONTINUOUS
Status: DISCONTINUED | OUTPATIENT
Start: 2019-03-30 | End: 2019-04-02

## 2019-03-30 RX ORDER — TRIAMTERENE AND HYDROCHLOROTHIAZIDE 37.5; 25 MG/1; MG/1
1 TABLET ORAL DAILY
Status: DISCONTINUED | OUTPATIENT
Start: 2019-03-30 | End: 2019-03-31

## 2019-03-30 RX ORDER — ACETAMINOPHEN 325 MG/1
650 TABLET ORAL EVERY 4 HOURS PRN
Status: DISCONTINUED | OUTPATIENT
Start: 2019-03-30 | End: 2019-04-03 | Stop reason: HOSPADM

## 2019-03-30 RX ORDER — GABAPENTIN 600 MG/1
600 TABLET ORAL 4 TIMES DAILY
Status: DISCONTINUED | OUTPATIENT
Start: 2019-03-30 | End: 2019-04-03 | Stop reason: HOSPADM

## 2019-03-30 RX ORDER — ALPRAZOLAM 0.5 MG/1
0.5 TABLET ORAL NIGHTLY PRN
Status: DISCONTINUED | OUTPATIENT
Start: 2019-03-30 | End: 2019-04-01

## 2019-03-30 RX ORDER — OXYCODONE HYDROCHLORIDE 5 MG/1
10 TABLET ORAL EVERY 6 HOURS PRN
Status: DISCONTINUED | OUTPATIENT
Start: 2019-03-30 | End: 2019-03-31

## 2019-03-30 RX ORDER — SODIUM CHLORIDE 0.9 % (FLUSH) 0.9 %
10 SYRINGE (ML) INJECTION EVERY 12 HOURS SCHEDULED
Status: DISCONTINUED | OUTPATIENT
Start: 2019-03-30 | End: 2019-04-03 | Stop reason: HOSPADM

## 2019-03-30 RX ORDER — ALBUTEROL SULFATE 2.5 MG/3ML
2.5 SOLUTION RESPIRATORY (INHALATION) EVERY 6 HOURS PRN
Status: DISCONTINUED | OUTPATIENT
Start: 2019-03-30 | End: 2019-04-03 | Stop reason: HOSPADM

## 2019-03-30 RX ORDER — TOPIRAMATE 100 MG/1
100 TABLET, FILM COATED ORAL 2 TIMES DAILY
Status: DISCONTINUED | OUTPATIENT
Start: 2019-03-30 | End: 2019-04-03 | Stop reason: HOSPADM

## 2019-03-30 RX ORDER — ACETAMINOPHEN 500 MG
1000 TABLET ORAL ONCE
Status: COMPLETED | OUTPATIENT
Start: 2019-03-30 | End: 2019-03-30

## 2019-03-30 RX ORDER — DICYCLOMINE HYDROCHLORIDE 10 MG/1
20 CAPSULE ORAL ONCE
Status: COMPLETED | OUTPATIENT
Start: 2019-03-30 | End: 2019-03-30

## 2019-03-30 RX ORDER — TIZANIDINE 4 MG/1
4 TABLET ORAL EVERY 6 HOURS PRN
Status: DISCONTINUED | OUTPATIENT
Start: 2019-03-30 | End: 2019-04-03 | Stop reason: HOSPADM

## 2019-03-30 RX ORDER — POTASSIUM CHLORIDE 20 MEQ/1
40 TABLET, EXTENDED RELEASE ORAL ONCE
Status: COMPLETED | OUTPATIENT
Start: 2019-03-31 | End: 2019-03-31

## 2019-03-30 RX ADMIN — ONDANSETRON 4 MG: 2 INJECTION INTRAMUSCULAR; INTRAVENOUS at 15:17

## 2019-03-30 RX ADMIN — SODIUM CHLORIDE: 9 INJECTION, SOLUTION INTRAVENOUS at 21:41

## 2019-03-30 RX ADMIN — CEFTRIAXONE SODIUM 1 G: 1 INJECTION, POWDER, FOR SOLUTION INTRAMUSCULAR; INTRAVENOUS at 17:00

## 2019-03-30 RX ADMIN — ONDANSETRON 4 MG: 2 INJECTION INTRAMUSCULAR; INTRAVENOUS at 17:08

## 2019-03-30 RX ADMIN — DICYCLOMINE HYDROCHLORIDE 20 MG: 10 CAPSULE ORAL at 15:17

## 2019-03-30 RX ADMIN — IOPAMIDOL 80 ML: 755 INJECTION, SOLUTION INTRAVENOUS at 17:00

## 2019-03-30 RX ADMIN — TRIAMTERENE AND HYDROCHLOROTHIAZIDE 1 TABLET: 37.5; 25 TABLET ORAL at 21:46

## 2019-03-30 RX ADMIN — PROPRANOLOL HYDROCHLORIDE 80 MG: 20 TABLET ORAL at 21:46

## 2019-03-30 RX ADMIN — QUETIAPINE FUMARATE 50 MG: 25 TABLET ORAL at 21:46

## 2019-03-30 RX ADMIN — BENZONATATE 200 MG: 100 CAPSULE ORAL at 17:11

## 2019-03-30 RX ADMIN — PENTOSAN POLYSULFATE SODIUM 100 MG: 100 CAPSULE, GELATIN COATED ORAL at 22:03

## 2019-03-30 RX ADMIN — ACETAMINOPHEN 1000 MG: 500 TABLET, FILM COATED ORAL at 18:41

## 2019-03-30 RX ADMIN — TIZANIDINE 4 MG: 4 TABLET ORAL at 21:46

## 2019-03-30 RX ADMIN — SODIUM CHLORIDE 2000 ML: 9 INJECTION, SOLUTION INTRAVENOUS at 15:17

## 2019-03-30 RX ADMIN — AZITHROMYCIN MONOHYDRATE 500 MG: 500 INJECTION, POWDER, LYOPHILIZED, FOR SOLUTION INTRAVENOUS at 17:45

## 2019-03-30 RX ADMIN — ACETAMINOPHEN 650 MG: 325 TABLET ORAL at 23:26

## 2019-03-30 RX ADMIN — TOPIRAMATE 100 MG: 100 TABLET, FILM COATED ORAL at 21:46

## 2019-03-30 RX ADMIN — GABAPENTIN 600 MG: 600 TABLET, FILM COATED ORAL at 21:46

## 2019-03-30 RX ADMIN — ALPRAZOLAM 0.5 MG: 0.5 TABLET ORAL at 21:46

## 2019-03-30 ASSESSMENT — PAIN DESCRIPTION - PAIN TYPE: TYPE: ACUTE PAIN

## 2019-03-30 ASSESSMENT — ENCOUNTER SYMPTOMS
RHINORRHEA: 0
EYE DISCHARGE: 0
EYE PAIN: 0
ABDOMINAL PAIN: 1
NAUSEA: 1
VOMITING: 0
SHORTNESS OF BREATH: 0
DIARRHEA: 1
SORE THROAT: 0
COUGH: 1
BACK PAIN: 0
WHEEZING: 0

## 2019-03-30 ASSESSMENT — PAIN SCALES - GENERAL
PAINLEVEL_OUTOF10: 0
PAINLEVEL_OUTOF10: 0
PAINLEVEL_OUTOF10: 9
PAINLEVEL_OUTOF10: 8

## 2019-03-30 ASSESSMENT — PAIN DESCRIPTION - LOCATION: LOCATION: ABDOMEN

## 2019-03-30 ASSESSMENT — PAIN DESCRIPTION - DESCRIPTORS: DESCRIPTORS: NUMBNESS;ACHING

## 2019-03-30 NOTE — H&P
Admission:    Not in a hospital admission. Allergies:    Adhesive tape; Compazine [prochlorperazine]; Erythromycin; Lyrica [pregabalin]; Morphine; Reglan [metoclopramide]; and Sulfa antibiotics    Social History:    reports that she has never smoked. She has never used smokeless tobacco. She reports that she drinks alcohol. She reports that she does not use drugs. Family History:   family history includes Heart Attack in her brother; Heart Disease in her father and mother; Inflam Bowel Dis in her mother. REVIEW OF SYSTEMS:  See HPI and problem list; otherwise no other new complaints with respect to HEENT, neck, pulmonary, coronary, GI, , endocrine, musculoskeletal, immune system/connective tissue disease, hematologic, neuropsych, skin, lymphatics, or malignancies. PHYSICAL EXAM:  Vitals:  /75   Pulse 122   Temp 102.3 °F (39.1 °C) (Oral)   Resp 30   Ht 5' 6\" (1.676 m)   Wt 200 lb (90.7 kg)   LMP 11/19/2014 (Exact Date)   SpO2 99%   BMI 32.28 kg/m²     HEENT: MM dry, PERRLA, EMOI, Normocephalic and Atraumatic  Neck: Supple, Carotid Pulses Present, No Bruits, No Masses, Tenderness, Nodularity and No Lymphadenopathy  Chest/Lungs: crackles in bilateral bases and Expiratory Wheezes  Cardiac: tachycardia present and Regular Rate and Rhythm without Rubs, Clicks, Gallops, or Murmurs  GI/Abdomen:  Bowel Sounds Present, Soft, Non-tender, without Guarding or Rebound Tenderness, No Masses and No Tenderness  : Not examined  EXT/Skin: No Edema, No Cyanosis, No Clubbing and Normal Skin Turgor  Neuro: Alert and Oriented, to Person, to Time, to Place, to Situation, No Localizing Signs/Symptoms, Cranial Nerves II-XII Grossly Intact, Sensory Grossly Intact and Motor Sensory Intact        LABS:    Recent Results (from the past 168 hour(s))   EKG 12 Lead    Collection Time: 03/30/19  2:51 PM   Result Value Ref Range    Ventricular Rate 109 BPM    Atrial Rate 109 BPM    P-R Interval 162 ms    QRS Duration 80 ms    Q-T Interval 340 ms    QTc Calculation (Bazett) 457 ms    P Axis 59 degrees    R Axis 27 degrees    T Axis 31 degrees   Rapid influenza A/B antigens    Collection Time: 03/30/19  3:15 PM   Result Value Ref Range    Flu A Antigen NEGATIVE NEGATIVE    Flu B Antigen NEGATIVE NEGATIVE   CBC auto differential    Collection Time: 03/30/19  3:55 PM   Result Value Ref Range    WBC 10.2 4.8 - 10.8 thou/mm3    RBC 4.09 (L) 4.20 - 5.40 mill/mm3    Hemoglobin 11.2 (L) 12.0 - 16.0 gm/dl    Hematocrit 34.0 (L) 37.0 - 47.0 %    MCV 83.1 81.0 - 99.0 fL    MCH 27.4 26.0 - 33.0 pg    MCHC 32.9 32.2 - 35.5 gm/dl    RDW-CV 13.0 11.5 - 14.5 %    RDW-SD 39.7 35.0 - 45.0 fL    Platelets 402 090 - 909 thou/mm3    MPV 9.7 9.4 - 12.4 fL    Seg Neutrophils 76.9 %    Lymphocytes 15.8 %    Monocytes 5.7 %    Eosinophils 0.2 %    Basophils 0.3 %    Immature Granulocytes 1.1 %    Platelet Estimate ADEQUATE Adequate    Segs Absolute 7.8 (H) 1.8 - 7.7 thou/mm3    Lymphocytes # 1.6 1.0 - 4.8 thou/mm3    Monocytes # 0.6 0.4 - 1.3 thou/mm3    Eosinophils # 0.0 0.0 - 0.4 thou/mm3    Basophils # 0.0 0.0 - 0.1 thou/mm3    Immature Grans (Abs) 0.11 (H) 0.00 - 0.07 thou/mm3    nRBC 0 /100 wbc    BASOPHILIA 1+ Absent    Toxic Granulation Present Absent   Basic Metabolic Panel    Collection Time: 03/30/19  3:55 PM   Result Value Ref Range    Sodium 138 135 - 145 meq/L    Potassium 3.3 (L) 3.5 - 5.2 meq/L    Chloride 98 98 - 111 meq/L    CO2 24 23 - 33 meq/L    Glucose 94 70 - 108 mg/dL    BUN 14 7 - 22 mg/dL    CREATININE 0.6 0.4 - 1.2 mg/dL    Calcium 8.6 8.5 - 10.5 mg/dL   Procalcitonin    Collection Time: 03/30/19  3:55 PM   Result Value Ref Range    Procalcitonin 10.00 (H) 0.01 - 0.09 ng/mL   Hepatic function panel    Collection Time: 03/30/19  3:55 PM   Result Value Ref Range    Alb 3.2 (L) 3.5 - 5.1 g/dL    Total Bilirubin 0.3 0.3 - 1.2 mg/dL    Bilirubin, Direct <0.2 0.0 - 0.3 mg/dL    Alkaline Phosphatase 67 38 - 126 U/L     (H) 5 - 40 /hpf    Bacteria, UA NONE FEW/NONE S /hpf    Casts UA NONE SEEN NONE SEEN /lpf    Crystals NONE SEEN NONE SEEN    Renal Epithelial, Urine NONE SEEN NONE SEEN    Yeast, UA NONE SEEN NONE SEEN    CASTS 2 NONE SEEN NONE SEEN /lpf    MISCELLANEOUS 2 NONE SEEN    Blood occult stool screen #1    Collection Time: 03/30/19  7:00 PM   Result Value Ref Range    OCCULT BLOOD FECAL Positive          ASSESSMENT:      Patient Active Problem List   Diagnosis    Epigastric pain    Abdominal pain, right upper quadrant    Migraine headache    Hypertension, benign    GERD (gastroesophageal reflux disease)    Constipation    Abdominal pain    Insomnia    Nuñez's esophagus    Chest pain    Asthma    Obesity    Lumbar spinal stenosis    Gastrointestinal hemorrhage    Cellulitis of left axilla    Gastroesophageal reflux disease with esophagitis    Urinary retention    Moderate episode of recurrent major depressive disorder (HCC)    KELLI (generalized anxiety disorder)    ACS (acute coronary syndrome) ([facility] [address]    Community acquired bacterial pneumonia       PLAN:    1. Pneumonia - patient has an extensive pneumonia on Xray in the absence of leukocytosis and lactic acidosis but markedly elevated procalcitonin. In the setting of tachycardia and tachypnea will add CTA chest to r/o PE but also to ensure there is no obstructive process. IV abx, med nebs, antipyretics, IV fluids, IV steroids  2. GI bleed - patient is a daily user of NSAIDs, has a history of the same, Hgb is 11.2 on admission, will continue serial H&H to monitor, stop NSAIDs, GI panel is also pending, GI consult, hold Lovenox, SCDs  3. Elevated liver enzymes - patient has been running fevers for one week and taking Tylenol consistently, will continue to hydrate and check liver enzymes again in the am along with acute hepatitis panel in the setting of fevers and diarrhea. The patient will need anti-pyretics and can not use NSAIDs, cautious use of Tylenol. Will DC her Percocet and change to oxycodone so we are only using Tylenol for fever control while enzymes are elevated. 4. Hypertension - continue home medications at current doses, will monitor blood pressures closely  5. Chronic back pain syndrome - continue home meds with exception of changing Percocet 10/325 to oxycodone 10mg as needed for pain as mentioned above.     Home medications reviewed  See orders     Note that over 50 minutes was spent in evaluation of the patient, review of the chart and pertinent records, discussion with family/staff, etc    Paige Cortés PA-C  6:55 PM  3/30/2019

## 2019-03-30 NOTE — ED NOTES
Hand red after starting IV antibiotics.    Antibiotics stopped, dr Odell Dumas notified      Adam Enriquez, RN  03/30/19 6226

## 2019-03-30 NOTE — ED NOTES
Patient transported to Radiology department via "[employer] in stable condition.        Estrella Ngo RN  03/30/19 2274

## 2019-03-30 NOTE — ED PROVIDER NOTES
light-headedness, numbness and headaches. Hematological: Negative for adenopathy. Psychiatric/Behavioral: Negative for confusion and suicidal ideas. The patient is not nervous/anxious. PAST MEDICAL HISTORY    has a past medical history of Anxiety, Arthritis, Asthma, Nuñez's esophagus, GERD (gastroesophageal reflux disease), Hypertension, Insomnia, Interstitial cystitis, Migraine, and Spinal stenosis of lumbar region. SURGICAL HISTORY    has a past surgical history that includes Abdomen surgery (93); ovarian cyst removal (93); Tubal ligation (93); Colonoscopy (2010); Endoscopy, colon, diagnostic (2010); Patellar tendon repair (Right, 1994); Dilatation, esophagus (2010); Cardiac catheterization (unsure); other surgical history (21 Nov 2014); Cholecystectomy; Appendectomy; other surgical history (N/A, 03-21-16); other surgical history (N/A, 04-04-16); other surgical history (Bilateral, 6-13-16); Upper gastrointestinal endoscopy (2012); other surgical history (Left, 09/13/2016); and Carpal tunnel release (Left). CURRENT MEDICATIONS       Previous Medications    ALBUTEROL (PROVENTIL HFA;VENTOLIN HFA) 108 (90 BASE) MCG/ACT INHALER    Inhale 2 puffs into the lungs every 4 hours as needed for Wheezing or Shortness of Breath Whichever brand is covered by insurance, substitute as necessary. ALPRAZOLAM (XANAX) 0.5 MG TABLET    Take 1-2 tabs. QHS prn for insomnia    DEXLANSOPRAZOLE (DEXILANT) 60 MG CPDR CAPSULE    Take 1 capsule by mouth daily    DULOXETINE (CYMBALTA) 20 MG EXTENDED RELEASE CAPSULE    Take 1 capsule by mouth daily    GABAPENTIN (NEURONTIN) 600 MG TABLET    Take 1 tablet by mouth 4 times daily for 30 days.  Fill on or after 9/20/2018    MELOXICAM (MOBIC) 15 MG TABLET    Take 1 tablet by mouth daily    NALOXONE (NARCAN) 4 MG/0.1ML LIQD NASAL SPRAY    1 spray by Nasal route as needed for Opioid Reversal    OXYCODONE-ACETAMINOPHEN (PERCOCET)  MG PER TABLET    Take 1 tablet by mouth every 6 hours as needed for Pain for up to 30 days. PENTOSAN POLYSULFATE (ELMIRON) 100 MG CAPSULE    Take 1 capsule by mouth 3 times daily (before meals)    PROPRANOLOL (INDERAL) 80 MG TABLET    Take 80 mg by mouth 2 times daily    QUETIAPINE (SEROQUEL) 50 MG TABLET    Take 1 tablet by mouth nightly    SUMATRIPTAN SUCCINATE (IMITREX PO)    Take by mouth    TIZANIDINE (ZANAFLEX) 4 MG TABLET    TAKE 1 TABLET BY MOUTH EVERY 6 HOURS AS NEEDED FOR MUSCLE SPASM    TOPIRAMATE (TOPAMAX) 100 MG TABLET    Take 1 tablet by mouth 2 times daily    TRIAMTERENE-HYDROCHLOROTHIAZIDE (MAXZIDE-25) 37.5-25 MG PER TABLET    1 tablet daily        ALLERGIES     is allergic to adhesive tape; compazine [prochlorperazine]; erythromycin; lyrica [pregabalin]; morphine; reglan [metoclopramide]; and sulfa antibiotics. FAMILY HISTORY     indicated that her mother is alive. She indicated that her father is . She indicated that the status of her brother is unknown.   family history includes Heart Attack in her brother; Heart Disease in her father and mother; Inflam Bowel Dis in her mother. SOCIAL HISTORY      reports that she has never smoked. She has never used smokeless tobacco. She reports that she drinks alcohol. She reports that she does not use drugs. PHYSICAL EXAM     INITIAL VITALS:  height is 5' 6\" (1.676 m) and weight is 200 lb (90.7 kg). Her oral temperature is 102.3 °F (39.1 °C). Her blood pressure is 138/75 and her pulse is 122. Her respiration is 30 and oxygen saturation is 99%. Physical Exam   Constitutional: She is oriented to person, place, and time. She appears well-developed and well-nourished. Non-toxic appearance. HENT:   Head: Normocephalic and atraumatic.    Right Ear: Tympanic membrane and external ear normal.   Left Ear: Tympanic membrane and external ear normal.   Nose: Nose normal.   Mouth/Throat: Oropharynx is clear and moist and mucous membranes are normal. No oropharyngeal exudate, posterior oropharyngeal edema or posterior oropharyngeal erythema. Eyes: Conjunctivae and EOM are normal.   Neck: Normal range of motion. Neck supple. No JVD present. Cardiovascular: Normal rate, regular rhythm, normal heart sounds, intact distal pulses and normal pulses. Exam reveals no gallop and no friction rub. No murmur heard. Pulmonary/Chest: Effort normal and breath sounds normal. No respiratory distress. She has no decreased breath sounds. She has no wheezes. She has no rhonchi. She has no rales. Abdominal: Soft. Bowel sounds are normal. She exhibits no distension. There is tenderness in the epigastric area and periumbilical area. There is no rebound, no guarding and no CVA tenderness. Musculoskeletal: Normal range of motion. She exhibits no edema. Neurological: She is alert and oriented to person, place, and time. She exhibits normal muscle tone. Coordination normal.   Skin: Skin is warm and dry. No rash noted. She is not diaphoretic. Nursing note and vitals reviewed. DIFFERENTIAL DIAGNOSIS:   Pneumonia, sepsis, bronchitis, influenza, gastroenteritis, pancreatitis, colitis, infectious diarrhea     DIAGNOSTIC RESULTS     EKG: All EKG's are interpreted by the Emergency Department Physician who either signs or Co-signs this chart in the absence of a cardiologist.  EKG interpreted by Radha Gomez MD:    Vent. Rate: 109 bpm  SD interval: 162 ms  QRS duration: 80 ms  QTc: 457 ms  P-R-T axes: 59, 27, 31  Sinus tachycardia   Cannot rule out anterior infarct, age undetermined   No STEMI. RADIOLOGY: non-plain film images(s) such as CT, Ultrasound and MRI are read by the radiologist.    CT ABDOMEN PELVIS W IV CONTRAST   Final Result   Left lower lobe peribronchial infiltrates. No acute abdominal or pelvic abnormalities            **This report has been created using voice recognition software. It may contain minor errors which are inherent in voice recognition technology. **      Final report electronically signed by Dr. Turner Case on 3/30/2019 5:10 PM      XR CHEST STANDARD (2 VW)   Final Result   Pneumonic infiltrates noted in the left mid and lower lung. **This report has been created using voice recognition software. It may contain minor errors which are inherent in voice recognition technology. **      Final report electronically signed by Dr Genevieve Dong on 3/30/2019 4:26 PM           LABS:   Labs Reviewed   CBC WITH AUTO DIFFERENTIAL - Abnormal; Notable for the following components:       Result Value    RBC 4.09 (*)     Hemoglobin 11.2 (*)     Hematocrit 34.0 (*)     Segs Absolute 7.8 (*)     Immature Grans (Abs) 0.11 (*)     All other components within normal limits   BASIC METABOLIC PANEL - Abnormal; Notable for the following components:    Potassium 3.3 (*)     All other components within normal limits   PROCALCITONIN - Abnormal; Notable for the following components:    Procalcitonin 10.00 (*)     All other components within normal limits   HEPATIC FUNCTION PANEL - Abnormal; Notable for the following components:    Alb 3.2 (*)      (*)     ALT 79 (*)     All other components within normal limits   URINE WITH REFLEXED MICRO - Abnormal; Notable for the following components:    Ketones, Urine 15 (*)     Blood, Urine SMALL (*)     Protein, UA 30 (*)     All other components within normal limits   RAPID INFLUENZA A/B ANTIGENS   GASTROINTESTINAL PANEL BY DNA   CULTURE BLOOD #1   CULTURE BLOOD #2   HCG, SERUM, QUALITATIVE   URINE DRUG SCREEN   LIPASE   LACTIC ACID, PLASMA   ANION GAP   GLOMERULAR FILTRATION RATE, ESTIMATED   OSMOLALITY   SCAN OF BLOOD SMEAR   BLOOD OCCULT STOOL SCREEN #1       EMERGENCY DEPARTMENT COURSE:   Vitals:    Vitals:    03/30/19 1457 03/30/19 1645 03/30/19 1707 03/30/19 1841   BP: (!) 143/95 137/88 128/71 138/75   Pulse: 109 117 119 122   Resp: (!) 34 (!) 32 30 30   Temp: 98.4 °F (36.9 °C)  99.4 °F (37.4 °C) 102.3 °F (39.1 °C)   TempSrc: Oral  Oral Oral   SpO2: 95% 92% 99% 99%   Weight: 200 lb (90.7 kg)      Height: 5' 6\" (1.676 m)          3:14 PM: The patient was seen and evaluated. MDM:  The patient was seen and evaluated within the department today following generalized weakness. Within the department, I observed the patient's vital signs to show tachycardia and tachypnea. On exam, I appreciated epigastric and periumbilical tenderness. Radiological studies revealed Left lower lobe peribronchial infiltrates. No acute abdominal or pelvic abnormalities. Laboratory work was completed and discussed with the patient. Within the department, the patient was treated with Tylenol, Rocephin, Zithromax, Zofran, Tessalon, Bentyl and IV fluids. I explained my proposed course of treatment to the patient, and they were amenable to my decision. The patient was admitted under Dr. Dexter Romero MD (internist). CRITICAL CARE:   None     CONSULTS:  Dr. Dexter Romero MD (internist) who graciously accepts the patient for admission. PROCEDURES:  None     FINAL IMPRESSION      1. Pneumonia due to organism    2.  Gastrointestinal hemorrhage, unspecified gastrointestinal hemorrhage type          DISPOSITION/PLAN   Admit     PATIENT REFERRED TO:  Dave Rodriguez MD  [address] [place] [address] [address], [address]  [phone]            DISCHARGE MEDICATIONS:  New Prescriptions    No medications on file       (Please note that portions of this note were completed with a voice recognition program.  Efforts were made to edit the dictations but occasionally words are mis-transcribed.)    Scribe:  Leia Rodriguez 3/30/19 3:14 PM Scribing for and in the presence of Giuseppe Kumar MD.    Signed by: Clover Emanuel, 03/30/19 8:02 PM    Provider:  I personally performed the services described in the documentation, reviewed and edited the documentation which was dictated to the scribe in my presence, and it accurately records my words and actions.     Jeanie Sebastian MD 3/30/19 8:02 PM        Jeanie Sebastian MD  03/30/19 2002

## 2019-03-31 ENCOUNTER — APPOINTMENT (OUTPATIENT)
Dept: CT IMAGING | Age: 49
DRG: 720 | End: 2019-03-31
Payer: COMMERCIAL

## 2019-03-31 ENCOUNTER — APPOINTMENT (OUTPATIENT)
Dept: ULTRASOUND IMAGING | Age: 49
DRG: 720 | End: 2019-03-31
Payer: COMMERCIAL

## 2019-03-31 LAB
ADENOVIRUS F 40 41 PCR: NOT DETECTED
ALBUMIN SERPL-MCNC: 2.5 G/DL (ref 3.5–5.1)
ALP BLD-CCNC: 55 U/L (ref 38–126)
ALT SERPL-CCNC: 60 U/L (ref 11–66)
ANION GAP SERPL CALCULATED.3IONS-SCNC: 14 MEQ/L (ref 8–16)
AST SERPL-CCNC: 88 U/L (ref 5–40)
ASTROVIRUS PCR: NOT DETECTED
BASOPHILS # BLD: 0.3 %
BASOPHILS ABSOLUTE: 0 THOU/MM3 (ref 0–0.1)
BILIRUB SERPL-MCNC: 0.3 MG/DL (ref 0.3–1.2)
BILIRUBIN DIRECT: < 0.2 MG/DL (ref 0–0.3)
BUN BLDV-MCNC: 10 MG/DL (ref 7–22)
CALCIUM SERPL-MCNC: 8.4 MG/DL (ref 8.5–10.5)
CAMPYLOBACTER PCR: NOT DETECTED
CHLORIDE BLD-SCNC: 106 MEQ/L (ref 98–111)
CLOSTRIDIUM DIFFICILE, PCR: NOT DETECTED
CO2: 22 MEQ/L (ref 23–33)
CREAT SERPL-MCNC: 0.6 MG/DL (ref 0.4–1.2)
CRYPTOSPORIDIUM PCR: NOT DETECTED
CYCLOSPORA CAYETANENSIS PCR: NOT DETECTED
E COLI 0157 PCR: NORMAL
E COLI ENTEROAGGREGATIVE PCR: NOT DETECTED
E COLI ENTEROPATHOGENIC PCR: NOT DETECTED
E COLI ENTEROTOXIGENIC PCR: NOT DETECTED
E COLI SHIGA LIKE TOXIN PCR: NOT DETECTED
E COLI SHIGELLA/ENTEROINVASIVE PCR: NOT DETECTED
E HISTOLYTICA GI FILM ARRAY: NOT DETECTED
EOSINOPHIL # BLD: 0.6 %
EOSINOPHILS ABSOLUTE: 0.1 THOU/MM3 (ref 0–0.4)
ERYTHROCYTE [DISTWIDTH] IN BLOOD BY AUTOMATED COUNT: 13.2 % (ref 11.5–14.5)
ERYTHROCYTE [DISTWIDTH] IN BLOOD BY AUTOMATED COUNT: 41.3 FL (ref 35–45)
GFR SERPL CREATININE-BSD FRML MDRD: > 90 ML/MIN/1.73M2
GIARDIA LAMBLIA PCR: NOT DETECTED
GLUCOSE BLD-MCNC: 82 MG/DL (ref 70–108)
HCT VFR BLD CALC: 29.8 % (ref 37–47)
HCT VFR BLD CALC: 31.5 % (ref 37–47)
HCT VFR BLD CALC: 31.6 % (ref 37–47)
HCT VFR BLD CALC: 31.7 % (ref 37–47)
HCT VFR BLD CALC: 32.3 % (ref 37–47)
HEMOGLOBIN: 10.1 GM/DL (ref 12–16)
HEMOGLOBIN: 10.3 GM/DL (ref 12–16)
HEMOGLOBIN: 10.5 GM/DL (ref 12–16)
HEMOGLOBIN: 10.7 GM/DL (ref 12–16)
HEMOGLOBIN: 9.6 GM/DL (ref 12–16)
IMMATURE GRANS (ABS): 0.18 THOU/MM3 (ref 0–0.07)
IMMATURE GRANULOCYTES: 1.6 %
LYMPHOCYTES # BLD: 19 %
LYMPHOCYTES ABSOLUTE: 2.1 THOU/MM3 (ref 1–4.8)
MAGNESIUM: 2.2 MG/DL (ref 1.6–2.4)
MCH RBC QN AUTO: 27.2 PG (ref 26–33)
MCHC RBC AUTO-ENTMCNC: 32 GM/DL (ref 32.2–35.5)
MCV RBC AUTO: 84.9 FL (ref 81–99)
MONOCYTES # BLD: 5.4 %
MONOCYTES ABSOLUTE: 0.6 THOU/MM3 (ref 0.4–1.3)
NOROVIRUS GI GII PCR: NOT DETECTED
NUCLEATED RED BLOOD CELLS: 0 /100 WBC
PLATELET # BLD: 273 THOU/MM3 (ref 130–400)
PLESIOMONAS SHIGELLOIDES PCR: NOT DETECTED
PMV BLD AUTO: 9.7 FL (ref 9.4–12.4)
POTASSIUM REFLEX MAGNESIUM: 3.1 MEQ/L (ref 3.5–5.2)
PROCALCITONIN: 6.97 NG/ML (ref 0.01–0.09)
RBC # BLD: 3.72 MILL/MM3 (ref 4.2–5.4)
ROTAVIRUS A PCR: NOT DETECTED
SALMONELLA PCR: NOT DETECTED
SAPOVIRUS PCR: NOT DETECTED
SEG NEUTROPHILS: 73.1 %
SEGMENTED NEUTROPHILS ABSOLUTE COUNT: 8.3 THOU/MM3 (ref 1.8–7.7)
SODIUM BLD-SCNC: 142 MEQ/L (ref 135–145)
TOTAL PROTEIN: 6 G/DL (ref 6.1–8)
VIBRIO CHOLERAE PCR: NOT DETECTED
VIBRIO PCR: NOT DETECTED
WBC # BLD: 11.3 THOU/MM3 (ref 4.8–10.8)
YERSINIA ENTEROCOLITICA PCR: NOT DETECTED

## 2019-03-31 PROCEDURE — 85018 HEMOGLOBIN: CPT

## 2019-03-31 PROCEDURE — 2709999900 HC NON-CHARGEABLE SUPPLY

## 2019-03-31 PROCEDURE — 83735 ASSAY OF MAGNESIUM: CPT

## 2019-03-31 PROCEDURE — 36415 COLL VENOUS BLD VENIPUNCTURE: CPT

## 2019-03-31 PROCEDURE — 87449 NOS EACH ORGANISM AG IA: CPT

## 2019-03-31 PROCEDURE — 6370000000 HC RX 637 (ALT 250 FOR IP): Performed by: INTERNAL MEDICINE

## 2019-03-31 PROCEDURE — 80076 HEPATIC FUNCTION PANEL: CPT

## 2019-03-31 PROCEDURE — 6360000002 HC RX W HCPCS: Performed by: PHYSICIAN ASSISTANT

## 2019-03-31 PROCEDURE — 6360000004 HC RX CONTRAST MEDICATION: Performed by: INTERNAL MEDICINE

## 2019-03-31 PROCEDURE — 87070 CULTURE OTHR SPECIMN AEROBIC: CPT

## 2019-03-31 PROCEDURE — 85014 HEMATOCRIT: CPT

## 2019-03-31 PROCEDURE — 85025 COMPLETE CBC W/AUTO DIFF WBC: CPT

## 2019-03-31 PROCEDURE — 71275 CT ANGIOGRAPHY CHEST: CPT

## 2019-03-31 PROCEDURE — 87205 SMEAR GRAM STAIN: CPT

## 2019-03-31 PROCEDURE — 99233 SBSQ HOSP IP/OBS HIGH 50: CPT | Performed by: INTERNAL MEDICINE

## 2019-03-31 PROCEDURE — 2580000003 HC RX 258: Performed by: PHYSICIAN ASSISTANT

## 2019-03-31 PROCEDURE — 84145 PROCALCITONIN (PCT): CPT

## 2019-03-31 PROCEDURE — 2060000000 HC ICU INTERMEDIATE R&B

## 2019-03-31 PROCEDURE — 76705 ECHO EXAM OF ABDOMEN: CPT

## 2019-03-31 PROCEDURE — 6370000000 HC RX 637 (ALT 250 FOR IP): Performed by: PHYSICIAN ASSISTANT

## 2019-03-31 PROCEDURE — 87899 AGENT NOS ASSAY W/OPTIC: CPT

## 2019-03-31 PROCEDURE — 80048 BASIC METABOLIC PNL TOTAL CA: CPT

## 2019-03-31 RX ORDER — POTASSIUM CHLORIDE 20 MEQ/1
40 TABLET, EXTENDED RELEASE ORAL ONCE
Status: DISCONTINUED | OUTPATIENT
Start: 2019-03-31 | End: 2019-03-31 | Stop reason: ALTCHOICE

## 2019-03-31 RX ORDER — GUAIFENESIN/DEXTROMETHORPHAN 100-10MG/5
5 SYRUP ORAL EVERY 4 HOURS PRN
Status: DISCONTINUED | OUTPATIENT
Start: 2019-03-31 | End: 2019-04-01

## 2019-03-31 RX ORDER — OXYCODONE HYDROCHLORIDE AND ACETAMINOPHEN 5; 325 MG/1; MG/1
2 TABLET ORAL EVERY 6 HOURS
Status: DISCONTINUED | OUTPATIENT
Start: 2019-03-31 | End: 2019-04-03 | Stop reason: HOSPADM

## 2019-03-31 RX ADMIN — AZITHROMYCIN MONOHYDRATE 500 MG: 500 INJECTION, POWDER, LYOPHILIZED, FOR SOLUTION INTRAVENOUS at 18:31

## 2019-03-31 RX ADMIN — GUAIFENESIN AND DEXTROMETHORPHAN 5 ML: 100; 10 SYRUP ORAL at 16:12

## 2019-03-31 RX ADMIN — TRIAMTERENE AND HYDROCHLOROTHIAZIDE 1 TABLET: 37.5; 25 TABLET ORAL at 12:50

## 2019-03-31 RX ADMIN — PENTOSAN POLYSULFATE SODIUM 100 MG: 100 CAPSULE, GELATIN COATED ORAL at 06:26

## 2019-03-31 RX ADMIN — GABAPENTIN 600 MG: 600 TABLET, FILM COATED ORAL at 12:46

## 2019-03-31 RX ADMIN — GABAPENTIN 600 MG: 600 TABLET, FILM COATED ORAL at 16:14

## 2019-03-31 RX ADMIN — DULOXETINE HYDROCHLORIDE 20 MG: 20 CAPSULE, DELAYED RELEASE ORAL at 09:04

## 2019-03-31 RX ADMIN — OXYCODONE AND ACETAMINOPHEN 2 TABLET: 5; 325 TABLET ORAL at 16:27

## 2019-03-31 RX ADMIN — POTASSIUM CHLORIDE 40 MEQ: 20 TABLET, EXTENDED RELEASE ORAL at 00:38

## 2019-03-31 RX ADMIN — PENTOSAN POLYSULFATE SODIUM 100 MG: 100 CAPSULE, GELATIN COATED ORAL at 12:46

## 2019-03-31 RX ADMIN — IOPAMIDOL 80 ML: 755 INJECTION, SOLUTION INTRAVENOUS at 00:56

## 2019-03-31 RX ADMIN — POTASSIUM BICARBONATE 40 MEQ: 782 TABLET, EFFERVESCENT ORAL at 16:12

## 2019-03-31 RX ADMIN — ALPRAZOLAM 0.5 MG: 0.5 TABLET ORAL at 21:03

## 2019-03-31 RX ADMIN — OXYCODONE HYDROCHLORIDE 10 MG: 5 TABLET ORAL at 12:56

## 2019-03-31 RX ADMIN — PENTOSAN POLYSULFATE SODIUM 100 MG: 100 CAPSULE, GELATIN COATED ORAL at 16:16

## 2019-03-31 RX ADMIN — LIDOCAINE HYDROCHLORIDE: 20 SOLUTION ORAL; TOPICAL at 17:34

## 2019-03-31 RX ADMIN — Medication 10 ML: at 21:05

## 2019-03-31 RX ADMIN — GABAPENTIN 600 MG: 600 TABLET, FILM COATED ORAL at 21:02

## 2019-03-31 RX ADMIN — GABAPENTIN 600 MG: 600 TABLET, FILM COATED ORAL at 09:04

## 2019-03-31 RX ADMIN — PROPRANOLOL HYDROCHLORIDE 80 MG: 20 TABLET ORAL at 12:46

## 2019-03-31 RX ADMIN — CEFTRIAXONE SODIUM 1 G: 1 INJECTION, POWDER, FOR SOLUTION INTRAMUSCULAR; INTRAVENOUS at 17:35

## 2019-03-31 RX ADMIN — TOPIRAMATE 100 MG: 100 TABLET, FILM COATED ORAL at 21:01

## 2019-03-31 RX ADMIN — PANTOPRAZOLE SODIUM 40 MG: 40 TABLET, DELAYED RELEASE ORAL at 06:26

## 2019-03-31 RX ADMIN — QUETIAPINE FUMARATE 50 MG: 25 TABLET ORAL at 21:02

## 2019-03-31 RX ADMIN — TOPIRAMATE 100 MG: 100 TABLET, FILM COATED ORAL at 09:05

## 2019-03-31 RX ADMIN — SODIUM CHLORIDE: 9 INJECTION, SOLUTION INTRAVENOUS at 08:09

## 2019-03-31 ASSESSMENT — PAIN DESCRIPTION - ORIENTATION: ORIENTATION: MID;UPPER

## 2019-03-31 ASSESSMENT — PAIN DESCRIPTION - DESCRIPTORS: DESCRIPTORS: STABBING

## 2019-03-31 ASSESSMENT — PAIN SCALES - GENERAL
PAINLEVEL_OUTOF10: 7
PAINLEVEL_OUTOF10: 5
PAINLEVEL_OUTOF10: 6

## 2019-03-31 ASSESSMENT — PAIN DESCRIPTION - FREQUENCY: FREQUENCY: CONTINUOUS

## 2019-03-31 ASSESSMENT — PAIN DESCRIPTION - LOCATION: LOCATION: ABDOMEN

## 2019-03-31 ASSESSMENT — PAIN DESCRIPTION - PAIN TYPE: TYPE: ACUTE PAIN

## 2019-03-31 NOTE — FLOWSHEET NOTE
Pt is a 45 yo woman.  responded to consult for AD. Pt stated she did not feel up to completing this document at this time.  left pamphlet with the pt. Pt was laying in bed with a friend at her bedside. Pt stated she was exhausted and she had an intense coughing episode. Pt stated she does not have a Mandaeism.  alerted pt's nurse that pt appeared in mild distress. Spiritual care to continue to follow up on AD and to offer spiritual support.      03/31/19 1530   Encounter Summary   Services provided to: Patient  (Friend)   Referral/Consult From: Physician   Support System Friends/neighbors   Continue Visiting Yes  (3/31)   Complexity of Encounter Low   Length of Encounter 15 minutes   Routine   Type Initial   Assessment Approachable   Intervention Active listening   Outcome Engaged in conversation

## 2019-03-31 NOTE — PROGRESS NOTES
Hospitalist Progress Note    Patient:  Judy Sorto      Unit/Bed:4K-16/016-A    YOB: 1970    MRN: [id number]       Acct: [de-identified]     PCP: Allen Ko MD    Date of Admission: 3/30/2019    Assessment/Plan:    Sepsis: Due to suspected PNA. SIRS 4/4. CTA with multilobar fluffy infiltrates. Possible aspiration?  - blood cultures/sputum culture pending  - urine strep/legionella pending  - Continue Ceftriaxone/Azithromycin. Procal 10, recheck. May need to consider additional anaerobe coverage with aspiration consideration. - continue IVF's    Odynophagia: History of prasanna's esophagus per chart review, daily NSAID use. No evidence of pneumomediastinum on CT.  - consult GI, endorsing severe pain with swallowing  - consult SLP, states coughing with swallowing. Chronic pain due to spinal stenosis: Continue home percocet, gabapentin, zanaflex. Re-introduce judiciously with hypotension. Transaminitis: Suspect liver injury from sepsis, however, with check RUQ US. Limit tylenol to 4 g per day. Improving    HTN: Holding triamterene/hctz with low BP's    Hypokalemia: Replace PRN    Expected discharge date:  3-4 days    Disposition:    [x] Home       [] TCU       [] Rehab       [] Psych       [] SNF       [] [place]       [] Other-    Chief Complaint: Weak    Hospital Course: The patient is a 46 y.o. female patient of Allen Ko MD who presents with fevers TMAX 104.5 at home, cough, weakness, shortness of breath. The patient lives with her mother who was recently ill as well. There are also school age children in the house. The patient denies chest pain but she is having some dark stools as well as diarrhea. The patient has a history of GI bleed one year ago and does continue to use NSAIDs daily. Subjective (past 24 hours): Feels about the same today. Endorsing odynophagia and coughing with swallowing.      Medications: Reviewed    Infusion Medications    sodium chloride 125 mL/hr at 03/31/19 0809     Scheduled Medications    potassium chloride  40 mEq Oral Once    oxyCODONE-acetaminophen  2 tablet Oral Q6H    GI cocktail   Oral Once    pantoprazole  40 mg Oral QAM AC    DULoxetine  20 mg Oral Daily    gabapentin  600 mg Oral 4x daily    pentosan polysulfate  100 mg Oral TID AC    propranolol  80 mg Oral BID    QUEtiapine  50 mg Oral Nightly    topiramate  100 mg Oral BID    sodium chloride flush  10 mL Intravenous 2 times per day    azithromycin  500 mg Intravenous Q24H    And    cefTRIAXone (ROCEPHIN) IV  1 g Intravenous Q24H    potassium replacement protocol   Other RX Placeholder     PRN Meds: guaiFENesin-dextromethorphan, albuterol, ALPRAZolam, tiZANidine, sodium chloride flush, magnesium hydroxide, ondansetron, acetaminophen      Intake/Output Summary (Last 24 hours) at 3/31/2019 1604  Last data filed at 3/31/2019 1439  Gross per 24 hour   Intake 5129.78 ml   Output 1300 ml   Net 3829.78 ml       Diet:  DIET CARDIAC; Exam:  /61   Pulse 68   Temp 98.2 °F (36.8 °C) (Oral)   Resp 16   Ht 5' 6\" (1.676 m)   Wt 203 lb 11.2 oz (92.4 kg)   LMP 11/19/2014 (Exact Date)   SpO2 93%   BMI 32.88 kg/m²     General appearance: Appears ill  HEENT: Pupils equal, round, and reactive to light. Conjunctivae/corneas clear. Neck: Supple, with full range of motion. No jugular venous distention. Trachea midline. Respiratory:  Rhonchi in left lung fields. Cardiovascular: Regular rate and rhythm with normal S1/S2 without murmurs, rubs or gallops. Abdomen: Soft, non-tender, non-distended with normal bowel sounds. Musculoskeletal: passive and active ROM x 4 extremities. Skin: Skin color, texture, turgor normal.  No rashes or lesions. Neurologic:  Neurovascularly intact without any focal sensory/motor deficits.  Cranial nerves: II-XII intact, grossly non-focal.  Psychiatric: Alert and oriented, thought content on 3/30/2019 5:10 PM      XR CHEST STANDARD (2 VW)   Final Result   Pneumonic infiltrates noted in the left mid and lower lung. **This report has been created using voice recognition software. It may contain minor errors which are inherent in voice recognition technology. **      Final report electronically signed by Dr Gertrude Urbano on 3/30/2019 4:26 PM          DVT prophylaxis: [] Lovenox                                 [x] SCDs                                 [] SQ Heparin                                 [] Encourage ambulation           [] Already on Anticoagulation     Code Status: Full Code    PT/OT Eval Status:   Tele:   [x] yes             [] no    Active Hospital Problems    Diagnosis Date Noted    Community acquired bacterial pneumonia [J15.9] 03/30/2019    Elevated liver enzymes [R74.8] 03/30/2019    Chronic low back pain [M54.5, G89.29] 03/30/2019    Pneumonia due to organism [J18.9]     Gastrointestinal hemorrhage [K92.2] 06/24/2016    Hypertension, benign [I10] 11/19/2014       Electronically signed by Joyce Wen MD on 3/31/2019 at 4:04 PM

## 2019-04-01 ENCOUNTER — APPOINTMENT (OUTPATIENT)
Dept: GENERAL RADIOLOGY | Age: 49
DRG: 720 | End: 2019-04-01
Payer: COMMERCIAL

## 2019-04-01 PROBLEM — E43 SEVERE MALNUTRITION (HCC): Status: ACTIVE | Noted: 2019-04-01

## 2019-04-01 LAB
GLUCOSE BLD-MCNC: 119 MG/DL (ref 70–108)
HCT VFR BLD CALC: 29.4 % (ref 37–47)
HCT VFR BLD CALC: 30.4 % (ref 37–47)
HCT VFR BLD CALC: 31 % (ref 37–47)
HEMOGLOBIN: 10.1 GM/DL (ref 12–16)
HEMOGLOBIN: 9.8 GM/DL (ref 12–16)
HEMOGLOBIN: 9.9 GM/DL (ref 12–16)
MRSA SCREEN: NORMAL

## 2019-04-01 PROCEDURE — 6370000000 HC RX 637 (ALT 250 FOR IP): Performed by: PHYSICIAN ASSISTANT

## 2019-04-01 PROCEDURE — 2060000000 HC ICU INTERMEDIATE R&B

## 2019-04-01 PROCEDURE — 6370000000 HC RX 637 (ALT 250 FOR IP): Performed by: NURSE PRACTITIONER

## 2019-04-01 PROCEDURE — 2709999900 HC NON-CHARGEABLE SUPPLY

## 2019-04-01 PROCEDURE — 82948 REAGENT STRIP/BLOOD GLUCOSE: CPT

## 2019-04-01 PROCEDURE — 2580000003 HC RX 258: Performed by: PHYSICIAN ASSISTANT

## 2019-04-01 PROCEDURE — 85014 HEMATOCRIT: CPT

## 2019-04-01 PROCEDURE — 94760 N-INVAS EAR/PLS OXIMETRY 1: CPT

## 2019-04-01 PROCEDURE — 6370000000 HC RX 637 (ALT 250 FOR IP): Performed by: HOSPITALIST

## 2019-04-01 PROCEDURE — 6360000002 HC RX W HCPCS: Performed by: PHYSICIAN ASSISTANT

## 2019-04-01 PROCEDURE — 94667 MNPJ CHEST WALL 1ST: CPT

## 2019-04-01 PROCEDURE — 6370000000 HC RX 637 (ALT 250 FOR IP): Performed by: INTERNAL MEDICINE

## 2019-04-01 PROCEDURE — C9113 INJ PANTOPRAZOLE SODIUM, VIA: HCPCS | Performed by: NURSE PRACTITIONER

## 2019-04-01 PROCEDURE — 99233 SBSQ HOSP IP/OBS HIGH 50: CPT | Performed by: HOSPITALIST

## 2019-04-01 PROCEDURE — 94640 AIRWAY INHALATION TREATMENT: CPT

## 2019-04-01 PROCEDURE — 36415 COLL VENOUS BLD VENIPUNCTURE: CPT

## 2019-04-01 PROCEDURE — 85018 HEMOGLOBIN: CPT

## 2019-04-01 PROCEDURE — 92610 EVALUATE SWALLOWING FUNCTION: CPT

## 2019-04-01 PROCEDURE — 6360000002 HC RX W HCPCS: Performed by: NURSE PRACTITIONER

## 2019-04-01 RX ORDER — ALPRAZOLAM 0.5 MG/1
1 TABLET ORAL NIGHTLY PRN
Status: DISCONTINUED | OUTPATIENT
Start: 2019-04-01 | End: 2019-04-03 | Stop reason: HOSPADM

## 2019-04-01 RX ORDER — AMOXICILLIN AND CLAVULANATE POTASSIUM 500; 125 MG/1; MG/1
1 TABLET, FILM COATED ORAL EVERY 8 HOURS SCHEDULED
Status: DISCONTINUED | OUTPATIENT
Start: 2019-04-01 | End: 2019-04-03 | Stop reason: HOSPADM

## 2019-04-01 RX ORDER — SUCRALFATE 1 G/1
1 TABLET ORAL EVERY 6 HOURS SCHEDULED
Status: DISCONTINUED | OUTPATIENT
Start: 2019-04-01 | End: 2019-04-03

## 2019-04-01 RX ORDER — PANTOPRAZOLE SODIUM 40 MG/10ML
40 INJECTION, POWDER, LYOPHILIZED, FOR SOLUTION INTRAVENOUS 2 TIMES DAILY
Status: DISCONTINUED | OUTPATIENT
Start: 2019-04-01 | End: 2019-04-01

## 2019-04-01 RX ORDER — PANTOPRAZOLE SODIUM 40 MG/10ML
40 INJECTION, POWDER, LYOPHILIZED, FOR SOLUTION INTRAVENOUS 2 TIMES DAILY
Status: DISCONTINUED | OUTPATIENT
Start: 2019-04-01 | End: 2019-04-03

## 2019-04-01 RX ADMIN — GUAIFENESIN AND DEXTROMETHORPHAN 5 ML: 100; 10 SYRUP ORAL at 04:03

## 2019-04-01 RX ADMIN — GABAPENTIN 600 MG: 600 TABLET, FILM COATED ORAL at 08:02

## 2019-04-01 RX ADMIN — DULOXETINE HYDROCHLORIDE 20 MG: 20 CAPSULE, DELAYED RELEASE ORAL at 08:02

## 2019-04-01 RX ADMIN — ALBUTEROL SULFATE 2.5 MG: 2.5 SOLUTION RESPIRATORY (INHALATION) at 20:06

## 2019-04-01 RX ADMIN — PENTOSAN POLYSULFATE SODIUM 100 MG: 100 CAPSULE, GELATIN COATED ORAL at 15:48

## 2019-04-01 RX ADMIN — PENTOSAN POLYSULFATE SODIUM 100 MG: 100 CAPSULE, GELATIN COATED ORAL at 11:25

## 2019-04-01 RX ADMIN — Medication 3.3 MG: at 16:43

## 2019-04-01 RX ADMIN — AMOXICILLIN AND CLAVULANATE POTASSIUM 1 TABLET: 500; 125 TABLET, FILM COATED ORAL at 12:45

## 2019-04-01 RX ADMIN — GABAPENTIN 600 MG: 600 TABLET, FILM COATED ORAL at 21:05

## 2019-04-01 RX ADMIN — QUETIAPINE FUMARATE 50 MG: 25 TABLET ORAL at 21:05

## 2019-04-01 RX ADMIN — Medication 10 ML: at 21:06

## 2019-04-01 RX ADMIN — PANTOPRAZOLE SODIUM 40 MG: 40 TABLET, DELAYED RELEASE ORAL at 05:45

## 2019-04-01 RX ADMIN — SUCRALFATE 1 G: 1 TABLET ORAL at 14:49

## 2019-04-01 RX ADMIN — OXYCODONE AND ACETAMINOPHEN 2 TABLET: 5; 325 TABLET ORAL at 16:43

## 2019-04-01 RX ADMIN — GUAIFENESIN AND DEXTROMETHORPHAN 5 ML: 100; 10 SYRUP ORAL at 08:01

## 2019-04-01 RX ADMIN — TIZANIDINE 4 MG: 4 TABLET ORAL at 15:48

## 2019-04-01 RX ADMIN — PROPRANOLOL HYDROCHLORIDE 80 MG: 20 TABLET ORAL at 08:01

## 2019-04-01 RX ADMIN — TOPIRAMATE 100 MG: 100 TABLET, FILM COATED ORAL at 21:05

## 2019-04-01 RX ADMIN — OXYCODONE AND ACETAMINOPHEN 2 TABLET: 5; 325 TABLET ORAL at 04:00

## 2019-04-01 RX ADMIN — TOPIRAMATE 100 MG: 100 TABLET, FILM COATED ORAL at 08:02

## 2019-04-01 RX ADMIN — PENTOSAN POLYSULFATE SODIUM 100 MG: 100 CAPSULE, GELATIN COATED ORAL at 05:45

## 2019-04-01 RX ADMIN — ALPRAZOLAM 1 MG: 0.5 TABLET ORAL at 23:32

## 2019-04-01 RX ADMIN — GABAPENTIN 600 MG: 600 TABLET, FILM COATED ORAL at 11:24

## 2019-04-01 RX ADMIN — OXYCODONE AND ACETAMINOPHEN 2 TABLET: 5; 325 TABLET ORAL at 23:31

## 2019-04-01 RX ADMIN — Medication 3.3 MG: at 21:05

## 2019-04-01 RX ADMIN — AMOXICILLIN AND CLAVULANATE POTASSIUM 1 TABLET: 500; 125 TABLET, FILM COATED ORAL at 21:05

## 2019-04-01 RX ADMIN — SUCRALFATE 1 G: 1 TABLET ORAL at 23:31

## 2019-04-01 RX ADMIN — PANTOPRAZOLE SODIUM 40 MG: 40 INJECTION, POWDER, FOR SOLUTION INTRAVENOUS at 21:06

## 2019-04-01 RX ADMIN — SUCRALFATE 1 G: 1 TABLET ORAL at 16:42

## 2019-04-01 RX ADMIN — SODIUM CHLORIDE: 9 INJECTION, SOLUTION INTRAVENOUS at 14:49

## 2019-04-01 RX ADMIN — GABAPENTIN 600 MG: 600 TABLET, FILM COATED ORAL at 15:48

## 2019-04-01 RX ADMIN — TIZANIDINE 4 MG: 4 TABLET ORAL at 23:31

## 2019-04-01 RX ADMIN — OXYCODONE AND ACETAMINOPHEN 2 TABLET: 5; 325 TABLET ORAL at 11:24

## 2019-04-01 ASSESSMENT — PAIN SCALES - GENERAL
PAINLEVEL_OUTOF10: 7
PAINLEVEL_OUTOF10: 6
PAINLEVEL_OUTOF10: 7
PAINLEVEL_OUTOF10: 7
PAINLEVEL_OUTOF10: 5
PAINLEVEL_OUTOF10: 6

## 2019-04-01 ASSESSMENT — PAIN DESCRIPTION - ORIENTATION
ORIENTATION: MID
ORIENTATION: RIGHT
ORIENTATION: MID;UPPER;RIGHT

## 2019-04-01 ASSESSMENT — PAIN DESCRIPTION - DIRECTION
RADIATING_TOWARDS: RIGH
RADIATING_TOWARDS: RIGHT SIDE

## 2019-04-01 ASSESSMENT — PAIN DESCRIPTION - DESCRIPTORS
DESCRIPTORS: ACHING

## 2019-04-01 ASSESSMENT — PAIN DESCRIPTION - ONSET
ONSET: ON-GOING

## 2019-04-01 ASSESSMENT — PAIN DESCRIPTION - PROGRESSION
CLINICAL_PROGRESSION: NOT CHANGED

## 2019-04-01 ASSESSMENT — PAIN - FUNCTIONAL ASSESSMENT: PAIN_FUNCTIONAL_ASSESSMENT: ACTIVITIES ARE NOT PREVENTED

## 2019-04-01 ASSESSMENT — PAIN DESCRIPTION - LOCATION
LOCATION: ABDOMEN

## 2019-04-01 ASSESSMENT — PAIN DESCRIPTION - FREQUENCY
FREQUENCY: CONTINUOUS

## 2019-04-01 ASSESSMENT — PAIN DESCRIPTION - PAIN TYPE
TYPE: ACUTE PAIN

## 2019-04-01 NOTE — PLAN OF CARE
HPI Comments: 50 y.o. male with no significant past medical history who presents accompanied by his wife with chief complaint of diarrhea. The pt c/o diarrhea that has been ongoing for the past 6 days with intermittent chills, night sweats, nausea, and abdominal cramps. The pt reports that is diarrhea has been consistent since then and has noticed mucus in stool. The pt reports that his appetite has decreased over the past few days because of his nausea and he believes that he is dehydrated. The pt notes that he presented to [facility] 2 days ago where they prescribed him bentyl for his abdominal cramps with some relief. The pt reports that he was on ABX a couple of months ago. The pt says the he will intermittently have sinus HA. The pt denies any hx of persistent diarrhea and denies recent travel outside of the country. There are no other acute medical concerns at this time. Social hx: Current smoker. Note written by Lenora Carranza, as dictated by Jackelin Valle MD 1:46 PM      The history is provided by the patient. No  was used. History reviewed. No pertinent past medical history. History reviewed. No pertinent surgical history. History reviewed. No pertinent family history. Social History     Social History    Marital status:      Spouse name: N/A    Number of children: N/A    Years of education: N/A     Occupational History    Not on file. Social History Main Topics    Smoking status: Current Every Day Smoker     Packs/day: 0.50    Smokeless tobacco: Not on file    Alcohol use No    Drug use: Not on file    Sexual activity: Not on file     Other Topics Concern    Not on file     Social History Narrative    No narrative on file         ALLERGIES: Pcn [penicillins]    Review of Systems   Constitutional: Positive for appetite change (decreased), chills and diaphoresis.    Gastrointestinal: Positive for abdominal pain (cramps, intermittent), See SW note, April 1, open to [facility] diarrhea and nausea (intermittent). All other systems reviewed and are negative. Vitals:    03/15/17 1312   BP: 127/75   Pulse: 89   Resp: 18   Temp: 98.3 °F (36.8 °C)   SpO2: 95%   Weight: 125.4 kg (276 lb 8 oz)   Height: 5' 10\" (1.778 m)            Physical Exam   Constitutional: He is oriented to person, place, and time. He appears well-developed and well-nourished. No distress. HENT:   Head: Normocephalic and atraumatic. Eyes: Conjunctivae are normal. No scleral icterus. Neck: Neck supple. No tracheal deviation present. Cardiovascular: Normal rate, regular rhythm, normal heart sounds and intact distal pulses. Exam reveals no gallop and no friction rub. No murmur heard. Pulmonary/Chest: Effort normal and breath sounds normal. He has no wheezes. He has no rales. Abdominal: Soft. He exhibits no distension. There is no tenderness. There is no rebound and no guarding. Musculoskeletal: He exhibits no edema. Neurological: He is alert and oriented to person, place, and time. Skin: Skin is warm and dry. No rash noted. Psychiatric: He has a normal mood and affect. Nursing note and vitals reviewed. Note written by Lenora Delgadillo, as dictated by Joyce Cole MD 1:46 PM     [facility]  ED Course       Procedures      PROGRESS NOTE:  3:36 PM Spoke with GI, would like a CT scan. They are planning on seeing him in the ED. PROGRESS NOTE:  5:25 PM Awaiting CT report. Believe the pt has infectious diarrhea. GI recommended Levaquin and flagyl. Will discharge home with a close GI follow up. PROGRESS NOTE:  5:36 PM CT reveals R sided colitis.

## 2019-04-01 NOTE — PROGRESS NOTES
Nutrition Assessment    Type and Reason for Visit: Initial, Positive Nutrition Screen, Consult(Swallowing difficulty w/ food, poor po; malnutrition)    Nutrition Recommendations: Recommend probiotic. ONS initiated. Nutrition Assessment:   Pt. severely malnourished AEB no po intake for the past 5 days d/t not feeling well, nausea and diarrhea; moderate loss of fat. At risk for further nutritional compromise r/t altered GI function, underlying medical condition (hx GERD, barretts esophagus, HTN). Will send Ensure Clear TID as pt agreeable. Pt. Declines Kefir (dislikes yogurt). Recommend probiotic. Malnutrition Assessment:  · Malnutrition Status: Meets the criteria for severe malnutrition  · Context: Acute illness or injury  · Findings of the 6 clinical characteristics of malnutrition (Minimum of 2 out of 6 clinical characteristics is required to make the diagnosis of moderate or severe Protein Calorie Malnutrition based on AND/ASPEN Guidelines):  1. Energy Intake-Less than or equal to 50% of estimated energy requirement, Greater than or equal to 5 days    2. Fat Loss-Moderate subcutaneous fat loss, Orbital    Nutrition Risk Level: High    Nutrient Needs:  · Estimated Daily Total Kcal: 4607-6120 kcals (15-18 kcals/kgm wt of 91 kgm)  · Estimated Daily Protein (g):  gm (1.2-2 gm/kgm IBW of 59 kgm)    Nutrition Diagnosis:   · Problem: Severe malnutrition  · Etiology: related to Nausea, Diarrhea     Signs and symptoms:  as evidenced by Diet history of poor intake, Moderate loss of subcutaneous fat    Objective Information:  · Nutrition-Focused Physical Findings: weak; diarrhea 7 days pta; +nausea pta; Rx includes Zofran, ATB, Magic mouthwash; GI notes - start Imodium; s/p SLP eval - rec regular diet with thin liquids.   Pt. Denies trouble with swallowing at present  · Wound Type: None(none noted)  · Current Nutrition Therapies:  · Oral Diet Orders: Cardiac   · Oral Diet intake: 1-25%  · Oral Nutrition Supplement (ONS) Orders: Clear Liquid Oral Supplement(TID)  · ONS intake: (initiated)  · Anthropometric Measures:  · Ht: 5' 6\" (167.6 cm)   · Current Body Wt: 201 lb 7 oz (91.4 kg)(4/1, trace edema)  · Admission Body Wt: 203 lb 1.2 oz (92.1 kg)(3/30, trace edema)  · Usual Body Wt: (per pt 203-207#; per EMR: 5/21/18: 195# 1.7 oz)  · % Weight Change:  ,  denies  · Ideal Body Wt: 130 lb (59 kg),   · BMI Classification: BMI 30.0 - 34.9 Obese Class I    Nutrition Interventions:   Continue current diet, Start ONS  Continued Inpatient Monitoring, Education Initiated, Coordination of Care(4/1 Encouraged intake at best efforts as tolerated.)    Nutrition Evaluation:   · Evaluation: Goals set   · Goals: Pt. will tolerate and consume 75% or more of meals during LOS.     · Monitoring: Meal Intake, Supplement Intake, Diet Tolerance, Skin Integrity, Weight, Pertinent Labs, Chewing/Swallowing, Nausea or Vomiting, Diarrhea, Patient/Family Education, Monitor Bowel Function      Electronically signed by Emory Adams RD, LD on 4/1/19 at 10:35 AM    Contact Number: [phone]

## 2019-04-01 NOTE — PROGRESS NOTES
see          Expected discharge date:  TBD         Disposition:      ? Home                             ? TCU                             ? Rehab                             ? Psych                             ? SNF                             ? [place]                             ? Other-    Chief Complaint:   Chief Complaint   Patient presents with    Fatigue   [facility] Course: Patient was seen, examined and the medical chart was reviewed thoroughly today. In summary, 46 y. o.female admitted on 3/30/2019 for  Aspiration PNA. I took over care on 4/1. Subjective (past 24 hours):   c/o sharp epigastric pain upon drinking liquids. Also R-sided CP w/ deep breathing and movement. Denies SOB. + poor appetite. Medications:  Reviewed    Infusion Medications    sodium chloride 125 mL/hr at 03/31/19 0809     Scheduled Medications    pantoprazole  40 mg Intravenous BID    amoxicillin-clavulanate  1 tablet Oral 3 times per day    sucralfate  1 g Oral 4 times per day    oxyCODONE-acetaminophen  2 tablet Oral Q6H    DULoxetine  20 mg Oral Daily    gabapentin  600 mg Oral 4x daily    pentosan polysulfate  100 mg Oral TID AC    propranolol  80 mg Oral BID    QUEtiapine  50 mg Oral Nightly    topiramate  100 mg Oral BID    sodium chloride flush  10 mL Intravenous 2 times per day    potassium replacement protocol   Other RX Placeholder     PRN Meds: magic (miracle) mouthwash, albuterol, ALPRAZolam, tiZANidine, sodium chloride flush, magnesium hydroxide, ondansetron, acetaminophen      Intake/Output Summary (Last 24 hours) at 4/1/2019 1255  Last data filed at 4/1/2019 1057  Gross per 24 hour   Intake 4303 ml   Output 3900 ml   Net 403 ml       Diet:  DIET CARDIAC;   Dietary Nutrition Supplements: Clear Liquid Oral Supplement    Exam:  BP (!) 100/57   Pulse 68   Temp 98.4 °F (36.9 °C) (Oral)   Resp 18   Ht 5' 6\" (1.676 m)   Wt 201 lb 7 oz (91.4 kg)   LMP 11/19/2014 (Exact Date)   SpO2 92%   BMI 32.51 kg/m²     General appearance: A&O x3, looks ill but not toxic toxic, in no apparent distress  HEENT:  BETTY  EOM intact. Neck: Supple, with full range of motion. No jugular venous distention. Trachea midline. Respiratory:   NL A/E bilat with no adventitious sounds   Cardiovascular:  normal S1/S2 with no murmurs/gallops  Abdomen: Soft, non-tender, non-distended, no rigidity or peritoneal signs  Musculoskeletal: NL symmetrical A/PROM bilat U/L extremities   Skin: No rashes. No edema  Neurologic:  CN II-XII intact. NL symmetrical reflexes. NL gait and stance. NL Cerebellar exam. Power 5/5 all muscle groups U/L extremities. Toes downgoing  Capillary Refill: Brisk,< 3 seconds   Peripheral Pulses: +2 palpable, equal bilaterally         Labs:   Recent Labs     03/30/19  1555  03/31/19  0752  03/31/19  2333 04/01/19  0540 04/01/19  1149   WBC 10.2  --  11.3*  --   --   --   --    HGB 11.2*   < > 10.1*   < > 10.5* 9.9* 10.1*   HCT 34.0*   < > 31.6*   < > 31.5* 30.4* 29.4*     --  273  --   --   --   --     < > = values in this interval not displayed. Recent Labs     03/30/19  1555 03/31/19  0752    142   K 3.3* 3.1*   CL 98 106   CO2 24 22*   BUN 14 10   CREATININE 0.6 0.6   CALCIUM 8.6 8.4*     Recent Labs     03/30/19  1555 03/31/19  0752   * 88*   ALT 79* 60   BILIDIR <0.2 <0.2   BILITOT 0.3 0.3   ALKPHOS 67 55     No results for input(s): INR in the last 72 hours. No results for input(s): Luis Relic in the last 72 hours. Urinalysis:      Lab Results   Component Value Date    NITRU NEGATIVE 03/30/2019    WBCUA 0-2 03/30/2019    BACTERIA NONE 03/30/2019    RBCUA 3-5 03/30/2019    BLOODU SMALL 03/30/2019    SPECGRAV 1.017 07/11/2017    GLUCOSEU NEGATIVE 03/30/2019       Radiology:  Xr Chest Standard (2 Vw)    Result Date: 3/30/2019  PROCEDURE: XR CHEST (2 VW) CLINICAL INFORMATION: 33-year-old female with cough and fever.  COMPARISON: X-ray dated 11/14/2018. TECHNIQUE: PA and lateral views of the chest were obtained. FINDINGS: Infiltrates are noted in the left mid and lower lung. The cardiac silhouette and pulmonary vasculature are within normal limits. There is no significant pleural effusion or pneumothorax. Visualized portions of the upper abdomen are within normal limits. The osseous structures are intact. No acute fractures or suspicious osseous lesions. Pneumonic infiltrates noted in the left mid and lower lung. **This report has been created using voice recognition software. It may contain minor errors which are inherent in voice recognition technology. ** Final report electronically signed by Dr Genevieve Dong on 3/30/2019 4:26 PM    Cta Chest W Wo Contrast    Result Date: 3/31/2019  PROCEDURE: CTA CHEST W WO CONTRAST CLINICAL INFORMATION: SHORTNESS OF BREATH PRODUCED BY EXERTION OR STRESS. COMPARISON: No prior study. TECHNIQUE: 3 mm axial images were obtained through the chest after the administration of IV contrast.  A non-contrast localizer was obtained. 3D reconstructions were performed on the scanner to include MIP images through the right and left pulmonary arteries and sagittal images through the chest. Isovue was the intravenous contrast utilized. All CT scans at this facility use dose modulation, iterative reconstruction, and/or weight-based dosing when appropriate to reduce radiation dose to as low as reasonably achievable. FINDINGS:  There is no evidence of pulmonary embolus aortic dissection or vascular congestion. The heart size is normal. The thoracic aorta is normal in caliber. Adenopathy is not seen. The lungs reveal extensive airspace disease throughout the left lung. There is  minimal atelectasis in the right middle lobe and to lesser extent right lower lobe. Pleural fluid is not seen. The skeletal structures reveal multilevel disc degeneration in the dorsal spine. The gallbladder has been removed.      Impression: No evidence and pelvis The Liver, and spleen are unremarkable. Prior cholecystectomy. Prominent common bile duct. Atrophic pancreas. Adrenals and kidneys are normal. Pelvis Aorta is unremarkable. No bowel obstruction. No abnormal fluid collections. Urinary bladder is unremarkable. No suspicious bone lesions. Left lower lobe peribronchial infiltrates. No acute abdominal or pelvic abnormalities **This report has been created using voice recognition software. It may contain minor errors which are inherent in voice recognition technology. ** Final report electronically signed by Dr. Kirill Tan on 3/30/2019 5:10 PM    Us Liver    Result Date: 4/1/2019  PROCEDURE: US LIVER CLINICAL INFORMATION: transaminitis. COMPARISON: CT abdomen pelvis March 30, 2019 TECHNIQUE: Multiplanar sonographic images were obtained of the liver. FINDINGS:  The visualized pancreatic tissue is normal. The liver shows fatty changes and mild hepatomegaly measuring up to 19.8 cm. Doppler assessment of the portal and hepatic vein structures demonstrate normal blood flow and direction. Postcholecystectomy changes are noted. The common bile duct measures 7.5 mm with no filling defects visualized. 1. Postoperative cholecystectomy changes. 2. Hepatomegaly with fatty or other parenchymal changes of the liver noted. **This report has been created using voice recognition software. It may contain minor errors which are inherent in voice recognition technology. ** Final report electronically signed by Dr. Nickolas Butterfield on 4/1/2019 12:48 AM      Diet: DIET CARDIAC; Dietary Nutrition Supplements: Clear Liquid Oral Supplement    DVT prophylaxis: ? Lovenox                                 ? SCDs                                 ? SQ Heparin                                 ? Encourage ambulation           ?  Already on Anticoagulation       Code Status: Full Code      Active Hospital Problems    Diagnosis Date Noted    Severe malnutrition ([facility] [address] [E43] 04/01/2019 Class: Acute    Community acquired bacterial pneumonia [J15.9] 03/30/2019    Elevated liver enzymes [R74.8] 03/30/2019    Chronic low back pain [M54.5, G89.29] 03/30/2019    Pneumonia due to organism [J18.9]     Gastrointestinal hemorrhage [K92.2] 06/24/2016    Hypertension, benign [I10] 11/19/2014           Patient was updated about the treatment plan, all the questions and concerns were addressed.         Electronically signed by Yosef Harden MD on 4/1/2019 at 12:55 PM

## 2019-04-01 NOTE — PROGRESS NOTES
[address] ICU STEPDOWN TELEMETRY 4K  Bedside Swallowing Evaluation      SLP Individual Minutes  Time In: 9553  Time Out: 7734  Minutes: 9  Timed Code Treatment Minutes: 0 Minutes       Date: 2019  Patient Name: Beatris Dupree      CSN: [id number]   : 1970  (46 y.o.)  Gender: female   Referring Physician:  Dr Jenifer Lloyd  Diagnosis: Sepsis  Secondary Diagnosis:  dysphagia  History of Present Illness/Injury: Pt admit with the above diagnosis. Pneumonia with concern for aspiration. She describes a long-standing history of chronic GERD. She states she had been diagnosed with Nuñez's esophagus. She believes she had two EGDs since that time. She states periodically she is required dilations in the past due to dysphagia.   Speech to assess swallowing function to determine safest oral diet     Past Medical History:   Diagnosis Date    Anxiety     Arthritis     Asthma     Nuñez's esophagus     Blood circulation, collateral     GERD (gastroesophageal reflux disease)     Hypertension     Insomnia     Interstitial cystitis     Migraine     Pneumonia     Psychiatric problem     Spinal stenosis of lumbar region        Pain:  Pain upon swallowing, sternum     Current Diet: Regular and thin    Respiratory Status: [] Independent     Behavioral Observation: [x] Alert  [x] Oriented   ORAL MECHANISM EVALUATION:         Comments:  Facial / Labial [x]WFL [] Impaired []DNT    Lingual [x]WFL [] Impaired []DNT    Dentition [x]WFL [] Impaired []DNT    Velum [x]WFL [] Impaired []DNT    Vocal Quality [x]WFL [] Impaired []DNT    Sensation [x]WFL [] Impaired []DNT    Cough [x]WFL [] Impaired []DNT    Other: []WFL [] Impaired []DNT    Other: []WFL [] Impaired []DNT        PATIENT WAS EVALUATED USING:  Thin liquids by straw and kale simon    ORAL PHASE: [x] WFL     PHARYNGEAL PHASE: [] WFL: Pharyngeal phase appears WFLs, but cannot completely rule out pharyngeal phase deficits from a bedside swallow evaluation alone. [x] Impaired   [] Delayed Swallow  [] Decreased Hyolaryngeal Elevation  [] Audible Swallow   [x] Suspected Pharyngeal Residue due to spontaneous multiple swallow. [] OTHER:    SIGNS AND SYMPTOMS OF LARYNGEAL PENETRATION / ASPIRATION:  [] NO sign/symptoms of aspiration evident with this evaluation, but cannot rule out silent aspiration. [] Throat Clear  [] Immediate Cough [x] Delayed Cough 1x [] Wet Vocal Quality  [] Change in Pulmonary Status  [] OTHER:    IMPRESSIONS: Pt presents with essentially normal oral and pharyngeal with no overt s/s of aspiration noted with solids or liquids. Delayed cough noted 1x following trial of coarse solids. Pt denies difficulty swallowing food or liquids. Pt reports pain upon swallowing in the mid sternum region. Recommend pt remain on regular and thin diet. Will follow up 1-2 times to monitor diet tolerance and pulmonary status to ensure tolerance of current diet. RECOMMENDATIONS:     Modified Barium Swallow: [] Is indicated to further assess    [x] Is NOT indicated at this time; Will recommend as appropriate. DIET RECOMMENDATIONS:  Regular and thin    STRATEGIES: [] Strategies pending MBS results. [x] Full upright position  [x] Small bite/sip [] No Straw [] Multiple Swallow  [] Chin tuck [] Head turn [x] Pulmonary monitoring [] Oral care after all meals  [] Supervision  [] Medication in applesauce []Direct 1:1 Supervision  [] Spoon all liquids [] Alternate solid / liquid [] Limit distractions [] Monitor for fatigue  [] PMV in place for all po [] OTHER:      EDUCATION:   Learner: [x]Patient [] Significant other [] Son/Daughter [] Parent     [] Other:   Education: [x] Reviewed results and recommendations of this evaluation     [] Reviewed diet and strategies     [] Reviewed signs, symptoms and risk of aspiration     [] Demonstrated how to thick liquid appropriately.      [] Reviewed goals and Plan of Care     [] OTHER:   Method: [x] Discussion [] Demonstration [] Hand-out     [] OTHER:   Evaluation of Education:     [x] Verbalizes understanding  [] Demonstrates with assistance     [] Demonstrates without assistance []Needs further instruction     [] No evidence of learning  [x] Family not present    PATIENT GOALS: [] Pt did not state. Will further assess during treatment. [x] Return to the least restricted diet possible     [] Return to previous level of function     [] OTHER:      PLAN / TREATMENT RECOMMENDATIONS:  [x] Skilled SLP intervention on acute care 3-5 x per week or until goals met and/or pt plateaus in function. Specific interventions for next session may include:diet monitor    SHORT TERM GOALS:  Short-term Goals  Timeframe for Short-term Goals: 2 weeks  Goal 1: Pt will tolerate regular and thin diet without s/s of aspiration to ensure safe and adequate nutrition and hydration.     LONG TERM GOALS:    No LTG due to short ELOS    Clifford Swift M.S. [id number]

## 2019-04-01 NOTE — CONSULTS
exploratory surgery    APPENDECTOMY      CARDIAC CATHETERIZATION  unsure    CARPAL TUNNEL RELEASE Left     CHOLECYSTECTOMY      COLONOSCOPY  2010    DILATATION, ESOPHAGUS  2010    ENDOSCOPY, COLON, DIAGNOSTIC  2010    OTHER SURGICAL HISTORY  21 Nov 2014    Cholecystectomy Laparoscopic (Dr. Vitor Hunter, [facility])    OTHER SURGICAL HISTORY N/A 03-21-16    lumbar epidurala block L4-5    OTHER SURGICAL HISTORY N/A 04-04-16    lumbar epidural block L4-5    OTHER SURGICAL HISTORY Bilateral 6-13-16    facet blocks at L4-5, L5-S1    OTHER SURGICAL HISTORY Left 09/13/2016    RFA lumbar L4-S1    OVARIAN CYST REMOVAL  93    PATELLAR TENDON REPAIR Right 1994    3 screws in ankle.  TUBAL LIGATION  80    UPPER GASTROINTESTINAL ENDOSCOPY  2012       Medications Prior to Admission:    Prior to Admission medications    Medication Sig Start Date End Date Taking? Authorizing Provider   gabapentin (NEURONTIN) 600 MG tablet Take 1 tablet by mouth 4 times daily for 30 days. Fill on or after 9/20/2018 3/28/19 4/27/19 Yes RENNY Leon CNP   tiZANidine (ZANAFLEX) 4 MG tablet TAKE 1 TABLET BY MOUTH EVERY 6 HOURS AS NEEDED FOR MUSCLE SPASM 3/28/19  Yes RENNY Leon CNP   oxyCODONE-acetaminophen (PERCOCET)  MG per tablet Take 1 tablet by mouth every 6 hours as needed for Pain for up to 30 days. 3/28/19 4/27/19 Yes RENNY Leon CNP   meloxicam (MOBIC) 15 MG tablet Take 1 tablet by mouth daily 3/28/19  Yes RENNY Leon - IAN   ALPRAZolam Gabriel Kaska) 0.5 MG tablet Take 1-2 tabs. QHS prn for insomnia  Patient taking differently: 1 mg nightly as needed. Take 1-2 tabs.  QHS prn for insomnia 3/22/19 4/22/19 Yes Emily Lama MD   QUEtiapine (SEROQUEL) 50 MG tablet Take 1 tablet by mouth nightly 3/20/19  Yes Emily Lama MD   propranolol (INDERAL) 80 MG tablet Take 80 mg by mouth 2 times daily   Yes Historical Provider, MD   triamterene-hydrochlorothiazide (MAXZIDE-25) 37.5-25 MG per tablet 1 tablet daily  6/19/17  Yes Historical Provider, MD   pentosan polysulfate (ELMIRON) 100 MG capsule Take 1 capsule by mouth 3 times daily (before meals) 9/8/16  Yes RENNY Kern CNP   dexlansoprazole (DEXILANT) 60 MG CPDR capsule Take 1 capsule by mouth daily 8/11/16  Yes Winston Acharya MD   topiramate (TOPAMAX) 100 MG tablet Take 1 tablet by mouth 2 times daily 5/16/16  Yes Winston Acharya MD   DULoxetine (CYMBALTA) 20 MG extended release capsule Take 1 capsule by mouth daily 3/8/19   Tami Maxwell MD   naloxone [facility]) 4 MG/0.1ML LIQD nasal spray 1 spray by Nasal route as needed for Opioid Reversal 2/26/19   RENNY Cottrell CNP   SUMAtriptan Succinate (IMITREX PO) Take by mouth    Historical Provider, MD   albuterol (PROVENTIL HFA;VENTOLIN HFA) 108 (90 BASE) MCG/ACT inhaler Inhale 2 puffs into the lungs every 4 hours as needed for Wheezing or Shortness of Breath Whichever brand is covered by insurance, substitute as necessary. 7/30/15   Winston Acharya MD       Allergies:  Adhesive tape; Compazine [prochlorperazine]; Erythromycin; Lyrica [pregabalin]; Morphine; Reglan [metoclopramide]; and Sulfa antibiotics    Social History:      The patient currently lives     TOBACCO:   reports that she has never smoked. She has never used smokeless tobacco.  ETOH:   reports that she drinks alcohol. Family History:      Reviewed in detail and negative for DM, CAD, Cancer, CVA. Positive as follows:        Problem Relation Age of Onset    Heart Disease Mother     Inflam Bowel Dis Mother     Heart Disease Father     Heart Attack Brother        Diet:  DIET CARDIAC; Review Of Systems    GENERAL:+ fever, +chills no weight loss. EYES:  No  blurred vision, double vision, glaucoma, pain on exposure to light. CARDIOVASCULAR:  + chest pain no  palpitations.     RESPIRATORY:  + cough, sob    GI:  See HPI  MUSCULOSKELETAL:  No new painful or swollen joints or voice recognition software. It may contain minor errors which are inherent in voice recognition technology. **      Final report electronically signed by Dr Lorna Encinas on 3/30/2019 4:26 PM                   Code Status: Full Code    PT/OT Eval Status: Active and ongoing      ASSESSMENT:  Chronic GERD symptomatic on Dexilant PTA    Odynphagia    Dysphagia to pill and solid food mainly, last couple of days \"choking on liquids\"    Ongoing last couple of months, EGD w/dil in past    Normocytic anemia. Hemoglobin 10.5 with hematocrit 31.5, MCV, MCH and RDW are normal.  Per review of [facility] in [facility] 11/14/18 hemoglobin 12.9 with hematocrit 40.2. Fecal occult positive. Denies overt GI blood loss    Abnormal liver labs were normal 11/14/18. Possibly d/t fatty liver   On admission ALT elevated at 79 and . Trending down over the last 24 hours AST remains elevated at 88 other indices normal.   Acute hepatitis panel negative. Ultrasound the liver hepatomegaly with fatty liver. Cholecystectomy    Hypoalbuminemia albumin 2.5 with decreased total protein of 6. Diarrhea- GI PCR panel negative  Daily NSAID use  Hx of Nuñez's Esophagus  Reports last EGD no Barretts    Pneumonia on treatment with azithromycin and Rocephin per primary. Hypertension  Chronic back pain      Colonoscopy 2016 \"normal\"     PLAN:  Magic mouthwash swish and swallow  Start PPI IV bid  Add Carfate ac and prior to bedtime  Imodium prn for diarrhea  Esophogram   MBS  EGD once pneumonia has resolved. EGD needs to be done with anesthesia and would be safer for patient after tx completed. Timing based on clinical course. CBC in am, LFTs in am  Can follow abn liver labs after discharge  Speech eval  GI soft diet after eval by Speech         Thank you for the consultation. Plan of care in collaboration with Dr. Monserrat Stallings    Electronically signed by Teo Huang, RENNY - CNP on 4/1/2019

## 2019-04-01 NOTE — FLOWSHEET NOTE
04/01/19 1700   Encounter Summary   Services provided to: Patient   Referral/Consult From: Nurse   Support System Friends/neighbors   Continue Visiting Yes  (4/1)   Complexity of Encounter Moderate   Length of Encounter 15 minutes   Advance Directives (For Healthcare)   Healthcare Directive No, patient does not have an advance directive for healthcare treatment   Information on Healthcare Directives Requested Yes   Patient Requests Assistance Yes, referral made to    Advance Directives Documents explained;Documents given;Pt. not interested at this time     Advance Directive Consult: Advance Directive materials were provided to patient and explained. Patient is not ready to complete at this time as she didn't have the appropriate information to complete at this time. This staff gave her the information for Spiritual Care to be contacted when further assistance is needed.

## 2019-04-01 NOTE — PLAN OF CARE
Problem: Pain:  Goal: Pain level will decrease  Description  Pain level will decrease  Outcome: Ongoing  Note:   Patient rating pain as 8/10. Pt receiving scheduled Percocet. Pt satisfied. Will continue to monitor. Problem: Falls - Risk of:  Goal: Will remain free from falls  Description  Will remain free from falls  Outcome: Ongoing  Note:   Pt up with one assist and walker. Non slid socks on feet. Pathway free of clutter. Bed in lowest position with alarm on. Personal belongings and call light within reach. Problem: Risk for Impaired Skin Integrity  Goal: Tissue integrity - skin and mucous membranes  Description  Structural intactness and normal physiological function of skin and  mucous membranes. Outcome: Ongoing  Note:   Pt repositions self in bed. No new signs of skin breakdown present. Will continue to monitor. Care plan reviewed with patient. Patient verbalizes understanding of the plan of care and contribute to goal setting.

## 2019-04-02 ENCOUNTER — APPOINTMENT (OUTPATIENT)
Dept: GENERAL RADIOLOGY | Age: 49
DRG: 720 | End: 2019-04-02
Payer: COMMERCIAL

## 2019-04-02 LAB
ALBUMIN SERPL-MCNC: 2.1 G/DL (ref 3.5–5.1)
ALP BLD-CCNC: 51 U/L (ref 38–126)
ALT SERPL-CCNC: 40 U/L (ref 11–66)
ANION GAP SERPL CALCULATED.3IONS-SCNC: 12 MEQ/L (ref 8–16)
AST SERPL-CCNC: 29 U/L (ref 5–40)
BILIRUB SERPL-MCNC: 0.2 MG/DL (ref 0.3–1.2)
BILIRUBIN DIRECT: < 0.2 MG/DL (ref 0–0.3)
BUN BLDV-MCNC: 11 MG/DL (ref 7–22)
CALCIUM SERPL-MCNC: 8.5 MG/DL (ref 8.5–10.5)
CHLORIDE BLD-SCNC: 106 MEQ/L (ref 98–111)
CO2: 23 MEQ/L (ref 23–33)
CREAT SERPL-MCNC: 0.7 MG/DL (ref 0.4–1.2)
ERYTHROCYTE [DISTWIDTH] IN BLOOD BY AUTOMATED COUNT: 13.6 % (ref 11.5–14.5)
ERYTHROCYTE [DISTWIDTH] IN BLOOD BY AUTOMATED COUNT: 44.2 FL (ref 35–45)
GFR SERPL CREATININE-BSD FRML MDRD: 88 ML/MIN/1.73M2
GLUCOSE BLD-MCNC: 109 MG/DL (ref 70–108)
GLUCOSE BLD-MCNC: 151 MG/DL (ref 70–108)
GLUCOSE BLD-MCNC: 97 MG/DL (ref 70–108)
GRAM STAIN RESULT: NORMAL
HCT VFR BLD CALC: 30 % (ref 37–47)
HCT VFR BLD CALC: 30.3 % (ref 37–47)
HCT VFR BLD CALC: 30.9 % (ref 37–47)
HCT VFR BLD CALC: 31.1 % (ref 37–47)
HCT VFR BLD CALC: 31.1 % (ref 37–47)
HEMOGLOBIN: 9.4 GM/DL (ref 12–16)
HEMOGLOBIN: 9.6 GM/DL (ref 12–16)
HEMOGLOBIN: 9.7 GM/DL (ref 12–16)
HEMOGLOBIN: 9.8 GM/DL (ref 12–16)
HEMOGLOBIN: 9.8 GM/DL (ref 12–16)
MCH RBC QN AUTO: 27.1 PG (ref 26–33)
MCHC RBC AUTO-ENTMCNC: 30.2 GM/DL (ref 32.2–35.5)
MCV RBC AUTO: 89.6 FL (ref 81–99)
PLATELET # BLD: 284 THOU/MM3 (ref 130–400)
PMV BLD AUTO: 9.3 FL (ref 9.4–12.4)
POTASSIUM SERPL-SCNC: 3.6 MEQ/L (ref 3.5–5.2)
RBC # BLD: 3.47 MILL/MM3 (ref 4.2–5.4)
RESPIRATORY CULTURE: NORMAL
SODIUM BLD-SCNC: 141 MEQ/L (ref 135–145)
TOTAL PROTEIN: 5.7 G/DL (ref 6.1–8)
WBC # BLD: 6.6 THOU/MM3 (ref 4.8–10.8)

## 2019-04-02 PROCEDURE — 82248 BILIRUBIN DIRECT: CPT

## 2019-04-02 PROCEDURE — 6370000000 HC RX 637 (ALT 250 FOR IP): Performed by: INTERNAL MEDICINE

## 2019-04-02 PROCEDURE — 2060000000 HC ICU INTERMEDIATE R&B

## 2019-04-02 PROCEDURE — 6360000002 HC RX W HCPCS: Performed by: NURSE PRACTITIONER

## 2019-04-02 PROCEDURE — 74220 X-RAY XM ESOPHAGUS 1CNTRST: CPT

## 2019-04-02 PROCEDURE — 36415 COLL VENOUS BLD VENIPUNCTURE: CPT

## 2019-04-02 PROCEDURE — 85018 HEMOGLOBIN: CPT

## 2019-04-02 PROCEDURE — 80053 COMPREHEN METABOLIC PANEL: CPT

## 2019-04-02 PROCEDURE — 94668 MNPJ CHEST WALL SBSQ: CPT

## 2019-04-02 PROCEDURE — 85014 HEMATOCRIT: CPT

## 2019-04-02 PROCEDURE — 2709999900 HC NON-CHARGEABLE SUPPLY

## 2019-04-02 PROCEDURE — A4641 RADIOPHARM DX AGENT NOC: HCPCS | Performed by: NURSE PRACTITIONER

## 2019-04-02 PROCEDURE — 82948 REAGENT STRIP/BLOOD GLUCOSE: CPT

## 2019-04-02 PROCEDURE — 85027 COMPLETE CBC AUTOMATED: CPT

## 2019-04-02 PROCEDURE — 6370000000 HC RX 637 (ALT 250 FOR IP): Performed by: HOSPITALIST

## 2019-04-02 PROCEDURE — 6370000000 HC RX 637 (ALT 250 FOR IP): Performed by: NURSE PRACTITIONER

## 2019-04-02 PROCEDURE — C9113 INJ PANTOPRAZOLE SODIUM, VIA: HCPCS | Performed by: NURSE PRACTITIONER

## 2019-04-02 PROCEDURE — 2500000003 HC RX 250 WO HCPCS: Performed by: NURSE PRACTITIONER

## 2019-04-02 PROCEDURE — 6370000000 HC RX 637 (ALT 250 FOR IP): Performed by: PHYSICIAN ASSISTANT

## 2019-04-02 PROCEDURE — 6360000004 HC RX CONTRAST MEDICATION: Performed by: NURSE PRACTITIONER

## 2019-04-02 PROCEDURE — 2580000003 HC RX 258: Performed by: PHYSICIAN ASSISTANT

## 2019-04-02 PROCEDURE — 99233 SBSQ HOSP IP/OBS HIGH 50: CPT | Performed by: HOSPITALIST

## 2019-04-02 RX ORDER — POTASSIUM CHLORIDE 20 MEQ/1
40 TABLET, EXTENDED RELEASE ORAL
Status: DISCONTINUED | OUTPATIENT
Start: 2019-04-03 | End: 2019-04-03 | Stop reason: HOSPADM

## 2019-04-02 RX ADMIN — GABAPENTIN 600 MG: 600 TABLET, FILM COATED ORAL at 19:36

## 2019-04-02 RX ADMIN — SUCRALFATE 1 G: 1 TABLET ORAL at 17:21

## 2019-04-02 RX ADMIN — OXYCODONE AND ACETAMINOPHEN 2 TABLET: 5; 325 TABLET ORAL at 10:08

## 2019-04-02 RX ADMIN — DULOXETINE HYDROCHLORIDE 20 MG: 20 CAPSULE, DELAYED RELEASE ORAL at 10:06

## 2019-04-02 RX ADMIN — PANTOPRAZOLE SODIUM 40 MG: 40 INJECTION, POWDER, FOR SOLUTION INTRAVENOUS at 19:37

## 2019-04-02 RX ADMIN — TIZANIDINE 4 MG: 4 TABLET ORAL at 10:17

## 2019-04-02 RX ADMIN — Medication 10 ML: at 19:37

## 2019-04-02 RX ADMIN — PENTOSAN POLYSULFATE SODIUM 100 MG: 100 CAPSULE, GELATIN COATED ORAL at 10:08

## 2019-04-02 RX ADMIN — GABAPENTIN 600 MG: 600 TABLET, FILM COATED ORAL at 17:21

## 2019-04-02 RX ADMIN — ANTACID/ANTIFLATULENT 1 EACH: 380; 550; 10; 10 GRANULE, EFFERVESCENT ORAL at 09:21

## 2019-04-02 RX ADMIN — PENTOSAN POLYSULFATE SODIUM 100 MG: 100 CAPSULE, GELATIN COATED ORAL at 17:21

## 2019-04-02 RX ADMIN — SUCRALFATE 1 G: 1 TABLET ORAL at 06:34

## 2019-04-02 RX ADMIN — SODIUM CHLORIDE: 9 INJECTION, SOLUTION INTRAVENOUS at 03:06

## 2019-04-02 RX ADMIN — Medication 10 ML: at 10:07

## 2019-04-02 RX ADMIN — QUETIAPINE FUMARATE 50 MG: 25 TABLET ORAL at 19:36

## 2019-04-02 RX ADMIN — TOPIRAMATE 100 MG: 100 TABLET, FILM COATED ORAL at 19:36

## 2019-04-02 RX ADMIN — OXYCODONE AND ACETAMINOPHEN 2 TABLET: 5; 325 TABLET ORAL at 04:40

## 2019-04-02 RX ADMIN — AMOXICILLIN AND CLAVULANATE POTASSIUM 1 TABLET: 500; 125 TABLET, FILM COATED ORAL at 14:03

## 2019-04-02 RX ADMIN — TOPIRAMATE 100 MG: 100 TABLET, FILM COATED ORAL at 10:07

## 2019-04-02 RX ADMIN — SUCRALFATE 1 G: 1 TABLET ORAL at 10:05

## 2019-04-02 RX ADMIN — SUCRALFATE 1 G: 1 TABLET ORAL at 23:39

## 2019-04-02 RX ADMIN — GABAPENTIN 600 MG: 600 TABLET, FILM COATED ORAL at 10:05

## 2019-04-02 RX ADMIN — AMOXICILLIN AND CLAVULANATE POTASSIUM 1 TABLET: 500; 125 TABLET, FILM COATED ORAL at 23:39

## 2019-04-02 RX ADMIN — OXYCODONE AND ACETAMINOPHEN 2 TABLET: 5; 325 TABLET ORAL at 23:39

## 2019-04-02 RX ADMIN — PROPRANOLOL HYDROCHLORIDE 80 MG: 20 TABLET ORAL at 19:34

## 2019-04-02 RX ADMIN — ALPRAZOLAM 1 MG: 0.5 TABLET ORAL at 19:52

## 2019-04-02 RX ADMIN — Medication 3.3 MG: at 19:34

## 2019-04-02 RX ADMIN — PANTOPRAZOLE SODIUM 40 MG: 40 INJECTION, POWDER, FOR SOLUTION INTRAVENOUS at 10:07

## 2019-04-02 RX ADMIN — TIZANIDINE 4 MG: 4 TABLET ORAL at 17:22

## 2019-04-02 RX ADMIN — BARIUM SULFATE 100 ML: 0.6 SUSPENSION ORAL at 09:22

## 2019-04-02 RX ADMIN — AMOXICILLIN AND CLAVULANATE POTASSIUM 1 TABLET: 500; 125 TABLET, FILM COATED ORAL at 06:34

## 2019-04-02 RX ADMIN — OXYCODONE AND ACETAMINOPHEN 2 TABLET: 5; 325 TABLET ORAL at 17:21

## 2019-04-02 RX ADMIN — PENTOSAN POLYSULFATE SODIUM 100 MG: 100 CAPSULE, GELATIN COATED ORAL at 06:34

## 2019-04-02 RX ADMIN — BARIUM SULFATE 140 ML: 980 POWDER, FOR SUSPENSION ORAL at 09:21

## 2019-04-02 ASSESSMENT — PAIN - FUNCTIONAL ASSESSMENT
PAIN_FUNCTIONAL_ASSESSMENT: ACTIVITIES ARE NOT PREVENTED

## 2019-04-02 ASSESSMENT — PAIN SCALES - GENERAL
PAINLEVEL_OUTOF10: 4
PAINLEVEL_OUTOF10: 6
PAINLEVEL_OUTOF10: 8
PAINLEVEL_OUTOF10: 6

## 2019-04-02 ASSESSMENT — PAIN DESCRIPTION - FREQUENCY
FREQUENCY: CONTINUOUS

## 2019-04-02 ASSESSMENT — PAIN DESCRIPTION - LOCATION
LOCATION: ABDOMEN

## 2019-04-02 ASSESSMENT — PAIN DESCRIPTION - ORIENTATION
ORIENTATION: MID;UPPER;RIGHT

## 2019-04-02 ASSESSMENT — PAIN DESCRIPTION - ONSET
ONSET: ON-GOING

## 2019-04-02 ASSESSMENT — PAIN DESCRIPTION - PROGRESSION
CLINICAL_PROGRESSION: NOT CHANGED

## 2019-04-02 ASSESSMENT — PAIN DESCRIPTION - PAIN TYPE
TYPE: ACUTE PAIN

## 2019-04-02 ASSESSMENT — PAIN DESCRIPTION - DIRECTION
RADIATING_TOWARDS: RIGHT SIDE
RADIATING_TOWARDS: RIGHT SIDE

## 2019-04-02 ASSESSMENT — PAIN DESCRIPTION - DESCRIPTORS
DESCRIPTORS: ACHING

## 2019-04-02 NOTE — PLAN OF CARE
Problem: Pain:  Goal: Pain level will decrease  Description  Pain level will decrease  Outcome: Ongoing  Note:   Pain Assessment: 0-10  Pain Level: 8   Pain goal:  5  Is pain goal met at this time? Yes     Additional interventions to be implemented: medications percocet. , position change and rest         Problem: Falls - Risk of:  Goal: Will remain free from falls  Description  Will remain free from falls  Outcome: Ongoing  Note:   No falls this shift. Goal: Absence of physical injury  Description  Absence of physical injury  Outcome: Ongoing  Note:   No physical injury. Problem: Risk for Impaired Skin Integrity  Goal: Tissue integrity - skin and mucous membranes  Description  Structural intactness and normal physiological function of skin and  mucous membranes. Outcome: Ongoing  Note:   No new skin issues this shift. Problem: Nutrition  Goal: Optimal nutrition therapy  Outcome: Ongoing  Note:   Patient is eating and tolerating diet without difficulty. Problem: DISCHARGE BARRIERS  Goal: Patient's continuum of care needs are met  Outcome: Ongoing  Note:   Patient's continuum of care needs are being met. Care plan reviewed with patient. Patient verbalize understanding of the plan of care and contribute to goal setting.

## 2019-04-02 NOTE — PROGRESS NOTES
Gastroenterology Progress Note:     Patient Name:  Jim Bowens   MRN: [id number]  [id number]  YOB: 1970  Admit Date: 3/30/2019  2:46 PM  Primary Care Physician: Reyna Morrison MD   [id number]   78-year-old  female admitted for pneumonia. GI consulted due to odynophagia and dysphagia as well as GERD symptoms. Patient had a EGD and colonoscopy in 2016 By Dr. Srini Norris. EGD with gastritis and hiatal hernia: Colonoscopy normal.  She has chronic GERD maintained on Dexilant at home. Per report from Dr. Chas Reid patient will need anesthesia with endoscopy procedures. Patient seen and examined. 24 hours events and chart reviewed. Patient states less pain with swallowing and she continues with epigastric pain and nausea. She reports diarrhea and dark stools prior to admission Staff RN reports none since admission. No nausea or vomiting. Review of Systems  Neuro- negative for headache, dizziness, altered level of consciousness  Cardiovascular- negative for CP, SOB, peripheral edema, orthopnea  GI- See subjective  - negative for dysuria, frequency, hematuria  Skin- negative for rash, or wounds    (+   )Medications Reviewed ;(   )Old records reviewed    I/O last 3 completed shifts: In: 3947.7 [P.O.:1370; I.V.:2577.7]  Out: 2700 [Urine:2700]  I/O this shift:  In: 667.5 [P.O.:100; I.V.:567.5]  Out: 500 [Urine:500]    Diet:  DIET CARDIAC; Dietary Nutrition Supplements: Clear Liquid Oral Supplement      /61   Pulse 65   Temp 97.4 °F (36.3 °C) (Oral)   Resp 18   Ht 5' 6\" (1.676 m)   Wt 205 lb 9.6 oz (93.3 kg)   LMP 11/19/2014 (Exact Date)   SpO2 97%   BMI 33.18 kg/m²     Physical Exam:    General:  Nourished in no distress  HEENT: Atraumatic, normocephalic. Moist oral mucous membranes. Nares no drainage. Sclera anicteric. CV: Heart RRR with s1 s2 heard, no murmurs, rubs, gallops. Resp: Even, easy without cough or accessory use.  Lungs clear to ascultation bilaterally. Abd: Round, soft, epigastric tendereness. No hepatosplenomegaly or mass present. Active bowel sounds heard. No distention noted. Ext:  Without cyanosis, clubbing, edema. Skin: Pink, warm, dry  Neuro:  Alert, oriented x3 with no obvious deficits.        Rectal: deferred  Lines/tubes:       Labs: WBC:    Lab Results   Component Value Date    WBC 11.3 03/31/2019     Platelets:    Lab Results   Component Value Date     03/31/2019     Hemoglobin/Hematocrit:    Lab Results   Component Value Date    HGB 9.8 04/02/2019    HCT 30.9 04/02/2019     BMP:    Lab Results   Component Value Date     03/31/2019    K 3.1 03/31/2019     03/31/2019    CO2 22 03/31/2019    BUN 10 03/31/2019    LABALBU 2.5 03/31/2019    CREATININE 0.6 03/31/2019    CALCIUM 8.4 03/31/2019    LABGLOM >90 03/31/2019    GLUCOSE 82 03/31/2019     Hepatic Function Panel:    Lab Results   Component Value Date    ALKPHOS 55 03/31/2019    ALT 60 03/31/2019    AST 88 03/31/2019    PROT 6.0 03/31/2019    BILITOT 0.3 03/31/2019    BILIDIR <0.2 03/31/2019    LABALBU 2.5 03/31/2019     Calcium:    Lab Results   Component Value Date    CALCIUM 8.4 03/31/2019     PT/INR:    Lab Results   Component Value Date    INR 0.86 11/14/2018     PTT:    Lab Results   Component Value Date    APTT 30.0 11/14/2018   [APTT     Significant Diagnostic Studies:     Current Meds:  Scheduled Meds:   pantoprazole  40 mg Intravenous BID    amoxicillin-clavulanate  1 tablet Oral 3 times per day    sucralfate  1 g Oral 4 times per day    oxyCODONE-acetaminophen  2 tablet Oral Q6H    DULoxetine  20 mg Oral Daily    gabapentin  600 mg Oral 4x daily    pentosan polysulfate  100 mg Oral TID AC    propranolol  80 mg Oral BID    QUEtiapine  50 mg Oral Nightly    topiramate  100 mg Oral BID    sodium chloride flush  10 mL Intravenous 2 times per day    potassium replacement protocol   Other RX Placeholder     Continuous Infusions:   sodium chloride 125 mL/hr at 04/02/19 0306     PRN Meds:.magic (miracle) mouthwash, menthol, ALPRAZolam, albuterol, tiZANidine, sodium chloride flush, magnesium hydroxide, ondansetron, acetaminophen  Esophogram:  FINDINGS: Swallowing mechanism is normal. No strictures or extrinsic abnormalities are identified in the esophagus. No mucosal lesions or ulcerations are seen. There is no stenosis or gastroesophageal reflux in the distal esophagus. No hiatal hernia is    identified. The patient swallowed a 13 mm barium tablet without difficulty.             Assessment:   Chronic GERD symptomatic on Dexilant PTA     Odynphagia-less pain today.      Dysphagia to pill and solid food mainly, last couple of days \"choking on liquids\"   Evaluated by speech therapy no indication for modified barium swallow. No aspiration   Esophagram normal              Ongoing last couple of months, EGD w/dil in 2016     Normocytic anemia. Hemoglobin 10.5 with hematocrit 31.5, MCV, MCH and RDW are normal.  Per review of labs in epic 11/14/18 hemoglobin 12.9 with hematocrit 40.2. Fecal occult positive. Hemoglobin 10.1-9.8-9.4              Denies overt GI blood loss     Abnormal liver labs were normal 11/14/18. Possibly d/t fatty liver              On admission ALT elevated at 79 and . Resolved,  Now normal                Acute hepatitis panel negative. Ultrasound the liver hepatomegaly with fatty liver. Cholecystectomy     Hypoalbuminemia albumin 2.5 with decreased total protein of 6.     Diarrhea- GI PCR panel negative  Daily NSAID use  Hx of Nuñez's Esophagus  Reports last EGD no Barretts     Pneumonia on treatment with azithromycin and Rocephin per primary. Hypertension  Chronic back pain        Plan:    Magic mouthwash swish and swallow  Continue PPI  IV  OK for diet   Carfate ac and prior to bedtime  [facility].   Arrangements will be made at discharge to follow-up with other GI service.   EGD timing based on clinical course if continues to improve can be done after discharge     Case reviewed and impression/plan reviewed in collaboration with Dr Penny Lott, IAN for Dr. Alverto Cardoza   4/2/2019

## 2019-04-02 NOTE — PROGRESS NOTES
Hospitalist Progress Note    Patient:  Blain Schlatter      Unit/Bed:4K-16/016-A    YOB: 1970    MRN: [id number]       Acct: [de-identified]     PCP: Mariann Dimas MD    Date of Admission: 3/30/2019    Active Hospital Problems    Diagnosis Date Noted    Severe malnutrition ([facility] [address] [E43] 04/01/2019     Class: Acute    Community acquired bacterial pneumonia [J15.9] 03/30/2019    Elevated liver enzymes [R74.8] 03/30/2019    Chronic low back pain [M54.5, G89.29] 03/30/2019    Pneumonia due to organism [J18.9]     Gastrointestinal hemorrhage [K92.2] 06/24/2016    Hypertension, benign [I10] 11/19/2014       Assessment/Plan:    Sepsis: Due to suspected PNA. SIRS 4/4. CTA with multilobar fluffy infiltrates. Possible aspiration?  - blood cultures/sputum culture pending  - urine strep/legionella pending  - Continue Ceftriaxone/Azithromycin. Procal 10, recheck. May need to consider additional anaerobe coverage with aspiration consideration. - continue IVF's  4/1; improving. SIRS resolved. Switched to PO Augmentin today. 4/2: CCM    Acute anemia: Hb stable at 9.9. Will monitor H/H. On PPI IV BID as per GI  4/2: Hb stable 9.8 today     Odynophagia: History of prasanna's esophagus per chart review, daily NSAID use. No evidence of pneumomediastinum on CT.  - consult GI, endorsing severe pain with swallowing  - consult SLP, states coughing with swallowing. 4/1: for esophagogram. Given underlying achalasia and Nuñez's might be prudent to proceed w/ EGC. GI following . SLP to see. On IVF at 125 cc/hro for now    4/2: Esophagogram NL. Also assessed by SLP and placed on regular diet with no need for barium study. D/c'ed IVF. GI to advise if EGD on this admission. Will continue w/ IV PPI as per GI    Chronic pain due to spinal stenosis: Continue home percocet, gabapentin, zanaflex.  Re-introduce judiciously with hypotension.      Transaminitis: Suspect liver injury from sepsis, however, with check RUQ US. Limit tylenol to 4 g per day. Improving  4/1: resolved     HTN: Holding triamterene/hctz with low BP's  4/1: BP WNLs. CCM     Hypokalemia: Replace PRN.   4/2: BMP sent, will start on standing K    Malnutrition: seen by dietician, started on supplements          Expected discharge date:  TBD         Disposition:      ? Home                             ? TCU                             ? Rehab                             ? Psych                             ? SNF                             ? [facility]                             ? Other-    Chief Complaint:   Chief Complaint   Patient presents with    Fatigue   [facility] Course: Patient was seen, examined and the medical chart was reviewed thoroughly today. In summary, 46 y. o.female admitted on 3/30/2019 for  Aspiration PNA. I took over care on 4/1. Subjective (past 24 hours):   c/o sharp epigastric pain upon drinking liquids. Also R-sided CP w/ deep breathing and movement. Denies SOB. + poor appetite. 4/2: no new issues.       Medications:  Reviewed    Infusion Medications     Scheduled Medications    pantoprazole  40 mg Intravenous BID    amoxicillin-clavulanate  1 tablet Oral 3 times per day    sucralfate  1 g Oral 4 times per day    oxyCODONE-acetaminophen  2 tablet Oral Q6H    DULoxetine  20 mg Oral Daily    gabapentin  600 mg Oral 4x daily    pentosan polysulfate  100 mg Oral TID AC    propranolol  80 mg Oral BID    QUEtiapine  50 mg Oral Nightly    topiramate  100 mg Oral BID    sodium chloride flush  10 mL Intravenous 2 times per day    potassium replacement protocol   Other RX Placeholder     PRN Meds: magic (miracle) mouthwash, menthol, ALPRAZolam, albuterol, tiZANidine, sodium chloride flush, magnesium hydroxide, ondansetron, acetaminophen      Intake/Output Summary (Last 24 hours) at 4/2/2019 1504  Last data filed at 4/2/2019 1405  Gross per 24 hour   Intake 4281.23 ml   Output 2600 ml   Net BLOODU SMALL 03/30/2019    SPECGRAV 1.017 07/11/2017    GLUCOSEU NEGATIVE 03/30/2019       Radiology:  Xr Chest Standard (2 Vw)    Result Date: 3/30/2019  PROCEDURE: XR CHEST (2 VW) CLINICAL INFORMATION: 49-year-old female with cough and fever. COMPARISON: X-ray dated 11/14/2018. TECHNIQUE: PA and lateral views of the chest were obtained. FINDINGS: Infiltrates are noted in the left mid and lower lung. The cardiac silhouette and pulmonary vasculature are within normal limits. There is no significant pleural effusion or pneumothorax. Visualized portions of the upper abdomen are within normal limits. The osseous structures are intact. No acute fractures or suspicious osseous lesions. Pneumonic infiltrates noted in the left mid and lower lung. **This report has been created using voice recognition software. It may contain minor errors which are inherent in voice recognition technology. ** Final report electronically signed by Dr Hayden Kirkpatrick on 3/30/2019 4:26 PM    Cta Chest W Wo Contrast    Result Date: 3/31/2019  PROCEDURE: CTA CHEST W WO CONTRAST CLINICAL INFORMATION: SHORTNESS OF BREATH PRODUCED BY EXERTION OR STRESS. COMPARISON: No prior study. TECHNIQUE: 3 mm axial images were obtained through the chest after the administration of IV contrast.  A non-contrast localizer was obtained. 3D reconstructions were performed on the scanner to include MIP images through the right and left pulmonary arteries and sagittal images through the chest. Isovue was the intravenous contrast utilized. All CT scans at this facility use dose modulation, iterative reconstruction, and/or weight-based dosing when appropriate to reduce radiation dose to as low as reasonably achievable. FINDINGS:  There is no evidence of pulmonary embolus aortic dissection or vascular congestion. The heart size is normal. The thoracic aorta is normal in caliber. Adenopathy is not seen.  The lungs reveal extensive airspace disease throughout the left lung. There is  minimal atelectasis in the right middle lobe and to lesser extent right lower lobe. Pleural fluid is not seen. The skeletal structures reveal multilevel disc degeneration in the dorsal spine. The gallbladder has been removed. Impression: No evidence of pulmonary embolus, aortic dissection, or vascular congestion. Extensive airspace disease throughout the left lung with minimal atelectasis in the right middle lobe and right lower lobe. The findings are compatible with pneumonia. No evidence of pleural effusion. **This report has been created using voice recognition software. It may contain minor errors which are inherent in voice recognition technology. ** Final report electronically signed by Dr. Tim Chen on 3/31/2019 3:27 AM    Mri Lumbar Spine Wo Contrast    Result Date: 3/18/2019  PROCEDURE: MRI LUMBAR SPINE WO CONTRAST CLINICAL INFORMATION: Spinal stenosis of lumbar region with neurogenic claudication, Lumbar radiculitis, Lumbar disc herniation, Spondylosis of lumbar region without myelopathy or radiculopathy, DDD (degenerative disc disease), lumbar, Chronic pain syndrome. Additional history obtained from the electronic medical record indicates the patient has low back pain and bilateral leg pain. COMPARISON: None available. Correlation is made to CT of the lumbar spine dated May 28, 2017. TECHNIQUE: Sagittal and axial T1 and T2-weighted images were obtained through the lumbar spine. FINDINGS: The lumbar spine is imaged from the inferior aspect of T10 to the superior aspect of S3. The conus medullaris terminates at the L1-L2 level. No abnormal signal or expansion is identified within the conus. There is crowding of the nerve roots at the L4-L5  level. There is preservation of the expected lumbar lordosis. Minimal anterolisthesis is present of L4 on L5. No compression fracture deformity or suspicious marrow replacing lesion is identified.  Degenerative facet arthropathy is present at every level. With regards to the disc spaces, at L1-L2, there is a disc osteophyte complex and ligamentum flavum thickening. The spinal canal and neural foramina are patent. At L2-L3, there is a disc bulge and ligamentum flavum thickening. This results in mild spinal canal narrowing. The neural foramina are patent. At L3-L4, there is a disc osteophyte complex and ligamentum flavum thickening resulting in mild spinal canal narrowing. The neural foramina are patent. At L4-L5, there is uncovering of the disc and a disc bulge with ligamentum flavum thickening. This results in moderate to severe spinal canal narrowing. Mild neural foraminal narrowing is present bilaterally. At L5-S1, there is a disc osteophyte complex and ligamentum flavum thickening resulting in mild to moderate spinal canal narrowing. Mild neural foraminal narrowing is present bilaterally. No suspicious finding is identified within the visualized retroperitoneal or paraspinal soft tissues. Multilevel degenerative changes are present throughout the lumbar spine and are further discussed by level in the findings. Most significantly, at L4-L5, there is uncovering of the disc and a disc bulge with ligamentum flavum thickening causing moderate  to severe spinal canal narrowing. Degenerative facet arthropathy is also present resulting in mild neural foraminal narrowing bilaterally at L4-L5 and L5-S1. **This report has been created using voice recognition software. It may contain minor errors which are inherent in voice recognition technology. ** Final report electronically signed by Dr. Nichole Sinclair on 3/18/2019 1:50 PM    Ct Abdomen Pelvis W Iv Contrast    Result Date: 3/30/2019  PROCEDURE: CT ABDOMEN PELVIS W IV CONTRAST CLINICAL INFORMATION: epigastric and periumbilical abdominal pain diarrhea fever . COMPARISON: 9/1/2015 TECHNIQUE: 2-D multiplanar post contrast images of the abdomen and pelvis.  Isovue-370 IV contrast All CT scans at this facility use dose modulation, iterative reconstruction, and/or weight-based dosing when appropriate to reduce radiation dose to as low as reasonably achievable. FINDINGS: Lung bases extensive left lower lobe peribronchial infiltrates. No effusions. Heart size is normal Abdomen and pelvis The Liver, and spleen are unremarkable. Prior cholecystectomy. Prominent common bile duct. Atrophic pancreas. Adrenals and kidneys are normal. Pelvis Aorta is unremarkable. No bowel obstruction. No abnormal fluid collections. Urinary bladder is unremarkable. No suspicious bone lesions. Left lower lobe peribronchial infiltrates. No acute abdominal or pelvic abnormalities **This report has been created using voice recognition software. It may contain minor errors which are inherent in voice recognition technology. ** Final report electronically signed by Dr. Chandra Cottrell on 3/30/2019 5:10 PM    Us Liver    Result Date: 4/1/2019  PROCEDURE: US LIVER CLINICAL INFORMATION: transaminitis. COMPARISON: CT abdomen pelvis March 30, 2019 TECHNIQUE: Multiplanar sonographic images were obtained of the liver. FINDINGS:  The visualized pancreatic tissue is normal. The liver shows fatty changes and mild hepatomegaly measuring up to 19.8 cm. Doppler assessment of the portal and hepatic vein structures demonstrate normal blood flow and direction. Postcholecystectomy changes are noted. The common bile duct measures 7.5 mm with no filling defects visualized. 1. Postoperative cholecystectomy changes. 2. Hepatomegaly with fatty or other parenchymal changes of the liver noted. **This report has been created using voice recognition software. It may contain minor errors which are inherent in voice recognition technology. ** Final report electronically signed by Dr. Eric Medrano on 4/1/2019 12:48 AM      Diet: DIET CARDIAC; Dietary Nutrition Supplements: Clear Liquid Oral Supplement    DVT prophylaxis: ? Lovenox                                 ?

## 2019-04-02 NOTE — FLOWSHEET NOTE
There was an advanced directive consult on pt but there was no readiness on the part of the pt to have it filled out. 04/02/19 1249   Encounter Summary   Services provided to: Patient   Referral/Consult From: Rounding   Continue Visiting Yes  (4/2)   Complexity of Encounter Low   Length of Encounter 15 minutes   Routine   Type Follow up   Assessment Approachable;Calm   Intervention Active listening;Empowerment   Outcome Acceptance;Expressed gratitude;Encouraged; Hopeful

## 2019-04-03 VITALS
BODY MASS INDEX: 33.07 KG/M2 | RESPIRATION RATE: 16 BRPM | SYSTOLIC BLOOD PRESSURE: 111 MMHG | DIASTOLIC BLOOD PRESSURE: 65 MMHG | HEART RATE: 65 BPM | HEIGHT: 66 IN | WEIGHT: 205.8 LBS | TEMPERATURE: 98.7 F | OXYGEN SATURATION: 95 %

## 2019-04-03 LAB
ANION GAP SERPL CALCULATED.3IONS-SCNC: 14 MEQ/L (ref 8–16)
BUN BLDV-MCNC: 11 MG/DL (ref 7–22)
CALCIUM SERPL-MCNC: 8.8 MG/DL (ref 8.5–10.5)
CHLORIDE BLD-SCNC: 106 MEQ/L (ref 98–111)
CO2: 20 MEQ/L (ref 23–33)
CREAT SERPL-MCNC: 0.6 MG/DL (ref 0.4–1.2)
GFR SERPL CREATININE-BSD FRML MDRD: > 90 ML/MIN/1.73M2
GLUCOSE BLD-MCNC: 87 MG/DL (ref 70–108)
HCT VFR BLD CALC: 33.3 % (ref 37–47)
HEMOGLOBIN: 10.3 GM/DL (ref 12–16)
LEGIONELLA URINARY AG: NEGATIVE
POTASSIUM SERPL-SCNC: 3.5 MEQ/L (ref 3.5–5.2)
SODIUM BLD-SCNC: 140 MEQ/L (ref 135–145)
STREP PNEUMO AG, UR: NEGATIVE

## 2019-04-03 PROCEDURE — 6370000000 HC RX 637 (ALT 250 FOR IP): Performed by: INTERNAL MEDICINE

## 2019-04-03 PROCEDURE — 92526 ORAL FUNCTION THERAPY: CPT

## 2019-04-03 PROCEDURE — 85014 HEMATOCRIT: CPT

## 2019-04-03 PROCEDURE — 6370000000 HC RX 637 (ALT 250 FOR IP): Performed by: HOSPITALIST

## 2019-04-03 PROCEDURE — 6370000000 HC RX 637 (ALT 250 FOR IP): Performed by: NURSE PRACTITIONER

## 2019-04-03 PROCEDURE — 6370000000 HC RX 637 (ALT 250 FOR IP): Performed by: PHYSICIAN ASSISTANT

## 2019-04-03 PROCEDURE — 99233 SBSQ HOSP IP/OBS HIGH 50: CPT | Performed by: HOSPITALIST

## 2019-04-03 PROCEDURE — 85018 HEMOGLOBIN: CPT

## 2019-04-03 PROCEDURE — 80048 BASIC METABOLIC PNL TOTAL CA: CPT

## 2019-04-03 PROCEDURE — 36415 COLL VENOUS BLD VENIPUNCTURE: CPT

## 2019-04-03 PROCEDURE — 2580000003 HC RX 258: Performed by: PHYSICIAN ASSISTANT

## 2019-04-03 RX ORDER — PANTOPRAZOLE SODIUM 40 MG/1
40 TABLET, DELAYED RELEASE ORAL
Status: DISCONTINUED | OUTPATIENT
Start: 2019-04-03 | End: 2019-04-03 | Stop reason: HOSPADM

## 2019-04-03 RX ORDER — SUCRALFATE 1 G/1
1 TABLET ORAL 4 TIMES DAILY
Qty: 120 TABLET | Refills: 1 | Status: SHIPPED | OUTPATIENT
Start: 2019-04-03

## 2019-04-03 RX ORDER — AMOXICILLIN AND CLAVULANATE POTASSIUM 500; 125 MG/1; MG/1
1 TABLET, FILM COATED ORAL EVERY 8 HOURS SCHEDULED
Qty: 9 TABLET | Refills: 0 | Status: SHIPPED | OUTPATIENT
Start: 2019-04-03 | End: 2019-04-06

## 2019-04-03 RX ORDER — LOPERAMIDE HYDROCHLORIDE 2 MG/1
2 CAPSULE ORAL 4 TIMES DAILY PRN
Status: DISCONTINUED | OUTPATIENT
Start: 2019-04-03 | End: 2019-04-03 | Stop reason: HOSPADM

## 2019-04-03 RX ORDER — POTASSIUM CHLORIDE 20 MEQ/1
40 TABLET, EXTENDED RELEASE ORAL
Qty: 60 TABLET | Refills: 1 | Status: ON HOLD | OUTPATIENT
Start: 2019-04-04 | End: 2021-08-02 | Stop reason: HOSPADM

## 2019-04-03 RX ORDER — PANTOPRAZOLE SODIUM 40 MG/1
40 TABLET, DELAYED RELEASE ORAL
Qty: 30 TABLET | Refills: 1 | Status: SHIPPED | OUTPATIENT
Start: 2019-04-03 | End: 2019-06-11

## 2019-04-03 RX ADMIN — OXYCODONE AND ACETAMINOPHEN 2 TABLET: 5; 325 TABLET ORAL at 10:39

## 2019-04-03 RX ADMIN — AMOXICILLIN AND CLAVULANATE POTASSIUM 1 TABLET: 500; 125 TABLET, FILM COATED ORAL at 06:08

## 2019-04-03 RX ADMIN — POTASSIUM CHLORIDE 40 MEQ: 1500 TABLET, EXTENDED RELEASE ORAL at 08:12

## 2019-04-03 RX ADMIN — OXYCODONE AND ACETAMINOPHEN 2 TABLET: 5; 325 TABLET ORAL at 04:29

## 2019-04-03 RX ADMIN — PENTOSAN POLYSULFATE SODIUM 100 MG: 100 CAPSULE, GELATIN COATED ORAL at 10:39

## 2019-04-03 RX ADMIN — LOPERAMIDE HYDROCHLORIDE 2 MG: 2 CAPSULE ORAL at 10:39

## 2019-04-03 RX ADMIN — AMOXICILLIN AND CLAVULANATE POTASSIUM 1 TABLET: 500; 125 TABLET, FILM COATED ORAL at 14:46

## 2019-04-03 RX ADMIN — Medication 10 ML: at 08:14

## 2019-04-03 RX ADMIN — GABAPENTIN 600 MG: 600 TABLET, FILM COATED ORAL at 08:13

## 2019-04-03 RX ADMIN — TOPIRAMATE 100 MG: 100 TABLET, FILM COATED ORAL at 08:13

## 2019-04-03 RX ADMIN — TIZANIDINE 4 MG: 4 TABLET ORAL at 06:13

## 2019-04-03 RX ADMIN — DULOXETINE HYDROCHLORIDE 20 MG: 20 CAPSULE, DELAYED RELEASE ORAL at 08:13

## 2019-04-03 RX ADMIN — GABAPENTIN 600 MG: 600 TABLET, FILM COATED ORAL at 14:46

## 2019-04-03 RX ADMIN — PANTOPRAZOLE SODIUM 40 MG: 40 TABLET, DELAYED RELEASE ORAL at 08:12

## 2019-04-03 RX ADMIN — PENTOSAN POLYSULFATE SODIUM 100 MG: 100 CAPSULE, GELATIN COATED ORAL at 06:09

## 2019-04-03 ASSESSMENT — PAIN DESCRIPTION - FREQUENCY: FREQUENCY: CONTINUOUS

## 2019-04-03 ASSESSMENT — PAIN SCALES - GENERAL
PAINLEVEL_OUTOF10: 4
PAINLEVEL_OUTOF10: 6
PAINLEVEL_OUTOF10: 5
PAINLEVEL_OUTOF10: 5

## 2019-04-03 ASSESSMENT — PAIN DESCRIPTION - ONSET: ONSET: ON-GOING

## 2019-04-03 ASSESSMENT — PAIN DESCRIPTION - ORIENTATION: ORIENTATION: MID;UPPER;RIGHT

## 2019-04-03 ASSESSMENT — PAIN DESCRIPTION - DESCRIPTORS: DESCRIPTORS: ACHING

## 2019-04-03 ASSESSMENT — PAIN DESCRIPTION - PROGRESSION: CLINICAL_PROGRESSION: NOT CHANGED

## 2019-04-03 ASSESSMENT — PAIN - FUNCTIONAL ASSESSMENT: PAIN_FUNCTIONAL_ASSESSMENT: ACTIVITIES ARE NOT PREVENTED

## 2019-04-03 ASSESSMENT — PAIN DESCRIPTION - LOCATION: LOCATION: ABDOMEN

## 2019-04-03 ASSESSMENT — PAIN DESCRIPTION - PAIN TYPE: TYPE: ACUTE PAIN

## 2019-04-03 NOTE — DISCHARGE SUMMARY
Weight: 205 lb 12.8 oz (93.4 kg)     24 hour intake/output:    Intake/Output Summary (Last 24 hours) at 4/3/2019 1052  Last data filed at 4/3/2019 0615  Gross per 24 hour   Intake 1880 ml   Output 1500 ml   Net 380 ml           General appearance: A&O x3, no longer looks ill/toxic,  in no apparent distress  HEENT:  BETTY  EOM intact. Neck: Supple, with full range of motion. No jugular venous distention. Trachea midline. Respiratory:   NL A/E bilat with no adventitious sounds   Cardiovascular:  normal S1/S2 with no murmurs/gallops  Abdomen: Soft, non-tender, non-distended, no rigidity or peritoneal signs  Musculoskeletal: NL symmetrical A/PROM bilat U/L extremities   Skin: No rashes. No edema  Neurologic:  CN II-XII intact. NL symmetrical reflexes. NL gait and stance. NL Cerebellar exam. Power 5/5 all muscle groups U/L extremities. Toes downgoing  Capillary Refill: Brisk,< 3 seconds   Peripheral Pulses: +2 palpable, equal bilaterally           Labs: For convenience and continuity at follow-up the following most recent labs are provided:      CBC:    Lab Results   Component Value Date    WBC 6.6 04/02/2019    HGB 10.3 04/03/2019    HCT 33.3 04/03/2019     04/02/2019       Renal:    Lab Results   Component Value Date     04/03/2019    K 3.5 04/03/2019    K 3.1 03/31/2019     04/03/2019    CO2 20 04/03/2019    BUN 11 04/03/2019    CREATININE 0.6 04/03/2019    CALCIUM 8.8 04/03/2019         Significant Diagnostic Studies    Radiology:   [facility] ESOPHAGRAM   Final Result      Normal esophagram.      Final report electronically signed by Dr. Emeli Spence on 4/2/2019 9:33 AM      US LIVER   Final Result   1. Postoperative cholecystectomy changes. 2. Hepatomegaly with fatty or other parenchymal changes of the liver noted. **This report has been created using voice recognition software. It may contain minor errors which are inherent in voice recognition technology. **      Final report electronically signed by Dr. Dalia Peña on 4/1/2019 12:48 AM      CTA CHEST W WO CONTRAST   Final Result   Impression:    No evidence of pulmonary embolus, aortic dissection, or vascular congestion. Extensive airspace disease throughout the left lung with minimal atelectasis in the right middle lobe and right lower lobe. The findings are compatible with pneumonia. No evidence of pleural effusion. **This report has been created using voice recognition software. It may contain minor errors which are inherent in voice recognition technology. **      Final report electronically signed by Dr. Dalia Peña on 3/31/2019 3:27 AM      CT ABDOMEN PELVIS W IV CONTRAST   Final Result   Left lower lobe peribronchial infiltrates. No acute abdominal or pelvic abnormalities            **This report has been created using voice recognition software. It may contain minor errors which are inherent in voice recognition technology. **      Final report electronically signed by Dr. Adelfo Espino on 3/30/2019 5:10 PM      XR CHEST STANDARD (2 VW)   Final Result   Pneumonic infiltrates noted in the left mid and lower lung. **This report has been created using voice recognition software. It may contain minor errors which are inherent in voice recognition technology. **      Final report electronically signed by Dr Hernandez Allison on 3/30/2019 4:26 PM             Consults:     IP CONSULT TO SPIRITUAL SERVICES  IP CONSULT TO GI  IP CONSULT TO DIETITIAN  IP CONSULT TO SOCIAL WORK  IP CONSULT TO HOME CARE NEEDS    Disposition:    ? [facility]       ? TCU       ? Rehab       ? Psych       ? SNF       ? [facility]       ? Other-    Condition at Discharge: Stable    Code Status:  Full Code     Patient Instructions:    Discharge lab work: CBC, BMP in one week  Activity: activity as tolerated  Diet: DIET CARDIAC;   Dietary Nutrition Supplements: Clear Liquid Oral Supplement      Follow-up visits:   Vanessa Granados Leah Grossman [address]AY[phone]      staff-please s/u w/i [address] [facility] [facility] [address], [place]  [address]   [address] [facility]  [phone]    In 2 weeks           Discharge Medications:      Charmaine Odebolt   Home Medication Instructions MRQ:[id number]    Printed on:04/03/19 1054   Medication Information                      albuterol (PROVENTIL HFA;VENTOLIN HFA) 108 (90 BASE) MCG/ACT inhaler  Inhale 2 puffs into the lungs every 4 hours as needed for Wheezing or Shortness of Breath Whichever brand is covered by insurance, substitute as necessary. ALPRAZolam (XANAX) 0.5 MG tablet  Take 1-2 tabs. QHS prn for insomnia             amoxicillin-clavulanate (AUGMENTIN) 500-125 MG per tablet  Take 1 tablet by mouth every 8 hours for 3 days             DULoxetine (CYMBALTA) 20 MG extended release capsule  Take 1 capsule by mouth daily             gabapentin (NEURONTIN) 600 MG tablet  Take 1 tablet by mouth 4 times daily for 30 days. Fill on or after 9/20/2018             naloxone (NARCAN) 4 MG/0.1ML LIQD nasal spray  1 spray by Nasal route as needed for Opioid Reversal             oxyCODONE-acetaminophen (PERCOCET)  MG per tablet  Take 1 tablet by mouth every 6 hours as needed for Pain for up to 30 days.              pantoprazole (PROTONIX) 40 MG tablet  Take 1 tablet by mouth 2 times daily (before meals)             pentosan polysulfate (ELMIRON) 100 MG capsule  Take 1 capsule by mouth 3 times daily (before meals)             potassium chloride (KLOR-CON M) 20 MEQ extended release tablet  Take 2 tablets by mouth daily (with breakfast)             propranolol (INDERAL) 80 MG tablet  Take 80 mg by mouth 2 times daily             QUEtiapine (SEROQUEL) 50 MG tablet  Take 1 tablet by mouth nightly             sucralfate (CARAFATE) 1 GM tablet  Take 1 tablet by mouth 4 times daily             SUMAtriptan Succinate (IMITREX PO)  Take by mouth             tiZANidine (ZANAFLEX) 4 MG tablet  TAKE 1 TABLET BY MOUTH EVERY 6 HOURS AS NEEDED FOR MUSCLE SPASM             topiramate (TOPAMAX) 100 MG tablet  Take 1 tablet by mouth 2 times daily             triamterene-hydrochlorothiazide (MAXZIDE-25) 37.5-25 MG per tablet  1 tablet daily                  Time Spent on discharge is more than 30 minutes in the examination, evaluation, counseling and review of medications and discharge plan. Patient was updated about the treatment plan, all the questions and concerns were addressed. Alarming signs and symptoms to return to ED were explained in length. Signed: Thank you Maris Zamora MD for the opportunity to be involved in this patient's care.     Electronically signed by Jenifer Tsang MD on 4/3/2019 at 10:52 AM

## 2019-04-03 NOTE — PROGRESS NOTES
Discharge teaching and instructions for diagnosis/procedure of pneumonia completed with patient using teachback method. AVS reviewed. Printed prescriptions given to patient. Patient voiced understanding regarding prescriptions, follow up appointments, and care of self at home. Discharged in a wheelchair to  home with support per friend.

## 2019-04-03 NOTE — PROGRESS NOTES
Gastroenterology Progress Note:     Patient Name:  Kole Daugherty   MRN: [id number]  [id number]  YOB: 1970  Admit Date: 3/30/2019  2:46 PM  Primary Care Physician: Katerine Yen MD   [id number]        Patient seen and examined. 24 hours events and chart reviewed. Patient tolerating dies and less epigastric pain. Continues with dysphagia but tolerating regular diet. Odynophagia has improved. States had 2 loose stools overnight. No blood noted. Denies any lower abdominal pain        Review of Systems  Neuro- negative for headache, dizziness, altered level of consciousness  Cardiovascular- negative for CP, SOB, peripheral edema, orthopnea  GI-See subjective    (   )Medications Reviewed ;(   )Old records reviewed    I/O last 3 completed shifts: In: 2327.5 [P.O.:1240; I.V.:1087.5]  Out: 2500 [Urine:2500]  I/O this shift:  In: 200 [P.O.:200]  Out: 300 [Urine:300]    Diet:  DIET CARDIAC; Dietary Nutrition Supplements: Clear Liquid Oral Supplement      BP (!) 113/57   Pulse 61   Temp 97.6 °F (36.4 °C) (Oral)   Resp 18   Ht 5' 6\" (1.676 m)   Wt 205 lb 12.8 oz (93.4 kg)   LMP 11/19/2014 (Exact Date)   SpO2 92%   BMI 33.22 kg/m²     Physical Exam:    General:  Nourished in no distress  CV: Heart RRR with s1 s2 heard, no murmurs, rubs, gallops. Resp: Even, easy without cough or accessory use. Lungs clear to ascultation bilaterally. Abd: Round, soft, mild vague tenderness epigastric area. No hepatosplenomegaly or mass present. Active bowel sounds heard. No distention noted. Ext:  Without cyanosis, clubbing, edema. Skin: Pink, warm, dry  Neuro:  Alert, oriented x3 with no obvious deficits.        Rectal: deferred  Lines/tubes:       Labs: WBC:    Lab Results   Component Value Date    WBC 6.6 04/02/2019     Platelets:    Lab Results   Component Value Date     04/02/2019     Hemoglobin/Hematocrit:    Lab Results   Component Value Date    HGB 9.7 04/02/2019    HCT 30.3 04/02/2019     BMP:    Lab Results   Component Value Date     04/02/2019    K 3.6 04/02/2019    K 3.1 03/31/2019     04/02/2019    CO2 23 04/02/2019    BUN 11 04/02/2019    LABALBU 2.1 04/02/2019    CREATININE 0.7 04/02/2019    CALCIUM 8.5 04/02/2019    LABGLOM 88 04/02/2019    GLUCOSE 109 04/02/2019     Hepatic Function Panel:    Lab Results   Component Value Date    ALKPHOS 51 04/02/2019    ALT 40 04/02/2019    AST 29 04/02/2019    PROT 5.7 04/02/2019    BILITOT 0.2 04/02/2019    BILIDIR <0.2 04/02/2019    LABALBU 2.1 04/02/2019     Calcium:    Lab Results   Component Value Date    CALCIUM 8.5 04/02/2019     PT/INR:    Lab Results   Component Value Date    INR 0.86 11/14/2018     PTT:    Lab Results   Component Value Date    APTT 30.0 11/14/2018   [APTT     Significant Diagnostic Studies:     Current Meds:  Scheduled Meds:   potassium chloride  40 mEq Oral Daily with breakfast    pantoprazole  40 mg Intravenous BID    amoxicillin-clavulanate  1 tablet Oral 3 times per day    sucralfate  1 g Oral 4 times per day    oxyCODONE-acetaminophen  2 tablet Oral Q6H    DULoxetine  20 mg Oral Daily    gabapentin  600 mg Oral 4x daily    pentosan polysulfate  100 mg Oral TID AC    propranolol  80 mg Oral BID    QUEtiapine  50 mg Oral Nightly    topiramate  100 mg Oral BID    sodium chloride flush  10 mL Intravenous 2 times per day    potassium replacement protocol   Other RX Placeholder     Continuous Infusions:  PRN Meds:.magic (miracle) mouthwash, menthol, ALPRAZolam, albuterol, tiZANidine, sodium chloride flush, magnesium hydroxide, ondansetron, acetaminophen    Assessment:   Chronic GERD on Dexilant PTA, flare of symptoms on admission       Odynphagia-less pain today.       Dysphagia to pill and solid food mainly, last couple of days \"choking on liquids\"              Evaluated by speech therapy no indication for modified barium swallow.   No aspiration              Esophagram normal              Ongoing last couple of months, EGD w/dil in 2016     Normocytic anemia.  Hemoglobin 10.5 with hematocrit 31.5, MCV, MCH and RDW are normal.  Per review of labs in [facility] 11/14/18 hemoglobin 12.9 with hematocrit 40. 2.              Fecal occult positive. Hemoglobin 10.1-9.8-9. 4              Denies overt GI blood loss     Abnormal liver labs were normal 11/14/18.  Possibly d/t fatty liver              On admission ALT elevated at 79 and . Resolved,  Now normal                Acute hepatitis panel negative.              Ultrasound the liver hepatomegaly with fatty liver.  Cholecystectomy     Hypoalbuminemia albumin 2.5 with decreased total protein of 6.     Diarrhea- GI PCR panel negative  Daily NSAID use  Hx of Nuñez's Esophagus  Reports last EGD no Barretts     Pneumonia on treatment with azithromycin and Rocephin per primary. Hypertension  Chronic back pain        Plan:    Transition to high-dose oral PPI with Protonix 40 mg twice daily. Imodium as needed  GI PCR panel negative  Plan for upper endoscopy after discharge. Ideally would like resolution of pneumonia prior to proceeding with EGD.   Patient to follow-up [facility] week after discharge      Case reviewed and impression/plan reviewed in collaboration with Dr Albaro Garrett        Case reviewed and impression/plan reviewed in collaboration with Dr Tanika England, IAN for Dr. Shelia Bruno   4/3/2019

## 2019-04-03 NOTE — CARE COORDINATION
4/3/19, 11:47 AM  Patient is to be discharged today. She agrees to [facility], referral was made to [facility]. They will see in 24 to 48 hours  Discharge plan discussed by  and . Discharge plan reviewed with patient/ family. Patient/ family verbalize understanding of discharge plan and are in agreement with plan. Understanding was demonstrated using the teach back method.      Services After Discharge  Services At/After Discharge: Nursing Services, Skilled Therapy([facility])
DISCHARGE BARRIERS  4/1/19, 2:28 PM    Reason for Referral:  \"\[facility]\"" - [facility] Pneumonia\"  Mental Status:  Alert and oriented   Decision Making:  Makes her own decisions   Family/Social/Home Environment:   SW spoke to patient about her needs. She lives in a one story home without a basement. She takes care of her 80year old mother. She assists her with transfers and shower. They have a walk-in shower with seat and grab bar. She does not use the seat, her mother does. Presently her mother is doing rehab at [facility]. She does not leave her mother alone other than to get the children on the bus or get the mail. The patient is able to drive and did not use any DME to ambulate. She also takes care of her \"God children\". The will stay at her house a lot due to their father's occupation (). The patient does the housekeeping and is able to drive  Current Services:  none  Current Equipment: none  Payment Source: [facility]  Concerns or Barriers to Discharge: We discussed HH. She states her mother has [place] but has not had an aide for over a year. Upon further discussion it was determined that she is a [employer] client. She states her [place] agency is [address] [place] [address] from [facility]. We discussed what HH would do for her. She appeared interested   Collabrative List of ECF/HH were provided: yes for [place]    Teach Back Method used with patient regarding care plan and needs  Patient verbalize understanding of the plan of care and contribute to goal setting. Anticipated Needs/Discharge Plan: Will refer to [place] once ordered.     Electronically signed by ERIKA Vera on 4/1/2019 at 2:28 PM
Zone management tool for Pneumonia Diagnosis given to the patient as an education reference. Patient verbalizes understanding that the care team will be referencing this tool throughout their hospital stay and again on discharge. Nurse Sophie Reyna notified of the reference tool being received by the patient.      Pneumonia Smart Phrase Added to AVS: yes
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

## 2019-04-05 LAB
BLOOD CULTURE, ROUTINE: NORMAL
BLOOD CULTURE, ROUTINE: NORMAL

## 2019-04-09 DIAGNOSIS — F51.05 INSOMNIA DUE TO OTHER MENTAL DISORDER: ICD-10-CM

## 2019-04-09 DIAGNOSIS — F99 INSOMNIA DUE TO OTHER MENTAL DISORDER: ICD-10-CM

## 2019-04-09 RX ORDER — ALPRAZOLAM 0.5 MG/1
TABLET ORAL
Qty: 30 TABLET | Refills: 0 | Status: SHIPPED | OUTPATIENT
Start: 2019-04-09 | End: 2019-04-26

## 2019-04-09 NOTE — TELEPHONE ENCOUNTER
Alta Becerril called into the office stating that she needs a refill on her Xanax 0.5mg;#30 with 0 refills;last with a start date of 03/22/19. She states that she has been taking 2 every night and then if she experiences a panic attack then she will take 2 upon that attack. She states that she ran out last night; taking her last 2. I informed her that I would put a note into you. Patient's last completed appt was on 03/08/19 with instructions to return in 3 weeks; she did have a cancellation on 03/26/19 due to being sick. While on the phone, she states again that she missed this appt due to her being in the hospital with pneumonia. She r/sed an appt during this conversation for 04/30/19. Medication is loaded as the original order for #30 with 0 refills; unless otherwise noted by this provider.

## 2019-04-11 ENCOUNTER — HOSPITAL ENCOUNTER (EMERGENCY)
Age: 49
Discharge: HOME | End: 2019-04-11
Payer: COMMERCIAL

## 2019-04-11 ENCOUNTER — APPOINTMENT (OUTPATIENT)
Dept: CT IMAGING | Age: 49
End: 2019-04-11
Payer: COMMERCIAL

## 2019-04-11 VITALS
SYSTOLIC BLOOD PRESSURE: 144 MMHG | RESPIRATION RATE: 20 BRPM | DIASTOLIC BLOOD PRESSURE: 88 MMHG | OXYGEN SATURATION: 98 % | TEMPERATURE: 98.3 F | HEART RATE: 93 BPM

## 2019-04-11 DIAGNOSIS — R07.81 PLEURITIC CHEST PAIN: Primary | ICD-10-CM

## 2019-04-11 DIAGNOSIS — F41.1 ANXIETY STATE: ICD-10-CM

## 2019-04-11 LAB
ACETAMINOPHEN LEVEL: < 5 UG/ML (ref 0–20)
ALBUMIN SERPL-MCNC: 3.9 G/DL (ref 3.5–5.1)
ALP BLD-CCNC: 76 U/L (ref 38–126)
ALT SERPL-CCNC: 22 U/L (ref 11–66)
AMPHETAMINE+METHAMPHETAMINE URINE SCREEN: NEGATIVE
ANION GAP SERPL CALCULATED.3IONS-SCNC: 14 MEQ/L (ref 8–16)
APTT: 33.1 SECONDS (ref 22–38)
AST SERPL-CCNC: 22 U/L (ref 5–40)
BARBITURATE QUANTITATIVE URINE: NEGATIVE
BASOPHILS # BLD: 0.8 %
BASOPHILS ABSOLUTE: 0.1 THOU/MM3 (ref 0–0.1)
BENZODIAZEPINE QUANTITATIVE URINE: POSITIVE
BILIRUB SERPL-MCNC: < 0.2 MG/DL (ref 0.3–1.2)
BILIRUBIN URINE: NEGATIVE
BLOOD, URINE: NEGATIVE
BUN BLDV-MCNC: 19 MG/DL (ref 7–22)
CALCIUM SERPL-MCNC: 9.5 MG/DL (ref 8.5–10.5)
CANNABINOID QUANTITATIVE URINE: NEGATIVE
CHARACTER, URINE: CLEAR
CHLORIDE BLD-SCNC: 103 MEQ/L (ref 98–111)
CO2: 21 MEQ/L (ref 23–33)
COCAINE METABOLITE QUANTITATIVE URINE: NEGATIVE
COLOR: YELLOW
CREAT SERPL-MCNC: 1 MG/DL (ref 0.4–1.2)
EKG ATRIAL RATE: 114 BPM
EKG P AXIS: 55 DEGREES
EKG P-R INTERVAL: 162 MS
EKG Q-T INTERVAL: 340 MS
EKG QRS DURATION: 80 MS
EKG QTC CALCULATION (BAZETT): 468 MS
EKG R AXIS: 14 DEGREES
EKG T AXIS: 30 DEGREES
EKG VENTRICULAR RATE: 114 BPM
EOSINOPHIL # BLD: 1 %
EOSINOPHILS ABSOLUTE: 0.1 THOU/MM3 (ref 0–0.4)
ERYTHROCYTE [DISTWIDTH] IN BLOOD BY AUTOMATED COUNT: 14.4 % (ref 11.5–14.5)
ERYTHROCYTE [DISTWIDTH] IN BLOOD BY AUTOMATED COUNT: 44.9 FL (ref 35–45)
FLU A ANTIGEN: NEGATIVE
FLU B ANTIGEN: NEGATIVE
GFR SERPL CREATININE-BSD FRML MDRD: 59 ML/MIN/1.73M2
GLUCOSE BLD-MCNC: 127 MG/DL (ref 70–108)
GLUCOSE URINE: NEGATIVE MG/DL
HCT VFR BLD CALC: 38 % (ref 37–47)
HEMOGLOBIN: 12 GM/DL (ref 12–16)
IMMATURE GRANS (ABS): 0.05 THOU/MM3 (ref 0–0.07)
IMMATURE GRANULOCYTES: 0.5 %
INR BLD: 1.08 (ref 0.85–1.13)
KETONES, URINE: NEGATIVE
LACTIC ACID: 1 MMOL/L (ref 0.5–2.2)
LACTIC ACID: 2.7 MMOL/L (ref 0.5–2.2)
LEUKOCYTE ESTERASE, URINE: NEGATIVE
LYMPHOCYTES # BLD: 31.3 %
LYMPHOCYTES ABSOLUTE: 2.9 THOU/MM3 (ref 1–4.8)
MAGNESIUM: 1.9 MG/DL (ref 1.6–2.4)
MCH RBC QN AUTO: 27.3 PG (ref 26–33)
MCHC RBC AUTO-ENTMCNC: 31.6 GM/DL (ref 32.2–35.5)
MCV RBC AUTO: 86.6 FL (ref 81–99)
MONOCYTES # BLD: 5.2 %
MONOCYTES ABSOLUTE: 0.5 THOU/MM3 (ref 0.4–1.3)
NITRITE, URINE: NEGATIVE
NUCLEATED RED BLOOD CELLS: 0 /100 WBC
OPIATES, URINE: NEGATIVE
OSMOLALITY CALCULATION: 279.5 MOSMOL/KG (ref 275–300)
OXYCODONE: POSITIVE
PH UA: 5 (ref 5–9)
PHENCYCLIDINE QUANTITATIVE URINE: NEGATIVE
PLATELET # BLD: 576 THOU/MM3 (ref 130–400)
PMV BLD AUTO: 9 FL (ref 9.4–12.4)
POTASSIUM REFLEX MAGNESIUM: 3.5 MEQ/L (ref 3.5–5.2)
PREGNANCY, SERUM: NEGATIVE
PRO-BNP: 111.6 PG/ML (ref 0–900)
PROCALCITONIN: 0.17 NG/ML (ref 0.01–0.09)
PROTEIN UA: NEGATIVE
RBC # BLD: 4.39 MILL/MM3 (ref 4.2–5.4)
SALICYLATE, SERUM: < 0.3 MG/DL (ref 2–10)
SEG NEUTROPHILS: 61.2 %
SEGMENTED NEUTROPHILS ABSOLUTE COUNT: 5.6 THOU/MM3 (ref 1.8–7.7)
SODIUM BLD-SCNC: 138 MEQ/L (ref 135–145)
SPECIFIC GRAVITY, URINE: 1.01 (ref 1–1.03)
TOTAL PROTEIN: 7.4 G/DL (ref 6.1–8)
TROPONIN T: < 0.01 NG/ML
TROPONIN T: < 0.01 NG/ML
TSH SERPL DL<=0.05 MIU/L-ACNC: 2.96 UIU/ML (ref 0.4–4.2)
UROBILINOGEN, URINE: 0.2 EU/DL (ref 0–1)
WBC # BLD: 9.2 THOU/MM3 (ref 4.8–10.8)

## 2019-04-11 PROCEDURE — 85730 THROMBOPLASTIN TIME PARTIAL: CPT

## 2019-04-11 PROCEDURE — 93005 ELECTROCARDIOGRAM TRACING: CPT | Performed by: PHYSICIAN ASSISTANT

## 2019-04-11 PROCEDURE — 84703 CHORIONIC GONADOTROPIN ASSAY: CPT

## 2019-04-11 PROCEDURE — 84484 ASSAY OF TROPONIN QUANT: CPT

## 2019-04-11 PROCEDURE — 99284 EMERGENCY DEPT VISIT MOD MDM: CPT

## 2019-04-11 PROCEDURE — 87040 BLOOD CULTURE FOR BACTERIA: CPT

## 2019-04-11 PROCEDURE — 80307 DRUG TEST PRSMV CHEM ANLYZR: CPT

## 2019-04-11 PROCEDURE — 6360000002 HC RX W HCPCS: Performed by: PHYSICIAN ASSISTANT

## 2019-04-11 PROCEDURE — 84145 PROCALCITONIN (PCT): CPT

## 2019-04-11 PROCEDURE — 6360000004 HC RX CONTRAST MEDICATION: Performed by: PHYSICIAN ASSISTANT

## 2019-04-11 PROCEDURE — 96375 TX/PRO/DX INJ NEW DRUG ADDON: CPT

## 2019-04-11 PROCEDURE — 85025 COMPLETE CBC W/AUTO DIFF WBC: CPT

## 2019-04-11 PROCEDURE — 83735 ASSAY OF MAGNESIUM: CPT

## 2019-04-11 PROCEDURE — G0480 DRUG TEST DEF 1-7 CLASSES: HCPCS

## 2019-04-11 PROCEDURE — 2580000003 HC RX 258: Performed by: PHYSICIAN ASSISTANT

## 2019-04-11 PROCEDURE — 83880 ASSAY OF NATRIURETIC PEPTIDE: CPT

## 2019-04-11 PROCEDURE — 83605 ASSAY OF LACTIC ACID: CPT

## 2019-04-11 PROCEDURE — 84443 ASSAY THYROID STIM HORMONE: CPT

## 2019-04-11 PROCEDURE — 36415 COLL VENOUS BLD VENIPUNCTURE: CPT

## 2019-04-11 PROCEDURE — 81003 URINALYSIS AUTO W/O SCOPE: CPT

## 2019-04-11 PROCEDURE — 71275 CT ANGIOGRAPHY CHEST: CPT

## 2019-04-11 PROCEDURE — 87804 INFLUENZA ASSAY W/OPTIC: CPT

## 2019-04-11 PROCEDURE — 80053 COMPREHEN METABOLIC PANEL: CPT

## 2019-04-11 PROCEDURE — 85610 PROTHROMBIN TIME: CPT

## 2019-04-11 PROCEDURE — 96374 THER/PROPH/DIAG INJ IV PUSH: CPT

## 2019-04-11 RX ORDER — KETOROLAC TROMETHAMINE 30 MG/ML
30 INJECTION, SOLUTION INTRAMUSCULAR; INTRAVENOUS ONCE
Status: COMPLETED | OUTPATIENT
Start: 2019-04-11 | End: 2019-04-11

## 2019-04-11 RX ORDER — 0.9 % SODIUM CHLORIDE 0.9 %
1000 INTRAVENOUS SOLUTION INTRAVENOUS ONCE
Status: COMPLETED | OUTPATIENT
Start: 2019-04-11 | End: 2019-04-11

## 2019-04-11 RX ORDER — FENTANYL CITRATE 50 UG/ML
50 INJECTION, SOLUTION INTRAMUSCULAR; INTRAVENOUS ONCE
Status: COMPLETED | OUTPATIENT
Start: 2019-04-11 | End: 2019-04-11

## 2019-04-11 RX ORDER — SODIUM CHLORIDE 9 MG/ML
INJECTION, SOLUTION INTRAVENOUS CONTINUOUS
Status: DISCONTINUED | OUTPATIENT
Start: 2019-04-11 | End: 2019-04-11 | Stop reason: HOSPADM

## 2019-04-11 RX ADMIN — KETOROLAC TROMETHAMINE 30 MG: 30 INJECTION, SOLUTION INTRAMUSCULAR; INTRAVENOUS at 17:36

## 2019-04-11 RX ADMIN — SODIUM CHLORIDE 1000 ML: 9 INJECTION, SOLUTION INTRAVENOUS at 17:34

## 2019-04-11 RX ADMIN — FENTANYL CITRATE 50 MCG: 50 INJECTION, SOLUTION INTRAMUSCULAR; INTRAVENOUS at 19:46

## 2019-04-11 RX ADMIN — SODIUM CHLORIDE: 9 INJECTION, SOLUTION INTRAVENOUS at 19:47

## 2019-04-11 RX ADMIN — IOPAMIDOL 80 ML: 755 INJECTION, SOLUTION INTRAVENOUS at 18:45

## 2019-04-11 ASSESSMENT — ENCOUNTER SYMPTOMS
ABDOMINAL PAIN: 0
NAUSEA: 0
CHEST TIGHTNESS: 1
EYE DISCHARGE: 0
SORE THROAT: 0
RHINORRHEA: 0
COUGH: 1
WHEEZING: 1
VOMITING: 0
SHORTNESS OF BREATH: 1
BACK PAIN: 0
DIARRHEA: 0
EYE PAIN: 0

## 2019-04-11 ASSESSMENT — PAIN DESCRIPTION - PAIN TYPE
TYPE: ACUTE PAIN
TYPE: ACUTE PAIN

## 2019-04-11 ASSESSMENT — PAIN SCALES - GENERAL
PAINLEVEL_OUTOF10: 7
PAINLEVEL_OUTOF10: 7
PAINLEVEL_OUTOF10: 8
PAINLEVEL_OUTOF10: 6

## 2019-04-11 ASSESSMENT — PAIN DESCRIPTION - DESCRIPTORS: DESCRIPTORS: ACHING

## 2019-04-11 ASSESSMENT — PAIN DESCRIPTION - LOCATION
LOCATION: CHEST
LOCATION: CHEST

## 2019-04-11 NOTE — ED NOTES
Patient to ED for cough and chest pain. Patient reports being dc from the hospital 2 weeks ago for pneumonia. Today patient states she feels awful, she has dyspnea with exertion. Patient has completed her course of oral ATB. Patient feels that her pneumonia is coming back.      Zac Rai RN  04/11/19 [id number]

## 2019-04-11 NOTE — ED PROVIDER NOTES
Ir[facility] COMPLAINT       Chief Complaint   Patient presents with    Cough       Nurses Notes reviewed and I agree except as noted in the HPI. HISTORY OF PRESENT ILLNESS    Fanny Bravo is a 46 y.o. female with a past medical history of anxiety, asthma, GERD, HTN, and pneumonia who presents to the Emergency Department from home for the evaluation of cough. The patient was admitted on 3/30/2019 for Pneumonia and sepsis. She  Was discharged on 4/03/2019 with 2 antibiotics that she has since finished. The patient states she felt her cough get better but is concerned it is coming back. She reports associated chest tightness, pleuritic chest pain, shortness of breath, wheezing, heart palpitations, and fever. She rates her current chest pain as an 8/10 in severity and states it is worse with deep inspiration. Denies pain is worse with exertion or provoked by food consumption. Patient has had 2 stress tests and an ECHO done in the past which were all normal. She denies a history of cardiac disease but states it does run in the family. The patient states her Cardiologist is in [place]. She denies any recent surgeries or travels. Patient states she has anxiety and has had panic attacks but denies it feeling like that now. She denies taking any estrogen, birth control or family history of clotting disorders. She denies ever having a DVT/PE. Patient states she has asthma and uses an inhaler prn. Patient states it has worked in the past but denies it working today. The patient states she had an appointment with GI at [facility] yesterday and has a Colonoscopy scheduled for 4/18/2019 and an EGD scheduled for 4/23/2019. No further complaints at the time of the initial encounter. The HPI was provided by the patient. REVIEW OF SYSTEMS     Review of Systems   Constitutional: Positive for fever (subjective).  Negative for appetite change, chills and fatigue. HENT: Negative for congestion, ear pain, rhinorrhea and sore throat. Eyes: Negative for pain, discharge and visual disturbance. Respiratory: Positive for cough, chest tightness, shortness of breath and wheezing. Cardiovascular: Positive for chest pain (pleuritic) and palpitations. Negative for leg swelling. Gastrointestinal: Negative for abdominal pain, diarrhea, nausea and vomiting. Genitourinary: Negative for difficulty urinating, dysuria, hematuria and vaginal discharge. Musculoskeletal: Negative for arthralgias, back pain, joint swelling and neck pain. Skin: Negative for pallor and rash. Neurological: Negative for dizziness, syncope, weakness, light-headedness, numbness and headaches. Hematological: Negative for adenopathy. Psychiatric/Behavioral: Negative for confusion and suicidal ideas. The patient is not nervous/anxious. PAST MEDICAL HISTORY    has a past medical history of Anxiety, Arthritis, Asthma, Nuñez's esophagus, Blood circulation, collateral, GERD (gastroesophageal reflux disease), Hypertension, Insomnia, Interstitial cystitis, Migraine, Pneumonia, Psychiatric problem, and Spinal stenosis of lumbar region. SURGICAL HISTORY      has a past surgical history that includes Abdomen surgery (93); ovarian cyst removal (93); Tubal ligation (93); Colonoscopy (2010); Endoscopy, colon, diagnostic (2010); Patellar tendon repair (Right, 1994); Dilatation, esophagus (2010); Cardiac catheterization (unsure); other surgical history (21 Nov 2014); Cholecystectomy; Appendectomy; other surgical history (N/A, 03-21-16); other surgical history (N/A, 04-04-16); other surgical history (Bilateral, 6-13-16); Upper gastrointestinal endoscopy (2012); other surgical history (Left, 09/13/2016); and Carpal tunnel release (Left).     CURRENT MEDICATIONS       Discharge Medication List as of 4/11/2019  9:00 PM      CONTINUE these medications which have NOT CHANGED    Details   ALPRAZolam Johanny Fair) 0.5 MG tablet Take 1-2 tabs. QHS prn for insomnia, Disp-30 tablet, R-0Normal      sucralfate (CARAFATE) 1 GM tablet Take 1 tablet by mouth 4 times daily, Disp-120 tablet, R-1Normal      pantoprazole (PROTONIX) 40 MG tablet Take 1 tablet by mouth 2 times daily (before meals), Disp-30 tablet, R-1Normal      potassium chloride (KLOR-CON M) 20 MEQ extended release tablet Take 2 tablets by mouth daily (with breakfast), Disp-60 tablet, R-1Normal      gabapentin (NEURONTIN) 600 MG tablet Take 1 tablet by mouth 4 times daily for 30 days. Fill on or after 9/20/2018, Disp-120 tablet, R-0Normal      tiZANidine (ZANAFLEX) 4 MG tablet TAKE 1 TABLET BY MOUTH EVERY 6 HOURS AS NEEDED FOR MUSCLE SPASM, Disp-120 tablet, R-0Normal      oxyCODONE-acetaminophen (PERCOCET)  MG per tablet Take 1 tablet by mouth every 6 hours as needed for Pain for up to 30 days. , Disp-120 tablet, R-0Normal      QUEtiapine (SEROQUEL) 50 MG tablet Take 1 tablet by mouth nightly, Disp-30 tablet, R-1Normal      DULoxetine (CYMBALTA) 20 MG extended release capsule Take 1 capsule by mouth daily, Disp-30 capsule, R-1Normal      naloxone (NARCAN) 4 MG/0.1ML LIQD nasal spray 1 spray by Nasal route as needed for Opioid Reversal, Disp-1 each, R-0Normal      SUMAtriptan Succinate (IMITREX PO) Take by mouthHistorical Med      propranolol (INDERAL) 80 MG tablet Take 80 mg by mouth 2 times dailyHistorical Med      triamterene-hydrochlorothiazide (MAXZIDE-25) 37.5-25 MG per tablet 1 tablet daily Historical Med      pentosan polysulfate (ELMIRON) 100 MG capsule Take 1 capsule by mouth 3 times daily (before meals), Disp-90 capsule, R-5      topiramate (TOPAMAX) 100 MG tablet Take 1 tablet by mouth 2 times daily, Disp-60 tablet, R-5      albuterol (PROVENTIL HFA;VENTOLIN HFA) 108 (90 BASE) MCG/ACT inhaler Inhale 2 puffs into the lungs every 4 hours as needed for Wheezing or Shortness of Breath Whichever brand is covered by insurance, substitute as necessary. , Disp-1 Inhaler, R-2             ALLERGIES     is allergic to adhesive tape; compazine [prochlorperazine]; erythromycin; lyrica [pregabalin]; morphine; reglan [metoclopramide]; and sulfa antibiotics. FAMILY HISTORY     indicated that her mother is alive. She indicated that her father is . She indicated that the status of her brother is unknown.   family history includes Heart Attack in her brother; Heart Disease in her father and mother; Inflam Bowel Dis in her mother. SOCIAL HISTORY      reports that she has never smoked. She has never used smokeless tobacco. She reports that she drinks alcohol. She reports that she does not use drugs. PHYSICAL EXAM   INITIAL VITALS:  oral temperature is 98.3 °F (36.8 °C). Her blood pressure is 144/88 (abnormal) and her pulse is 93. Her respiration is 20 and oxygen saturation is 98%. Physical Exam   Constitutional: She is oriented to person, place, and time. She appears well-developed and well-nourished. No distress. HENT:   Head: Normocephalic and atraumatic. Right Ear: Tympanic membrane and external ear normal.   Left Ear: Tympanic membrane and external ear normal.   Nose: Nose normal.   Mouth/Throat: Uvula is midline, oropharynx is clear and moist and mucous membranes are normal. No oropharyngeal exudate, posterior oropharyngeal edema or posterior oropharyngeal erythema. Eyes: Pupils are equal, round, and reactive to light. Conjunctivae and EOM are normal. Right eye exhibits no discharge. Left eye exhibits no discharge. No scleral icterus. Neck: Trachea normal, normal range of motion, full passive range of motion without pain and phonation normal. Neck supple. No JVD present. No tracheal tenderness, no spinous process tenderness and no muscular tenderness present. Carotid bruit is not present. No neck rigidity. No tracheal deviation present. Cardiovascular: Regular rhythm, S1 normal, S2 normal and normal pulses. Tachycardia present.  Exam reveals no lobe.   Minimal right middle lobe atelectasis. 7 x 7 mm left lower lobe pulmonary nodule which may represent the sequela of the prior infiltrates. Recommend follow-up chest CT in 3-6 months. **This report has been created using voice recognition software. It may contain minor errors which are inherent in voice recognition technology. **      Final report electronically signed by Dr. Randa Ferraro on 4/11/2019 8:04 PM          LABS:     Labs Reviewed   CBC WITH AUTO DIFFERENTIAL - Abnormal; Notable for the following components:       Result Value    MCHC 31.6 (*)     Platelets 531 (*)     MPV 9.0 (*)     All other components within normal limits   COMPREHENSIVE METABOLIC PANEL W/ REFLEX TO MG FOR LOW K - Abnormal; Notable for the following components:    Glucose 127 (*)     CO2 21 (*)     Total Bilirubin <0.2 (*)     All other components within normal limits   LACTIC ACID, PLASMA - Abnormal; Notable for the following components:    Lactic Acid 2.7 (*)     All other components within normal limits   SALICYLATE LEVEL - Abnormal; Notable for the following components:    Salicylate, Serum < 0.3 (*)     All other components within normal limits   GLOMERULAR FILTRATION RATE, ESTIMATED - Abnormal; Notable for the following components:    Est, Glom Filt Rate 59 (*)     All other components within normal limits   PROCALCITONIN - Abnormal; Notable for the following components:    Procalcitonin 0.17 (*)     All other components within normal limits   RAPID INFLUENZA A/B ANTIGENS   CULTURE BLOOD #2    Narrative:     Source: blood-Adult-suboptimal <5.5oz./set volume       Site: Peripheral Vein            Current Antibiotics: not stated   CULTURE BLOOD #1    Narrative:     Source: blood-Adult-suboptimal <5.5oz./set volume       Site: Peripheral Vein            Current Antibiotics: not stated   TROPONIN   HCG, SERUM, QUALITATIVE   TSH WITH REFLEX   BRAIN NATRIURETIC PEPTIDE   APTT   PROTIME-INR   ACETAMINOPHEN LEVEL URINE DRUG SCREEN   URINE RT REFLEX TO CULTURE   LACTIC ACID, PLASMA   ANION GAP   MAGNESIUM   OSMOLALITY   TROPONIN       EMERGENCYDEPARTMENT COURSE:   Vitals:    Vitals:    04/11/19 1644 04/11/19 1916 04/11/19 1949 04/11/19 2046   BP: (!) 142/99 (!) 142/87 (!) 156/84 (!) 144/88   Pulse: 113 104 101 93   Resp: 20 16 20    Temp: 98.3 °F (36.8 °C)      TempSrc: Oral      SpO2: 98% 98% 98%        4:55 PM: The patient was seen and evaluated. MDM:  The pt was seen and evaluated by me. Within the department, I observed the pt's vital signs to be within acceptable range other than initially tachycardic most likely secondary to anxiety. Laboratory and Radiological studies were performed, results were reviewed with the patient. Within the department, the pt was treated with fentanyl, toradol, and iv fluids. I observed the pt's condition to improve during the duration of their stay. HR normalized. I explained my proposed course of treatment to the pt, and they were amenable to my decision. Reassurance and anticipatory guidance given. They were discharged home in stable condition, and they will return to the ED if their symptoms become more severe in nature, or otherwise change. I estimate there is LOW risk for PULMONARY EMBOLISM, ACUTE CORONARY SYNDROME, OR THORACIC AORTIC DISSECTION, thus I consider the discharge disposition reasonable. The patient and/or family and I have discussed the diagnosis and risks, and we agree with discharging home to follow-up with their primary doctor. We also discussed returning to the Emergency Department immediately if new or worsening symptoms occur. We have discussed the symptoms which are most concerning (e.g., bloody sputum, fever, worsening pain or shortness of breath, vomiting) that necessitate immediate return. CRITICAL CARE:   None     CONSULTS:  Case discussed with my attending physician in the ED. PROCEDURES:  None    FINAL IMPRESSION      1.  Pleuritic chest pain    2. Anxiety state          DISPOSITION/PLAN   Discharged    PATIENT REFERRED TO:  Joya Nunez MD  Loraine Barrera [phone]      follow-up with your primary care provider on Monday. Jess Rucker MD  [address]  [address] [place] [address]      go to scheduled follow appointments for colonoscopy and EGD    [facility] EMERGENCY DEPT  [address]  [phone]    return to emergency department if any new or worsening symptoms      DISCHARGE MEDICATIONS:  Discharge Medication List as of 4/11/2019  9:00 PM          (Please note that portions of this note were completed with a voice recognition program.  Efforts weremade to edit the dictations but occasionally words are mis-transcribed.)    The patient was given an opportunity to see the Emergency Attending. Thepatient voiced understanding that I was a Mid-Level Provider and was in agreement with being seen independently by myself. Scribe:  Raheem Hansen 12/23/16 10:31 AM Scribing for and in the presence of Trevor Zavala [facility]. Signed by:Fallon Osorio, 04/25/19 4:57 PM      Provider:  I personally performed the services described in the documentation,reviewed and edited the documentation which was dictated to the scribe in my presence, and it accurately records my words and actions.     Trevor Zavala [facility] 4/11/19 4:57 PM        Trevor Zavala PA-C  04/25/19 [id number]

## 2019-04-11 NOTE — ED NOTES
Patient transported to Radiology department via [facility]TAP tech in stable condition.        Constantino Ellison RN  04/11/19 1152

## 2019-04-12 PROCEDURE — 93010 ELECTROCARDIOGRAM REPORT: CPT | Performed by: INTERNAL MEDICINE

## 2019-04-17 LAB
BLOOD CULTURE, ROUTINE: NORMAL
BLOOD CULTURE, ROUTINE: NORMAL

## 2019-04-26 DIAGNOSIS — M48.062 SPINAL STENOSIS OF LUMBAR REGION WITH NEUROGENIC CLAUDICATION: ICD-10-CM

## 2019-04-26 DIAGNOSIS — M47.816 SPONDYLOSIS OF LUMBAR REGION WITHOUT MYELOPATHY OR RADICULOPATHY: ICD-10-CM

## 2019-04-26 DIAGNOSIS — M79.18 MYOFASCIAL PAIN DYSFUNCTION SYNDROME: ICD-10-CM

## 2019-04-26 DIAGNOSIS — F51.05 INSOMNIA DUE TO OTHER MENTAL DISORDER: ICD-10-CM

## 2019-04-26 DIAGNOSIS — G89.4 CHRONIC PAIN SYNDROME: ICD-10-CM

## 2019-04-26 DIAGNOSIS — F99 INSOMNIA DUE TO OTHER MENTAL DISORDER: ICD-10-CM

## 2019-04-26 RX ORDER — MELOXICAM 15 MG/1
15 TABLET ORAL DAILY
Qty: 30 TABLET | Refills: 1 | Status: SHIPPED | OUTPATIENT
Start: 2019-04-26 | End: 2019-05-24 | Stop reason: SDUPTHER

## 2019-04-26 RX ORDER — OXYCODONE AND ACETAMINOPHEN 10; 325 MG/1; MG/1
1 TABLET ORAL EVERY 6 HOURS PRN
Qty: 120 TABLET | Refills: 0 | Status: SHIPPED | OUTPATIENT
Start: 2019-04-27 | End: 2019-05-24 | Stop reason: SDUPTHER

## 2019-04-26 RX ORDER — ALPRAZOLAM 0.5 MG/1
TABLET ORAL
Qty: 30 TABLET | Refills: 0 | Status: SHIPPED | OUTPATIENT
Start: 2019-04-26 | End: 2019-05-15 | Stop reason: SDUPTHER

## 2019-04-26 RX ORDER — GABAPENTIN 600 MG/1
600 TABLET ORAL 4 TIMES DAILY
Qty: 120 TABLET | Refills: 0 | Status: SHIPPED | OUTPATIENT
Start: 2019-04-26 | End: 2019-05-24 | Stop reason: SDUPTHER

## 2019-04-26 RX ORDER — TIZANIDINE 4 MG/1
TABLET ORAL
Qty: 120 TABLET | Refills: 0 | Status: SHIPPED | OUTPATIENT
Start: 2019-04-26 | End: 2019-05-24 | Stop reason: SDUPTHER

## 2019-04-26 NOTE — TELEPHONE ENCOUNTER
OARRS reviewed. UDS: + for  Gabapentin and oxycodone - consistent. Narcan offered: offered  Last seen: 2/26/2019.  Follow-up: 5/28/2019

## 2019-04-26 NOTE — TELEPHONE ENCOUNTER
Homero Barthel is a pt of Dr. Sean Bah needing a rx xanax. Homero Barthel was seen in the office on 3/8 and scheduled for a future appt.  On 4/30, loaded pending your approval.

## 2019-04-26 NOTE — TELEPHONE ENCOUNTER
Covering for Dr. Phoenix Jade. Approved Xanax refill. OARRS was appropriate.    Electronically signed by Jeannette Real MD on 4/26/2019 at 12:16 PM

## 2019-04-30 ENCOUNTER — OFFICE VISIT (OUTPATIENT)
Dept: PSYCHIATRY | Age: 49
End: 2019-04-30
Payer: COMMERCIAL

## 2019-04-30 DIAGNOSIS — F41.1 GENERALIZED ANXIETY DISORDER: ICD-10-CM

## 2019-04-30 DIAGNOSIS — F33.1 MODERATE EPISODE OF RECURRENT MAJOR DEPRESSIVE DISORDER (HCC): Primary | ICD-10-CM

## 2019-04-30 PROCEDURE — 99214 OFFICE O/P EST MOD 30 MIN: CPT | Performed by: PSYCHIATRY & NEUROLOGY

## 2019-04-30 PROCEDURE — G8428 CUR MEDS NOT DOCUMENT: HCPCS | Performed by: PSYCHIATRY & NEUROLOGY

## 2019-04-30 PROCEDURE — 1036F TOBACCO NON-USER: CPT | Performed by: PSYCHIATRY & NEUROLOGY

## 2019-04-30 PROCEDURE — 3017F COLORECTAL CA SCREEN DOC REV: CPT | Performed by: PSYCHIATRY & NEUROLOGY

## 2019-04-30 PROCEDURE — 1111F DSCHRG MED/CURRENT MED MERGE: CPT | Performed by: PSYCHIATRY & NEUROLOGY

## 2019-04-30 PROCEDURE — G8417 CALC BMI ABV UP PARAM F/U: HCPCS | Performed by: PSYCHIATRY & NEUROLOGY

## 2019-04-30 PROCEDURE — G8599 NO ASA/ANTIPLAT THER USE RNG: HCPCS | Performed by: PSYCHIATRY & NEUROLOGY

## 2019-04-30 RX ORDER — QUETIAPINE FUMARATE 100 MG/1
100 TABLET, FILM COATED ORAL NIGHTLY
Qty: 30 TABLET | Refills: 2 | Status: SHIPPED | OUTPATIENT
Start: 2019-04-30 | End: 2019-05-14 | Stop reason: SDUPTHER

## 2019-04-30 RX ORDER — DULOXETINE 40 MG/1
40 CAPSULE, DELAYED RELEASE ORAL DAILY
Qty: 30 CAPSULE | Refills: 2 | Status: SHIPPED | OUTPATIENT
Start: 2019-04-30 | End: 2019-04-30 | Stop reason: DRUGHIGH

## 2019-04-30 RX ORDER — DULOXETIN HYDROCHLORIDE 60 MG/1
60 CAPSULE, DELAYED RELEASE ORAL DAILY
Qty: 30 CAPSULE | Refills: 3 | Status: SHIPPED | OUTPATIENT
Start: 2019-04-30 | End: 2021-07-29

## 2019-04-30 NOTE — PROGRESS NOTES
propranolol (INDERAL) 80 MG tablet Take 80 mg by mouth 2 times daily      triamterene-hydrochlorothiazide (MAXZIDE-25) 37.5-25 MG per tablet 1 tablet daily       pentosan polysulfate (ELMIRON) 100 MG capsule Take 1 capsule by mouth 3 times daily (before meals) 90 capsule 5    topiramate (TOPAMAX) 100 MG tablet Take 1 tablet by mouth 2 times daily 60 tablet 5    albuterol (PROVENTIL HFA;VENTOLIN HFA) 108 (90 BASE) MCG/ACT inhaler Inhale 2 puffs into the lungs every 4 hours as needed for Wheezing or Shortness of Breath Whichever brand is covered by insurance, substitute as necessary. 1 Inhaler 2     No current facility-administered medications for this visit. Mental Status Exam:   Appearance: Neatly groomed, appropriately dressed   Gait:normal  Behavior: Cooperative and Appropriate   Eye Contact: Maintained good eye contact   Psychomotor Activity: Normal   Speech: Rate, volume, and prosody were normal   Mood: Depressed   Affect: Full range, appropriate, and mood congruent   Thought Process: Goal directed and coherent   Associations: Goal directed and coherent   Thought content: The thought content was appropriate to questions. The patient denies any suicidal ideation or homicidal ideation. Perceptions: Perceptions were normal, the patient denied any auditory, tactile or visual hallucinations   Cognition: Patient is alert and oriented to time, place and person, no gross cognitive deficits   Memory:grossly intact  Attention:intact  Judgment: Judgment appeared normal and appropri ate   Insight: Fair   Impulse control: Intact     Assessment:   Continued anxiety  Worsening mood   Diagnosis:   1. Moderate episode of recurrent major depressive disorder ([facility] [address]    2.  Generalized anxiety disorder        Plan:   Increase duloxetine to 60 mg - will not wait at 40mg due to Kiara's poor compliance with appointments and self care - if mood improves can try to decrease dose to 40 mg  Increase quetiapine to 100mg for sleep  Continue alprazolam 0.5 mg qHS  Suggested CBT I   Return x 4 weeks

## 2019-05-14 DIAGNOSIS — F99 INSOMNIA DUE TO OTHER MENTAL DISORDER: ICD-10-CM

## 2019-05-14 DIAGNOSIS — F51.05 INSOMNIA DUE TO OTHER MENTAL DISORDER: ICD-10-CM

## 2019-05-14 NOTE — TELEPHONE ENCOUNTER
Angelina Valencia called into the office to check on the status of this encounter; I informed her of this provider's response. She states that she is no longer taking naps throughout the day and she states that the Seroquel is \" not doing anything at all. \"    She was wanting to come in for a sooner appt; I did offer the tomorrow on 05/15/19 at 230pm; she said that she is unable to make that time. I informed her that she is going to have to give the Seroquel 2 nightly a try until her upcoming appt on 05/28/19.

## 2019-05-14 NOTE — TELEPHONE ENCOUNTER
Let's give her 60 pills since she's taking 2 a night. At the last visit we discussed sleep hygiene and needing to stop napping during the day.  That takes some time to work and if she is napping during the day there isn't a sleeping pill that will help

## 2019-05-15 RX ORDER — ALPRAZOLAM 0.5 MG/1
TABLET ORAL
Qty: 30 TABLET | Refills: 0 | Status: SHIPPED | OUTPATIENT
Start: 2019-05-15 | End: 2019-05-31 | Stop reason: SDUPTHER

## 2019-05-15 RX ORDER — QUETIAPINE FUMARATE 100 MG/1
200 TABLET, FILM COATED ORAL NIGHTLY
Qty: 60 TABLET | Refills: 0 | Status: SHIPPED | OUTPATIENT
Start: 2019-05-15 | End: 2019-06-12 | Stop reason: SDUPTHER

## 2019-05-24 DIAGNOSIS — M79.18 MYOFASCIAL PAIN DYSFUNCTION SYNDROME: ICD-10-CM

## 2019-05-24 DIAGNOSIS — M48.062 SPINAL STENOSIS OF LUMBAR REGION WITH NEUROGENIC CLAUDICATION: ICD-10-CM

## 2019-05-24 DIAGNOSIS — M47.816 SPONDYLOSIS OF LUMBAR REGION WITHOUT MYELOPATHY OR RADICULOPATHY: ICD-10-CM

## 2019-05-24 DIAGNOSIS — G89.4 CHRONIC PAIN SYNDROME: ICD-10-CM

## 2019-05-26 RX ORDER — MELOXICAM 15 MG/1
15 TABLET ORAL DAILY
Qty: 30 TABLET | Refills: 1 | Status: SHIPPED | OUTPATIENT
Start: 2019-05-26 | End: 2019-06-27 | Stop reason: SDUPTHER

## 2019-05-26 RX ORDER — OXYCODONE AND ACETAMINOPHEN 10; 325 MG/1; MG/1
1 TABLET ORAL EVERY 6 HOURS PRN
Qty: 120 TABLET | Refills: 0 | Status: SHIPPED | OUTPATIENT
Start: 2019-05-26 | End: 2019-06-27 | Stop reason: SDUPTHER

## 2019-05-26 RX ORDER — GABAPENTIN 600 MG/1
600 TABLET ORAL 4 TIMES DAILY
Qty: 120 TABLET | Refills: 0 | Status: SHIPPED | OUTPATIENT
Start: 2019-05-26 | End: 2019-06-27 | Stop reason: SDUPTHER

## 2019-05-26 RX ORDER — TIZANIDINE 4 MG/1
TABLET ORAL
Qty: 120 TABLET | Refills: 0 | Status: SHIPPED | OUTPATIENT
Start: 2019-05-26 | End: 2019-06-27 | Stop reason: SDUPTHER

## 2019-05-28 ENCOUNTER — TELEPHONE (OUTPATIENT)
Dept: PSYCHIATRY | Age: 49
End: 2019-05-28

## 2019-05-28 NOTE — TELEPHONE ENCOUNTER
I spoke with Patti Gamble, who says she is in excruciating pain. Given her acute pain we can't evaluate her mood until it's taken care of.  She will be in touch

## 2019-05-28 NOTE — TELEPHONE ENCOUNTER
[facility] called at 10:50 to cx her 11:30 appt today due to having a pinched nerve in her back. She was tearful on the phone and appeared to genuinely be in pain. She stated that she could not even drive herself to her pain management doctor today. She did not reschedule at this time until she knows more about her back. She states that she really needed to talk with you because she does not feel that her medication is working.  She can be reached by calling [phone]

## 2019-05-31 DIAGNOSIS — F51.05 INSOMNIA DUE TO OTHER MENTAL DISORDER: ICD-10-CM

## 2019-05-31 DIAGNOSIS — F99 INSOMNIA DUE TO OTHER MENTAL DISORDER: ICD-10-CM

## 2019-05-31 RX ORDER — ALPRAZOLAM 0.5 MG/1
TABLET ORAL
Qty: 30 TABLET | Refills: 0 | Status: SHIPPED | OUTPATIENT
Start: 2019-05-31 | End: 2019-06-18 | Stop reason: SDUPTHER

## 2019-05-31 NOTE — TELEPHONE ENCOUNTER
Covering for Dr. Gaurav Ovalle. Approved Xanax refill. OARRS was ok.    Electronically signed by Liliya Silver MD on 5/31/2019 at 4:59 PM

## 2019-06-11 ENCOUNTER — OFFICE VISIT (OUTPATIENT)
Dept: CARDIOLOGY CLINIC | Age: 49
End: 2019-06-11
Payer: COMMERCIAL

## 2019-06-11 VITALS
SYSTOLIC BLOOD PRESSURE: 146 MMHG | BODY MASS INDEX: 33.81 KG/M2 | HEART RATE: 96 BPM | DIASTOLIC BLOOD PRESSURE: 103 MMHG | HEIGHT: 66 IN | WEIGHT: 210.38 LBS

## 2019-06-11 DIAGNOSIS — I10 HYPERTENSION, BENIGN: ICD-10-CM

## 2019-06-11 DIAGNOSIS — R06.02 SOB (SHORTNESS OF BREATH) ON EXERTION: ICD-10-CM

## 2019-06-11 DIAGNOSIS — R94.31 ABNORMAL EKG: ICD-10-CM

## 2019-06-11 DIAGNOSIS — R07.89 CHEST PAIN, ATYPICAL: Primary | ICD-10-CM

## 2019-06-11 PROCEDURE — 99215 OFFICE O/P EST HI 40 MIN: CPT | Performed by: INTERNAL MEDICINE

## 2019-06-11 PROCEDURE — 1036F TOBACCO NON-USER: CPT | Performed by: INTERNAL MEDICINE

## 2019-06-11 PROCEDURE — 3017F COLORECTAL CA SCREEN DOC REV: CPT | Performed by: INTERNAL MEDICINE

## 2019-06-11 PROCEDURE — 93000 ELECTROCARDIOGRAM COMPLETE: CPT | Performed by: INTERNAL MEDICINE

## 2019-06-11 PROCEDURE — G8599 NO ASA/ANTIPLAT THER USE RNG: HCPCS | Performed by: INTERNAL MEDICINE

## 2019-06-11 PROCEDURE — G8427 DOCREV CUR MEDS BY ELIG CLIN: HCPCS | Performed by: INTERNAL MEDICINE

## 2019-06-11 PROCEDURE — G8417 CALC BMI ABV UP PARAM F/U: HCPCS | Performed by: INTERNAL MEDICINE

## 2019-06-11 RX ORDER — OXYBUTYNIN CHLORIDE 5 MG/1
1 TABLET ORAL 3 TIMES DAILY
COMMUNITY

## 2019-06-11 RX ORDER — OLANZAPINE 5 MG/1
TABLET ORAL
Refills: 5 | COMMUNITY
Start: 2019-05-08 | End: 2019-07-16

## 2019-06-11 RX ORDER — FUROSEMIDE 20 MG/1
40 TABLET ORAL DAILY
Refills: 0 | Status: ON HOLD | COMMUNITY
Start: 2019-05-31 | End: 2021-08-02 | Stop reason: HOSPADM

## 2019-06-11 RX ORDER — DEXLANSOPRAZOLE 60 MG/1
CAPSULE, DELAYED RELEASE ORAL
COMMUNITY
End: 2021-07-29

## 2019-06-11 NOTE — PROGRESS NOTES
Chief Complaint   Patient presents with    Follow-Up from [address] ED swelling      This follow up from  [facility] [facility] [facility] swelling and chest tightness    Chest pain chronic long time several yrs  Tightness Nonexertional 5/10  Occasional cp1 x/ week    Sob on exertion    Complains of swelling of body and hands and ankle-  Now no pitting edema but puffiness    Has been on lasix 20 mg po bid for the last 10 days and still on it  And on KCL     EKG done today      Patient Active Problem List   Diagnosis    Epigastric pain    Abdominal pain, right upper quadrant    Migraine headache    Hypertension, benign    GERD (gastroesophageal reflux disease)    Constipation    Abdominal pain    Insomnia    Nuñez's esophagus    Chest pain    Asthma    Obesity    Lumbar spinal stenosis    Gastrointestinal hemorrhage    Cellulitis of left axilla    Gastroesophageal reflux disease with esophagitis    Urinary retention    Moderate episode of recurrent major depressive disorder ([address]    KELLI (generalized anxiety disorder)    ACS (acute coronary syndrome) ([address]    Community acquired bacterial pneumonia    Elevated liver enzymes    Chronic low back pain    Pneumonia due to organism    Severe malnutrition ([address]    Chest pain, atypical    SOB (shortness of breath) on exertion    Abnormal EKG- Nonsp T wave abn- unchanged from april 2019, and new compared to 2018 ekg       Past Surgical History:   Procedure Laterality Date    ABDOMEN SURGERY  89    exploratory surgery    APPENDECTOMY      CARDIAC CATHETERIZATION  unsure    CARPAL TUNNEL RELEASE Left     CHOLECYSTECTOMY      COLONOSCOPY  2010    DILATATION, ESOPHAGUS  2010    ENDOSCOPY, COLON, DIAGNOSTIC  2010    OTHER SURGICAL HISTORY  21 Nov 2014    Cholecystectomy Laparoscopic ([facility] Noel Irwin, [facility])    OTHER SURGICAL HISTORY N/A 03-21-16    lumbar epidurala block L4-5    OTHER SURGICAL HISTORY N/A 04-04-16    lumbar epidural block L4-5    OTHER Relationship status: Not on file    Intimate partner violence:     Fear of current or ex partner: Not on file     Emotionally abused: Not on file     Physically abused: Not on file     Forced sexual activity: Not on file   Other Topics Concern    Not on file   Social History Narrative    Not on file       Current Outpatient Medications   Medication Sig Dispense Refill    dexlansoprazole (DEXILANT) 60 MG CPDR delayed release capsule Take by mouth      furosemide (LASIX) 20 MG tablet TAKE 2 TABLETS BY MOUTH IN THE MORNING FOR 3 DAYS 1 IN THE MORNING UNTIL FINISHED  0    OLANZapine (ZYPREXA) 5 MG tablet   5    oxybutynin (DITROPAN) 5 MG tablet Take 1 tablet by mouth      ALPRAZolam (XANAX) 0.5 MG tablet Take 1-2 tabs. QHS prn for insomnia 30 tablet 0    tiZANidine (ZANAFLEX) 4 MG tablet TAKE 1 TABLET BY MOUTH EVERY 6 HOURS AS NEEDED FOR MUSCLE SPASM 120 tablet 0    gabapentin (NEURONTIN) 600 MG tablet Take 1 tablet by mouth 4 times daily for 30 days. Fill on or after 9/20/2018 120 tablet 0    meloxicam (MOBIC) 15 MG tablet Take 1 tablet by mouth daily 30 tablet 1    oxyCODONE-acetaminophen (PERCOCET)  MG per tablet Take 1 tablet by mouth every 6 hours as needed for Pain for up to 30 days.  120 tablet 0    QUEtiapine (SEROQUEL) 100 MG tablet Take 2 tablets by mouth nightly 60 tablet 0    DULoxetine (CYMBALTA) 60 MG extended release capsule Take 1 capsule by mouth daily 30 capsule 3    sucralfate (CARAFATE) 1 GM tablet Take 1 tablet by mouth 4 times daily 120 tablet 1    potassium chloride (KLOR-CON M) 20 MEQ extended release tablet Take 2 tablets by mouth daily (with breakfast) 60 tablet 1    naloxone (NARCAN) 4 MG/0.1ML LIQD nasal spray 1 spray by Nasal route as needed for Opioid Reversal 1 each 0    SUMAtriptan Succinate (IMITREX PO) Take by mouth as needed       propranolol (INDERAL) 80 MG tablet Take 80 mg by mouth 2 times daily      pentosan polysulfate (ELMIRON) 100 MG capsule Take 1 capsule by mouth 3 times daily (before meals) 90 capsule 5    topiramate (TOPAMAX) 100 MG tablet Take 1 tablet by mouth 2 times daily 60 tablet 5    albuterol (PROVENTIL HFA;VENTOLIN HFA) 108 (90 BASE) MCG/ACT inhaler Inhale 2 puffs into the lungs every 4 hours as needed for Wheezing or Shortness of Breath Whichever brand is covered by insurance, substitute as necessary. 1 Inhaler 2     No current facility-administered medications for this visit. Review of Systems -     General ROS: negative  Psychological ROS: negative  Hematological and Lymphatic ROS: No history of blood clots or bleeding disorder. Respiratory ROS: no cough,  or wheezing, the rest see HPI  Cardiovascular ROS: See HPI  Gastrointestinal ROS: negative  Genito-Urinary ROS: no dysuria, trouble voiding, or hematuria  Musculoskeletal ROS: negative  Neurological ROS: no TIA or stroke symptoms  Dermatological ROS: negative      Blood pressure (!) 146/103, pulse 96, height 5' 6\" (1.676 m), weight 210 lb 6 oz (95.4 kg), last menstrual period 11/19/2014, not currently breastfeeding.         Physical Examination:    General appearance - alert, well appearing, and in no distress  HEENT- Pink conjunctiva  , Non-icteri sclera,PERRLA  Mental status - alert, oriented to person, place, and time  Neck - supple, no significant adenopathy, no JVD, or carotid bruits  Chest - clear to auscultation, no wheezes, rales or rhonchi, symmetric air entry  Heart - normal rate, regular rhythm, normal S1, S2, no murmurs, rubs, clicks or gallops  Abdomen - soft, nontender, nondistended, no masses or organomegaly  ZHEN- no CVA or flank tenderness, no suprapubic tenderness  Neurological - alert, oriented, normal speech, no focal findings or movement disorder noted  Musculoskeletal/limbs - no joint tenderness, deformity or swelling   - peripheral pulses normal, no pedal edema, no clubbing or cyanosis  Skin - normal coloration and turgor, no rashes, no suspicious skin lesions noted  Psych- appropriate mood and affect    Lab  No results for input(s): CKTOTAL, CKMB, CKMBINDEX, TROPONINI in the last 72 hours. CBC:   Lab Results   Component Value Date    WBC 9.2 04/11/2019    RBC 4.39 04/11/2019    RBC 4.48 04/10/2019    HGB 12.0 04/11/2019    HCT 38.0 04/11/2019    MCV 86.6 04/11/2019    MCH 27.3 04/11/2019    MCHC 31.6 04/11/2019    RDW 13.3 04/10/2019     04/11/2019    MPV 9.0 04/11/2019     BMP:    Lab Results   Component Value Date     04/11/2019    K 3.5 04/11/2019     04/11/2019    CO2 21 04/11/2019    BUN 19 04/11/2019    LABALBU 3.9 04/11/2019    CREATININE 1.0 04/11/2019    CALCIUM 9.5 04/11/2019    LABGLOM 59 04/11/2019    GLUCOSE 127 04/11/2019    GLUCOSE 113 04/10/2019     Hepatic Function Panel:    Lab Results   Component Value Date    ALKPHOS 76 04/11/2019    ALT 22 04/11/2019    AST 22 04/11/2019    PROT 7.4 04/11/2019    BILITOT <0.2 04/11/2019    BILIDIR <0.2 04/02/2019    LABALBU 3.9 04/11/2019     Magnesium:    Lab Results   Component Value Date    MG 1.9 04/11/2019     Warfarin PT/INR:  No components found for: PTPATWAR, PTINRWAR  HgBA1c:  No results found for: LABA1C  FLP:    Lab Results   Component Value Date    TRIG 154 10/07/2017    HDL 42 10/07/2017    LDLCALC 99 10/07/2017     TSH:    Lab Results   Component Value Date    TSH 2.960 04/11/2019       EKG 56/11/19  NSR, NONsp T wave changes    Assessment   Diagnosis Orders   1. Chest pain, atypical  ECHO Complete 2D W Doppler W Color   2. Abnormal EKG- Nonsp T wave abn- unchanged from april 2019, and new compared to 2018 ekg  ECHO Complete 2D W Doppler W Color   3. Hypertension, benign  ECHO Complete 2D W Doppler W Color   4.  SOB (shortness of breath) on exertion  ECHO Complete 2D W Doppler W Color         Plan     Chest pain pain  Right sided chest wall tenderness  Chronic recurrent cp  Nuc stress test Nov 2018 negative  Hx of cardiac cath in [place] around 2014 and nonrevealing per pat  Need Coronary CTA  Echo    Cont gentle diuresis and kcl for sob and Hx of edema  Get lab report from sharon    BMP and  asap    Hypertension, on medical treatment. Seems to be under good control. Patient is compliant with medical treatment.    Pat stated BP flacutates      I spent 40 minutes involved in face-to-face discussion of medical issues, prognosis, record review  and plan with the patient today and more than 50% of the time was spent on counseling and coordination of care        RTC in 3 weeks    Teja Ya

## 2019-06-12 RX ORDER — QUETIAPINE FUMARATE 100 MG/1
200 TABLET, FILM COATED ORAL NIGHTLY
Qty: 60 TABLET | Refills: 0 | Status: SHIPPED | OUTPATIENT
Start: 2019-06-12 | End: 2019-07-12 | Stop reason: SDUPTHER

## 2019-06-12 RX ORDER — QUETIAPINE FUMARATE 100 MG/1
200 TABLET, FILM COATED ORAL NIGHTLY
Qty: 60 TABLET | Refills: 0 | OUTPATIENT
Start: 2019-06-12

## 2019-06-12 NOTE — TELEPHONE ENCOUNTER
It's unclear from the notes what does of quetiapine Abran Mehta is actually taking.  Please ask her to call me to clarify, thank you

## 2019-06-12 NOTE — TELEPHONE ENCOUNTER
Medication for Seroquel 100mg with instructions to take 2 at night is pending your approval #60 with 0 refills.

## 2019-06-12 NOTE — TELEPHONE ENCOUNTER
[address] is requesting a medication refill for Seroquel 100mg;#60 with 0 refills;last with a start and refill date of 05/15/19 on Kiara's behalf. Patient's last completed appt was on 04/30/19 with a cancellation on 05/28/19 due to a pinched nerve and she is to return on 07/16/19.     Medication is loaded pending your approval.

## 2019-06-18 DIAGNOSIS — F51.05 INSOMNIA DUE TO OTHER MENTAL DISORDER: ICD-10-CM

## 2019-06-18 DIAGNOSIS — F99 INSOMNIA DUE TO OTHER MENTAL DISORDER: ICD-10-CM

## 2019-06-18 RX ORDER — ALPRAZOLAM 0.5 MG/1
TABLET ORAL
Qty: 60 TABLET | Refills: 0 | Status: SHIPPED | OUTPATIENT
Start: 2019-06-18 | End: 2019-07-17 | Stop reason: SDUPTHER

## 2019-06-18 NOTE — TELEPHONE ENCOUNTER
I have spoke with David Soriano and reminded her of this information that she needs at leave a 2 hour window between her pain meds and her Xanax prescription. She states that she follows this.

## 2019-06-27 ENCOUNTER — TELEPHONE (OUTPATIENT)
Dept: CARDIOLOGY CLINIC | Age: 49
End: 2019-06-27

## 2019-06-27 DIAGNOSIS — M48.062 SPINAL STENOSIS OF LUMBAR REGION WITH NEUROGENIC CLAUDICATION: ICD-10-CM

## 2019-06-27 DIAGNOSIS — M47.816 SPONDYLOSIS OF LUMBAR REGION WITHOUT MYELOPATHY OR RADICULOPATHY: ICD-10-CM

## 2019-06-27 DIAGNOSIS — M79.18 MYOFASCIAL PAIN DYSFUNCTION SYNDROME: ICD-10-CM

## 2019-06-27 DIAGNOSIS — G89.4 CHRONIC PAIN SYNDROME: ICD-10-CM

## 2019-06-27 RX ORDER — TIZANIDINE 4 MG/1
TABLET ORAL
Qty: 120 TABLET | Refills: 0 | Status: SHIPPED | OUTPATIENT
Start: 2019-06-27 | End: 2019-08-08 | Stop reason: SDUPTHER

## 2019-06-27 RX ORDER — GABAPENTIN 600 MG/1
600 TABLET ORAL 4 TIMES DAILY
Qty: 52 TABLET | Refills: 0 | Status: SHIPPED | OUTPATIENT
Start: 2019-06-27 | End: 2019-07-10 | Stop reason: SDUPTHER

## 2019-06-27 RX ORDER — MELOXICAM 15 MG/1
15 TABLET ORAL DAILY
Qty: 30 TABLET | Refills: 1 | Status: SHIPPED | OUTPATIENT
Start: 2019-06-27 | End: 2019-08-08 | Stop reason: SDUPTHER

## 2019-06-27 RX ORDER — OXYCODONE AND ACETAMINOPHEN 10; 325 MG/1; MG/1
1 TABLET ORAL EVERY 6 HOURS PRN
Qty: 52 TABLET | Refills: 0 | Status: SHIPPED | OUTPATIENT
Start: 2019-06-27 | End: 2019-07-10 | Stop reason: SDUPTHER

## 2019-06-27 NOTE — TELEPHONE ENCOUNTER
OARRS reviewed. UDS: + for  gabapentin and percocet. Narcan offered: yes  Last seen: 2/26/2019. Follow-up:   Future Appointments   Date Time Provider Laisha Gallardoi   7/5/2019  1:00 PM STR CT IMAGING RM1 STRZ CT SCAN STR Radiolog   7/5/2019  2:30 PM STR ECHO RM1 STRZ ECHO None   7/10/2019 11:50 AM RENNY Huff - CNP SRPX Pain [facility] [place]   7/12/2019 12:00 PM Ledell Alpers, MD [address] [facility] [place]   7/16/2019  3:00 PM [facility]  [place]  [place]     Enough pills given until appt with Josias.

## 2019-06-27 NOTE — TELEPHONE ENCOUNTER
Joel Abel called requesting a refill on the following medications:  Requested Prescriptions     Pending Prescriptions Disp Refills    tiZANidine (ZANAFLEX) 4 MG tablet 120 tablet 0     Sig: TAKE 1 TABLET BY MOUTH EVERY 6 HOURS AS NEEDED FOR MUSCLE SPASM    meloxicam (MOBIC) 15 MG tablet 30 tablet 1     Sig: Take 1 tablet by mouth daily    oxyCODONE-acetaminophen (PERCOCET)  MG per tablet 120 tablet 0     Sig: Take 1 tablet by mouth every 6 hours as needed for Pain for up to 30 days.  gabapentin (NEURONTIN) 600 MG tablet 120 tablet 0     Sig: Take 1 tablet by mouth 4 times daily for 30 days.  Fill on or after 9/20/2018     Pharmacy verified: [facility]      Date of last visit: 2/26/19  Date of next visit (if applicable): Visit date not found

## 2019-07-10 ENCOUNTER — OFFICE VISIT (OUTPATIENT)
Dept: PHYSICAL MEDICINE AND REHAB | Age: 49
End: 2019-07-10
Payer: COMMERCIAL

## 2019-07-10 VITALS
SYSTOLIC BLOOD PRESSURE: 150 MMHG | DIASTOLIC BLOOD PRESSURE: 90 MMHG | HEART RATE: 74 BPM | BODY MASS INDEX: 33.75 KG/M2 | WEIGHT: 210 LBS | HEIGHT: 66 IN

## 2019-07-10 DIAGNOSIS — M51.36 DDD (DEGENERATIVE DISC DISEASE), LUMBAR: ICD-10-CM

## 2019-07-10 DIAGNOSIS — M54.16 LUMBAR RADICULITIS: ICD-10-CM

## 2019-07-10 DIAGNOSIS — G89.4 CHRONIC PAIN SYNDROME: ICD-10-CM

## 2019-07-10 DIAGNOSIS — M48.062 SPINAL STENOSIS OF LUMBAR REGION WITH NEUROGENIC CLAUDICATION: Primary | ICD-10-CM

## 2019-07-10 DIAGNOSIS — M79.18 MYOFASCIAL PAIN DYSFUNCTION SYNDROME: ICD-10-CM

## 2019-07-10 DIAGNOSIS — M47.816 SPONDYLOSIS OF LUMBAR REGION WITHOUT MYELOPATHY OR RADICULOPATHY: ICD-10-CM

## 2019-07-10 DIAGNOSIS — M51.26 LUMBAR DISC HERNIATION: ICD-10-CM

## 2019-07-10 PROCEDURE — G8599 NO ASA/ANTIPLAT THER USE RNG: HCPCS | Performed by: NURSE PRACTITIONER

## 2019-07-10 PROCEDURE — 3017F COLORECTAL CA SCREEN DOC REV: CPT | Performed by: NURSE PRACTITIONER

## 2019-07-10 PROCEDURE — G8427 DOCREV CUR MEDS BY ELIG CLIN: HCPCS | Performed by: NURSE PRACTITIONER

## 2019-07-10 PROCEDURE — 1036F TOBACCO NON-USER: CPT | Performed by: NURSE PRACTITIONER

## 2019-07-10 PROCEDURE — G8417 CALC BMI ABV UP PARAM F/U: HCPCS | Performed by: NURSE PRACTITIONER

## 2019-07-10 PROCEDURE — 99214 OFFICE O/P EST MOD 30 MIN: CPT | Performed by: NURSE PRACTITIONER

## 2019-07-10 RX ORDER — GABAPENTIN 600 MG/1
600 TABLET ORAL 4 TIMES DAILY
Qty: 120 TABLET | Refills: 0 | Status: SHIPPED | OUTPATIENT
Start: 2019-07-10 | End: 2019-09-06 | Stop reason: SDUPTHER

## 2019-07-10 RX ORDER — OXYCODONE AND ACETAMINOPHEN 10; 325 MG/1; MG/1
1 TABLET ORAL EVERY 6 HOURS PRN
Qty: 120 TABLET | Refills: 0 | Status: SHIPPED | OUTPATIENT
Start: 2019-07-10 | End: 2019-08-08 | Stop reason: SDUPTHER

## 2019-07-10 ASSESSMENT — ENCOUNTER SYMPTOMS: BACK PAIN: 1

## 2019-07-10 NOTE — PROGRESS NOTES
Take 1 tablet by mouth every 6 hours as needed for Pain for up to 30 days. , Disp: 120 tablet, Rfl: 0    gabapentin (NEURONTIN) 600 MG tablet, Take 1 tablet by mouth 4 times daily for 30 days. Fill on or after 9/20/2018, Disp: 120 tablet, Rfl: 0    tiZANidine (ZANAFLEX) 4 MG tablet, TAKE 1 TABLET BY MOUTH EVERY 6 HOURS AS NEEDED FOR MUSCLE SPASM, Disp: 120 tablet, Rfl: 0    meloxicam (MOBIC) 15 MG tablet, Take 1 tablet by mouth daily, Disp: 30 tablet, Rfl: 1    ALPRAZolam (XANAX) 0.5 MG tablet, Take 1-2 tabs.  QHS prn for insomnia, Disp: 60 tablet, Rfl: 0    QUEtiapine (SEROQUEL) 100 MG tablet, Take 2 tablets by mouth nightly, Disp: 60 tablet, Rfl: 0    dexlansoprazole (DEXILANT) 60 MG CPDR delayed release capsule, Take by mouth, Disp: , Rfl:     furosemide (LASIX) 20 MG tablet, TAKE 2 TABLETS BY MOUTH IN THE MORNING FOR 3 DAYS 1 IN THE MORNING UNTIL FINISHED, Disp: , Rfl: 0    OLANZapine (ZYPREXA) 5 MG tablet, , Disp: , Rfl: 5    oxybutynin (DITROPAN) 5 MG tablet, Take 1 tablet by mouth, Disp: , Rfl:     DULoxetine (CYMBALTA) 60 MG extended release capsule, Take 1 capsule by mouth daily, Disp: 30 capsule, Rfl: 3    sucralfate (CARAFATE) 1 GM tablet, Take 1 tablet by mouth 4 times daily, Disp: 120 tablet, Rfl: 1    potassium chloride (KLOR-CON M) 20 MEQ extended release tablet, Take 2 tablets by mouth daily (with breakfast), Disp: 60 tablet, Rfl: 1    naloxone (NARCAN) 4 MG/0.1ML LIQD nasal spray, 1 spray by Nasal route as needed for Opioid Reversal, Disp: 1 each, Rfl: 0    SUMAtriptan Succinate (IMITREX PO), Take by mouth as needed , Disp: , Rfl:     propranolol (INDERAL) 80 MG tablet, Take 80 mg by mouth 2 times daily, Disp: , Rfl:     pentosan polysulfate (ELMIRON) 100 MG capsule, Take 1 capsule by mouth 3 times daily (before meals), Disp: 90 capsule, Rfl: 5    topiramate (TOPAMAX) 100 MG tablet, Take 1 tablet by mouth 2 times daily, Disp: 60 tablet, Rfl: 5    albuterol (PROVENTIL HFA;VENTOLIN compliance. · Patient told can not receive any pain medications from any other source. · No evidence of abuse, diversion or aberrant behavior.  Medications and/or procedures to improve function and quality of life- patient understanding with this and that may not be pain free   Discussed with patient about safe storage of medications at home   Discussed possible weaning of medication dosing dependent on treatment/procedure results.  Discussed with patient about risks with procedure including infection, reaction to medication, increased pain, or bleeding. · Procedure notes reviewed in detail. · Has had L-facet MBB and LESI with no improvement   · Nothing more to offer at this time with procedures  · Was evaluated by 2 different surgeons and both recommended surgery- patient trying to hold off. Will need Dr. Deandre Brambila notes   · Continue Percocet 10/325 QID prn, Continue Neurontin, Zanaflex, Mobic. Patient remains compliant. Ordered refills   · Reviewed L-MRI in detail     Meds. Prescribed:   Orders Placed This Encounter   Medications    oxyCODONE-acetaminophen (PERCOCET)  MG per tablet     Sig: Take 1 tablet by mouth every 6 hours as needed for Pain for up to 30 days. Dispense:  120 tablet     Refill:  0     Reduce doses taken as pain becomes manageable    gabapentin (NEURONTIN) 600 MG tablet     Sig: Take 1 tablet by mouth 4 times daily for 30 days. Fill on or after 9/20/2018     Dispense:  120 tablet     Refill:  0       Return in about 3 months (around 10/10/2019), or if symptoms worsen or fail to improve, for follow up  for medications.          Electronically signed by RENNY Lawrence CNP on7/10/2019 at 12:19 PM

## 2019-07-11 ENCOUNTER — HOSPITAL ENCOUNTER (OUTPATIENT)
Age: 49
Setting detail: SPECIMEN
Discharge: HOME | End: 2019-07-11
Payer: COMMERCIAL

## 2019-07-11 LAB
ALBUMIN SERPL-MCNC: 4.5 G/DL (ref 3.5–5.2)
ANION GAP SERPL CALCULATED.3IONS-SCNC: 15 MMOL/L (ref 9–17)
BUN BLDV-MCNC: 34 MG/DL (ref 6–20)
BUN/CREAT BLD: ABNORMAL (ref 9–20)
CALCIUM SERPL-MCNC: 10 MG/DL (ref 8.6–10.4)
CHLORIDE BLD-SCNC: 102 MMOL/L (ref 98–107)
CO2: 27 MMOL/L (ref 20–31)
CREAT SERPL-MCNC: 1.35 MG/DL (ref 0.5–0.9)
GFR AFRICAN AMERICAN: 50 ML/MIN
GFR NON-AFRICAN AMERICAN: 42 ML/MIN
GFR SERPL CREATININE-BSD FRML MDRD: ABNORMAL ML/MIN/{1.73_M2}
GFR SERPL CREATININE-BSD FRML MDRD: ABNORMAL ML/MIN/{1.73_M2}
GLUCOSE BLD-MCNC: 78 MG/DL (ref 70–99)
PHOSPHORUS: 3.8 MG/DL (ref 2.6–4.5)
POTASSIUM SERPL-SCNC: 4.2 MMOL/L (ref 3.7–5.3)
SODIUM BLD-SCNC: 144 MMOL/L (ref 135–144)

## 2019-07-12 RX ORDER — QUETIAPINE FUMARATE 100 MG/1
200 TABLET, FILM COATED ORAL NIGHTLY
Qty: 60 TABLET | Refills: 0 | Status: SHIPPED
Start: 2019-07-12 | End: 2019-07-16

## 2019-07-12 NOTE — TELEPHONE ENCOUNTER
Ese Armando called requesting a refill of Seroquel 100mg tablets, 2 tablets nightly.  Patient states she will be out tonight      Last Appointment Date: 4/30/2019  Next Appointment Date: 7/16/2019

## 2019-07-16 ENCOUNTER — OFFICE VISIT (OUTPATIENT)
Dept: PSYCHIATRY | Age: 49
End: 2019-07-16
Payer: COMMERCIAL

## 2019-07-16 ENCOUNTER — TELEPHONE (OUTPATIENT)
Dept: PSYCHIATRY | Age: 49
End: 2019-07-16

## 2019-07-16 DIAGNOSIS — F41.9 ANXIETY: ICD-10-CM

## 2019-07-16 DIAGNOSIS — G47.00 INSOMNIA, UNSPECIFIED TYPE: ICD-10-CM

## 2019-07-16 DIAGNOSIS — F33.1 MODERATE EPISODE OF RECURRENT MAJOR DEPRESSIVE DISORDER (HCC): Primary | ICD-10-CM

## 2019-07-16 PROCEDURE — 1036F TOBACCO NON-USER: CPT | Performed by: PSYCHIATRY & NEUROLOGY

## 2019-07-16 PROCEDURE — 99214 OFFICE O/P EST MOD 30 MIN: CPT | Performed by: PSYCHIATRY & NEUROLOGY

## 2019-07-16 PROCEDURE — G8599 NO ASA/ANTIPLAT THER USE RNG: HCPCS | Performed by: PSYCHIATRY & NEUROLOGY

## 2019-07-16 PROCEDURE — 3017F COLORECTAL CA SCREEN DOC REV: CPT | Performed by: PSYCHIATRY & NEUROLOGY

## 2019-07-16 PROCEDURE — G8417 CALC BMI ABV UP PARAM F/U: HCPCS | Performed by: PSYCHIATRY & NEUROLOGY

## 2019-07-16 PROCEDURE — G8427 DOCREV CUR MEDS BY ELIG CLIN: HCPCS | Performed by: PSYCHIATRY & NEUROLOGY

## 2019-07-16 RX ORDER — DULOXETIN HYDROCHLORIDE 30 MG/1
30 CAPSULE, DELAYED RELEASE ORAL DAILY
Qty: 30 CAPSULE | Refills: 3 | Status: SHIPPED | OUTPATIENT
Start: 2019-07-16 | End: 2021-07-29

## 2019-07-16 RX ORDER — ZOLPIDEM TARTRATE 10 MG/1
10 TABLET ORAL NIGHTLY PRN
Qty: 30 TABLET | Refills: 0 | Status: SHIPPED | OUTPATIENT
Start: 2019-07-16 | End: 2019-08-15

## 2019-07-17 DIAGNOSIS — F99 INSOMNIA DUE TO OTHER MENTAL DISORDER: ICD-10-CM

## 2019-07-17 DIAGNOSIS — F51.05 INSOMNIA DUE TO OTHER MENTAL DISORDER: ICD-10-CM

## 2019-07-17 RX ORDER — ALPRAZOLAM 0.5 MG/1
TABLET ORAL
Qty: 60 TABLET | Refills: 0 | Status: SHIPPED | OUTPATIENT
Start: 2019-07-17 | End: 2019-08-13 | Stop reason: SDUPTHER

## 2019-08-08 DIAGNOSIS — M79.18 MYOFASCIAL PAIN DYSFUNCTION SYNDROME: ICD-10-CM

## 2019-08-08 DIAGNOSIS — M48.062 SPINAL STENOSIS OF LUMBAR REGION WITH NEUROGENIC CLAUDICATION: ICD-10-CM

## 2019-08-08 DIAGNOSIS — G89.4 CHRONIC PAIN SYNDROME: ICD-10-CM

## 2019-08-08 RX ORDER — OXYCODONE AND ACETAMINOPHEN 10; 325 MG/1; MG/1
1 TABLET ORAL EVERY 6 HOURS PRN
Qty: 120 TABLET | Refills: 0 | Status: SHIPPED | OUTPATIENT
Start: 2019-08-09 | End: 2019-09-06 | Stop reason: SDUPTHER

## 2019-08-08 RX ORDER — MELOXICAM 15 MG/1
15 TABLET ORAL DAILY
Qty: 30 TABLET | Refills: 1 | Status: SHIPPED | OUTPATIENT
Start: 2019-08-08 | End: 2019-09-06 | Stop reason: SDUPTHER

## 2019-08-08 RX ORDER — TIZANIDINE 4 MG/1
TABLET ORAL
Qty: 120 TABLET | Refills: 0 | Status: SHIPPED | OUTPATIENT
Start: 2019-08-08 | End: 2019-09-06 | Stop reason: SDUPTHER

## 2019-08-13 DIAGNOSIS — F99 INSOMNIA DUE TO OTHER MENTAL DISORDER: ICD-10-CM

## 2019-08-13 DIAGNOSIS — F51.05 INSOMNIA DUE TO OTHER MENTAL DISORDER: ICD-10-CM

## 2019-08-13 RX ORDER — QUETIAPINE FUMARATE 100 MG/1
200 TABLET, FILM COATED ORAL NIGHTLY
Qty: 60 TABLET | Refills: 0 | Status: SHIPPED | OUTPATIENT
Start: 2019-08-13 | End: 2021-06-12

## 2019-08-13 RX ORDER — ALPRAZOLAM 0.5 MG/1
TABLET ORAL
Qty: 60 TABLET | Refills: 0 | Status: SHIPPED | OUTPATIENT
Start: 2019-08-13 | End: 2019-09-12

## 2019-09-06 DIAGNOSIS — G89.4 CHRONIC PAIN SYNDROME: ICD-10-CM

## 2019-09-06 DIAGNOSIS — M47.816 SPONDYLOSIS OF LUMBAR REGION WITHOUT MYELOPATHY OR RADICULOPATHY: ICD-10-CM

## 2019-09-06 DIAGNOSIS — M79.18 MYOFASCIAL PAIN DYSFUNCTION SYNDROME: ICD-10-CM

## 2019-09-06 DIAGNOSIS — M48.062 SPINAL STENOSIS OF LUMBAR REGION WITH NEUROGENIC CLAUDICATION: ICD-10-CM

## 2019-09-06 RX ORDER — TIZANIDINE 4 MG/1
TABLET ORAL
Qty: 120 TABLET | Refills: 0 | Status: SHIPPED | OUTPATIENT
Start: 2019-09-07 | End: 2019-10-07 | Stop reason: SDUPTHER

## 2019-09-06 RX ORDER — GABAPENTIN 600 MG/1
600 TABLET ORAL 4 TIMES DAILY
Qty: 120 TABLET | Refills: 0 | Status: SHIPPED | OUTPATIENT
Start: 2019-09-06 | End: 2019-10-07 | Stop reason: SDUPTHER

## 2019-09-06 RX ORDER — OXYCODONE AND ACETAMINOPHEN 10; 325 MG/1; MG/1
1 TABLET ORAL EVERY 6 HOURS PRN
Qty: 120 TABLET | Refills: 0 | Status: SHIPPED | OUTPATIENT
Start: 2019-09-07 | End: 2019-10-07 | Stop reason: SDUPTHER

## 2019-09-06 RX ORDER — MELOXICAM 15 MG/1
15 TABLET ORAL DAILY
Qty: 30 TABLET | Refills: 1 | Status: SHIPPED | OUTPATIENT
Start: 2019-09-07 | End: 2019-10-28 | Stop reason: ALTCHOICE

## 2019-10-02 ENCOUNTER — TELEPHONE (OUTPATIENT)
Dept: CARDIOLOGY CLINIC | Age: 49
End: 2019-10-02

## 2019-10-02 ENCOUNTER — APPOINTMENT (OUTPATIENT)
Dept: GENERAL RADIOLOGY | Age: 49
End: 2019-10-02
Payer: COMMERCIAL

## 2019-10-02 ENCOUNTER — NURSE TRIAGE (OUTPATIENT)
Dept: OTHER | Facility: CLINIC | Age: 49
End: 2019-10-02

## 2019-10-02 ENCOUNTER — HOSPITAL ENCOUNTER (EMERGENCY)
Age: 49
Discharge: LEFT BEFORE DISCHARGE | End: 2019-10-03
Attending: EMERGENCY MEDICINE
Payer: COMMERCIAL

## 2019-10-02 VITALS
RESPIRATION RATE: 18 BRPM | OXYGEN SATURATION: 100 % | HEIGHT: 66 IN | TEMPERATURE: 98 F | WEIGHT: 200 LBS | BODY MASS INDEX: 32.14 KG/M2 | HEART RATE: 82 BPM | SYSTOLIC BLOOD PRESSURE: 138 MMHG | DIASTOLIC BLOOD PRESSURE: 88 MMHG

## 2019-10-02 DIAGNOSIS — F41.1 ANXIETY STATE: ICD-10-CM

## 2019-10-02 DIAGNOSIS — R07.9 CHEST PAIN, UNSPECIFIED TYPE: Primary | ICD-10-CM

## 2019-10-02 LAB
EKG ATRIAL RATE: 89 BPM
EKG P AXIS: 70 DEGREES
EKG P-R INTERVAL: 176 MS
EKG Q-T INTERVAL: 362 MS
EKG QRS DURATION: 78 MS
EKG QTC CALCULATION (BAZETT): 440 MS
EKG R AXIS: 50 DEGREES
EKG T AXIS: -87 DEGREES
EKG VENTRICULAR RATE: 89 BPM

## 2019-10-02 PROCEDURE — 71046 X-RAY EXAM CHEST 2 VIEWS: CPT

## 2019-10-02 PROCEDURE — 93005 ELECTROCARDIOGRAM TRACING: CPT | Performed by: EMERGENCY MEDICINE

## 2019-10-02 PROCEDURE — 99285 EMERGENCY DEPT VISIT HI MDM: CPT

## 2019-10-02 PROCEDURE — 6370000000 HC RX 637 (ALT 250 FOR IP): Performed by: EMERGENCY MEDICINE

## 2019-10-02 RX ORDER — ONDANSETRON 4 MG/1
4 TABLET, ORALLY DISINTEGRATING ORAL ONCE
Status: DISCONTINUED | OUTPATIENT
Start: 2019-10-02 | End: 2019-10-03 | Stop reason: HOSPADM

## 2019-10-02 RX ORDER — PANTOPRAZOLE SODIUM 40 MG/1
40 TABLET, DELAYED RELEASE ORAL ONCE
Status: DISCONTINUED | OUTPATIENT
Start: 2019-10-02 | End: 2019-10-03 | Stop reason: HOSPADM

## 2019-10-02 ASSESSMENT — ENCOUNTER SYMPTOMS
TROUBLE SWALLOWING: 0
CONSTIPATION: 0
COUGH: 0
SHORTNESS OF BREATH: 0
SINUS PRESSURE: 0
ABDOMINAL DISTENTION: 0
EYE PAIN: 0
PHOTOPHOBIA: 0
ABDOMINAL PAIN: 0
WHEEZING: 0
EYE DISCHARGE: 0
BLOOD IN STOOL: 0
SORE THROAT: 0
BACK PAIN: 0
EYE REDNESS: 0
EYE ITCHING: 0
CHEST TIGHTNESS: 0
RHINORRHEA: 0
DIARRHEA: 0
VOMITING: 0
CHOKING: 0
NAUSEA: 0
VOICE CHANGE: 0

## 2019-10-02 ASSESSMENT — PAIN DESCRIPTION - DESCRIPTORS: DESCRIPTORS: ACHING

## 2019-10-02 ASSESSMENT — PAIN DESCRIPTION - FREQUENCY: FREQUENCY: CONTINUOUS

## 2019-10-02 ASSESSMENT — PAIN DESCRIPTION - PAIN TYPE: TYPE: ACUTE PAIN

## 2019-10-02 ASSESSMENT — PAIN DESCRIPTION - ONSET: ONSET: ON-GOING

## 2019-10-02 ASSESSMENT — PAIN DESCRIPTION - PROGRESSION: CLINICAL_PROGRESSION: NOT CHANGED

## 2019-10-02 ASSESSMENT — PAIN SCALES - GENERAL: PAINLEVEL_OUTOF10: 8

## 2019-10-03 PROCEDURE — 93010 ELECTROCARDIOGRAM REPORT: CPT | Performed by: INTERNAL MEDICINE

## 2019-10-03 ASSESSMENT — ENCOUNTER SYMPTOMS
EYE DISCHARGE: 0
RHINORRHEA: 0
DIARRHEA: 0
WHEEZING: 0
ABDOMINAL DISTENTION: 0
ABDOMINAL PAIN: 0
EYE ITCHING: 0
VOMITING: 0
NAUSEA: 0
COUGH: 0
SHORTNESS OF BREATH: 0

## 2019-10-07 DIAGNOSIS — G89.4 CHRONIC PAIN SYNDROME: ICD-10-CM

## 2019-10-07 DIAGNOSIS — M48.062 SPINAL STENOSIS OF LUMBAR REGION WITH NEUROGENIC CLAUDICATION: ICD-10-CM

## 2019-10-07 DIAGNOSIS — M79.18 MYOFASCIAL PAIN DYSFUNCTION SYNDROME: ICD-10-CM

## 2019-10-07 DIAGNOSIS — M47.816 SPONDYLOSIS OF LUMBAR REGION WITHOUT MYELOPATHY OR RADICULOPATHY: ICD-10-CM

## 2019-10-08 RX ORDER — TIZANIDINE 4 MG/1
TABLET ORAL
Qty: 120 TABLET | Refills: 0 | Status: SHIPPED | OUTPATIENT
Start: 2019-10-08 | End: 2019-10-28 | Stop reason: SDUPTHER

## 2019-10-08 RX ORDER — GABAPENTIN 600 MG/1
600 TABLET ORAL 4 TIMES DAILY
Qty: 120 TABLET | Refills: 0 | Status: SHIPPED | OUTPATIENT
Start: 2019-10-08 | End: 2019-11-09 | Stop reason: SDUPTHER

## 2019-10-08 RX ORDER — OXYCODONE AND ACETAMINOPHEN 10; 325 MG/1; MG/1
1 TABLET ORAL EVERY 6 HOURS PRN
Qty: 120 TABLET | Refills: 0 | Status: SHIPPED | OUTPATIENT
Start: 2019-10-08 | End: 2019-10-28 | Stop reason: SDUPTHER

## 2019-10-28 ENCOUNTER — OFFICE VISIT (OUTPATIENT)
Dept: PHYSICAL MEDICINE AND REHAB | Age: 49
End: 2019-10-28
Payer: COMMERCIAL

## 2019-10-28 VITALS
BODY MASS INDEX: 32.14 KG/M2 | HEART RATE: 78 BPM | WEIGHT: 199.96 LBS | HEIGHT: 66 IN | SYSTOLIC BLOOD PRESSURE: 136 MMHG | DIASTOLIC BLOOD PRESSURE: 80 MMHG

## 2019-10-28 DIAGNOSIS — G89.4 CHRONIC PAIN SYNDROME: ICD-10-CM

## 2019-10-28 DIAGNOSIS — M47.816 SPONDYLOSIS OF LUMBAR REGION WITHOUT MYELOPATHY OR RADICULOPATHY: ICD-10-CM

## 2019-10-28 DIAGNOSIS — M51.26 LUMBAR DISC HERNIATION: ICD-10-CM

## 2019-10-28 DIAGNOSIS — M48.062 SPINAL STENOSIS OF LUMBAR REGION WITH NEUROGENIC CLAUDICATION: Primary | ICD-10-CM

## 2019-10-28 DIAGNOSIS — M79.18 MYOFASCIAL PAIN DYSFUNCTION SYNDROME: ICD-10-CM

## 2019-10-28 DIAGNOSIS — M54.16 LUMBAR RADICULITIS: ICD-10-CM

## 2019-10-28 PROCEDURE — 99213 OFFICE O/P EST LOW 20 MIN: CPT | Performed by: NURSE PRACTITIONER

## 2019-10-28 PROCEDURE — 1036F TOBACCO NON-USER: CPT | Performed by: NURSE PRACTITIONER

## 2019-10-28 PROCEDURE — G8417 CALC BMI ABV UP PARAM F/U: HCPCS | Performed by: NURSE PRACTITIONER

## 2019-10-28 PROCEDURE — G8427 DOCREV CUR MEDS BY ELIG CLIN: HCPCS | Performed by: NURSE PRACTITIONER

## 2019-10-28 PROCEDURE — G8599 NO ASA/ANTIPLAT THER USE RNG: HCPCS | Performed by: NURSE PRACTITIONER

## 2019-10-28 PROCEDURE — G8484 FLU IMMUNIZE NO ADMIN: HCPCS | Performed by: NURSE PRACTITIONER

## 2019-10-28 PROCEDURE — 3017F COLORECTAL CA SCREEN DOC REV: CPT | Performed by: NURSE PRACTITIONER

## 2019-10-28 RX ORDER — OXYCODONE AND ACETAMINOPHEN 10; 325 MG/1; MG/1
1 TABLET ORAL EVERY 6 HOURS PRN
Qty: 120 TABLET | Refills: 0 | Status: SHIPPED | OUTPATIENT
Start: 2019-11-07 | End: 2019-12-09 | Stop reason: SDUPTHER

## 2019-10-28 RX ORDER — TIZANIDINE 4 MG/1
TABLET ORAL
Qty: 120 TABLET | Refills: 0 | Status: SHIPPED | OUTPATIENT
Start: 2019-11-06 | End: 2019-12-09 | Stop reason: SDUPTHER

## 2019-10-28 ASSESSMENT — ENCOUNTER SYMPTOMS
ABDOMINAL PAIN: 1
BACK PAIN: 1

## 2019-11-09 DIAGNOSIS — G89.4 CHRONIC PAIN SYNDROME: ICD-10-CM

## 2019-11-09 DIAGNOSIS — M47.816 SPONDYLOSIS OF LUMBAR REGION WITHOUT MYELOPATHY OR RADICULOPATHY: ICD-10-CM

## 2019-11-09 DIAGNOSIS — M79.18 MYOFASCIAL PAIN DYSFUNCTION SYNDROME: ICD-10-CM

## 2019-11-11 RX ORDER — GABAPENTIN 600 MG/1
600 TABLET ORAL 4 TIMES DAILY
Qty: 120 TABLET | Refills: 0 | Status: SHIPPED | OUTPATIENT
Start: 2019-11-11 | End: 2019-12-09 | Stop reason: SDUPTHER

## 2019-12-06 DIAGNOSIS — M79.18 MYOFASCIAL PAIN DYSFUNCTION SYNDROME: ICD-10-CM

## 2019-12-06 DIAGNOSIS — G89.4 CHRONIC PAIN SYNDROME: ICD-10-CM

## 2019-12-06 DIAGNOSIS — M47.816 SPONDYLOSIS OF LUMBAR REGION WITHOUT MYELOPATHY OR RADICULOPATHY: ICD-10-CM

## 2019-12-06 DIAGNOSIS — M48.062 SPINAL STENOSIS OF LUMBAR REGION WITH NEUROGENIC CLAUDICATION: ICD-10-CM

## 2019-12-09 RX ORDER — TIZANIDINE 4 MG/1
TABLET ORAL
Qty: 120 TABLET | Refills: 0 | Status: SHIPPED | OUTPATIENT
Start: 2019-12-09 | End: 2020-01-08 | Stop reason: SDUPTHER

## 2019-12-09 RX ORDER — OXYCODONE AND ACETAMINOPHEN 10; 325 MG/1; MG/1
1 TABLET ORAL EVERY 6 HOURS PRN
Qty: 120 TABLET | Refills: 0 | Status: SHIPPED | OUTPATIENT
Start: 2019-12-09 | End: 2020-01-08 | Stop reason: SDUPTHER

## 2019-12-09 RX ORDER — GABAPENTIN 600 MG/1
600 TABLET ORAL 4 TIMES DAILY
Qty: 120 TABLET | Refills: 0 | Status: SHIPPED | OUTPATIENT
Start: 2019-12-11 | End: 2020-01-08 | Stop reason: SDUPTHER

## 2020-01-07 NOTE — TELEPHONE ENCOUNTER
Sharon Beckman called requesting a refill on the following medications:  Requested Prescriptions     Pending Prescriptions Disp Refills    oxyCODONE-acetaminophen (PERCOCET)  MG per tablet 120 tablet 0     Sig: Take 1 tablet by mouth every 6 hours as needed for Pain for up to 30 days.  gabapentin (NEURONTIN) 600 MG tablet 120 tablet 0     Sig: Take 1 tablet by mouth 4 times daily for 30 days.     tiZANidine (ZANAFLEX) 4 MG tablet 120 tablet 0     Sig: TAKE 1 TABLET BY MOUTH EVERY 6 HOURS AS NEEDED FOR MUSCLE SPASM     Pharmacy verified:  [email or web address]    [address]    Date of last visit: 10/28/19  Date of next visit (if applicable): 8/33/9809

## 2020-01-08 RX ORDER — GABAPENTIN 600 MG/1
600 TABLET ORAL 4 TIMES DAILY
Qty: 120 TABLET | Refills: 0 | Status: SHIPPED | OUTPATIENT
Start: 2020-01-08 | End: 2020-01-30 | Stop reason: SDUPTHER

## 2020-01-08 RX ORDER — TIZANIDINE 4 MG/1
TABLET ORAL
Qty: 120 TABLET | Refills: 0 | Status: SHIPPED | OUTPATIENT
Start: 2020-01-08 | End: 2020-01-30 | Stop reason: SDUPTHER

## 2020-01-08 RX ORDER — OXYCODONE AND ACETAMINOPHEN 10; 325 MG/1; MG/1
1 TABLET ORAL EVERY 6 HOURS PRN
Qty: 120 TABLET | Refills: 0 | Status: SHIPPED | OUTPATIENT
Start: 2020-01-08 | End: 2020-01-30 | Stop reason: SDUPTHER

## 2020-01-08 NOTE — TELEPHONE ENCOUNTER
OARRS reviewed. UDS: + for  Oxycodone- last dose of gabapentin was day before screen. Narcan offered: yes  Last seen: 10/28/2019.  Follow-up: 01/27/2020

## 2020-01-30 ENCOUNTER — OFFICE VISIT (OUTPATIENT)
Dept: PHYSICAL MEDICINE AND REHAB | Age: 50
End: 2020-01-30
Payer: COMMERCIAL

## 2020-01-30 VITALS
HEART RATE: 74 BPM | BODY MASS INDEX: 31.02 KG/M2 | SYSTOLIC BLOOD PRESSURE: 138 MMHG | WEIGHT: 193 LBS | HEIGHT: 66 IN | DIASTOLIC BLOOD PRESSURE: 78 MMHG

## 2020-01-30 PROCEDURE — G8427 DOCREV CUR MEDS BY ELIG CLIN: HCPCS | Performed by: NURSE PRACTITIONER

## 2020-01-30 PROCEDURE — G8417 CALC BMI ABV UP PARAM F/U: HCPCS | Performed by: NURSE PRACTITIONER

## 2020-01-30 PROCEDURE — 3017F COLORECTAL CA SCREEN DOC REV: CPT | Performed by: NURSE PRACTITIONER

## 2020-01-30 PROCEDURE — G8484 FLU IMMUNIZE NO ADMIN: HCPCS | Performed by: NURSE PRACTITIONER

## 2020-01-30 PROCEDURE — 1036F TOBACCO NON-USER: CPT | Performed by: NURSE PRACTITIONER

## 2020-01-30 PROCEDURE — 99213 OFFICE O/P EST LOW 20 MIN: CPT | Performed by: NURSE PRACTITIONER

## 2020-01-30 RX ORDER — GABAPENTIN 600 MG/1
600 TABLET ORAL 4 TIMES DAILY
Qty: 120 TABLET | Refills: 0 | Status: SHIPPED | OUTPATIENT
Start: 2020-02-07 | End: 2020-03-09 | Stop reason: SDUPTHER

## 2020-01-30 RX ORDER — OXYCODONE AND ACETAMINOPHEN 10; 325 MG/1; MG/1
1 TABLET ORAL EVERY 6 HOURS PRN
Qty: 120 TABLET | Refills: 0 | Status: SHIPPED | OUTPATIENT
Start: 2020-02-07 | End: 2020-03-09 | Stop reason: SDUPTHER

## 2020-01-30 RX ORDER — TIZANIDINE 4 MG/1
TABLET ORAL
Qty: 120 TABLET | Refills: 0 | Status: SHIPPED | OUTPATIENT
Start: 2020-02-07 | End: 2020-03-09 | Stop reason: SDUPTHER

## 2020-01-30 ASSESSMENT — ENCOUNTER SYMPTOMS: BACK PAIN: 1

## 2020-01-30 NOTE — PROGRESS NOTES
[address] [facility]  [address]  Dept: [phone]  Dept Fax: [phone]: [phone]    Visit Date: 1/30/2020    Functionality Assessment/Goals Worksheet     On a scale of 0 (Does not Interfere) to 10 (Completely Interferes)     1. Which number describes how during the past week pain has interfered with       the following:  A. General Activity:  7  B. Mood: 6  C. Walking Ability:  9  D. Normal Work (Includes both work outside the home and housework):  9  E. Relations with Other People:   6  F. Sleep:   7  G. Enjoyment of Life:   6    2. Patient Prefers to Take their Pain Medications:     [x]  On a regular basis   []  Only when necessary    []  Does not take pain medications    3. What are the Patient's Goals/Expectations for Visiting Pain Management? [x]  Learn about my pain    []  Receive Medication   []  Physical Therapy     []  Treat Depression   []  Receive Injections    []  Treat Sleep   []  Deal with Anxiety and Stress   []  Treat Opoid Dependence/Addiction   []  Other:      HPI:   Katarina Villela is a 44 y.o. female is here today for    Chief Complaint: Lower back pain, leg pain     HPI   3 month FU. Continues to have pain across lower back and radiating down bilateral legs. Reports that pain has been up and down but lately increased because is doing more taking care of her bed bound 80year old mom. Has a lot of stressors. Surgery was recommended by 2 surgeons but patient is trying to hold off. Tearful today as her mother is in the hospital. Patient nowt sleeping well. She stopped following with psych     Medications continue to help     Medications reviewed. Patient denies side effects with medications. Patient states she is taking medications as prescribed. Shedenies receiving pain medications from other sources.  She had 4 ER visist in 1 week for abd pain and diverticulitis  since last visit. Pain scale with out pain medications or at its worst is 7-10/10. Pain scale with pain medications or at its best is 3-5/10. Last dose of Percocet and Neurontin was yesterday   Drug screen reviewed from 10/28/2019 and was appropriate  Pill count was completed today and was appropriate  Patient does have naloxone available at home. Patient has not required use of naloxone at home since last office visit. The patientis allergic to ketorolac; adhesive tape; compazine [prochlorperazine]; erythromycin; lyrica [pregabalin]; morphine; reglan [metoclopramide]; and sulfa antibiotics. Past Medical History  Nain Shah  has a past medical history of Anxiety, Arthritis, Asthma, Nuñez's esophagus, Blood circulation, collateral, GERD (gastroesophageal reflux disease), Hypertension, Insomnia, Interstitial cystitis, Migraine, Pneumonia, Psychiatric problem, and Spinal stenosis of lumbar region. Past Surgical History  The patient  has a past surgical history that includes Abdomen surgery (93); ovarian cyst removal (93); Tubal ligation (93); Colonoscopy (2010); Endoscopy, colon, diagnostic (2010); Patellar tendon repair (Right, 1994); Dilatation, esophagus (2010); Cardiac catheterization (unsure); other surgical history (21 Nov 2014); Cholecystectomy; Appendectomy; other surgical history (N/A, 03-21-16); other surgical history (N/A, 04-04-16); other surgical history (Bilateral, 6-13-16); Upper gastrointestinal endoscopy (2012); other surgical history (Left, 09/13/2016); and Carpal tunnel release (Left). Family History  This patient's family history includes Heart Attack in her brother; Heart Disease in her father and mother; Inflam Bowel Dis in her mother. Social History  Nain Shah  reports that she has never smoked. She has never used smokeless tobacco. She reports current alcohol use. She reports that she does not use drugs.     Medications    Current Outpatient Medications:   [START ON 2/7/2020] oxyCODONE-acetaminophen (PERCOCET)  MG per tablet, Take 1 tablet by mouth every 6 hours as needed for Pain for up to 30 days. , Disp: 120 tablet, Rfl: 0    [START ON 2/7/2020] gabapentin (NEURONTIN) 600 MG tablet, Take 1 tablet by mouth 4 times daily for 30 days. , Disp: 120 tablet, Rfl: 0    [START ON 2/7/2020] tiZANidine (ZANAFLEX) 4 MG tablet, TAKE 1 TABLET BY MOUTH EVERY 6 HOURS AS NEEDED FOR MUSCLE SPASM, Disp: 120 tablet, Rfl: 0    QUEtiapine (SEROQUEL) 100 MG tablet, Take 2 tablets by mouth nightly, Disp: 60 tablet, Rfl: 0    DULoxetine (CYMBALTA) 30 MG extended release capsule, Take 1 capsule by mouth daily In addition to 60 mg tablet, Disp: 30 capsule, Rfl: 3    dexlansoprazole (DEXILANT) 60 MG CPDR delayed release capsule, Take by mouth, Disp: , Rfl:     furosemide (LASIX) 20 MG tablet, TAKE 2 TABLETS BY MOUTH IN THE MORNING FOR 3 DAYS 1 IN THE MORNING UNTIL FINISHED, Disp: , Rfl: 0    oxybutynin (DITROPAN) 5 MG tablet, Take 1 tablet by mouth, Disp: , Rfl:     DULoxetine (CYMBALTA) 60 MG extended release capsule, Take 1 capsule by mouth daily, Disp: 30 capsule, Rfl: 3    sucralfate (CARAFATE) 1 GM tablet, Take 1 tablet by mouth 4 times daily, Disp: 120 tablet, Rfl: 1    potassium chloride (KLOR-CON M) 20 MEQ extended release tablet, Take 2 tablets by mouth daily (with breakfast), Disp: 60 tablet, Rfl: 1    naloxone (NARCAN) 4 MG/0.1ML LIQD nasal spray, 1 spray by Nasal route as needed for Opioid Reversal, Disp: 1 each, Rfl: 0    SUMAtriptan Succinate (IMITREX PO), Take by mouth as needed , Disp: , Rfl:     propranolol (INDERAL) 80 MG tablet, Take 80 mg by mouth 2 times daily, Disp: , Rfl:     pentosan polysulfate (ELMIRON) 100 MG capsule, Take 1 capsule by mouth 3 times daily (before meals), Disp: 90 capsule, Rfl: 5    topiramate (TOPAMAX) 100 MG tablet, Take 1 tablet by mouth 2 times daily, Disp: 60 tablet, Rfl: 5    albuterol (PROVENTIL HFA;VENTOLIN HFA) 108 (90 BASE) MCG/ACT inhaler, Inhale 2 puffs into the lungs every 4 hours as needed for Wheezing or Shortness of Breath Whichever brand is covered by insurance, substitute as necessary. , Disp: 1 Inhaler, Rfl: 2    Subjective:      Review of Systems   Musculoskeletal: Positive for arthralgias, back pain, myalgias, neck pain and neck stiffness. Neurological: Positive for weakness and numbness. Psychiatric/Behavioral: The patient is nervous/anxious. Objective:     Vitals:    01/30/20 1256   BP: 138/78   Site: Right Upper Arm   Position: Sitting   Cuff Size: Medium Adult   Pulse: 74   Weight: 193 lb (87.5 kg)   Height: 5' 6\" (1.676 m)       Physical Exam  Vitals signs and nursing note reviewed. Constitutional:       General: She is not in acute distress. Appearance: She is well-developed. She is not diaphoretic. HENT:      Head: Normocephalic and atraumatic. Right Ear: External ear normal.      Left Ear: External ear normal.      Nose: Nose normal.      Mouth/Throat:      Pharynx: No oropharyngeal exudate. Eyes:      General: No scleral icterus. Right eye: No discharge. Left eye: No discharge. Conjunctiva/sclera: Conjunctivae normal.      Pupils: Pupils are equal, round, and reactive to light. Neck:      Musculoskeletal: Full passive range of motion without pain, normal range of motion and neck supple. Normal range of motion. No edema, erythema, neck rigidity or muscular tenderness. Thyroid: No thyromegaly. Cardiovascular:      Rate and Rhythm: Normal rate and regular rhythm. Heart sounds: Normal heart sounds. No murmur. No friction rub. No gallop. Pulmonary:      Effort: Pulmonary effort is normal. No respiratory distress. Breath sounds: Normal breath sounds. No wheezing or rales. Chest:      Chest wall: No tenderness. Abdominal:      General: Bowel sounds are normal. There is no distension. Palpations: Abdomen is soft. Tenderness:  There is no abdominal tenderness. There is no guarding or rebound. Musculoskeletal:      Cervical back: She exhibits normal range of motion. Thoracic back: She exhibits normal range of motion. Lumbar back: She exhibits decreased range of motion, tenderness, pain and spasm. Skin:     General: Skin is warm. Coloration: Skin is not pale. Findings: No erythema or rash. Neurological:      Mental Status: She is alert and oriented to person, place, and time. She is not disoriented. Cranial Nerves: No cranial nerve deficit. Sensory: Sensory deficit present. Motor: No atrophy or abnormal muscle tone. Coordination: Coordination normal.      Gait: Gait abnormal.      Deep Tendon Reflexes: Reflexes are normal and symmetric. Comments: SLR + 90 degrees sitting    Left hand and finger numbness    Psychiatric:         Attention and Perception: She is attentive. Mood and Affect: Mood is anxious and depressed. Affect is flat and tearful. Affect is not labile, blunt, angry or inappropriate. Speech: She is communicative. Speech is not rapid and pressured, delayed, slurred or tangential.         Behavior: Behavior is not agitated, slowed, aggressive, withdrawn, hyperactive or combative. Thought Content: Thought content is not paranoid or delusional. Thought content does not include homicidal or suicidal ideation. Thought content does not include homicidal or suicidal plan. Cognition and Memory: Memory is not impaired. She does not exhibit impaired recent memory or impaired remote memory. Judgment: Judgment is not impulsive or inappropriate. JOYCE test: negative   Yeomans test: negative   Gaenslen test: negative      Assessment:     1. Spinal stenosis of lumbar region with neurogenic claudication    2. Lumbar radiculitis    3. Spondylosis of lumbar region without myelopathy or radiculopathy    4. Lumbar disc herniation    5.  DDD (degenerative disc disease), lumbar    6. Myofascial pain dysfunction syndrome    7. Chronic pain syndrome            Plan:      · OARRS reviewed. Current MED: 60.00  · Patient was offered naloxone for home. Already prescribed. · Discussed long term side effects of medications, tolerance, dependency and addiction. · Previous UDS reviewed  · UDS preformed today for compliance. · Patient told can not receive any pain medications from any other source. · No evidence of abuse, diversion or aberrant behavior.  Medications and/or procedures to improve function and quality of life- patient understanding with this and that may not be pain free   Discussed with patient about safe storage of medications at home   Discussed possible weaning of medication dosing dependent on treatment/procedure results.  Discussed with patient about risks with procedure including infection, reaction to medication, increased pain, or bleeding. · Procedure notes reviewed in detail. · Has had L-facet MBB and LESI with no improvement   · Nothing more to offer at this time with procedures  · Was evaluated by 2 different surgeons and both recommended surgery- Patient does not want  · Reviewed L-MRI in detail  · Not a candidate for SCS d/t insurance   · Continue Percocet 10/325 QID prn, Zanaflex. Patient remains compliant. Ordered refill  · Continue Neurontin to 600 mg scheduled BID for nerve pain d/t Kidney function. Meds. Prescribed:   Orders Placed This Encounter   Medications    oxyCODONE-acetaminophen (PERCOCET)  MG per tablet     Sig: Take 1 tablet by mouth every 6 hours as needed for Pain for up to 30 days. Dispense:  120 tablet     Refill:  0     Reduce doses taken as pain becomes manageable    gabapentin (NEURONTIN) 600 MG tablet     Sig: Take 1 tablet by mouth 4 times daily for 30 days.      Dispense:  120 tablet     Refill:  0     Please consider 90 day supplies to promote better adherence    tiZANidine (ZANAFLEX) 4 MG tablet Sig: TAKE 1 TABLET BY MOUTH EVERY 6 HOURS AS NEEDED FOR MUSCLE SPASM     Dispense:  120 tablet     Refill:  0       Return in about 3 months (around 4/30/2020), or if symptoms worsen or fail to improve, for follow up  for medications.          Electronically signed by RENNY Martin CNP on1/30/2020 at 1:17 PM

## 2020-02-23 ENCOUNTER — APPOINTMENT (OUTPATIENT)
Dept: GENERAL RADIOLOGY | Age: 50
End: 2020-02-23
Payer: COMMERCIAL

## 2020-02-23 ENCOUNTER — HOSPITAL ENCOUNTER (EMERGENCY)
Age: 50
Discharge: HOME | End: 2020-02-23
Payer: COMMERCIAL

## 2020-02-23 VITALS
OXYGEN SATURATION: 100 % | DIASTOLIC BLOOD PRESSURE: 122 MMHG | WEIGHT: 193 LBS | RESPIRATION RATE: 18 BRPM | TEMPERATURE: 98.2 F | HEART RATE: 67 BPM | SYSTOLIC BLOOD PRESSURE: 152 MMHG | BODY MASS INDEX: 31.15 KG/M2

## 2020-02-23 LAB
ALBUMIN SERPL-MCNC: 4.1 G/DL (ref 3.5–5.1)
ALP BLD-CCNC: 77 U/L (ref 38–126)
ALT SERPL-CCNC: 28 U/L (ref 11–66)
ANION GAP SERPL CALCULATED.3IONS-SCNC: 12 MEQ/L (ref 8–16)
APTT: 38.6 SECONDS (ref 22–38)
AST SERPL-CCNC: 22 U/L (ref 5–40)
BASOPHILS # BLD: 0.7 %
BASOPHILS ABSOLUTE: 0.1 THOU/MM3 (ref 0–0.1)
BILIRUB SERPL-MCNC: < 0.2 MG/DL (ref 0.3–1.2)
BILIRUBIN DIRECT: < 0.2 MG/DL (ref 0–0.3)
BUN BLDV-MCNC: 31 MG/DL (ref 7–22)
CALCIUM SERPL-MCNC: 8.8 MG/DL (ref 8.5–10.5)
CHLORIDE BLD-SCNC: 109 MEQ/L (ref 98–111)
CO2: 20 MEQ/L (ref 23–33)
CREAT SERPL-MCNC: 0.8 MG/DL (ref 0.4–1.2)
D-DIMER QUANTITATIVE: 366 NG/ML FEU (ref 0–500)
EKG ATRIAL RATE: 77 BPM
EKG P AXIS: 67 DEGREES
EKG P-R INTERVAL: 150 MS
EKG Q-T INTERVAL: 404 MS
EKG QRS DURATION: 88 MS
EKG QTC CALCULATION (BAZETT): 457 MS
EKG R AXIS: 60 DEGREES
EKG T AXIS: 47 DEGREES
EKG VENTRICULAR RATE: 77 BPM
EOSINOPHIL # BLD: 2.1 %
EOSINOPHILS ABSOLUTE: 0.2 THOU/MM3 (ref 0–0.4)
ERYTHROCYTE [DISTWIDTH] IN BLOOD BY AUTOMATED COUNT: 16.7 % (ref 11.5–14.5)
ERYTHROCYTE [DISTWIDTH] IN BLOOD BY AUTOMATED COUNT: 50.6 FL (ref 35–45)
GFR SERPL CREATININE-BSD FRML MDRD: 76 ML/MIN/1.73M2
GLUCOSE BLD-MCNC: 122 MG/DL (ref 70–108)
HCT VFR BLD CALC: 37 % (ref 37–47)
HEMOGLOBIN: 11.7 GM/DL (ref 12–16)
IMMATURE GRANS (ABS): 0.03 THOU/MM3 (ref 0–0.07)
IMMATURE GRANULOCYTES: 0.3 %
INR BLD: 1.5 (ref 0.85–1.13)
LIPASE: 61.3 U/L (ref 5.6–51.3)
LYMPHOCYTES # BLD: 43.3 %
LYMPHOCYTES ABSOLUTE: 3.7 THOU/MM3 (ref 1–4.8)
MCH RBC QN AUTO: 26.1 PG (ref 26–33)
MCHC RBC AUTO-ENTMCNC: 31.6 GM/DL (ref 32.2–35.5)
MCV RBC AUTO: 82.6 FL (ref 81–99)
MONOCYTES # BLD: 7.3 %
MONOCYTES ABSOLUTE: 0.6 THOU/MM3 (ref 0.4–1.3)
NUCLEATED RED BLOOD CELLS: 0 /100 WBC
OSMOLALITY CALCULATION: 289.1 MOSMOL/KG (ref 275–300)
PLATELET # BLD: 241 THOU/MM3 (ref 130–400)
PMV BLD AUTO: 10.5 FL (ref 9.4–12.4)
POTASSIUM REFLEX MAGNESIUM: 3.8 MEQ/L (ref 3.5–5.2)
PRO-BNP: 183.3 PG/ML (ref 0–900)
RBC # BLD: 4.48 MILL/MM3 (ref 4.2–5.4)
SEG NEUTROPHILS: 46.3 %
SEGMENTED NEUTROPHILS ABSOLUTE COUNT: 4 THOU/MM3 (ref 1.8–7.7)
SODIUM BLD-SCNC: 141 MEQ/L (ref 135–145)
TOTAL PROTEIN: 6.6 G/DL (ref 6.1–8)
TROPONIN T: < 0.01 NG/ML
WBC # BLD: 8.6 THOU/MM3 (ref 4.8–10.8)

## 2020-02-23 PROCEDURE — 96374 THER/PROPH/DIAG INJ IV PUSH: CPT

## 2020-02-23 PROCEDURE — 36415 COLL VENOUS BLD VENIPUNCTURE: CPT

## 2020-02-23 PROCEDURE — 96375 TX/PRO/DX INJ NEW DRUG ADDON: CPT

## 2020-02-23 PROCEDURE — 85730 THROMBOPLASTIN TIME PARTIAL: CPT

## 2020-02-23 PROCEDURE — 83880 ASSAY OF NATRIURETIC PEPTIDE: CPT

## 2020-02-23 PROCEDURE — 80076 HEPATIC FUNCTION PANEL: CPT

## 2020-02-23 PROCEDURE — 96372 THER/PROPH/DIAG INJ SC/IM: CPT

## 2020-02-23 PROCEDURE — 96376 TX/PRO/DX INJ SAME DRUG ADON: CPT

## 2020-02-23 PROCEDURE — 80048 BASIC METABOLIC PNL TOTAL CA: CPT

## 2020-02-23 PROCEDURE — 71046 X-RAY EXAM CHEST 2 VIEWS: CPT

## 2020-02-23 PROCEDURE — 6360000002 HC RX W HCPCS: Performed by: NURSE PRACTITIONER

## 2020-02-23 PROCEDURE — 84484 ASSAY OF TROPONIN QUANT: CPT

## 2020-02-23 PROCEDURE — 83690 ASSAY OF LIPASE: CPT

## 2020-02-23 PROCEDURE — 85610 PROTHROMBIN TIME: CPT

## 2020-02-23 PROCEDURE — 85025 COMPLETE CBC W/AUTO DIFF WBC: CPT

## 2020-02-23 PROCEDURE — 99282 EMERGENCY DEPT VISIT SF MDM: CPT

## 2020-02-23 PROCEDURE — 93005 ELECTROCARDIOGRAM TRACING: CPT | Performed by: NURSE PRACTITIONER

## 2020-02-23 PROCEDURE — 85379 FIBRIN DEGRADATION QUANT: CPT

## 2020-02-23 RX ORDER — ALPRAZOLAM 1 MG/1
1 TABLET ORAL 2 TIMES DAILY PRN
Qty: 10 TABLET | Refills: 0 | Status: SHIPPED | OUTPATIENT
Start: 2020-02-23 | End: 2020-02-27

## 2020-02-23 RX ORDER — PROMETHAZINE HYDROCHLORIDE 25 MG/ML
25 INJECTION, SOLUTION INTRAMUSCULAR; INTRAVENOUS ONCE
Status: COMPLETED | OUTPATIENT
Start: 2020-02-23 | End: 2020-02-23

## 2020-02-23 RX ORDER — LORAZEPAM 2 MG/ML
0.5 INJECTION INTRAMUSCULAR ONCE
Status: COMPLETED | OUTPATIENT
Start: 2020-02-23 | End: 2020-02-23

## 2020-02-23 RX ORDER — ONDANSETRON 2 MG/ML
4 INJECTION INTRAMUSCULAR; INTRAVENOUS ONCE
Status: COMPLETED | OUTPATIENT
Start: 2020-02-23 | End: 2020-02-23

## 2020-02-23 RX ORDER — LORAZEPAM 2 MG/ML
1 INJECTION INTRAMUSCULAR ONCE
Status: COMPLETED | OUTPATIENT
Start: 2020-02-23 | End: 2020-02-23

## 2020-02-23 RX ORDER — LORAZEPAM 1 MG/1
0.5 TABLET ORAL EVERY 6 HOURS PRN
Qty: 10 TABLET | Refills: 0 | Status: SHIPPED | OUTPATIENT
Start: 2020-02-23 | End: 2020-02-23 | Stop reason: SINTOL

## 2020-02-23 RX ADMIN — LORAZEPAM 1 MG: 2 INJECTION INTRAMUSCULAR; INTRAVENOUS at 20:30

## 2020-02-23 RX ADMIN — ONDANSETRON 4 MG: 2 INJECTION INTRAMUSCULAR; INTRAVENOUS at 19:41

## 2020-02-23 RX ADMIN — LORAZEPAM 0.5 MG: 2 INJECTION INTRAMUSCULAR; INTRAVENOUS at 19:24

## 2020-02-23 RX ADMIN — PROMETHAZINE HYDROCHLORIDE 25 MG: 25 INJECTION INTRAMUSCULAR; INTRAVENOUS at 20:19

## 2020-02-23 ASSESSMENT — PAIN DESCRIPTION - PAIN TYPE: TYPE: ACUTE PAIN

## 2020-02-23 ASSESSMENT — PAIN DESCRIPTION - DESCRIPTORS: DESCRIPTORS: PRESSURE;SHARP;SHOOTING

## 2020-02-23 ASSESSMENT — PAIN SCALES - GENERAL: PAINLEVEL_OUTOF10: 8

## 2020-02-23 ASSESSMENT — PAIN DESCRIPTION - ORIENTATION: ORIENTATION: LEFT;MID

## 2020-02-23 ASSESSMENT — PAIN DESCRIPTION - LOCATION: LOCATION: CHEST

## 2020-02-24 NOTE — ED PROVIDER NOTES
[facility]  eMERGENCY dEPARTMENT eNCOUnter          CHIEF COMPLAINT       Chief Complaint   Patient presents with    Chest Pain       Nurses Notes reviewed and I agree except as noted in the HPI. HISTORY OF PRESENT ILLNESS    Tramaine Min is a 44 y.o. female who presents to the Emergency Department for the evaluation of left-sided chest pain. Patient reports the pain started several hours ago, she has been a visitor in the ICU where her mother is a patient. Patient states that she had recent admission at [facility] for CHF, also reports that she \"had a pulmonary embolism in my leg\". Patient reports some mild leg swelling, reports that she had 2 episodes of diarrhea this morning. Patient also complaining of abdominal pain, history of diverticulitis. The HPI was provided by the patient. REVIEW OF SYSTEMS     Review of Systems   Constitutional: Negative for chills, diaphoresis, fatigue and fever. HENT: Negative for congestion, ear pain, nosebleeds, rhinorrhea, sinus pressure, sinus pain, sore throat and trouble swallowing. Eyes: Negative for photophobia. Respiratory: Positive for chest tightness and shortness of breath. Negative for cough and wheezing. Cardiovascular: Positive for chest pain and leg swelling. Negative for palpitations. Gastrointestinal: Negative for abdominal pain, constipation, diarrhea, nausea and vomiting. Endocrine: Negative for cold intolerance and heat intolerance. Genitourinary: Negative for difficulty urinating, dysuria, flank pain, hematuria, pelvic pain, vaginal bleeding, vaginal discharge and vaginal pain. Musculoskeletal: Negative for arthralgias, back pain, joint swelling, myalgias and neck stiffness. Skin: Negative for color change and wound. Neurological: Negative for dizziness, weakness, light-headedness, numbness and headaches.    Psychiatric/Behavioral: Negative for agitation, behavioral problems, confusion, tablet TAKE 2 TABLETS BY MOUTH IN THE MORNING FOR 3 DAYS 1 IN THE MORNING UNTIL FINISHED, R-0Historical Med      oxybutynin (DITROPAN) 5 MG tablet Take 1 tablet by mouthHistorical Med      !! DULoxetine (CYMBALTA) 60 MG extended release capsule Take 1 capsule by mouth daily, Disp-30 capsule, R-3Normal      sucralfate (CARAFATE) 1 GM tablet Take 1 tablet by mouth 4 times daily, Disp-120 tablet, R-1Normal      potassium chloride (KLOR-CON M) 20 MEQ extended release tablet Take 2 tablets by mouth daily (with breakfast), Disp-60 tablet, R-1Normal      naloxone (NARCAN) 4 MG/0.1ML LIQD nasal spray 1 spray by Nasal route as needed for Opioid Reversal, Disp-1 each, R-0Normal      SUMAtriptan Succinate (IMITREX PO) Take by mouth as needed Historical Med      propranolol (INDERAL) 80 MG tablet Take 80 mg by mouth 2 times dailyHistorical Med      pentosan polysulfate (ELMIRON) 100 MG capsule Take 1 capsule by mouth 3 times daily (before meals), Disp-90 capsule, R-5      topiramate (TOPAMAX) 100 MG tablet Take 1 tablet by mouth 2 times daily, Disp-60 tablet, R-5      albuterol (PROVENTIL HFA;VENTOLIN HFA) 108 (90 BASE) MCG/ACT inhaler Inhale 2 puffs into the lungs every 4 hours as needed for Wheezing or Shortness of Breath Whichever brand is covered by insurance, substitute as necessary. , Disp-1 Inhaler, R-2       !! - Potential duplicate medications found. Please discuss with provider. ALLERGIES     is allergic to ketorolac; adhesive tape; compazine [prochlorperazine]; erythromycin; lyrica [pregabalin]; morphine; reglan [metoclopramide]; and sulfa antibiotics. FAMILY HISTORY     She indicated that her mother is alive. She indicated that her father is . She indicated that the status of her brother is unknown.   family history includes Heart Attack in her brother; Heart Disease in her father and mother; Inflam Bowel Dis in her mother. SOCIAL HISTORY      reports that she has never smoked.  She has never [id number] [facility] [address]  [phone]    Schedule an appointment as soon as possible for a visit in 3 days  For re-evaluation of symptoms      DISCHARGE MEDICATIONS:  Discharge Medication List as of 2/24/2020  5:36 PM      START taking these medications    Details   ALPRAZolam (XANAX) 1 MG tablet Take 1 tablet by mouth 2 times daily as needed for Anxiety for up to 10 doses. , Disp-10 tablet, R-0Print             (Please note that portions of this note were completed with a voice recognition program.  Efforts were made to edit the dictations but occasionally words are mis-transcribed.)    The patient was given an opportunity to see the Emergency Attending. The patient voiced understanding that I was a Mid-LevelProvider and was in agreement with being seen independently by myself.           Jonny Augustin, RENNY - CNP  02/27/20 1122

## 2020-02-24 NOTE — ED NOTES
Pt to the ED with CP that began when she was visiting her mother in ICU. Dr in ICU gave her mom a few hours to live. Hx anxiety. Pt states she is dizzy and occasionally SOB when standing. States the pain is mid to left chest radiating to the left arm. Rates pain 7/10 sharp occasionally but constantly having pressure.       Erin Roach RN  02/23/20 7683

## 2020-02-27 ASSESSMENT — ENCOUNTER SYMPTOMS
CHEST TIGHTNESS: 1
COUGH: 0
NAUSEA: 0
BACK PAIN: 0
RHINORRHEA: 0
SINUS PRESSURE: 0
VOMITING: 0
COLOR CHANGE: 0
ABDOMINAL PAIN: 0
PHOTOPHOBIA: 0
TROUBLE SWALLOWING: 0
SINUS PAIN: 0
SORE THROAT: 0
WHEEZING: 0
DIARRHEA: 0
SHORTNESS OF BREATH: 1
CONSTIPATION: 0

## 2020-03-06 NOTE — TELEPHONE ENCOUNTER
Sybil Valladares called requesting a refill on the following medications:  Requested Prescriptions     Pending Prescriptions Disp Refills    oxyCODONE-acetaminophen (PERCOCET)  MG per tablet 120 tablet 0     Sig: Take 1 tablet by mouth every 6 hours as needed for Pain for up to 30 days.  gabapentin (NEURONTIN) 600 MG tablet 120 tablet 0     Sig: Take 1 tablet by mouth 4 times daily for 30 days.     tiZANidine (ZANAFLEX) 4 MG tablet 120 tablet 0     Sig: TAKE 1 TABLET BY MOUTH EVERY 6 HOURS AS NEEDED FOR MUSCLE SPASM     Pharmacy verified:  [facility]      Date of last visit: 1/30/20  Date of next visit (if applicable): 2/73/3568

## 2020-03-09 RX ORDER — TIZANIDINE 4 MG/1
TABLET ORAL
Qty: 120 TABLET | Refills: 0 | Status: SHIPPED | OUTPATIENT
Start: 2020-03-09 | End: 2020-04-06

## 2020-03-09 RX ORDER — OXYCODONE AND ACETAMINOPHEN 10; 325 MG/1; MG/1
1 TABLET ORAL EVERY 6 HOURS PRN
Qty: 120 TABLET | Refills: 0 | Status: SHIPPED | OUTPATIENT
Start: 2020-03-09 | End: 2020-04-06 | Stop reason: SDUPTHER

## 2020-03-09 RX ORDER — GABAPENTIN 600 MG/1
600 TABLET ORAL 4 TIMES DAILY
Qty: 120 TABLET | Refills: 0 | Status: SHIPPED | OUTPATIENT
Start: 2020-03-09 | End: 2020-04-06 | Stop reason: SDUPTHER

## 2020-04-06 RX ORDER — TIZANIDINE 4 MG/1
TABLET ORAL
Qty: 120 TABLET | Refills: 0 | Status: SHIPPED | OUTPATIENT
Start: 2020-04-06 | End: 2020-04-30 | Stop reason: SDUPTHER

## 2020-04-06 RX ORDER — OXYCODONE AND ACETAMINOPHEN 10; 325 MG/1; MG/1
1 TABLET ORAL EVERY 6 HOURS PRN
Qty: 120 TABLET | Refills: 0 | Status: SHIPPED | OUTPATIENT
Start: 2020-04-08 | End: 2020-04-30 | Stop reason: SDUPTHER

## 2020-04-06 RX ORDER — GABAPENTIN 600 MG/1
600 TABLET ORAL 4 TIMES DAILY
Qty: 120 TABLET | Refills: 0 | Status: SHIPPED | OUTPATIENT
Start: 2020-04-08 | End: 2020-04-30 | Stop reason: SDUPTHER

## 2020-04-30 ENCOUNTER — VIRTUAL VISIT (OUTPATIENT)
Dept: PHYSICAL MEDICINE AND REHAB | Age: 50
End: 2020-04-30
Payer: COMMERCIAL

## 2020-04-30 PROCEDURE — 3017F COLORECTAL CA SCREEN DOC REV: CPT | Performed by: NURSE PRACTITIONER

## 2020-04-30 PROCEDURE — G8427 DOCREV CUR MEDS BY ELIG CLIN: HCPCS | Performed by: NURSE PRACTITIONER

## 2020-04-30 PROCEDURE — 99213 OFFICE O/P EST LOW 20 MIN: CPT | Performed by: NURSE PRACTITIONER

## 2020-04-30 RX ORDER — GABAPENTIN 600 MG/1
600 TABLET ORAL 4 TIMES DAILY
Qty: 120 TABLET | Refills: 0 | Status: SHIPPED | OUTPATIENT
Start: 2020-05-06 | End: 2020-06-08 | Stop reason: SDUPTHER

## 2020-04-30 RX ORDER — OXYCODONE AND ACETAMINOPHEN 10; 325 MG/1; MG/1
1 TABLET ORAL EVERY 6 HOURS PRN
Qty: 120 TABLET | Refills: 0 | Status: SHIPPED | OUTPATIENT
Start: 2020-05-06 | End: 2020-06-08 | Stop reason: SDUPTHER

## 2020-04-30 RX ORDER — TIZANIDINE 4 MG/1
4 TABLET ORAL EVERY 8 HOURS PRN
Qty: 120 TABLET | Refills: 0 | Status: SHIPPED | OUTPATIENT
Start: 2020-05-06 | End: 2020-06-08 | Stop reason: SDUPTHER

## 2020-04-30 ASSESSMENT — ENCOUNTER SYMPTOMS
GASTROINTESTINAL NEGATIVE: 1
EYES NEGATIVE: 1
RESPIRATORY NEGATIVE: 1
BACK PAIN: 1

## 2020-04-30 NOTE — PROGRESS NOTES
(DITROPAN) 5 MG tablet, Take 1 tablet by mouth, Disp: , Rfl:     DULoxetine (CYMBALTA) 60 MG extended release capsule, Take 1 capsule by mouth daily, Disp: 30 capsule, Rfl: 3    sucralfate (CARAFATE) 1 GM tablet, Take 1 tablet by mouth 4 times daily, Disp: 120 tablet, Rfl: 1    potassium chloride (KLOR-CON M) 20 MEQ extended release tablet, Take 2 tablets by mouth daily (with breakfast), Disp: 60 tablet, Rfl: 1    naloxone (NARCAN) 4 MG/0.1ML LIQD nasal spray, 1 spray by Nasal route as needed for Opioid Reversal, Disp: 1 each, Rfl: 0    SUMAtriptan Succinate (IMITREX PO), Take by mouth as needed , Disp: , Rfl:     propranolol (INDERAL) 80 MG tablet, Take 80 mg by mouth 2 times daily, Disp: , Rfl:     pentosan polysulfate (ELMIRON) 100 MG capsule, Take 1 capsule by mouth 3 times daily (before meals), Disp: 90 capsule, Rfl: 5    topiramate (TOPAMAX) 100 MG tablet, Take 1 tablet by mouth 2 times daily, Disp: 60 tablet, Rfl: 5    albuterol (PROVENTIL HFA;VENTOLIN HFA) 108 (90 BASE) MCG/ACT inhaler, Inhale 2 puffs into the lungs every 4 hours as needed for Wheezing or Shortness of Breath Whichever brand is covered by insurance, substitute as necessary. , Disp: 1 Inhaler, Rfl: 2    Subjective:      Review of Systems   Constitutional: Negative. HENT: Positive for congestion. Allergies   Eyes: Negative. Respiratory: Negative. Cardiovascular: Negative. Gastrointestinal: Negative. Endocrine: Negative. Genitourinary: Negative. Musculoskeletal: Positive for arthralgias, back pain, gait problem and myalgias. Skin: Negative. Neurological: Positive for weakness and numbness. Psychiatric/Behavioral: Positive for decreased concentration and dysphoric mood. The patient is nervous/anxious. Depressed d/t mom passing away. Objective: There were no vitals filed for this visit. Physical Exam  Constitutional:       Appearance: Normal appearance.    HENT:      Head: Normocephalic. Neurological:      Mental Status: She is alert and oriented to person, place, and time. Psychiatric:      Comments: Depressed flat mood            Assessment:     1. Lumbar radiculitis    2. Spinal stenosis of lumbar region with neurogenic claudication    3. Lumbar disc herniation    4. DDD (degenerative disc disease), lumbar    5. Spondylosis of lumbar region without myelopathy or radiculopathy    6. Chronic pain syndrome    7. Myofascial pain dysfunction syndrome            Plan:      · OARRS reviewed. Current MED: 60.00  · Patient was offered naloxone for home. Already prescribed  · Discussed long term side effects of medications, tolerance, dependency and addiction. · Previous UDS reviewed  · UDS preformed today for compliance. · Patient told can not receive any pain medications from any other source. · No evidence of abuse, diversion or aberrant behavior.  Medications and/or procedures to improve function and quality of life- patient understanding with this and that may not be pain free   Discussed with patient about safe storage of medications at home   Discussed possible weaning of medication dosing dependent on treatment/procedure results.  Discussed with patient about risks with procedure including infection, reaction to medication, increased pain, or bleeding.  Procedure notes reviewed in detail.  Has had L-facet MBB and LESI with no improvement    Nothing more to offer at this time with procedure   Still trying to hold off on surgery that was recommended by 2 surgeons.  Not a candidate for SCS d/t insurance    Continue Percocet 10/325 QID prn, Zanaflex. Patient remains compliant. Ordered refill   Continue Neurontin to 600 mg scheduled BID for nerve pain d/t Kidney function.  Will order a UDS at next face to face visit d/t virus. Meds.  Prescribed:   Orders Placed This Encounter   Medications    oxyCODONE-acetaminophen (PERCOCET)  MG per tablet     Sig:

## 2020-06-04 NOTE — TELEPHONE ENCOUNTER
Christiano Mobley called requesting a refill on the following medications:  Requested Prescriptions     Pending Prescriptions Disp Refills    oxyCODONE-acetaminophen (PERCOCET)  MG per tablet 120 tablet 0     Sig: Take 1 tablet by mouth every 6 hours as needed for Pain for up to 30 days.  tiZANidine (ZANAFLEX) 4 MG tablet 120 tablet 0     Sig: Take 1 tablet by mouth every 8 hours as needed (spasms)    gabapentin (NEURONTIN) 600 MG tablet 120 tablet 0     Sig: Take 1 tablet by mouth 4 times daily for 30 days. Pharmacy verified: [facility] in [facility]   . pv      Date of last visit: 4/30/2020  Date of next visit (if applicable): 7/89/9810

## 2020-06-08 RX ORDER — GABAPENTIN 600 MG/1
600 TABLET ORAL 4 TIMES DAILY
Qty: 120 TABLET | Refills: 0 | Status: SHIPPED | OUTPATIENT
Start: 2020-06-08 | End: 2020-07-06 | Stop reason: SDUPTHER

## 2020-06-08 RX ORDER — TIZANIDINE 4 MG/1
4 TABLET ORAL EVERY 8 HOURS PRN
Qty: 120 TABLET | Refills: 0 | Status: SHIPPED | OUTPATIENT
Start: 2020-06-08 | End: 2020-07-06 | Stop reason: SDUPTHER

## 2020-06-08 RX ORDER — OXYCODONE AND ACETAMINOPHEN 10; 325 MG/1; MG/1
1 TABLET ORAL EVERY 6 HOURS PRN
Qty: 120 TABLET | Refills: 0 | Status: SHIPPED | OUTPATIENT
Start: 2020-06-08 | End: 2020-07-06 | Stop reason: SDUPTHER

## 2020-07-06 RX ORDER — GABAPENTIN 600 MG/1
600 TABLET ORAL 4 TIMES DAILY
Qty: 120 TABLET | Refills: 0 | Status: SHIPPED | OUTPATIENT
Start: 2020-07-08 | End: 2020-08-04 | Stop reason: SDUPTHER

## 2020-07-06 RX ORDER — OXYCODONE AND ACETAMINOPHEN 10; 325 MG/1; MG/1
1 TABLET ORAL EVERY 6 HOURS PRN
Qty: 120 TABLET | Refills: 0 | Status: SHIPPED | OUTPATIENT
Start: 2020-07-08 | End: 2020-08-04 | Stop reason: SDUPTHER

## 2020-07-06 RX ORDER — TIZANIDINE 4 MG/1
4 TABLET ORAL EVERY 8 HOURS PRN
Qty: 120 TABLET | Refills: 0 | Status: SHIPPED | OUTPATIENT
Start: 2020-07-08 | End: 2020-08-04 | Stop reason: SDUPTHER

## 2020-07-06 NOTE — TELEPHONE ENCOUNTER
OARRS reviewed. UDS: + for  Oxycodone gabapentin consistent. Narcan offered: yes  Last seen: 4/30/2020.  Follow-up:   Future Appointments   Date Time Provider Laisha López   7/15/2020  8:45 AM RENNY Romeo - CNP [facility] [place]

## 2020-07-15 ENCOUNTER — VIRTUAL VISIT (OUTPATIENT)
Dept: PHYSICAL MEDICINE AND REHAB | Age: 50
End: 2020-07-15
Payer: COMMERCIAL

## 2020-07-15 PROCEDURE — G8427 DOCREV CUR MEDS BY ELIG CLIN: HCPCS | Performed by: NURSE PRACTITIONER

## 2020-07-15 PROCEDURE — 99213 OFFICE O/P EST LOW 20 MIN: CPT | Performed by: NURSE PRACTITIONER

## 2020-07-15 PROCEDURE — 3017F COLORECTAL CA SCREEN DOC REV: CPT | Performed by: NURSE PRACTITIONER

## 2020-07-15 ASSESSMENT — ENCOUNTER SYMPTOMS
COUGH: 1
EYES NEGATIVE: 1
BACK PAIN: 1
CHEST TIGHTNESS: 1
GASTROINTESTINAL NEGATIVE: 1

## 2020-07-15 NOTE — PROGRESS NOTES
HPI:   Fallon Dumont is a 44 y.o. female is here today for    Chief Complaint: Lower back and leg pain     HPI   Video Visit. TELEHEALTH EVALUATION -- Audio/Visual (During OQIBX-31 public health emergency). This visit was completed virtually using doxy. me. Location of visit: patients home, providers office. 3 month FU. Patient canceled on her last appointment d/t being ill. Continues to have pain across lower back and radiating down bilateral legs. Pain remains elevated at times. Sharp, burning, aching pain with numbness and pins and needles down legs. Still does not want lumbar surgery. Current pain medications remain effective. Has not been feeling well- has been having a fever and Migraines and looks and sounds really sick. Is currently self quarantine  Medications reviewed. Patient denies side effects with medications. Patient states she is taking medications as prescribed. Shedenies receiving pain medications from other sources. She had 1 ER visit since last visit  any ER visits since last visit. Pain scale with out pain medications or at its worst is 9/10. Pain scale with pain medications or at its best is 4-5/10. Last dose of Percocet and Neurontin was today  Drug screen reviewed from 1/30/2020 and was appropriate  Pill count was not completed today video visit   Patient does have naloxone available at home. Patient has not required use of naloxone at home since last office visit. The patientis allergic to ketorolac; adhesive tape; compazine [prochlorperazine]; erythromycin; lyrica [pregabalin]; morphine; reglan [metoclopramide]; and sulfa antibiotics. Past Medical History  Nafisa Gregorio  has a past medical history of Anxiety, Arthritis, Asthma, Nuñez's esophagus, Blood circulation, collateral, GERD (gastroesophageal reflux disease), Hypertension, Insomnia, Interstitial cystitis, Migraine, Pneumonia, Psychiatric problem, and Spinal stenosis of lumbar region.     Past Surgical History  The patient  has a past surgical history that includes Abdomen surgery (93); ovarian cyst removal (93); Tubal ligation (93); Colonoscopy (2010); Endoscopy, colon, diagnostic (2010); Patellar tendon repair (Right, 1994); Dilatation, esophagus (2010); Cardiac catheterization (unsure); other surgical history (21 Nov 2014); Cholecystectomy; Appendectomy; other surgical history (N/A, 03-21-16); other surgical history (N/A, 04-04-16); other surgical history (Bilateral, 6-13-16); Upper gastrointestinal endoscopy (2012); other surgical history (Left, 09/13/2016); and Carpal tunnel release (Left). Family History  This patient's family history includes Heart Attack in her brother; Heart Disease in her father and mother; Inflam Bowel Dis in her mother. Social History  Theresa Benitez  reports that she has never smoked. She has never used smokeless tobacco. She reports current alcohol use. She reports that she does not use drugs. Medications    Current Outpatient Medications:     oxyCODONE-acetaminophen (PERCOCET)  MG per tablet, Take 1 tablet by mouth every 6 hours as needed for Pain for up to 30 days. , Disp: 120 tablet, Rfl: 0    gabapentin (NEURONTIN) 600 MG tablet, Take 1 tablet by mouth 4 times daily for 30 days. , Disp: 120 tablet, Rfl: 0    tiZANidine (ZANAFLEX) 4 MG tablet, Take 1 tablet by mouth every 8 hours as needed (spasms), Disp: 120 tablet, Rfl: 0    apixaban (ELIQUIS) 5 MG TABS tablet, Take 5 mg by mouth 2 times daily, Disp: , Rfl:     Promethazine HCl (PHENERGAN PO), Take by mouth, Disp: , Rfl:     QUEtiapine (SEROQUEL) 100 MG tablet, Take 2 tablets by mouth nightly, Disp: 60 tablet, Rfl: 0    DULoxetine (CYMBALTA) 30 MG extended release capsule, Take 1 capsule by mouth daily In addition to 60 mg tablet, Disp: 30 capsule, Rfl: 3    dexlansoprazole (DEXILANT) 60 MG CPDR delayed release capsule, Take by mouth, Disp: , Rfl:     furosemide (LASIX) 20 MG tablet, TAKE 2 TABLETS BY MOUTH IN THE MORNING FOR 3 DAYS 1 IN THE MORNING UNTIL FINISHED, Disp: , Rfl: 0    oxybutynin (DITROPAN) 5 MG tablet, Take 1 tablet by mouth, Disp: , Rfl:     DULoxetine (CYMBALTA) 60 MG extended release capsule, Take 1 capsule by mouth daily, Disp: 30 capsule, Rfl: 3    sucralfate (CARAFATE) 1 GM tablet, Take 1 tablet by mouth 4 times daily, Disp: 120 tablet, Rfl: 1    potassium chloride (KLOR-CON M) 20 MEQ extended release tablet, Take 2 tablets by mouth daily (with breakfast), Disp: 60 tablet, Rfl: 1    naloxone (NARCAN) 4 MG/0.1ML LIQD nasal spray, 1 spray by Nasal route as needed for Opioid Reversal, Disp: 1 each, Rfl: 0    SUMAtriptan Succinate (IMITREX PO), Take by mouth as needed , Disp: , Rfl:     propranolol (INDERAL) 80 MG tablet, Take 80 mg by mouth 2 times daily, Disp: , Rfl:     pentosan polysulfate (ELMIRON) 100 MG capsule, Take 1 capsule by mouth 3 times daily (before meals), Disp: 90 capsule, Rfl: 5    topiramate (TOPAMAX) 100 MG tablet, Take 1 tablet by mouth 2 times daily, Disp: 60 tablet, Rfl: 5    albuterol (PROVENTIL HFA;VENTOLIN HFA) 108 (90 BASE) MCG/ACT inhaler, Inhale 2 puffs into the lungs every 4 hours as needed for Wheezing or Shortness of Breath Whichever brand is covered by insurance, substitute as necessary. , Disp: 1 Inhaler, Rfl: 2    Subjective:      Review of Systems   Constitutional: Positive for activity change, chills, fatigue and fever. HENT: Positive for congestion. Eyes: Negative. Respiratory: Positive for cough and chest tightness. Cardiovascular: Negative. Gastrointestinal: Negative. Genitourinary: Negative. Musculoskeletal: Positive for arthralgias, back pain and myalgias. Skin: Negative. Neurological: Positive for weakness, numbness and headaches. Psychiatric/Behavioral: Positive for dysphoric mood. Objective: There were no vitals filed for this visit. Physical Exam  Constitutional:       Appearance: She is ill-appearing.    HENT: Head: Normocephalic. Skin:     Coloration: Skin is pale. Neurological:      General: No focal deficit present. Mental Status: She is alert and oriented to person, place, and time. Psychiatric:         Mood and Affect: Mood normal.            Assessment:     1. Spinal stenosis of lumbar region with neurogenic claudication    2. Lumbar radiculitis    3. Lumbar disc herniation    4. DDD (degenerative disc disease), lumbar    5. Spondylosis of lumbar region without myelopathy or radiculopathy    6. Myofascial pain dysfunction syndrome    7. Chronic pain syndrome            Plan:      · OARRS reviewed. Current MED: 60.00  · Patient was offered naloxone for home. Already has   · Discussed long term side effects of medications, tolerance, dependency and addiction. · Previous UDS reviewed  · UDS preformed today for compliance. · Patient told can not receive any pain medications from any other source. · No evidence of abuse, diversion or aberrant behavior.  Medications and/or procedures to improve function and quality of life- patient understanding with this and that may not be pain free   Discussed with patient about safe storage of medications at home   Discussed possible weaning of medication dosing dependent on treatment/procedure results.  Discussed with patient about risks with procedure including infection, reaction to medication, increased pain, or bleeding. · Procedure notes reviewed in detail. · Has had L-facet MBB and LESI with no improvement   · Nothing more to offer at this time with procedure  · Still trying to hold off on surgery that was recommended by 2 surgeons. · Not a candidate for SCS d/t insurance   · Continue Percocet 10/325 QID prn, Zanaflex. Patient remains compliant. Filled 7/6/2020  · Continue Neurontin to 600 mg scheduled BID for nerve pain d/t Kidney function. Filled 7/6/2020  · Patient is really ill at this time, possible COVID. Will need updated UDS soon.  Will FU in 1

## 2020-08-03 NOTE — TELEPHONE ENCOUNTER
Monika Meier called requesting a refill on the following medications:  Requested Prescriptions     Pending Prescriptions Disp Refills    oxyCODONE-acetaminophen (PERCOCET)  MG per tablet 120 tablet 0     Sig: Take 1 tablet by mouth every 6 hours as needed for Pain for up to 30 days.  tiZANidine (ZANAFLEX) 4 MG tablet 120 tablet 0     Sig: Take 1 tablet by mouth every 8 hours as needed (spasms)    gabapentin (NEURONTIN) 600 MG tablet 120 tablet 0     Sig: Take 1 tablet by mouth 4 times daily for 30 days.      Pharmacy verified:  [facility]      Date of last visit: 07-15-20   Date of next visit (if applicable): 83-54-28

## 2020-08-04 RX ORDER — GABAPENTIN 600 MG/1
600 TABLET ORAL 4 TIMES DAILY
Qty: 120 TABLET | Refills: 0 | Status: SHIPPED | OUTPATIENT
Start: 2020-08-05 | End: 2020-09-02 | Stop reason: SDUPTHER

## 2020-08-04 RX ORDER — OXYCODONE AND ACETAMINOPHEN 10; 325 MG/1; MG/1
1 TABLET ORAL EVERY 6 HOURS PRN
Qty: 120 TABLET | Refills: 0 | Status: SHIPPED | OUTPATIENT
Start: 2020-08-05 | End: 2020-09-02 | Stop reason: SDUPTHER

## 2020-08-04 RX ORDER — TIZANIDINE 4 MG/1
4 TABLET ORAL EVERY 6 HOURS PRN
Qty: 120 TABLET | Refills: 0 | Status: SHIPPED | OUTPATIENT
Start: 2020-08-04 | End: 2020-09-02 | Stop reason: SDUPTHER

## 2020-08-04 NOTE — TELEPHONE ENCOUNTER
OARRS reviewed.  UDS: + for  Gabapentin, percocet  - consistent    Narcan offered: yes, has     Last seen: 1/30/2020.- vv 07/15/2020     Follow-up:   Future Appointments   Date Time Provider Laisha López   8/12/2020 12:15 PM RENNY Mohr - CNP [facility] [place]

## 2020-08-12 ENCOUNTER — HOSPITAL ENCOUNTER (OUTPATIENT)
Age: 50
Setting detail: SPECIMEN
Discharge: HOME | End: 2020-08-12
Payer: COMMERCIAL

## 2020-08-12 ENCOUNTER — OFFICE VISIT (OUTPATIENT)
Dept: PHYSICAL MEDICINE AND REHAB | Age: 50
End: 2020-08-12
Payer: COMMERCIAL

## 2020-08-12 VITALS
DIASTOLIC BLOOD PRESSURE: 68 MMHG | TEMPERATURE: 97.5 F | HEART RATE: 60 BPM | SYSTOLIC BLOOD PRESSURE: 128 MMHG | HEIGHT: 66 IN | WEIGHT: 195 LBS | BODY MASS INDEX: 31.34 KG/M2

## 2020-08-12 LAB
ABSOLUTE EOS #: 0.12 K/UL (ref 0–0.44)
ABSOLUTE IMMATURE GRANULOCYTE: 0.03 K/UL (ref 0–0.3)
ABSOLUTE LYMPH #: 2.28 K/UL (ref 1.1–3.7)
ABSOLUTE MONO #: 0.45 K/UL (ref 0.1–1.2)
ALBUMIN SERPL-MCNC: 3.9 G/DL (ref 3.5–5.2)
ALBUMIN/GLOBULIN RATIO: 1.4 (ref 1–2.5)
ALP BLD-CCNC: 87 U/L (ref 35–104)
ALT SERPL-CCNC: 25 U/L (ref 5–33)
ANION GAP SERPL CALCULATED.3IONS-SCNC: 15 MMOL/L (ref 9–17)
AST SERPL-CCNC: 42 U/L
BASOPHILS # BLD: 1 % (ref 0–2)
BASOPHILS ABSOLUTE: 0.04 K/UL (ref 0–0.2)
BILIRUB SERPL-MCNC: 0.16 MG/DL (ref 0.3–1.2)
BUN BLDV-MCNC: 19 MG/DL (ref 6–20)
BUN/CREAT BLD: ABNORMAL (ref 9–20)
CALCIUM SERPL-MCNC: 9.4 MG/DL (ref 8.6–10.4)
CHLORIDE BLD-SCNC: 103 MMOL/L (ref 98–107)
CHOLESTEROL/HDL RATIO: 5.2
CHOLESTEROL: 232 MG/DL
CO2: 22 MMOL/L (ref 20–31)
CREAT SERPL-MCNC: 1.01 MG/DL (ref 0.5–0.9)
DIFFERENTIAL TYPE: ABNORMAL
EOSINOPHILS RELATIVE PERCENT: 2 % (ref 1–4)
ESTIMATED AVERAGE GLUCOSE: 103 MG/DL
GFR AFRICAN AMERICAN: >60 ML/MIN
GFR NON-AFRICAN AMERICAN: 58 ML/MIN
GFR SERPL CREATININE-BSD FRML MDRD: ABNORMAL ML/MIN/{1.73_M2}
GFR SERPL CREATININE-BSD FRML MDRD: ABNORMAL ML/MIN/{1.73_M2}
GLUCOSE BLD-MCNC: 101 MG/DL (ref 70–99)
HBA1C MFR BLD: 5.2 % (ref 4–6)
HCT VFR BLD CALC: 42.4 % (ref 36.3–47.1)
HDLC SERPL-MCNC: 45 MG/DL
HEMOGLOBIN: 13 G/DL (ref 11.9–15.1)
HEPATITIS C ANTIBODY: NONREACTIVE
IMMATURE GRANULOCYTES: 0 %
LDL CHOLESTEROL: 127 MG/DL (ref 0–130)
LYMPHOCYTES # BLD: 30 % (ref 24–43)
MCH RBC QN AUTO: 26.8 PG (ref 25.2–33.5)
MCHC RBC AUTO-ENTMCNC: 30.7 G/DL (ref 28.4–34.8)
MCV RBC AUTO: 87.4 FL (ref 82.6–102.9)
MONOCYTES # BLD: 6 % (ref 3–12)
NRBC AUTOMATED: 0 PER 100 WBC
PDW BLD-RTO: 19.2 % (ref 11.8–14.4)
PLATELET # BLD: 232 K/UL (ref 138–453)
PLATELET ESTIMATE: ABNORMAL
PMV BLD AUTO: 12.7 FL (ref 8.1–13.5)
POTASSIUM SERPL-SCNC: 4.1 MMOL/L (ref 3.7–5.3)
RBC # BLD: 4.85 M/UL (ref 3.95–5.11)
RBC # BLD: ABNORMAL 10*6/UL
SEDIMENTATION RATE, ERYTHROCYTE: 18 MM (ref 0–30)
SEG NEUTROPHILS: 61 % (ref 36–65)
SEGMENTED NEUTROPHILS ABSOLUTE COUNT: 4.73 K/UL (ref 1.5–8.1)
SODIUM BLD-SCNC: 140 MMOL/L (ref 135–144)
THYROXINE, FREE: 0.66 NG/DL (ref 0.93–1.7)
TOTAL PROTEIN: 6.6 G/DL (ref 6.4–8.3)
TRIGL SERPL-MCNC: 300 MG/DL
TSH SERPL DL<=0.05 MIU/L-ACNC: 2.38 MIU/L (ref 0.3–5)
URIC ACID: 6.6 MG/DL (ref 2.4–5.7)
VITAMIN D 25-HYDROXY: 17.2 NG/ML (ref 30–100)
VLDLC SERPL CALC-MCNC: ABNORMAL MG/DL (ref 1–30)
WBC # BLD: 7.7 K/UL (ref 3.5–11.3)
WBC # BLD: ABNORMAL 10*3/UL

## 2020-08-12 PROCEDURE — 1036F TOBACCO NON-USER: CPT | Performed by: NURSE PRACTITIONER

## 2020-08-12 PROCEDURE — 99213 OFFICE O/P EST LOW 20 MIN: CPT | Performed by: NURSE PRACTITIONER

## 2020-08-12 PROCEDURE — G8427 DOCREV CUR MEDS BY ELIG CLIN: HCPCS | Performed by: NURSE PRACTITIONER

## 2020-08-12 PROCEDURE — 3017F COLORECTAL CA SCREEN DOC REV: CPT | Performed by: NURSE PRACTITIONER

## 2020-08-12 PROCEDURE — G8417 CALC BMI ABV UP PARAM F/U: HCPCS | Performed by: NURSE PRACTITIONER

## 2020-08-12 ASSESSMENT — ENCOUNTER SYMPTOMS
COUGH: 1
BACK PAIN: 1

## 2020-08-12 NOTE — PROGRESS NOTES
Take 1 tablet by mouth every 6 hours as needed (spasms), Disp: 120 tablet, Rfl: 0    gabapentin (NEURONTIN) 600 MG tablet, Take 1 tablet by mouth 4 times daily for 30 days. , Disp: 120 tablet, Rfl: 0    apixaban (ELIQUIS) 5 MG TABS tablet, Take 5 mg by mouth 2 times daily, Disp: , Rfl:     Promethazine HCl (PHENERGAN PO), Take by mouth, Disp: , Rfl:     QUEtiapine (SEROQUEL) 100 MG tablet, Take 2 tablets by mouth nightly, Disp: 60 tablet, Rfl: 0    DULoxetine (CYMBALTA) 30 MG extended release capsule, Take 1 capsule by mouth daily In addition to 60 mg tablet, Disp: 30 capsule, Rfl: 3    dexlansoprazole (DEXILANT) 60 MG CPDR delayed release capsule, Take by mouth, Disp: , Rfl:     furosemide (LASIX) 20 MG tablet, TAKE 2 TABLETS BY MOUTH IN THE MORNING FOR 3 DAYS 1 IN THE MORNING UNTIL FINISHED, Disp: , Rfl: 0    oxybutynin (DITROPAN) 5 MG tablet, Take 1 tablet by mouth, Disp: , Rfl:     DULoxetine (CYMBALTA) 60 MG extended release capsule, Take 1 capsule by mouth daily, Disp: 30 capsule, Rfl: 3    sucralfate (CARAFATE) 1 GM tablet, Take 1 tablet by mouth 4 times daily, Disp: 120 tablet, Rfl: 1    potassium chloride (KLOR-CON M) 20 MEQ extended release tablet, Take 2 tablets by mouth daily (with breakfast), Disp: 60 tablet, Rfl: 1    naloxone (NARCAN) 4 MG/0.1ML LIQD nasal spray, 1 spray by Nasal route as needed for Opioid Reversal, Disp: 1 each, Rfl: 0    SUMAtriptan Succinate (IMITREX PO), Take by mouth as needed , Disp: , Rfl:     propranolol (INDERAL) 80 MG tablet, Take 80 mg by mouth 2 times daily, Disp: , Rfl:     pentosan polysulfate (ELMIRON) 100 MG capsule, Take 1 capsule by mouth 3 times daily (before meals), Disp: 90 capsule, Rfl: 5    topiramate (TOPAMAX) 100 MG tablet, Take 1 tablet by mouth 2 times daily, Disp: 60 tablet, Rfl: 5    albuterol (PROVENTIL HFA;VENTOLIN HFA) 108 (90 BASE) MCG/ACT inhaler, Inhale 2 puffs into the lungs every 4 hours as needed for Wheezing or Shortness of Breath Whichever brand is covered by insurance, substitute as necessary. , Disp: 1 Inhaler, Rfl: 2    Subjective:      Review of Systems   Constitutional: Negative for fever. Respiratory: Positive for cough. Musculoskeletal: Positive for arthralgias, back pain, gait problem and myalgias. Neurological: Positive for weakness and numbness. Psychiatric/Behavioral: Positive for dysphoric mood. The patient is nervous/anxious. Objective:     Vitals:    08/12/20 1216   BP: 128/68   Site: Left Upper Arm   Position: Sitting   Cuff Size: Medium Adult   Pulse: 60   Temp: 97.5 °F (36.4 °C)   TempSrc: Temporal   Weight: 195 lb (88.5 kg)   Height: 5' 6\" (1.676 m)       Physical Exam  Vitals signs and nursing note reviewed. Constitutional:       General: She is not in acute distress. Appearance: She is well-developed. She is ill-appearing. She is not diaphoretic. HENT:      Head: Normocephalic and atraumatic. Right Ear: External ear normal.      Left Ear: External ear normal.      Nose: Nose normal.      Mouth/Throat:      Pharynx: No oropharyngeal exudate. Eyes:      General: No scleral icterus. Right eye: No discharge. Left eye: No discharge. Conjunctiva/sclera: Conjunctivae normal.      Pupils: Pupils are equal, round, and reactive to light. Neck:      Musculoskeletal: Full passive range of motion without pain, normal range of motion and neck supple. Normal range of motion. No edema, erythema, neck rigidity or muscular tenderness. Thyroid: No thyromegaly. Cardiovascular:      Rate and Rhythm: Normal rate and regular rhythm. Heart sounds: Normal heart sounds. No murmur. No friction rub. No gallop. Pulmonary:      Effort: Pulmonary effort is normal. No respiratory distress. Breath sounds: Normal breath sounds. No wheezing or rales. Chest:      Chest wall: No tenderness. Abdominal:      General: Bowel sounds are normal. There is no distension.       Palpations: lumbar    5. Spondylosis of lumbar region without myelopathy or radiculopathy    6. Myofascial pain dysfunction syndrome    7. Chronic pain syndrome            Plan:      · OARRS reviewed. Current MED: 60.00  · Patient was offered naloxone for home. Has  · Discussed long term side effects of medications, tolerance, dependency and addiction. · Previous UDS reviewed  · UDS preformed today for compliance. · Patient told can not receive any pain medications from any other source. · No evidence of abuse, diversion or aberrant behavior.  Medications and/or procedures to improve function and quality of life- patient understanding with this and that may not be pain free   Discussed with patient about safe storage of medications at home   Discussed possible weaning of medication dosing dependent on treatment/procedure results.  Discussed with patient about risks with procedure including infection, reaction to medication, increased pain, or bleeding. · Procedure notes reviewed in detail. · Has had L-facet MBB and LESI with no improvement   · Nothing more to offer at this time with procedures  · Was evaluated by 2 different surgeons and both recommended surgery- Patient does not want  · Reviewed L-MRI in detail  · Not a candidate for SCS d/t insurance   · Continue Percocet 10/325 QID prn, Zanaflex. Patient remains compliant. Filled 8/5/2020  · Continue Neurontin to 600 mg scheduled BID for nerve pain d/t Kidney function Filled 8/6/2020  · Updated UDS ordered today       Meds. Prescribed:   No orders of the defined types were placed in this encounter. Return in about 2 months (around 10/12/2020) for follow up  for medications.        Electronically signed by RENNY Price CNP on8/12/2020 at 12:37 PM

## 2020-08-31 NOTE — TELEPHONE ENCOUNTER
Patient is also wanting to know if she can get back on something for anti inflammatory. Kori Boyce called requesting a refill on the following medications:  Requested Prescriptions     Pending Prescriptions Disp Refills    gabapentin (NEURONTIN) 600 MG tablet 120 tablet 0     Sig: Take 1 tablet by mouth 4 times daily for 30 days.  oxyCODONE-acetaminophen (PERCOCET)  MG per tablet 120 tablet 0     Sig: Take 1 tablet by mouth every 6 hours as needed for Pain for up to 30 days.     tiZANidine (ZANAFLEX) 4 MG tablet 120 tablet 0     Sig: Take 1 tablet by mouth every 6 hours as needed (spasms)     Pharmacy verified:  [facility]    Date of last visit: 08/12/2020  Date of next visit (if applicable): 33/12/6538

## 2020-09-02 RX ORDER — OXYCODONE AND ACETAMINOPHEN 10; 325 MG/1; MG/1
1 TABLET ORAL EVERY 6 HOURS PRN
Qty: 120 TABLET | Refills: 0 | Status: SHIPPED | OUTPATIENT
Start: 2020-09-04 | End: 2020-09-30 | Stop reason: SDUPTHER

## 2020-09-02 RX ORDER — TIZANIDINE 4 MG/1
4 TABLET ORAL EVERY 6 HOURS PRN
Qty: 120 TABLET | Refills: 0 | Status: SHIPPED | OUTPATIENT
Start: 2020-09-02 | End: 2020-09-30 | Stop reason: SDUPTHER

## 2020-09-02 RX ORDER — GABAPENTIN 600 MG/1
600 TABLET ORAL 4 TIMES DAILY
Qty: 120 TABLET | Refills: 0 | Status: SHIPPED | OUTPATIENT
Start: 2020-09-05 | End: 2020-09-30 | Stop reason: SDUPTHER

## 2020-09-02 NOTE — TELEPHONE ENCOUNTER
OARRS reviewed. UDS: + for  Gabapentin, Percocet consistent. Narcan offered: Offered  Last seen: 8/12/2020. Follow-up: 10/14/2020    Patient also asking if she can get back on something for anti-inflammatory? Please advise.

## 2020-09-21 ENCOUNTER — NURSE ONLY (OUTPATIENT)
Dept: LAB | Age: 50
End: 2020-09-21

## 2020-09-28 NOTE — TELEPHONE ENCOUNTER
Joaquin White called requesting a refill on the following medications:  Requested Prescriptions     Pending Prescriptions Disp Refills    oxyCODONE-acetaminophen (PERCOCET)  MG per tablet 120 tablet 0     Sig: Take 1 tablet by mouth every 6 hours as needed for Pain for up to 30 days.  tiZANidine (ZANAFLEX) 4 MG tablet 120 tablet 0     Sig: Take 1 tablet by mouth every 6 hours as needed (spasms)    gabapentin (NEURONTIN) 600 MG tablet 120 tablet 0     Sig: Take 1 tablet by mouth 4 times daily for 30 days. Pharmacy verified: [facility]  . [facility]      Date of last visit: 8/12/2020  Date of next visit (if applicable): 43/24/8258

## 2020-09-30 RX ORDER — TIZANIDINE 4 MG/1
4 TABLET ORAL EVERY 6 HOURS PRN
Qty: 120 TABLET | Refills: 0 | Status: SHIPPED | OUTPATIENT
Start: 2020-10-02 | End: 2020-10-28 | Stop reason: SDUPTHER

## 2020-09-30 RX ORDER — GABAPENTIN 600 MG/1
600 TABLET ORAL 4 TIMES DAILY
Qty: 120 TABLET | Refills: 0 | Status: SHIPPED | OUTPATIENT
Start: 2020-10-02 | End: 2020-10-28 | Stop reason: SDUPTHER

## 2020-09-30 RX ORDER — OXYCODONE AND ACETAMINOPHEN 10; 325 MG/1; MG/1
1 TABLET ORAL EVERY 6 HOURS PRN
Qty: 120 TABLET | Refills: 0 | Status: SHIPPED | OUTPATIENT
Start: 2020-10-02 | End: 2020-10-28 | Stop reason: SDUPTHER

## 2020-09-30 NOTE — TELEPHONE ENCOUNTER
Please advise on note as well. Patient asking for morphine patch as well. OARRS reviewed. UDS: + for  Gabapentin, percocet - consistent   Last seen: 8/12/2020.      Follow-up:   Future Appointments   Date Time Provider Laisha López   10/14/2020 12:15 PM RENNY Guy - CNP [facility] - [facility]

## 2020-09-30 NOTE — TELEPHONE ENCOUNTER
Not sure what patient is speaking of. Just forwarding her message from pre service to you. She is asking for this on top of her other medications, and is aware a patch has never been prescribed. Please advise if willing to prescribe a type of pain patch or if you would like patient to come in for follow up to discuss.

## 2020-10-12 ENCOUNTER — TELEPHONE (OUTPATIENT)
Dept: PHYSICAL MEDICINE AND REHAB | Age: 50
End: 2020-10-12

## 2020-10-14 ENCOUNTER — VIRTUAL VISIT (OUTPATIENT)
Dept: PHYSICAL MEDICINE AND REHAB | Age: 50
End: 2020-10-14
Payer: COMMERCIAL

## 2020-10-14 PROCEDURE — 3017F COLORECTAL CA SCREEN DOC REV: CPT | Performed by: NURSE PRACTITIONER

## 2020-10-14 PROCEDURE — 99214 OFFICE O/P EST MOD 30 MIN: CPT | Performed by: NURSE PRACTITIONER

## 2020-10-14 PROCEDURE — G8427 DOCREV CUR MEDS BY ELIG CLIN: HCPCS | Performed by: NURSE PRACTITIONER

## 2020-10-14 ASSESSMENT — ENCOUNTER SYMPTOMS
BACK PAIN: 1
SHORTNESS OF BREATH: 1
COUGH: 1
SINUS PRESSURE: 1

## 2020-10-14 NOTE — PROGRESS NOTES
HPI:   Ivana Dial is a 44 y.o. female is here today for    Chief Complaint: Lower back pain, leg pain     HPI   Video Visit. TELEHEALTH EVALUATION -- Audio/Visual (During [facility] public health emergency). This visit was completed virtually using doxy. me. Location of visit: patients home, providers office. Asked for video visit because because she is not feeling well- cough, SOB, fevers and is going to the walk in clinic     2 month FU. Continues to have pain across lower back and radiating down bilateral legs. Pain remains elevated and described as sharp, burning, aching pain with numbness and pins and needles down legs. Pain remains elevated at times. Has good days and some really bad days. Still voiced that she is trying to avoid surgery   Patient asked for a fentanyl patch  And I stated that this could be an option but then we would decrease the dosage of the prn pain medication and she reports that she is not interested. States that prn Percocet is helping along with Neurontin. Medications reviewed. Patient denies side effects with medications. Patient states she is taking medications as prescribed. Shedenies receiving pain medications from other sources. She had 1 ER visit since last visit  any ER visits since last visit. Pain scale with out pain medications or at its worst is 8-10/10. Pain scale with pain medications or at its best is 5-6/10. Last dose of Percocet and Neurontin was today  Drug screen reviewed from 8/12/2020 and was appropriate  Pill count was not completed today d/t video visit   Patient does have naloxone available at home. Patient has not required use of naloxone at home since last office visit. The patientis allergic to ketorolac; adhesive tape; compazine [prochlorperazine]; erythromycin; lyrica [pregabalin]; morphine; reglan [metoclopramide]; and sulfa antibiotics.     Past Medical History  Veronica Gamble  has a past medical history of Anxiety, Arthritis, Asthma, Nuñez's esophagus, Blood circulation, collateral, GERD (gastroesophageal reflux disease), Hypertension, Insomnia, Interstitial cystitis, Migraine, Pneumonia, Psychiatric problem, and Spinal stenosis of lumbar region. Past Surgical History  The patient  has a past surgical history that includes Abdomen surgery (93); ovarian cyst removal (93); Tubal ligation (93); Colonoscopy (2010); Endoscopy, colon, diagnostic (2010); Patellar tendon repair (Right, 1994); Dilatation, esophagus (2010); Cardiac catheterization (unsure); other surgical history (21 Nov 2014); Cholecystectomy; Appendectomy; other surgical history (N/A, 03-21-16); other surgical history (N/A, 04-04-16); other surgical history (Bilateral, 6-13-16); Upper gastrointestinal endoscopy (2012); other surgical history (Left, 09/13/2016); and Carpal tunnel release (Left). Family History  This patient's family history includes Heart Attack in her brother; Heart Disease in her father and mother; Inflam Bowel Dis in her mother. Social History  Katharine Maravilla  reports that she has never smoked. She has never used smokeless tobacco. She reports current alcohol use. She reports that she does not use drugs. Medications    Current Outpatient Medications:     oxyCODONE-acetaminophen (PERCOCET)  MG per tablet, Take 1 tablet by mouth every 6 hours as needed for Pain for up to 30 days. , Disp: 120 tablet, Rfl: 0    tiZANidine (ZANAFLEX) 4 MG tablet, Take 1 tablet by mouth every 6 hours as needed (spasms), Disp: 120 tablet, Rfl: 0    gabapentin (NEURONTIN) 600 MG tablet, Take 1 tablet by mouth 4 times daily for 30 days. , Disp: 120 tablet, Rfl: 0    apixaban (ELIQUIS) 5 MG TABS tablet, Take 5 mg by mouth 2 times daily, Disp: , Rfl:     Promethazine HCl (PHENERGAN PO), Take by mouth, Disp: , Rfl:     QUEtiapine (SEROQUEL) 100 MG tablet, Take 2 tablets by mouth nightly, Disp: 60 tablet, Rfl: 0    DULoxetine (CYMBALTA) 30 MG extended release capsule, Take 1 capsule by mouth daily In addition to 60 mg tablet, Disp: 30 capsule, Rfl: 3    dexlansoprazole (DEXILANT) 60 MG CPDR delayed release capsule, Take by mouth, Disp: , Rfl:     furosemide (LASIX) 20 MG tablet, TAKE 2 TABLETS BY MOUTH IN THE MORNING FOR 3 DAYS 1 IN THE MORNING UNTIL FINISHED, Disp: , Rfl: 0    oxybutynin (DITROPAN) 5 MG tablet, Take 1 tablet by mouth, Disp: , Rfl:     DULoxetine (CYMBALTA) 60 MG extended release capsule, Take 1 capsule by mouth daily, Disp: 30 capsule, Rfl: 3    sucralfate (CARAFATE) 1 GM tablet, Take 1 tablet by mouth 4 times daily, Disp: 120 tablet, Rfl: 1    potassium chloride (KLOR-CON M) 20 MEQ extended release tablet, Take 2 tablets by mouth daily (with breakfast), Disp: 60 tablet, Rfl: 1    naloxone (NARCAN) 4 MG/0.1ML LIQD nasal spray, 1 spray by Nasal route as needed for Opioid Reversal, Disp: 1 each, Rfl: 0    SUMAtriptan Succinate (IMITREX PO), Take by mouth as needed , Disp: , Rfl:     propranolol (INDERAL) 80 MG tablet, Take 80 mg by mouth 2 times daily, Disp: , Rfl:     pentosan polysulfate (ELMIRON) 100 MG capsule, Take 1 capsule by mouth 3 times daily (before meals), Disp: 90 capsule, Rfl: 5    topiramate (TOPAMAX) 100 MG tablet, Take 1 tablet by mouth 2 times daily, Disp: 60 tablet, Rfl: 5    albuterol (PROVENTIL HFA;VENTOLIN HFA) 108 (90 BASE) MCG/ACT inhaler, Inhale 2 puffs into the lungs every 4 hours as needed for Wheezing or Shortness of Breath Whichever brand is covered by insurance, substitute as necessary. , Disp: 1 Inhaler, Rfl: 2    Subjective:      Review of Systems   Constitutional: Positive for fatigue, fever and unexpected weight change. HENT: Positive for congestion and sinus pressure. Respiratory: Positive for cough and shortness of breath. Musculoskeletal: Positive for arthralgias, back pain, gait problem and myalgias. Neurological: Positive for weakness and numbness. Psychiatric/Behavioral: Positive for dysphoric mood. The patient is nervous/anxious. Objective: There were no vitals filed for this visit. Physical Exam  Constitutional:       Appearance: Normal appearance. She is normal weight. HENT:      Head: Normocephalic and atraumatic. Neurological:      General: No focal deficit present. Mental Status: She is alert and oriented to person, place, and time. Psychiatric:         Mood and Affect: Mood normal.            Assessment:     1. Spinal stenosis of lumbar region with neurogenic claudication    2. Myofascial pain dysfunction syndrome    3. Spondylosis of lumbar region without myelopathy or radiculopathy    4. Lumbar radiculitis    5. Lumbar disc herniation    6. DDD (degenerative disc disease), lumbar    7. Chronic pain syndrome            Plan:      · OARRS reviewed. Current MED: 60.00  · Patient was offered naloxone for home. Has    · Discussed long term side effects of medications, tolerance, dependency and addiction. · Previous UDS reviewed  · UDS preformed today for compliance. · Patient told can not receive any pain medications from any other source. · No evidence of abuse, diversion or aberrant behavior.  Medications and/or procedures to improve function and quality of life- patient understanding with this and that may not be pain free   Discussed with patient about safe storage of medications at home   Discussed possible weaning of medication dosing dependent on treatment/procedure results.  Discussed with patient about risks with procedure including infection, reaction to medication, increased pain, or bleeding. · Procedure notes reviewed in detail. · Has had L-facet MBB and LESI with no improvement   · Nothing more to offer at this time with procedures  · Was evaluated by 2 different surgeons and both recommended surgery- Patient does not want  · Reviewed L-MRI in detail  · Not a candidate for SCS d/t insurance   · Continue Percocet 10/325 QID prn, Zanaflex.  Patient remains compliant. Filled 9/30/2020  · Continue Neurontin to 600 mg scheduled BID for nerve pain d/t Kidney function Filled 9/30/2020  · Discussed possibly adding a long acting pain medication and decreasing breakthrough. Patient wants to think about it. · Will FU in 6 weeks to discuss in person and get UDS      Meds. Prescribed:   No orders of the defined types were placed in this encounter. Return in about 6 weeks (around 11/25/2020), or if symptoms worsen or fail to improve, for follow up  for medications. Time spent with patient was 25 minutes, more than 50% was spent Counseling/coordinated the patient's care.       Electronically signed by RENNY Johnson CNP on10/14/2020 at 12:47 PM

## 2020-10-27 NOTE — TELEPHONE ENCOUNTER
Damian Sanches called requesting a refill on the following medications:  Requested Prescriptions     Pending Prescriptions Disp Refills    gabapentin (NEURONTIN) 600 MG tablet 120 tablet 0     Sig: Take 1 tablet by mouth 4 times daily for 30 days.  tiZANidine (ZANAFLEX) 4 MG tablet 120 tablet 0     Sig: Take 1 tablet by mouth every 6 hours as needed (spasms)    oxyCODONE-acetaminophen (PERCOCET)  MG per tablet 120 tablet 0     Sig: Take 1 tablet by mouth every 6 hours as needed for Pain for up to 30 days. [facility]  . pv      Date of last visit: 10/14/2020  Date of next visit (if applicable): 53/4/7611

## 2020-10-28 RX ORDER — TIZANIDINE 4 MG/1
4 TABLET ORAL EVERY 6 HOURS PRN
Qty: 120 TABLET | Refills: 0 | Status: SHIPPED | OUTPATIENT
Start: 2020-11-01 | End: 2020-11-25 | Stop reason: SDUPTHER

## 2020-10-28 RX ORDER — OXYCODONE AND ACETAMINOPHEN 10; 325 MG/1; MG/1
1 TABLET ORAL EVERY 6 HOURS PRN
Qty: 120 TABLET | Refills: 0 | Status: SHIPPED | OUTPATIENT
Start: 2020-10-30 | End: 2020-11-25 | Stop reason: SDUPTHER

## 2020-10-28 RX ORDER — GABAPENTIN 600 MG/1
600 TABLET ORAL 4 TIMES DAILY
Qty: 120 TABLET | Refills: 0 | Status: SHIPPED | OUTPATIENT
Start: 2020-10-30 | End: 2020-11-25 | Stop reason: SDUPTHER

## 2020-10-28 NOTE — TELEPHONE ENCOUNTER
OARRS reviewed. UDS: + for  Gabapentin oxycodone consistent. Last seen: 10/14/2020.  Follow-up:   Future Appointments   Date Time Provider Laisha López   12/1/2020 12:15 PM RENNY Shields - CNP [facility]

## 2020-11-09 ENCOUNTER — HOSPITAL ENCOUNTER (OUTPATIENT)
Age: 50
Setting detail: SPECIMEN
Discharge: HOME | End: 2020-11-09
Payer: COMMERCIAL

## 2020-11-09 LAB
ABSOLUTE EOS #: 0.06 K/UL (ref 0–0.44)
ABSOLUTE IMMATURE GRANULOCYTE: <0.03 K/UL (ref 0–0.3)
ABSOLUTE LYMPH #: 2.39 K/UL (ref 1.1–3.7)
ABSOLUTE MONO #: 0.29 K/UL (ref 0.1–1.2)
ALBUMIN SERPL-MCNC: 4.4 G/DL (ref 3.5–5.2)
ALBUMIN/GLOBULIN RATIO: 1.5 (ref 1–2.5)
ALP BLD-CCNC: 87 U/L (ref 35–104)
ALT SERPL-CCNC: 22 U/L (ref 5–33)
ANION GAP SERPL CALCULATED.3IONS-SCNC: 15 MMOL/L (ref 9–17)
AST SERPL-CCNC: 20 U/L
BASOPHILS # BLD: 1 % (ref 0–2)
BASOPHILS ABSOLUTE: 0.04 K/UL (ref 0–0.2)
BILIRUB SERPL-MCNC: 0.2 MG/DL (ref 0.3–1.2)
BUN BLDV-MCNC: 21 MG/DL (ref 6–20)
BUN/CREAT BLD: ABNORMAL (ref 9–20)
CALCIUM SERPL-MCNC: 9.7 MG/DL (ref 8.6–10.4)
CHLORIDE BLD-SCNC: 109 MMOL/L (ref 98–107)
CO2: 19 MMOL/L (ref 20–31)
CREAT SERPL-MCNC: 0.82 MG/DL (ref 0.5–0.9)
D-DIMER QUANTITATIVE: 0.33 MG/L FEU
DIFFERENTIAL TYPE: ABNORMAL
EOSINOPHILS RELATIVE PERCENT: 1 % (ref 1–4)
GFR AFRICAN AMERICAN: >60 ML/MIN
GFR NON-AFRICAN AMERICAN: >60 ML/MIN
GFR SERPL CREATININE-BSD FRML MDRD: ABNORMAL ML/MIN/{1.73_M2}
GFR SERPL CREATININE-BSD FRML MDRD: ABNORMAL ML/MIN/{1.73_M2}
GGT: 28 U/L (ref 5–36)
GLUCOSE BLD-MCNC: 84 MG/DL (ref 70–99)
HCT VFR BLD CALC: 45 % (ref 36.3–47.1)
HEMOGLOBIN: 13.6 G/DL (ref 11.9–15.1)
IMMATURE GRANULOCYTES: 0 %
LYMPHOCYTES # BLD: 40 % (ref 24–43)
MCH RBC QN AUTO: 26.3 PG (ref 25.2–33.5)
MCHC RBC AUTO-ENTMCNC: 30.2 G/DL (ref 28.4–34.8)
MCV RBC AUTO: 87 FL (ref 82.6–102.9)
MONOCYTES # BLD: 5 % (ref 3–12)
NRBC AUTOMATED: 0 PER 100 WBC
PDW BLD-RTO: 13.3 % (ref 11.8–14.4)
PLATELET # BLD: 249 K/UL (ref 138–453)
PLATELET ESTIMATE: ABNORMAL
PMV BLD AUTO: 11.9 FL (ref 8.1–13.5)
POTASSIUM SERPL-SCNC: 3.8 MMOL/L (ref 3.7–5.3)
RBC # BLD: 5.17 M/UL (ref 3.95–5.11)
RBC # BLD: ABNORMAL 10*6/UL
SEG NEUTROPHILS: 53 % (ref 36–65)
SEGMENTED NEUTROPHILS ABSOLUTE COUNT: 3.14 K/UL (ref 1.5–8.1)
SODIUM BLD-SCNC: 143 MMOL/L (ref 135–144)
THYROXINE, FREE: 1.12 NG/DL (ref 0.93–1.7)
TOTAL PROTEIN: 7.4 G/DL (ref 6.4–8.3)
TSH SERPL DL<=0.05 MIU/L-ACNC: 0.55 MIU/L (ref 0.3–5)
VITAMIN D 25-HYDROXY: 19.8 NG/ML (ref 30–100)
WBC # BLD: 5.9 K/UL (ref 3.5–11.3)
WBC # BLD: ABNORMAL 10*3/UL

## 2020-11-10 LAB — T3 TOTAL: 75 NG/DL (ref 60–181)

## 2020-11-25 RX ORDER — TIZANIDINE 4 MG/1
4 TABLET ORAL EVERY 6 HOURS PRN
Qty: 120 TABLET | Refills: 0 | Status: SHIPPED | OUTPATIENT
Start: 2020-11-25 | End: 2020-12-24 | Stop reason: SDUPTHER

## 2020-11-25 RX ORDER — GABAPENTIN 600 MG/1
600 TABLET ORAL 4 TIMES DAILY
Qty: 120 TABLET | Refills: 0 | Status: SHIPPED | OUTPATIENT
Start: 2020-11-27 | End: 2020-12-24 | Stop reason: SDUPTHER

## 2020-11-25 RX ORDER — OXYCODONE AND ACETAMINOPHEN 10; 325 MG/1; MG/1
1 TABLET ORAL EVERY 6 HOURS PRN
Qty: 120 TABLET | Refills: 0 | Status: SHIPPED | OUTPATIENT
Start: 2020-11-27 | End: 2020-12-24 | Stop reason: SDUPTHER

## 2020-12-16 ENCOUNTER — OFFICE VISIT (OUTPATIENT)
Dept: PHYSICAL MEDICINE AND REHAB | Age: 50
End: 2020-12-16
Payer: COMMERCIAL

## 2020-12-16 VITALS
BODY MASS INDEX: 31.34 KG/M2 | HEIGHT: 66 IN | WEIGHT: 195 LBS | DIASTOLIC BLOOD PRESSURE: 76 MMHG | SYSTOLIC BLOOD PRESSURE: 122 MMHG | TEMPERATURE: 97.5 F

## 2020-12-16 PROCEDURE — 99213 OFFICE O/P EST LOW 20 MIN: CPT | Performed by: NURSE PRACTITIONER

## 2020-12-16 PROCEDURE — 3017F COLORECTAL CA SCREEN DOC REV: CPT | Performed by: NURSE PRACTITIONER

## 2020-12-16 PROCEDURE — 1036F TOBACCO NON-USER: CPT | Performed by: NURSE PRACTITIONER

## 2020-12-16 PROCEDURE — G8427 DOCREV CUR MEDS BY ELIG CLIN: HCPCS | Performed by: NURSE PRACTITIONER

## 2020-12-16 PROCEDURE — G8484 FLU IMMUNIZE NO ADMIN: HCPCS | Performed by: NURSE PRACTITIONER

## 2020-12-16 PROCEDURE — G8417 CALC BMI ABV UP PARAM F/U: HCPCS | Performed by: NURSE PRACTITIONER

## 2020-12-16 ASSESSMENT — ENCOUNTER SYMPTOMS
BACK PAIN: 1
COUGH: 0

## 2020-12-16 NOTE — PROGRESS NOTES
Take 1 tablet by mouth every 6 hours as needed for Pain for up to 30 days. , Disp: 120 tablet, Rfl: 0    tiZANidine (ZANAFLEX) 4 MG tablet, Take 1 tablet by mouth every 6 hours as needed (spasms), Disp: 120 tablet, Rfl: 0    gabapentin (NEURONTIN) 600 MG tablet, Take 1 tablet by mouth 4 times daily for 30 days. , Disp: 120 tablet, Rfl: 0    apixaban (ELIQUIS) 5 MG TABS tablet, Take 5 mg by mouth 2 times daily, Disp: , Rfl:     Promethazine HCl (PHENERGAN PO), Take by mouth, Disp: , Rfl:     QUEtiapine (SEROQUEL) 100 MG tablet, Take 2 tablets by mouth nightly, Disp: 60 tablet, Rfl: 0    DULoxetine (CYMBALTA) 30 MG extended release capsule, Take 1 capsule by mouth daily In addition to 60 mg tablet, Disp: 30 capsule, Rfl: 3    dexlansoprazole (DEXILANT) 60 MG CPDR delayed release capsule, Take by mouth, Disp: , Rfl:     furosemide (LASIX) 20 MG tablet, TAKE 2 TABLETS BY MOUTH IN THE MORNING FOR 3 DAYS 1 IN THE MORNING UNTIL FINISHED, Disp: , Rfl: 0    oxybutynin (DITROPAN) 5 MG tablet, Take 1 tablet by mouth, Disp: , Rfl:     DULoxetine (CYMBALTA) 60 MG extended release capsule, Take 1 capsule by mouth daily, Disp: 30 capsule, Rfl: 3    sucralfate (CARAFATE) 1 GM tablet, Take 1 tablet by mouth 4 times daily, Disp: 120 tablet, Rfl: 1    potassium chloride (KLOR-CON M) 20 MEQ extended release tablet, Take 2 tablets by mouth daily (with breakfast), Disp: 60 tablet, Rfl: 1    naloxone (NARCAN) 4 MG/0.1ML LIQD nasal spray, 1 spray by Nasal route as needed for Opioid Reversal, Disp: 1 each, Rfl: 0    SUMAtriptan Succinate (IMITREX PO), Take by mouth as needed , Disp: , Rfl:     propranolol (INDERAL) 80 MG tablet, Take 80 mg by mouth 2 times daily, Disp: , Rfl:     pentosan polysulfate (ELMIRON) 100 MG capsule, Take 1 capsule by mouth 3 times daily (before meals), Disp: 90 capsule, Rfl: 5    topiramate (TOPAMAX) 100 MG tablet, Take 1 tablet by mouth 2 times daily, Disp: 60 tablet, Rfl: 5    albuterol (PROVENTIL HFA;VENTOLIN HFA) 108 (90 BASE) MCG/ACT inhaler, Inhale 2 puffs into the lungs every 4 hours as needed for Wheezing or Shortness of Breath Whichever brand is covered by insurance, substitute as necessary. , Disp: 1 Inhaler, Rfl: 2    Subjective:      Review of Systems   Constitutional: Negative for fever. Respiratory: Negative for cough. Cardiovascular: Positive for leg swelling. Musculoskeletal: Positive for arthralgias, back pain, gait problem and myalgias. Neurological: Positive for weakness and numbness. Psychiatric/Behavioral: Negative for dysphoric mood. The patient is nervous/anxious. Objective:     Vitals:    12/16/20 0811   BP: 122/76   Site: Left Upper Arm   Position: Sitting   Cuff Size: Small Adult   Temp: 97.5 °F (36.4 °C)   Weight: 195 lb (88.5 kg)   Height: 5' 6\" (1.676 m)       Physical Exam  Vitals signs and nursing note reviewed. Constitutional:       General: She is not in acute distress. Appearance: She is well-developed. She is ill-appearing. She is not diaphoretic. HENT:      Head: Normocephalic and atraumatic. Right Ear: External ear normal.      Left Ear: External ear normal.      Nose: Nose normal.      Mouth/Throat:      Mouth: Mucous membranes are moist.      Pharynx: No oropharyngeal exudate. Eyes:      General: No scleral icterus. Right eye: No discharge. Left eye: No discharge. Conjunctiva/sclera: Conjunctivae normal.      Pupils: Pupils are equal, round, and reactive to light. Neck:      Musculoskeletal: Full passive range of motion without pain, normal range of motion and neck supple. Normal range of motion. No edema, erythema, neck rigidity or muscular tenderness. Thyroid: No thyromegaly. Cardiovascular:      Rate and Rhythm: Normal rate and regular rhythm. Heart sounds: Normal heart sounds. No murmur. No friction rub. No gallop. Pulmonary:      Effort: Pulmonary effort is normal. No respiratory distress. types were placed in this encounter. Return in about 2 months (around 2/16/2021), or if symptoms worsen or fail to improve, for follow up  for medications.            Electronically signed by RENNY Link CNP on12/16/2020 at 8:36 AM

## 2020-12-24 RX ORDER — GABAPENTIN 600 MG/1
600 TABLET ORAL 4 TIMES DAILY
Qty: 120 TABLET | Refills: 0 | Status: SHIPPED | OUTPATIENT
Start: 2020-12-27 | End: 2021-01-26 | Stop reason: SDUPTHER

## 2020-12-24 RX ORDER — TIZANIDINE 4 MG/1
4 TABLET ORAL EVERY 6 HOURS PRN
Qty: 120 TABLET | Refills: 0 | Status: SHIPPED | OUTPATIENT
Start: 2020-12-24 | End: 2021-01-26 | Stop reason: SDUPTHER

## 2020-12-24 RX ORDER — OXYCODONE AND ACETAMINOPHEN 10; 325 MG/1; MG/1
1 TABLET ORAL EVERY 6 HOURS PRN
Qty: 120 TABLET | Refills: 0 | Status: SHIPPED | OUTPATIENT
Start: 2020-12-27 | End: 2021-01-26 | Stop reason: SDUPTHER

## 2021-01-25 DIAGNOSIS — M47.816 SPONDYLOSIS OF LUMBAR REGION WITHOUT MYELOPATHY OR RADICULOPATHY: ICD-10-CM

## 2021-01-25 DIAGNOSIS — M79.18 MYOFASCIAL PAIN DYSFUNCTION SYNDROME: ICD-10-CM

## 2021-01-25 DIAGNOSIS — M48.062 SPINAL STENOSIS OF LUMBAR REGION WITH NEUROGENIC CLAUDICATION: ICD-10-CM

## 2021-01-25 DIAGNOSIS — G89.4 CHRONIC PAIN SYNDROME: ICD-10-CM

## 2021-01-25 NOTE — TELEPHONE ENCOUNTER
Mary Kay  called requesting a refill on the following medications:  Requested Prescriptions     Pending Prescriptions Disp Refills    gabapentin (NEURONTIN) 600 MG tablet 120 tablet 0     Sig: Take 1 tablet by mouth 4 times daily for 30 days.  oxyCODONE-acetaminophen (PERCOCET)  MG per tablet 120 tablet 0     Sig: Take 1 tablet by mouth every 6 hours as needed for Pain for up to 30 days.  tiZANidine (ZANAFLEX) 4 MG tablet 120 tablet 0     Sig: Take 1 tablet by mouth every 6 hours as needed (spasms)     Pharmacy verified:[facility]   . pv      Date of last visit: 12/16/20  Date of next visit (if applicable): Visit date not found

## 2021-01-26 ENCOUNTER — TELEPHONE (OUTPATIENT)
Dept: PHYSICAL MEDICINE AND REHAB | Age: 51
End: 2021-01-26

## 2021-01-26 RX ORDER — OXYCODONE AND ACETAMINOPHEN 10; 325 MG/1; MG/1
1 TABLET ORAL EVERY 6 HOURS PRN
Qty: 120 TABLET | Refills: 0 | Status: SHIPPED | OUTPATIENT
Start: 2021-01-26 | End: 2021-02-16 | Stop reason: SDUPTHER

## 2021-01-26 RX ORDER — TIZANIDINE 4 MG/1
4 TABLET ORAL EVERY 6 HOURS PRN
Qty: 120 TABLET | Refills: 0 | Status: SHIPPED | OUTPATIENT
Start: 2021-01-26 | End: 2021-02-16 | Stop reason: SDUPTHER

## 2021-01-26 RX ORDER — GABAPENTIN 600 MG/1
600 TABLET ORAL 4 TIMES DAILY
Qty: 120 TABLET | Refills: 0 | Status: SHIPPED | OUTPATIENT
Start: 2021-02-03 | End: 2021-02-16 | Stop reason: SDUPTHER

## 2021-01-26 NOTE — TELEPHONE ENCOUNTER
OARRS reviewed. UDS: + for Gabapentin, Noroxycodone, Oxycodone, Oxymorphone  . Last seen: 12/16/2020.  Follow-up: 2/16/21

## 2021-02-16 ENCOUNTER — VIRTUAL VISIT (OUTPATIENT)
Dept: PHYSICAL MEDICINE AND REHAB | Age: 51
End: 2021-02-16
Payer: COMMERCIAL

## 2021-02-16 DIAGNOSIS — M48.062 SPINAL STENOSIS OF LUMBAR REGION WITH NEUROGENIC CLAUDICATION: ICD-10-CM

## 2021-02-16 DIAGNOSIS — M51.36 DDD (DEGENERATIVE DISC DISEASE), LUMBAR: ICD-10-CM

## 2021-02-16 DIAGNOSIS — M51.26 LUMBAR DISC HERNIATION: ICD-10-CM

## 2021-02-16 DIAGNOSIS — G89.4 CHRONIC PAIN SYNDROME: ICD-10-CM

## 2021-02-16 DIAGNOSIS — M47.816 SPONDYLOSIS OF LUMBAR REGION WITHOUT MYELOPATHY OR RADICULOPATHY: ICD-10-CM

## 2021-02-16 DIAGNOSIS — M79.18 MYOFASCIAL PAIN DYSFUNCTION SYNDROME: ICD-10-CM

## 2021-02-16 DIAGNOSIS — M54.16 LUMBAR RADICULITIS: Primary | ICD-10-CM

## 2021-02-16 PROCEDURE — 3017F COLORECTAL CA SCREEN DOC REV: CPT | Performed by: NURSE PRACTITIONER

## 2021-02-16 PROCEDURE — 99213 OFFICE O/P EST LOW 20 MIN: CPT | Performed by: NURSE PRACTITIONER

## 2021-02-16 PROCEDURE — G8427 DOCREV CUR MEDS BY ELIG CLIN: HCPCS | Performed by: NURSE PRACTITIONER

## 2021-02-16 RX ORDER — GABAPENTIN 600 MG/1
600 TABLET ORAL 4 TIMES DAILY
Qty: 120 TABLET | Refills: 0 | Status: SHIPPED | OUTPATIENT
Start: 2021-02-25 | End: 2021-03-24 | Stop reason: SDUPTHER

## 2021-02-16 RX ORDER — OXYCODONE AND ACETAMINOPHEN 10; 325 MG/1; MG/1
1 TABLET ORAL EVERY 6 HOURS PRN
Qty: 120 TABLET | Refills: 0 | Status: SHIPPED | OUTPATIENT
Start: 2021-02-25 | End: 2021-03-24 | Stop reason: SDUPTHER

## 2021-02-16 RX ORDER — TIZANIDINE 4 MG/1
4 TABLET ORAL EVERY 6 HOURS PRN
Qty: 120 TABLET | Refills: 0 | Status: SHIPPED | OUTPATIENT
Start: 2021-02-25 | End: 2021-03-24 | Stop reason: SDUPTHER

## 2021-02-16 ASSESSMENT — ENCOUNTER SYMPTOMS
BACK PAIN: 1
COUGH: 1
SHORTNESS OF BREATH: 1
SINUS PRESSURE: 0

## 2021-02-16 NOTE — PROGRESS NOTES
The patient  has a past surgical history that includes Abdomen surgery (93); ovarian cyst removal (93); Tubal ligation (93); Colonoscopy (2010); Endoscopy, colon, diagnostic (2010); Patellar tendon repair (Right, 1994); Dilatation, esophagus (2010); Cardiac catheterization (unsure); other surgical history (21 Nov 2014); Cholecystectomy; Appendectomy; other surgical history (N/A, 03-21-16); other surgical history (N/A, 04-04-16); other surgical history (Bilateral, 6-13-16); Upper gastrointestinal endoscopy (2012); other surgical history (Left, 09/13/2016); and Carpal tunnel release (Left). Family History  This patient's family history includes Heart Attack in her brother; Heart Disease in her father and mother; Inflam Bowel Dis in her mother. Social History  Abbie Recio  reports that she has never smoked. She has never used smokeless tobacco. She reports current alcohol use. She reports that she does not use drugs. Medications    Current Outpatient Medications:     [START ON 2/25/2021] oxyCODONE-acetaminophen (PERCOCET)  MG per tablet, Take 1 tablet by mouth every 6 hours as needed for Pain for up to 30 days. , Disp: 120 tablet, Rfl: 0    [START ON 2/25/2021] tiZANidine (ZANAFLEX) 4 MG tablet, Take 1 tablet by mouth every 6 hours as needed (spasms), Disp: 120 tablet, Rfl: 0    [START ON 2/25/2021] gabapentin (NEURONTIN) 600 MG tablet, Take 1 tablet by mouth 4 times daily for 30 days. , Disp: 120 tablet, Rfl: 0    apixaban (ELIQUIS) 5 MG TABS tablet, Take 5 mg by mouth 2 times daily, Disp: , Rfl:     Promethazine HCl (PHENERGAN PO), Take by mouth, Disp: , Rfl:     QUEtiapine (SEROQUEL) 100 MG tablet, Take 2 tablets by mouth nightly, Disp: 60 tablet, Rfl: 0    DULoxetine (CYMBALTA) 30 MG extended release capsule, Take 1 capsule by mouth daily In addition to 60 mg tablet, Disp: 30 capsule, Rfl: 3    dexlansoprazole (DEXILANT) 60 MG CPDR delayed release capsule, Take by mouth, Disp: , Rfl:   furosemide (LASIX) 20 MG tablet, TAKE 2 TABLETS BY MOUTH IN THE MORNING FOR 3 DAYS 1 IN THE MORNING UNTIL FINISHED, Disp: , Rfl: 0    oxybutynin (DITROPAN) 5 MG tablet, Take 1 tablet by mouth, Disp: , Rfl:     DULoxetine (CYMBALTA) 60 MG extended release capsule, Take 1 capsule by mouth daily, Disp: 30 capsule, Rfl: 3    sucralfate (CARAFATE) 1 GM tablet, Take 1 tablet by mouth 4 times daily, Disp: 120 tablet, Rfl: 1    potassium chloride (KLOR-CON M) 20 MEQ extended release tablet, Take 2 tablets by mouth daily (with breakfast), Disp: 60 tablet, Rfl: 1    naloxone (NARCAN) 4 MG/0.1ML LIQD nasal spray, 1 spray by Nasal route as needed for Opioid Reversal, Disp: 1 each, Rfl: 0    SUMAtriptan Succinate (IMITREX PO), Take by mouth as needed , Disp: , Rfl:     propranolol (INDERAL) 80 MG tablet, Take 80 mg by mouth 2 times daily, Disp: , Rfl:     pentosan polysulfate (ELMIRON) 100 MG capsule, Take 1 capsule by mouth 3 times daily (before meals), Disp: 90 capsule, Rfl: 5    topiramate (TOPAMAX) 100 MG tablet, Take 1 tablet by mouth 2 times daily, Disp: 60 tablet, Rfl: 5    albuterol (PROVENTIL HFA;VENTOLIN HFA) 108 (90 BASE) MCG/ACT inhaler, Inhale 2 puffs into the lungs every 4 hours as needed for Wheezing or Shortness of Breath Whichever brand is covered by insurance, substitute as necessary. , Disp: 1 Inhaler, Rfl: 2    Subjective:      Review of Systems   Constitutional: Positive for fatigue. Negative for fever and unexpected weight change. HENT: Negative for congestion and sinus pressure. Respiratory: Positive for cough and shortness of breath. Genitourinary: Positive for flank pain and frequency. Musculoskeletal: Positive for arthralgias, back pain, gait problem, myalgias and neck stiffness. Neurological: Positive for weakness and numbness. Psychiatric/Behavioral: Positive for dysphoric mood. The patient is nervous/anxious. Objective: There were no vitals filed for this visit. Physical Exam  Constitutional:       Appearance: Normal appearance. HENT:      Head: Normocephalic and atraumatic. Neurological:      General: No focal deficit present. Mental Status: She is alert. Psychiatric:         Mood and Affect: Mood normal.         Behavior: Behavior normal.            Assessment:     1. Lumbar radiculitis    2. Spinal stenosis of lumbar region with neurogenic claudication    3. Lumbar disc herniation    4. DDD (degenerative disc disease), lumbar    5. Spondylosis of lumbar region without myelopathy or radiculopathy    6. Chronic pain syndrome    7. Myofascial pain dysfunction syndrome            Plan:      · OARRS reviewed. Current MED: 60  · Patient was offered naloxone for home. · Discussed long term side effects of medications, tolerance, dependency and addiction. · Previous UDS reviewed  · UDS preformed today for compliance. · Patient told can not receive any pain medications from any other source. · No evidence of abuse, diversion or aberrant behavior. ? Medications and/or procedures to improve function and quality of life- patient understanding with this and that may not be pain free  ? Discussed with patient about safe storage of medications at home  ? Discussed possible weaning of medication dosing dependent on treatment/procedure results. ? Testing:   ? Procedures:   ? Discussed with patient about risks with procedure including infection, reaction to medication, increased pain, or bleeding. · Procedure notes reviewed in detail. · Has had L-facet MBB and LESI with no improvement   · Nothing more to offer at this time with procedures  · Was evaluated by 2 different surgeons and both recommended surgery- Patient does not want. · Reviewed L-MRI again in detail   · Not a candidate for SCS d/t insurance   · Continue Percocet 10/325 QID prn, Zanaflex. ordered refill  · Continue Neurontin to 600 mg scheduled QID- ordered refill · Patient is compliant, will order next UDS at FU in 2 month   · Instructed to get results from upper extremity EMG sent to clinic       Meds. Prescribed:   Orders Placed This Encounter   Medications    oxyCODONE-acetaminophen (PERCOCET)  MG per tablet     Sig: Take 1 tablet by mouth every 6 hours as needed for Pain for up to 30 days. Dispense:  120 tablet     Refill:  0     Reduce doses taken as pain becomes manageable    tiZANidine (ZANAFLEX) 4 MG tablet     Sig: Take 1 tablet by mouth every 6 hours as needed (spasms)     Dispense:  120 tablet     Refill:  0    gabapentin (NEURONTIN) 600 MG tablet     Sig: Take 1 tablet by mouth 4 times daily for 30 days. Dispense:  120 tablet     Refill:  0     Please consider 90 day supplies to promote better adherence. Fill 11/27/2020       Return in about 2 months (around 4/16/2021), or if symptoms worsen or fail to improve, for follow up  for medications.                Electronically signed by RENNY Hutchinson CNP on2/16/2021 at 8:27 AM

## 2021-03-23 DIAGNOSIS — M48.062 SPINAL STENOSIS OF LUMBAR REGION WITH NEUROGENIC CLAUDICATION: ICD-10-CM

## 2021-03-23 DIAGNOSIS — G89.4 CHRONIC PAIN SYNDROME: ICD-10-CM

## 2021-03-23 DIAGNOSIS — M79.18 MYOFASCIAL PAIN DYSFUNCTION SYNDROME: ICD-10-CM

## 2021-03-23 DIAGNOSIS — M47.816 SPONDYLOSIS OF LUMBAR REGION WITHOUT MYELOPATHY OR RADICULOPATHY: ICD-10-CM

## 2021-03-24 RX ORDER — GABAPENTIN 600 MG/1
600 TABLET ORAL 4 TIMES DAILY
Qty: 120 TABLET | Refills: 0 | Status: SHIPPED | OUTPATIENT
Start: 2021-03-27 | End: 2021-04-19 | Stop reason: SDUPTHER

## 2021-03-24 RX ORDER — OXYCODONE AND ACETAMINOPHEN 10; 325 MG/1; MG/1
1 TABLET ORAL EVERY 6 HOURS PRN
Qty: 120 TABLET | Refills: 0 | Status: SHIPPED | OUTPATIENT
Start: 2021-03-28 | End: 2021-04-19 | Stop reason: SDUPTHER

## 2021-03-24 RX ORDER — TIZANIDINE 4 MG/1
4 TABLET ORAL EVERY 6 HOURS PRN
Qty: 120 TABLET | Refills: 0 | Status: SHIPPED | OUTPATIENT
Start: 2021-03-27 | End: 2021-04-19 | Stop reason: SDUPTHER

## 2021-03-24 NOTE — TELEPHONE ENCOUNTER
OARRS reviewed. UDS: + for  Gabapentin, Noroxycdoone, Oxycodone, Oxymorphone. Last seen: 2/16/2021.  Follow-up:   Future Appointments   Date Time Provider Laisha López   4/19/2021 11:30 AM RENNY Ortega - CNP N [facility] - [id number] [facility]

## 2021-04-19 ENCOUNTER — OFFICE VISIT (OUTPATIENT)
Dept: PHYSICAL MEDICINE AND REHAB | Age: 51
End: 2021-04-19
Payer: COMMERCIAL

## 2021-04-19 VITALS
HEIGHT: 66 IN | SYSTOLIC BLOOD PRESSURE: 130 MMHG | DIASTOLIC BLOOD PRESSURE: 80 MMHG | WEIGHT: 195 LBS | BODY MASS INDEX: 31.34 KG/M2

## 2021-04-19 DIAGNOSIS — M54.16 LUMBAR RADICULITIS: ICD-10-CM

## 2021-04-19 DIAGNOSIS — M51.36 DDD (DEGENERATIVE DISC DISEASE), LUMBAR: ICD-10-CM

## 2021-04-19 DIAGNOSIS — M47.816 SPONDYLOSIS OF LUMBAR REGION WITHOUT MYELOPATHY OR RADICULOPATHY: ICD-10-CM

## 2021-04-19 DIAGNOSIS — M48.062 SPINAL STENOSIS OF LUMBAR REGION WITH NEUROGENIC CLAUDICATION: Primary | ICD-10-CM

## 2021-04-19 DIAGNOSIS — G89.4 CHRONIC PAIN SYNDROME: ICD-10-CM

## 2021-04-19 DIAGNOSIS — F11.90 CHRONIC, CONTINUOUS USE OF OPIOIDS: ICD-10-CM

## 2021-04-19 DIAGNOSIS — M51.26 LUMBAR DISC HERNIATION: ICD-10-CM

## 2021-04-19 DIAGNOSIS — M79.18 MYOFASCIAL PAIN DYSFUNCTION SYNDROME: ICD-10-CM

## 2021-04-19 PROCEDURE — G8427 DOCREV CUR MEDS BY ELIG CLIN: HCPCS | Performed by: NURSE PRACTITIONER

## 2021-04-19 PROCEDURE — G8417 CALC BMI ABV UP PARAM F/U: HCPCS | Performed by: NURSE PRACTITIONER

## 2021-04-19 PROCEDURE — 1036F TOBACCO NON-USER: CPT | Performed by: NURSE PRACTITIONER

## 2021-04-19 PROCEDURE — 3017F COLORECTAL CA SCREEN DOC REV: CPT | Performed by: NURSE PRACTITIONER

## 2021-04-19 PROCEDURE — 99214 OFFICE O/P EST MOD 30 MIN: CPT | Performed by: NURSE PRACTITIONER

## 2021-04-19 RX ORDER — OXYCODONE AND ACETAMINOPHEN 10; 325 MG/1; MG/1
1 TABLET ORAL EVERY 6 HOURS PRN
Qty: 120 TABLET | Refills: 0 | Status: SHIPPED | OUTPATIENT
Start: 2021-04-27 | End: 2021-06-02 | Stop reason: SDUPTHER

## 2021-04-19 RX ORDER — TIZANIDINE 4 MG/1
4 TABLET ORAL EVERY 6 HOURS PRN
Qty: 120 TABLET | Refills: 0 | Status: SHIPPED | OUTPATIENT
Start: 2021-04-26 | End: 2021-06-02 | Stop reason: SDUPTHER

## 2021-04-19 RX ORDER — GABAPENTIN 600 MG/1
600 TABLET ORAL 4 TIMES DAILY
Qty: 120 TABLET | Refills: 0 | Status: SHIPPED | OUTPATIENT
Start: 2021-04-26 | End: 2021-06-02 | Stop reason: SDUPTHER

## 2021-04-19 ASSESSMENT — ENCOUNTER SYMPTOMS
BACK PAIN: 1
COUGH: 0

## 2021-04-19 NOTE — PROGRESS NOTES
[facility] [address]  Dept: [phone]  Dept Fax: [phone]: [phone]    Visit Date: 4/19/2021    Functionality Assessment/Goals Worksheet     On a scale of 0 (Does not Interfere) to 10 (Completely Interferes)     1. Which number describes how during the past week pain has interfered with       the following:  A. General Activity:  7  B. Mood: 7  C. Walking Ability:  7  D. Normal Work (Includes both work outside the home and housework):  7  E. Relations with Other People:   7  F. Sleep:   7  G. Enjoyment of Life:   7    2. Patient Prefers to Take their Pain Medications:     [x]  On a regular basis   []  Only when necessary    []  Does not take pain medications    3. What are the Patient's Goals/Expectations for Visiting Pain Management? []  Learn about my pain    [x]  Receive Medication   []  Physical Therapy     []  Treat Depression   [x]  Receive Injections    []  Treat Sleep   []  Deal with Anxiety and Stress   []  Treat Opoid Dependence/Addiction   []  Other:      HPI:   Alta Mariscal is a 44 y.o. female is here today for    Chief Complaint: Low back and leg pain, right arm pain post op pain     HPI   2 month FU. Continues to have pain across lower back and radiating down bilateral legs. Pain has been up and down. States that pain is constant but fluctuates in intensity. Pain is sharp, burning, and aching. States that she has good days and bad days. Continues to have numbness down legs. Had a right upper extremity carpel tunnel surgery on 4/7/2021 and is healing right arm is wrapped- still some swelling and states that she continues to have pain throbbing and numbness and tingling in fingers. She states that pain medications remain effective in decreasing pain       Medications reviewed. Patient denies side effects with medications.  Patient states she is taking medications as prescribed. Shedenies receiving pain medications from other sources. She had 1 ER visit since last visit for chest pain. Pain scale with out pain medications or at its worst is 9-10/10. Pain scale with pain medications or at its best is 5/10. Last dose of Percocet and neurontin was yesterday  Drug screen reviewed from 12/16/2020 and was appropriate  Pill count was completed today and was appropriate  Patient does have naloxone available at home. Patient has not required use of naloxone at home since last office visit. The patientis allergic to ketorolac; adhesive tape; compazine [prochlorperazine]; erythromycin; lyrica [pregabalin]; morphine; reglan [metoclopramide]; and sulfa antibiotics. Subjective:      Review of Systems   Constitutional: Negative for fever. Respiratory: Negative for cough. Cardiovascular: Positive for leg swelling. Musculoskeletal: Positive for arthralgias, back pain, gait problem and myalgias. Neurological: Positive for weakness and numbness. Psychiatric/Behavioral: Negative for dysphoric mood. The patient is nervous/anxious. Objective:     Vitals:    04/19/21 1115   BP: 130/80   Weight: 195 lb (88.5 kg)   Height: 5' 6\" (1.676 m)       Physical Exam  Vitals signs and nursing note reviewed. Constitutional:       General: She is not in acute distress. Appearance: She is well-developed. She is not ill-appearing or diaphoretic. HENT:      Head: Normocephalic and atraumatic. Right Ear: External ear normal.      Left Ear: External ear normal.      Nose: Nose normal.      Mouth/Throat:      Mouth: Mucous membranes are moist.      Pharynx: No oropharyngeal exudate. Eyes:      General: No scleral icterus. Right eye: No discharge. Left eye: No discharge. Conjunctiva/sclera: Conjunctivae normal.      Pupils: Pupils are equal, round, and reactive to light.    Neck:      Musculoskeletal: Full passive Behavior is not agitated, slowed, aggressive, withdrawn, hyperactive or combative. Thought Content: Thought content is not paranoid or delusional. Thought content does not include homicidal or suicidal ideation. Thought content does not include homicidal or suicidal plan. Cognition and Memory: Memory is not impaired. She does not exhibit impaired recent memory or impaired remote memory. Judgment: Judgment is not impulsive or inappropriate. JOYCE test: negative   Yeomans test: negative   Gaenslen test: negative      Assessment:     1. Spinal stenosis of lumbar region with neurogenic claudication    2. Lumbar radiculitis    3. Spondylosis of lumbar region without myelopathy or radiculopathy    4. Lumbar disc herniation    5. DDD (degenerative disc disease), lumbar    6. Chronic pain syndrome    7. Myofascial pain dysfunction syndrome    8. Chronic, continuous use of opioids            Plan:      · OARRS reviewed. Current MED: 60. 00  · Patient was offered naloxone for home. · Discussed long term side effects of medications, tolerance, dependency and addiction. · Previous UDS reviewed  · UDS preformed today for compliance. · Patient told can not receive any pain medications from any other source. · No evidence of abuse, diversion or aberrant behavior.  Medications and/or procedures to improve function and quality of life- patient understanding with this and that may not be pain free   Discussed with patient about safe storage of medications at home   Discussed possible weaning of medication dosing dependent on treatment/procedure results.  Discussed with patient about risks with procedure including infection, reaction to medication, increased pain, or bleeding. · Procedure notes reviewed in detail.    · Has had L-facet MBB and LESI with no improvement   · Nothing more to offer at this time with procedures  · Was evaluated by 2 different surgeons and both recommended

## 2021-05-05 ENCOUNTER — OFFICE VISIT (OUTPATIENT)
Dept: PHYSICAL MEDICINE AND REHAB | Age: 51
End: 2021-05-05
Payer: COMMERCIAL

## 2021-05-05 VITALS
BODY MASS INDEX: 31.34 KG/M2 | WEIGHT: 195 LBS | HEIGHT: 66 IN | DIASTOLIC BLOOD PRESSURE: 104 MMHG | SYSTOLIC BLOOD PRESSURE: 164 MMHG

## 2021-05-05 DIAGNOSIS — M79.18 MYOFASCIAL PAIN DYSFUNCTION SYNDROME: Primary | ICD-10-CM

## 2021-05-05 PROCEDURE — 20552 NJX 1/MLT TRIGGER POINT 1/2: CPT | Performed by: PAIN MEDICINE

## 2021-06-01 DIAGNOSIS — G89.4 CHRONIC PAIN SYNDROME: ICD-10-CM

## 2021-06-01 DIAGNOSIS — M47.816 SPONDYLOSIS OF LUMBAR REGION WITHOUT MYELOPATHY OR RADICULOPATHY: ICD-10-CM

## 2021-06-01 DIAGNOSIS — M79.18 MYOFASCIAL PAIN DYSFUNCTION SYNDROME: ICD-10-CM

## 2021-06-01 DIAGNOSIS — M48.062 SPINAL STENOSIS OF LUMBAR REGION WITH NEUROGENIC CLAUDICATION: ICD-10-CM

## 2021-06-01 NOTE — TELEPHONE ENCOUNTER
Erin Pang called requesting a refill on the following medications:  Requested Prescriptions     Pending Prescriptions Disp Refills    oxyCODONE-acetaminophen (PERCOCET)  MG per tablet 120 tablet 0     Sig: Take 1 tablet by mouth every 6 hours as needed for Pain for up to 30 days.  gabapentin (NEURONTIN) 600 MG tablet 120 tablet 0     Sig: Take 1 tablet by mouth 4 times daily for 30 days.  tiZANidine (ZANAFLEX) 4 MG tablet 120 tablet 0     Sig: Take 1 tablet by mouth every 6 hours as needed (spasms)     Pharmacy verified: [facility]  . pv      Date of last visit: 4/19/2021  Date of next visit (if applicable): 8/36/5425

## 2021-06-02 RX ORDER — OXYCODONE AND ACETAMINOPHEN 10; 325 MG/1; MG/1
1 TABLET ORAL EVERY 6 HOURS PRN
Qty: 120 TABLET | Refills: 0 | Status: SHIPPED | OUTPATIENT
Start: 2021-06-03 | End: 2021-06-30 | Stop reason: SDUPTHER

## 2021-06-02 RX ORDER — GABAPENTIN 600 MG/1
600 TABLET ORAL 4 TIMES DAILY
Qty: 120 TABLET | Refills: 0 | Status: SHIPPED | OUTPATIENT
Start: 2021-06-03 | End: 2021-06-30 | Stop reason: SDUPTHER

## 2021-06-02 RX ORDER — TIZANIDINE 4 MG/1
4 TABLET ORAL EVERY 6 HOURS PRN
Qty: 120 TABLET | Refills: 0 | Status: SHIPPED | OUTPATIENT
Start: 2021-06-02 | End: 2021-06-30 | Stop reason: SDUPTHER

## 2021-06-02 NOTE — TELEPHONE ENCOUNTER
OARRS reviewed. UDS: + for  Gabapentin, Oxycodone, Zolpidem-Carboxyl -consistent. Last seen: 4/19/2021.  Follow-up:   Future Appointments   Date Time Provider Laisha López   6/16/2021 11:45 AM RENNY Rodriguez - CNP [facility] - Bridgette Milner

## 2021-06-12 ENCOUNTER — HOSPITAL ENCOUNTER (EMERGENCY)
Age: 51
Discharge: HOME | End: 2021-06-13
Attending: EMERGENCY MEDICINE
Payer: COMMERCIAL

## 2021-06-12 ENCOUNTER — APPOINTMENT (OUTPATIENT)
Dept: GENERAL RADIOLOGY | Age: 51
End: 2021-06-12
Payer: COMMERCIAL

## 2021-06-12 ENCOUNTER — APPOINTMENT (OUTPATIENT)
Dept: CT IMAGING | Age: 51
End: 2021-06-12
Payer: COMMERCIAL

## 2021-06-12 DIAGNOSIS — E86.0 DEHYDRATION: ICD-10-CM

## 2021-06-12 DIAGNOSIS — R07.89 ATYPICAL CHEST PAIN: Primary | ICD-10-CM

## 2021-06-12 LAB
ALBUMIN SERPL-MCNC: 4.5 G/DL (ref 3.5–5.1)
ALP BLD-CCNC: 80 U/L (ref 38–126)
ALT SERPL-CCNC: 33 U/L (ref 11–66)
AMPHETAMINE+METHAMPHETAMINE URINE SCREEN: NEGATIVE
ANION GAP SERPL CALCULATED.3IONS-SCNC: 15 MEQ/L (ref 8–16)
AST SERPL-CCNC: 43 U/L (ref 5–40)
BARBITURATE QUANTITATIVE URINE: NEGATIVE
BASOPHILS # BLD: 0.4 %
BASOPHILS ABSOLUTE: 0 THOU/MM3 (ref 0–0.1)
BENZODIAZEPINE QUANTITATIVE URINE: NEGATIVE
BILIRUB SERPL-MCNC: 0.2 MG/DL (ref 0.3–1.2)
BILIRUBIN DIRECT: < 0.2 MG/DL (ref 0–0.3)
BUN BLDV-MCNC: 20 MG/DL (ref 7–22)
CALCIUM SERPL-MCNC: 10.2 MG/DL (ref 8.5–10.5)
CANNABINOID QUANTITATIVE URINE: NEGATIVE
CHLORIDE BLD-SCNC: 103 MEQ/L (ref 98–111)
CO2: 18 MEQ/L (ref 23–33)
COCAINE METABOLITE QUANTITATIVE URINE: NEGATIVE
CREAT SERPL-MCNC: 1.5 MG/DL (ref 0.4–1.2)
EOSINOPHIL # BLD: 0.7 %
EOSINOPHILS ABSOLUTE: 0.1 THOU/MM3 (ref 0–0.4)
ERYTHROCYTE [DISTWIDTH] IN BLOOD BY AUTOMATED COUNT: 16 % (ref 11.5–14.5)
ERYTHROCYTE [DISTWIDTH] IN BLOOD BY AUTOMATED COUNT: 49.8 FL (ref 35–45)
GFR SERPL CREATININE-BSD FRML MDRD: 36 ML/MIN/1.73M2
GLUCOSE BLD-MCNC: 102 MG/DL (ref 70–108)
HCT VFR BLD CALC: 42.8 % (ref 37–47)
HEMOGLOBIN: 13.4 GM/DL (ref 12–16)
IMMATURE GRANS (ABS): 0.04 THOU/MM3 (ref 0–0.07)
IMMATURE GRANULOCYTES: 0.4 %
LIPASE: 18.6 U/L (ref 5.6–51.3)
LYMPHOCYTES # BLD: 15.9 %
LYMPHOCYTES ABSOLUTE: 1.6 THOU/MM3 (ref 1–4.8)
MCH RBC QN AUTO: 26.7 PG (ref 26–33)
MCHC RBC AUTO-ENTMCNC: 31.3 GM/DL (ref 32.2–35.5)
MCV RBC AUTO: 85.3 FL (ref 81–99)
MONOCYTES # BLD: 4.8 %
MONOCYTES ABSOLUTE: 0.5 THOU/MM3 (ref 0.4–1.3)
NUCLEATED RED BLOOD CELLS: 0 /100 WBC
OPIATES, URINE: NEGATIVE
OSMOLALITY CALCULATION: 274.8 MOSMOL/KG (ref 275–300)
OXYCODONE: POSITIVE
PHENCYCLIDINE QUANTITATIVE URINE: NEGATIVE
PLATELET # BLD: 275 THOU/MM3 (ref 130–400)
PMV BLD AUTO: 10.4 FL (ref 9.4–12.4)
POTASSIUM REFLEX MAGNESIUM: 3.9 MEQ/L (ref 3.5–5.2)
PRO-BNP: 274.7 PG/ML (ref 0–900)
RBC # BLD: 5.02 MILL/MM3 (ref 4.2–5.4)
SEG NEUTROPHILS: 77.8 %
SEGMENTED NEUTROPHILS ABSOLUTE COUNT: 7.8 THOU/MM3 (ref 1.8–7.7)
SODIUM BLD-SCNC: 136 MEQ/L (ref 135–145)
TOTAL PROTEIN: 7 G/DL (ref 6.1–8)
TROPONIN T: < 0.01 NG/ML
TSH SERPL DL<=0.05 MIU/L-ACNC: 2.23 UIU/ML (ref 0.4–4.2)
WBC # BLD: 10 THOU/MM3 (ref 4.8–10.8)

## 2021-06-12 PROCEDURE — 80048 BASIC METABOLIC PNL TOTAL CA: CPT

## 2021-06-12 PROCEDURE — 96375 TX/PRO/DX INJ NEW DRUG ADDON: CPT

## 2021-06-12 PROCEDURE — 84484 ASSAY OF TROPONIN QUANT: CPT

## 2021-06-12 PROCEDURE — 84443 ASSAY THYROID STIM HORMONE: CPT

## 2021-06-12 PROCEDURE — 85025 COMPLETE CBC W/AUTO DIFF WBC: CPT

## 2021-06-12 PROCEDURE — 2580000003 HC RX 258: Performed by: EMERGENCY MEDICINE

## 2021-06-12 PROCEDURE — 71275 CT ANGIOGRAPHY CHEST: CPT

## 2021-06-12 PROCEDURE — 83690 ASSAY OF LIPASE: CPT

## 2021-06-12 PROCEDURE — 80076 HEPATIC FUNCTION PANEL: CPT

## 2021-06-12 PROCEDURE — 6360000004 HC RX CONTRAST MEDICATION: Performed by: EMERGENCY MEDICINE

## 2021-06-12 PROCEDURE — 93005 ELECTROCARDIOGRAM TRACING: CPT | Performed by: EMERGENCY MEDICINE

## 2021-06-12 PROCEDURE — 96361 HYDRATE IV INFUSION ADD-ON: CPT

## 2021-06-12 PROCEDURE — 6370000000 HC RX 637 (ALT 250 FOR IP): Performed by: EMERGENCY MEDICINE

## 2021-06-12 PROCEDURE — 99285 EMERGENCY DEPT VISIT HI MDM: CPT

## 2021-06-12 PROCEDURE — 83880 ASSAY OF NATRIURETIC PEPTIDE: CPT

## 2021-06-12 PROCEDURE — 71045 X-RAY EXAM CHEST 1 VIEW: CPT

## 2021-06-12 PROCEDURE — 96374 THER/PROPH/DIAG INJ IV PUSH: CPT

## 2021-06-12 PROCEDURE — 80307 DRUG TEST PRSMV CHEM ANLYZR: CPT

## 2021-06-12 PROCEDURE — 6360000002 HC RX W HCPCS

## 2021-06-12 PROCEDURE — 36415 COLL VENOUS BLD VENIPUNCTURE: CPT

## 2021-06-12 RX ORDER — 0.9 % SODIUM CHLORIDE 0.9 %
1000 INTRAVENOUS SOLUTION INTRAVENOUS ONCE
Status: COMPLETED | OUTPATIENT
Start: 2021-06-12 | End: 2021-06-13

## 2021-06-12 RX ORDER — ONDANSETRON 2 MG/ML
INJECTION INTRAMUSCULAR; INTRAVENOUS
Status: COMPLETED
Start: 2021-06-12 | End: 2021-06-12

## 2021-06-12 RX ORDER — PROMETHAZINE HYDROCHLORIDE 25 MG/ML
6.25 INJECTION, SOLUTION INTRAMUSCULAR; INTRAVENOUS ONCE
Status: DISCONTINUED | OUTPATIENT
Start: 2021-06-13 | End: 2021-06-13

## 2021-06-12 RX ORDER — MORPHINE SULFATE 2 MG/ML
2 INJECTION, SOLUTION INTRAMUSCULAR; INTRAVENOUS ONCE
Status: COMPLETED | OUTPATIENT
Start: 2021-06-13 | End: 2021-06-13

## 2021-06-12 RX ORDER — ONDANSETRON 2 MG/ML
4 INJECTION INTRAMUSCULAR; INTRAVENOUS ONCE
Status: COMPLETED | OUTPATIENT
Start: 2021-06-12 | End: 2021-06-12

## 2021-06-12 RX ORDER — ACETAMINOPHEN 325 MG/1
650 TABLET ORAL ONCE
Status: COMPLETED | OUTPATIENT
Start: 2021-06-12 | End: 2021-06-12

## 2021-06-12 RX ORDER — LOSARTAN POTASSIUM 25 MG/1
25 TABLET ORAL DAILY
COMMUNITY
End: 2022-07-12

## 2021-06-12 RX ADMIN — ACETAMINOPHEN 650 MG: 325 TABLET ORAL at 21:42

## 2021-06-12 RX ADMIN — SODIUM CHLORIDE 1000 ML: 9 INJECTION, SOLUTION INTRAVENOUS at 22:57

## 2021-06-12 RX ADMIN — ONDANSETRON 4 MG: 2 INJECTION INTRAMUSCULAR; INTRAVENOUS at 21:50

## 2021-06-12 RX ADMIN — SODIUM CHLORIDE 1000 ML: 9 INJECTION, SOLUTION INTRAVENOUS at 21:44

## 2021-06-12 RX ADMIN — IOPAMIDOL 80 ML: 755 INJECTION, SOLUTION INTRAVENOUS at 21:26

## 2021-06-12 ASSESSMENT — ENCOUNTER SYMPTOMS
EYE DISCHARGE: 0
STRIDOR: 0
CONSTIPATION: 0
SORE THROAT: 0
DIARRHEA: 0
VOMITING: 0
SHORTNESS OF BREATH: 1
PHOTOPHOBIA: 0
ABDOMINAL PAIN: 0
CHEST TIGHTNESS: 0
EYE PAIN: 0
ABDOMINAL DISTENTION: 0
EYE ITCHING: 0
RHINORRHEA: 0
WHEEZING: 0
NAUSEA: 0
EYE REDNESS: 0
BACK PAIN: 0
COUGH: 0

## 2021-06-12 ASSESSMENT — PAIN SCALES - GENERAL
PAINLEVEL_OUTOF10: 4
PAINLEVEL_OUTOF10: 9
PAINLEVEL_OUTOF10: 9

## 2021-06-12 ASSESSMENT — PAIN DESCRIPTION - DESCRIPTORS: DESCRIPTORS: PRESSURE

## 2021-06-12 ASSESSMENT — PAIN DESCRIPTION - ORIENTATION: ORIENTATION: MID;LEFT

## 2021-06-12 ASSESSMENT — PAIN DESCRIPTION - PAIN TYPE: TYPE: ACUTE PAIN

## 2021-06-12 ASSESSMENT — PAIN DESCRIPTION - FREQUENCY: FREQUENCY: CONTINUOUS

## 2021-06-12 ASSESSMENT — PAIN DESCRIPTION - LOCATION: LOCATION: CHEST

## 2021-06-13 VITALS
TEMPERATURE: 98.5 F | WEIGHT: 202 LBS | DIASTOLIC BLOOD PRESSURE: 76 MMHG | BODY MASS INDEX: 32.47 KG/M2 | SYSTOLIC BLOOD PRESSURE: 134 MMHG | HEART RATE: 102 BPM | HEIGHT: 66 IN | OXYGEN SATURATION: 97 % | RESPIRATION RATE: 10 BRPM

## 2021-06-13 LAB
EKG ATRIAL RATE: 126 BPM
EKG P AXIS: 67 DEGREES
EKG P-R INTERVAL: 152 MS
EKG Q-T INTERVAL: 302 MS
EKG QRS DURATION: 76 MS
EKG QTC CALCULATION (BAZETT): 437 MS
EKG R AXIS: 53 DEGREES
EKG T AXIS: 48 DEGREES
EKG VENTRICULAR RATE: 126 BPM
TROPONIN T: < 0.01 NG/ML

## 2021-06-13 PROCEDURE — 93010 ELECTROCARDIOGRAM REPORT: CPT | Performed by: NUCLEAR MEDICINE

## 2021-06-13 PROCEDURE — 6360000002 HC RX W HCPCS: Performed by: EMERGENCY MEDICINE

## 2021-06-13 RX ORDER — PROMETHAZINE HYDROCHLORIDE 25 MG/ML
25 INJECTION, SOLUTION INTRAMUSCULAR; INTRAVENOUS ONCE
Status: DISCONTINUED | OUTPATIENT
Start: 2021-06-13 | End: 2021-06-13 | Stop reason: HOSPADM

## 2021-06-13 RX ADMIN — MORPHINE SULFATE 2 MG: 2 INJECTION, SOLUTION INTRAMUSCULAR; INTRAVENOUS at 00:12

## 2021-06-13 ASSESSMENT — PAIN SCALES - GENERAL: PAINLEVEL_OUTOF10: 5

## 2021-06-13 NOTE — ED NOTES
Pt arrives to the ED for chest pain that began about an hour prior to arrival. Pt states she was working at the [employer] and states she suddenly began having chest pain. Pt states she has a hx of chest pain and cardiac caths in addition to PEs and DVTs. PT states she takes eloquis daily. Pt states her pain is a 9/10 at this time. Pt respirations are slightly labored and states she feels sob. Pt vitals are stable.       Henna Sampson RN  06/12/21 2041

## 2021-06-13 NOTE — ED PROVIDER NOTES
stiffness. Skin: Negative for pallor, rash and wound. Allergic/Immunologic: Negative for environmental allergies and food allergies. Neurological: Negative for dizziness, tremors, syncope, weakness and headaches. Psychiatric/Behavioral: Negative for agitation, behavioral problems, confusion, self-injury, sleep disturbance and suicidal ideas. PAST MEDICAL HISTORY     Past Medical History:   Diagnosis Date    Anxiety     Arthritis     Asthma     Nuñez's esophagus     Blood circulation, collateral     GERD (gastroesophageal reflux disease)     Hypertension     Insomnia     Interstitial cystitis     Migraine     Pneumonia     Psychiatric problem     Spinal stenosis of lumbar region        SURGICAL HISTORY       Past Surgical History:   Procedure Laterality Date    ABDOMEN SURGERY  89    exploratory surgery    APPENDECTOMY      CARDIAC CATHETERIZATION  unsure    CARPAL TUNNEL RELEASE Left     CHOLECYSTECTOMY      COLONOSCOPY  2010    DILATATION, ESOPHAGUS  2010    ENDOSCOPY, COLON, DIAGNOSTIC  2010    OTHER SURGICAL HISTORY  21 Nov 2014    Cholecystectomy Laparoscopic (Dr. Mikel Headley, [facility])    OTHER SURGICAL HISTORY N/A 03-21-16    lumbar epidurala block L4-5    OTHER SURGICAL HISTORY N/A 04-04-16    lumbar epidural block L4-5    OTHER SURGICAL HISTORY Bilateral 6-13-16    facet blocks at L4-5, L5-S1    OTHER SURGICAL HISTORY Left 09/13/2016    RFA lumbar L4-S1    OVARIAN CYST REMOVAL  93    PATELLAR TENDON REPAIR Right 1994    3 screws in ankle.  TUBAL LIGATION  93    UPPER GASTROINTESTINAL ENDOSCOPY  2012       CURRENT MEDICATIONS       Previous Medications    ALBUTEROL (PROVENTIL HFA;VENTOLIN HFA) 108 (90 BASE) MCG/ACT INHALER    Inhale 2 puffs into the lungs every 4 hours as needed for Wheezing or Shortness of Breath Whichever brand is covered by insurance, substitute as necessary.     APIXABAN (ELIQUIS) 5 MG TABS TABLET    Take 5 mg by mouth 2 times daily oriented to person, place, and time. Cranial Nerves: No cranial nerve deficit. Sensory: No sensory deficit. Motor: No abnormal muscle tone. Coordination: Coordination normal.      Deep Tendon Reflexes: Reflexes normal.   Psychiatric:         Behavior: Behavior normal.         Thought Content: Thought content normal.         Judgment: Judgment normal.         ANCILLARY TEST RESULTS   EKG:    Interpreted by me  Result Value Ref Range    Ventricular Rate 126 BPM    Atrial Rate 126 BPM    P-R Interval 152 ms    QRS Duration 76 ms    Q-T Interval 302 ms    QTc Calculation (Bazett) 437 ms    P Axis 67 degrees    R Axis 53 degrees    T Axis 48 degrees       LAB RESULTS:  Results for orders placed or performed during the hospital encounter of 00/83/33   Basic Metabolic Panel w/ Reflex to MG   Result Value Ref Range    Sodium 136 135 - 145 meq/L    Potassium reflex Magnesium 3.9 3.5 - 5.2 meq/L    Chloride 103 98 - 111 meq/L    CO2 18 (L) 23 - 33 meq/L    Glucose 102 70 - 108 mg/dL    BUN 20 7 - 22 mg/dL    CREATININE 1.5 (H) 0.4 - 1.2 mg/dL    Calcium 10.2 8.5 - 10.5 mg/dL   Brain Natriuretic Peptide   Result Value Ref Range    Pro-.7 0.0 - 900.0 pg/mL   CBC Auto Differential   Result Value Ref Range    WBC 10.0 4.8 - 10.8 thou/mm3    RBC 5.02 4.20 - 5.40 mill/mm3    Hemoglobin 13.4 12.0 - 16.0 gm/dl    Hematocrit 42.8 37.0 - 47.0 %    MCV 85.3 81.0 - 99.0 fL    MCH 26.7 26.0 - 33.0 pg    MCHC 31.3 (L) 32.2 - 35.5 gm/dl    RDW-CV 16.0 (H) 11.5 - 14.5 %    RDW-SD 49.8 (H) 35.0 - 45.0 fL    Platelets 874 424 - 159 thou/mm3    MPV 10.4 9.4 - 12.4 fL    Seg Neutrophils 77.8 %    Lymphocytes 15.9 %    Monocytes 4.8 %    Eosinophils 0.7 %    Basophils 0.4 %    Immature Granulocytes 0.4 %    Segs Absolute 7.8 (H) 1 - 7 thou/mm3    Lymphocytes Absolute 1.6 1.0 - 4.8 thou/mm3    Monocytes Absolute 0.5 0.4 - 1.3 thou/mm3    Eosinophils Absolute 0.1 0.0 - 0.4 thou/mm3    Basophils Absolute 0.0 0.0 - 0.1 thou/mm3    Immature Grans (Abs) 0.04 0.00 - 0.07 thou/mm3    nRBC 0 /100 wbc   Troponin   Result Value Ref Range    Troponin T < 0.010 ng/ml   Urine Drug Screen   Result Value Ref Range    AMPHETAMINE+METHAMPHETAMINE URINE SCREEN Negative NEGATIVE    Barbiturate Quant, Ur Negative NEGATIVE    Benzodiazepine Quant, Ur Negative NEGATIVE    Cannabinoid Quant, Ur Negative NEGATIVE    Cocaine Metab Quant, Ur Negative NEGATIVE    Opiates, Urine Negative NEGATIVE    Oxycodone POSITIVE NEGATIVE    PCP Quant, Ur Negative NEGATIVE   Hepatic Function Panel   Result Value Ref Range    Albumin 4.5 3.5 - 5.1 g/dL    Total Bilirubin 0.2 (L) 0.3 - 1.2 mg/dL    Bilirubin, Direct <0.2 0.0 - 0.3 mg/dL    Alkaline Phosphatase 80 38 - 126 U/L    AST 43 (H) 5 - 40 U/L    ALT 33 11 - 66 U/L    Total Protein 7.0 6.1 - 8.0 g/dL   Lipase   Result Value Ref Range    Lipase 18.6 5.6 - 51.3 U/L   TSH without Reflex   Result Value Ref Range    TSH 2.230 0.400 - 4.200 uIU/mL   Anion Gap   Result Value Ref Range    Anion Gap 15.0 8.0 - 16.0 meq/L   Glomerular Filtration Rate, Estimated   Result Value Ref Range    Est, Glom Filt Rate 36 (A) ml/min/1.73m2   Osmolality   Result Value Ref Range    Osmolality Calc 274.8 (L) 275.0 - 300.0 mOsmol/kg   Troponin   Result Value Ref Range    Troponin T < 0.010 ng/ml   EKG 12 Lead   Result Value Ref Range    Ventricular Rate 126 BPM    Atrial Rate 126 BPM    P-R Interval 152 ms    QRS Duration 76 ms    Q-T Interval 302 ms    QTc Calculation (Bazett) 437 ms    P Axis 67 degrees    R Axis 53 degrees    T Axis 48 degrees       RADIOLOGY REPORTS  CTA CHEST W WO CONTRAST   Final Result   1. No pulmonary embolus. 2. Mild scarring or subsegmental atelectasis in the right middle lung. No    other acute parenchymal lung disease.       This document has been electronically signed by: Nestor Keating MD on    06/12/2021 10:00 PM      All CTs at this facility use dose modulation techniques and iterative reconstructions, and/or weight-based dosing   when appropriate to reduce radiation to a low as reasonably achievable. XR CHEST PORTABLE   Final Result   No acute findings. This document has been electronically signed by: Harper Ferreira MD on    06/12/2021 09:15 PM          [address] [place]     Differential diagnsis: Anxiety, pericarditis, bronchitis, ACS, PE    Actions: Large-bore IV, normal saline 1 L bolus, p.o. Tylenol, labs, chest x-ray, CTA chest    ED Vitals:  Vitals:    06/12/21 2017 06/12/21 2150 06/12/21 2305 06/12/21 2353   BP: 125/84 (!) 134/91 128/78 136/79   Pulse: 127 113 109 104   Resp: 22 24 15 18   Temp: 98.5 °F (36.9 °C)      TempSrc: Oral      SpO2: 99% 100% 96% 97%   Weight: 202 lb (91.6 kg)      Height: 5' 6\" (1.676 m)        She felt better on reassessment after 2 L normal saline bolus. Chest pain improved after Tylenol and morphine were given. Patient's tachycardia improved too. Creatinine 1.5, otherwise lab results were reassuring. Patient has mild MACKENZIE due to poor oral intake today. CTA chest has no acute findings. Patient is reassured and discharged in stable conditions with PCP follow-up in 3 days to recheck renal function. Medications   promethazine (PHENERGAN) injection 6.25 mg (has no administration in time range)   morphine (PF) injection 2 mg (has no administration in time range)   0.9 % sodium chloride bolus (1,000 mLs Intravenous New Bag 6/12/21 2144)   acetaminophen (TYLENOL) tablet 650 mg (650 mg Oral Given 6/12/21 2142)   iopamidol (ISOVUE-370) 76 % injection 80 mL (80 mLs Intravenous Given 6/12/21 2126)   ondansetron (ZOFRAN) injection 4 mg (4 mg Intravenous Given 6/12/21 2150)   0.9 % sodium chloride bolus (1,000 mLs Intravenous New Bag 6/12/21 2257)         CRITICAL CARE   None    CONSULTS   None    PROCEDURES   None    FINAL IMPRESSION AND DISPOSITION      1. Atypical chest pain    2.  Dehydration        Home    PATIENT REFERRED TO:  Kathy Garrido MD  [address] [place] [address]    In 3 days  ED discharge follow-up      DISCHARGE MEDICATIONS:  New Prescriptions    No medications on file       (Please note that portions of this note were completed with a voice recognition program.  Efforts were made to edit the dictations but occasionally words aremis-transcribed.)    MD Karen Vigil MD  06/13/21 0236

## 2021-06-13 NOTE — ED NOTES
Discharge instructions  discussed and explained with Pt. Pt. Verbalized understanding and has no further questions or needs at this time.         Janny Newman RN  06/13/21 2987

## 2021-06-13 NOTE — ED NOTES
Pt. Resting in bed with even and unlabored respirations. Pt. States pain is a 6/10 at this time. Pt requesting medication for pain. Dr. Quoc Madrid notified. Pt. Updated about plan of care and treatment. Pt. Has no further concerns, questions or needs at this time. Call light within reach.         Subha Ramey RN  06/12/21 7855

## 2021-06-13 NOTE — ED NOTES
Pt. Resting in bed with even and unlabored respirations. Pt. States pain remains 9/10 at this time. Pt medicated at this time. Pt able to provide urine sample. Pt. Updated about plan of care and treatment. Family at bedside. Pt. Has no further concerns, questions or needs at this time. Call light within reach.         Janny Newman RN  06/12/21 2203       Janny Newman RN  06/12/21 4481

## 2021-06-30 DIAGNOSIS — M48.062 SPINAL STENOSIS OF LUMBAR REGION WITH NEUROGENIC CLAUDICATION: ICD-10-CM

## 2021-06-30 DIAGNOSIS — M47.816 SPONDYLOSIS OF LUMBAR REGION WITHOUT MYELOPATHY OR RADICULOPATHY: ICD-10-CM

## 2021-06-30 DIAGNOSIS — G89.4 CHRONIC PAIN SYNDROME: ICD-10-CM

## 2021-06-30 DIAGNOSIS — M79.18 MYOFASCIAL PAIN DYSFUNCTION SYNDROME: ICD-10-CM

## 2021-06-30 RX ORDER — TIZANIDINE 4 MG/1
4 TABLET ORAL EVERY 6 HOURS PRN
Qty: 120 TABLET | Refills: 0 | Status: ON HOLD | OUTPATIENT
Start: 2021-07-02 | End: 2021-08-02 | Stop reason: HOSPADM

## 2021-06-30 RX ORDER — GABAPENTIN 600 MG/1
600 TABLET ORAL 4 TIMES DAILY
Qty: 120 TABLET | Refills: 0 | Status: SHIPPED | OUTPATIENT
Start: 2021-07-02 | End: 2021-08-03 | Stop reason: SDUPTHER

## 2021-06-30 RX ORDER — OXYCODONE AND ACETAMINOPHEN 10; 325 MG/1; MG/1
1 TABLET ORAL EVERY 6 HOURS PRN
Qty: 120 TABLET | Refills: 0 | Status: ON HOLD | OUTPATIENT
Start: 2021-07-04 | End: 2021-08-02 | Stop reason: HOSPADM

## 2021-06-30 NOTE — TELEPHONE ENCOUNTER
OARRS reviewed. UDS: + for Gabapentin, Noroxycodone, Oxycodone, Oxymorphone, Zolpidem-Carboxyl. Last seen: 4/19/2021. Follow-up:   Future Appointments   Date Time Provider Laisha López   7/20/2021  8:30 AM [facility] NUCLEAR MEDICINE RM3 GE INFINIA 1 STRZ NUC MED [facility] Radiolog   8/3/2021 11:45 AM Barbara Potts, APRN - CNP [facility] - [facility]     Patient filled a prescription of Norco on 6/9/21 #2 for 1 day by Ondeego Backers .  I pushed the due date back to 7/4/21 instead of 7/3/21

## 2021-07-20 ENCOUNTER — HOSPITAL ENCOUNTER (OUTPATIENT)
Dept: NUCLEAR MEDICINE | Age: 51
Discharge: HOME | End: 2021-07-20
Payer: COMMERCIAL

## 2021-07-20 DIAGNOSIS — K31.84 GASTROPARESIS: ICD-10-CM

## 2021-07-20 PROCEDURE — 3430000000 HC RX DIAGNOSTIC RADIOPHARMACEUTICAL: Performed by: NURSE PRACTITIONER

## 2021-07-20 PROCEDURE — A9541 TC99M SULFUR COLLOID: HCPCS | Performed by: NURSE PRACTITIONER

## 2021-07-20 PROCEDURE — 78264 GASTRIC EMPTYING IMG STUDY: CPT

## 2021-07-20 RX ADMIN — Medication 1 MILLICURIE: at 08:19

## 2021-07-29 ENCOUNTER — HOSPITAL ENCOUNTER (OUTPATIENT)
Age: 51
Setting detail: OBSERVATION
Discharge: HOME | End: 2021-08-02
Attending: EMERGENCY MEDICINE
Payer: COMMERCIAL

## 2021-07-29 ENCOUNTER — APPOINTMENT (OUTPATIENT)
Dept: CT IMAGING | Age: 51
End: 2021-07-29
Payer: COMMERCIAL

## 2021-07-29 DIAGNOSIS — R10.10 PAIN OF UPPER ABDOMEN: ICD-10-CM

## 2021-07-29 DIAGNOSIS — M48.062 SPINAL STENOSIS OF LUMBAR REGION WITH NEUROGENIC CLAUDICATION: ICD-10-CM

## 2021-07-29 DIAGNOSIS — K55.9 ISCHEMIA, INTESTINE (HCC): Primary | ICD-10-CM

## 2021-07-29 DIAGNOSIS — G89.4 CHRONIC PAIN SYNDROME: ICD-10-CM

## 2021-07-29 DIAGNOSIS — M79.18 MYOFASCIAL PAIN DYSFUNCTION SYNDROME: ICD-10-CM

## 2021-07-29 PROBLEM — R11.2 INTRACTABLE NAUSEA AND VOMITING: Status: ACTIVE | Noted: 2021-07-29

## 2021-07-29 LAB
ALBUMIN SERPL-MCNC: 4.7 G/DL (ref 3.5–5.1)
ALP BLD-CCNC: 82 U/L (ref 38–126)
ALT SERPL-CCNC: 26 U/L (ref 11–66)
ANION GAP SERPL CALCULATED.3IONS-SCNC: 11 MEQ/L (ref 8–16)
AST SERPL-CCNC: 25 U/L (ref 5–40)
BACTERIA: ABNORMAL /HPF
BASOPHILS # BLD: 0.7 %
BASOPHILS ABSOLUTE: 0 THOU/MM3 (ref 0–0.1)
BILIRUB SERPL-MCNC: 0.2 MG/DL (ref 0.3–1.2)
BILIRUBIN URINE: NEGATIVE
BLOOD, URINE: NEGATIVE
BUN BLDV-MCNC: 15 MG/DL (ref 7–22)
C-REACTIVE PROTEIN: < 0.3 MG/DL (ref 0–1)
CALCIUM SERPL-MCNC: 10.2 MG/DL (ref 8.5–10.5)
CASTS 2: ABNORMAL /LPF
CASTS UA: ABNORMAL /LPF
CHARACTER, URINE: CLEAR
CHLORIDE BLD-SCNC: 105 MEQ/L (ref 98–111)
CO2: 25 MEQ/L (ref 23–33)
COLOR: YELLOW
CREAT SERPL-MCNC: 1 MG/DL (ref 0.4–1.2)
CRYSTALS, UA: ABNORMAL
EKG ATRIAL RATE: 97 BPM
EKG P AXIS: 58 DEGREES
EKG P-R INTERVAL: 170 MS
EKG Q-T INTERVAL: 348 MS
EKG QRS DURATION: 80 MS
EKG QTC CALCULATION (BAZETT): 441 MS
EKG R AXIS: 21 DEGREES
EKG T AXIS: 36 DEGREES
EKG VENTRICULAR RATE: 97 BPM
EOSINOPHIL # BLD: 1.2 %
EOSINOPHILS ABSOLUTE: 0.1 THOU/MM3 (ref 0–0.4)
EPITHELIAL CELLS, UA: ABNORMAL /HPF
ERYTHROCYTE [DISTWIDTH] IN BLOOD BY AUTOMATED COUNT: 13.9 % (ref 11.5–14.5)
ERYTHROCYTE [DISTWIDTH] IN BLOOD BY AUTOMATED COUNT: 42.7 FL (ref 35–45)
GFR SERPL CREATININE-BSD FRML MDRD: 58 ML/MIN/1.73M2
GLUCOSE BLD-MCNC: 83 MG/DL (ref 70–108)
GLUCOSE URINE: NEGATIVE MG/DL
HCT VFR BLD CALC: 42.1 % (ref 37–47)
HEMOGLOBIN: 13.8 GM/DL (ref 12–16)
IMMATURE GRANS (ABS): 0.01 THOU/MM3 (ref 0–0.07)
IMMATURE GRANULOCYTES: 0.1 %
KETONES, URINE: NEGATIVE
LACTIC ACID: 1 MMOL/L (ref 0.5–2)
LEUKOCYTE ESTERASE, URINE: ABNORMAL
LIPASE: 20.7 U/L (ref 5.6–51.3)
LYMPHOCYTES # BLD: 35.9 %
LYMPHOCYTES ABSOLUTE: 2.4 THOU/MM3 (ref 1–4.8)
MAGNESIUM: 2 MG/DL (ref 1.6–2.4)
MCH RBC QN AUTO: 27.5 PG (ref 26–33)
MCHC RBC AUTO-ENTMCNC: 32.8 GM/DL (ref 32.2–35.5)
MCV RBC AUTO: 83.9 FL (ref 81–99)
MISCELLANEOUS 2: ABNORMAL
MONOCYTES # BLD: 6.9 %
MONOCYTES ABSOLUTE: 0.5 THOU/MM3 (ref 0.4–1.3)
NITRITE, URINE: NEGATIVE
NUCLEATED RED BLOOD CELLS: 0 /100 WBC
PH UA: 5.5 (ref 5–9)
PLATELET # BLD: 243 THOU/MM3 (ref 130–400)
PMV BLD AUTO: 10.4 FL (ref 9.4–12.4)
POTASSIUM REFLEX MAGNESIUM: 3.5 MEQ/L (ref 3.5–5.2)
PROTEIN UA: NEGATIVE
RBC # BLD: 5.02 MILL/MM3 (ref 4.2–5.4)
RBC URINE: ABNORMAL /HPF
RENAL EPITHELIAL, UA: ABNORMAL
SEG NEUTROPHILS: 55.2 %
SEGMENTED NEUTROPHILS ABSOLUTE COUNT: 3.8 THOU/MM3 (ref 1.8–7.7)
SODIUM BLD-SCNC: 141 MEQ/L (ref 135–145)
SPECIFIC GRAVITY, URINE: 1.02 (ref 1–1.03)
TOTAL PROTEIN: 7 G/DL (ref 6.1–8)
UROBILINOGEN, URINE: 0.2 EU/DL (ref 0–1)
WBC # BLD: 6.8 THOU/MM3 (ref 4.8–10.8)
WBC UA: ABNORMAL /HPF
YEAST: ABNORMAL

## 2021-07-29 PROCEDURE — 2500000003 HC RX 250 WO HCPCS: Performed by: STUDENT IN AN ORGANIZED HEALTH CARE EDUCATION/TRAINING PROGRAM

## 2021-07-29 PROCEDURE — G0378 HOSPITAL OBSERVATION PER HR: HCPCS

## 2021-07-29 PROCEDURE — 2580000003 HC RX 258: Performed by: STUDENT IN AN ORGANIZED HEALTH CARE EDUCATION/TRAINING PROGRAM

## 2021-07-29 PROCEDURE — 85025 COMPLETE CBC W/AUTO DIFF WBC: CPT

## 2021-07-29 PROCEDURE — 2500000003 HC RX 250 WO HCPCS: Performed by: INTERNAL MEDICINE

## 2021-07-29 PROCEDURE — 36415 COLL VENOUS BLD VENIPUNCTURE: CPT

## 2021-07-29 PROCEDURE — 83605 ASSAY OF LACTIC ACID: CPT

## 2021-07-29 PROCEDURE — 96375 TX/PRO/DX INJ NEW DRUG ADDON: CPT

## 2021-07-29 PROCEDURE — 96374 THER/PROPH/DIAG INJ IV PUSH: CPT

## 2021-07-29 PROCEDURE — C9113 INJ PANTOPRAZOLE SODIUM, VIA: HCPCS | Performed by: INTERNAL MEDICINE

## 2021-07-29 PROCEDURE — 6360000002 HC RX W HCPCS: Performed by: INTERNAL MEDICINE

## 2021-07-29 PROCEDURE — 86140 C-REACTIVE PROTEIN: CPT

## 2021-07-29 PROCEDURE — 96372 THER/PROPH/DIAG INJ SC/IM: CPT

## 2021-07-29 PROCEDURE — 96361 HYDRATE IV INFUSION ADD-ON: CPT

## 2021-07-29 PROCEDURE — 6360000002 HC RX W HCPCS: Performed by: STUDENT IN AN ORGANIZED HEALTH CARE EDUCATION/TRAINING PROGRAM

## 2021-07-29 PROCEDURE — 99285 EMERGENCY DEPT VISIT HI MDM: CPT

## 2021-07-29 PROCEDURE — 6360000002 HC RX W HCPCS: Performed by: PHYSICIAN ASSISTANT

## 2021-07-29 PROCEDURE — 93005 ELECTROCARDIOGRAM TRACING: CPT | Performed by: STUDENT IN AN ORGANIZED HEALTH CARE EDUCATION/TRAINING PROGRAM

## 2021-07-29 PROCEDURE — 87040 BLOOD CULTURE FOR BACTERIA: CPT

## 2021-07-29 PROCEDURE — 6370000000 HC RX 637 (ALT 250 FOR IP): Performed by: EMERGENCY MEDICINE

## 2021-07-29 PROCEDURE — 99220 PR INITIAL OBSERVATION CARE/DAY 70 MINUTES: CPT | Performed by: INTERNAL MEDICINE

## 2021-07-29 PROCEDURE — 2580000003 HC RX 258: Performed by: INTERNAL MEDICINE

## 2021-07-29 PROCEDURE — 80053 COMPREHEN METABOLIC PANEL: CPT

## 2021-07-29 PROCEDURE — 81001 URINALYSIS AUTO W/SCOPE: CPT

## 2021-07-29 PROCEDURE — 96365 THER/PROPH/DIAG IV INF INIT: CPT

## 2021-07-29 PROCEDURE — 83690 ASSAY OF LIPASE: CPT

## 2021-07-29 PROCEDURE — 96376 TX/PRO/DX INJ SAME DRUG ADON: CPT

## 2021-07-29 PROCEDURE — 83735 ASSAY OF MAGNESIUM: CPT

## 2021-07-29 PROCEDURE — 96366 THER/PROPH/DIAG IV INF ADDON: CPT

## 2021-07-29 PROCEDURE — 96368 THER/DIAG CONCURRENT INF: CPT

## 2021-07-29 PROCEDURE — 6360000004 HC RX CONTRAST MEDICATION: Performed by: STUDENT IN AN ORGANIZED HEALTH CARE EDUCATION/TRAINING PROGRAM

## 2021-07-29 PROCEDURE — 74177 CT ABD & PELVIS W/CONTRAST: CPT

## 2021-07-29 RX ORDER — METOPROLOL TARTRATE 50 MG/1
50 TABLET, FILM COATED ORAL 2 TIMES DAILY
COMMUNITY
End: 2022-07-12

## 2021-07-29 RX ORDER — ROPINIROLE 2 MG/1
2 TABLET, FILM COATED ORAL 3 TIMES DAILY
COMMUNITY

## 2021-07-29 RX ORDER — IPRATROPIUM BROMIDE AND ALBUTEROL SULFATE 2.5; .5 MG/3ML; MG/3ML
1 SOLUTION RESPIRATORY (INHALATION) EVERY 4 HOURS PRN
Status: DISCONTINUED | OUTPATIENT
Start: 2021-07-29 | End: 2021-07-30 | Stop reason: SDUPTHER

## 2021-07-29 RX ORDER — IPRATROPIUM BROMIDE AND ALBUTEROL SULFATE 2.5; .5 MG/3ML; MG/3ML
1 SOLUTION RESPIRATORY (INHALATION) EVERY 4 HOURS PRN
COMMUNITY
End: 2021-09-07 | Stop reason: ALTCHOICE

## 2021-07-29 RX ORDER — ZOLPIDEM TARTRATE 10 MG/1
TABLET ORAL NIGHTLY PRN
COMMUNITY

## 2021-07-29 RX ORDER — ALBUTEROL SULFATE 2.5 MG/3ML
SOLUTION RESPIRATORY (INHALATION)
COMMUNITY
End: 2021-09-07 | Stop reason: SDUPTHER

## 2021-07-29 RX ORDER — FERROUS SULFATE 325(65) MG
325 TABLET ORAL DAILY
COMMUNITY

## 2021-07-29 RX ORDER — PRENATAL WITH FERROUS FUM AND FOLIC ACID 3080; 920; 120; 400; 22; 1.84; 3; 20; 10; 1; 12; 200; 27; 25; 2 [IU]/1; [IU]/1; MG/1; [IU]/1; MG/1; MG/1; MG/1; MG/1; MG/1; MG/1; UG/1; MG/1; MG/1; MG/1; MG/1
1 TABLET ORAL DAILY
COMMUNITY

## 2021-07-29 RX ORDER — POTASSIUM CHLORIDE 7.45 MG/ML
10 INJECTION INTRAVENOUS PRN
Status: DISCONTINUED | OUTPATIENT
Start: 2021-07-29 | End: 2021-08-02 | Stop reason: HOSPADM

## 2021-07-29 RX ORDER — 0.9 % SODIUM CHLORIDE 0.9 %
1000 INTRAVENOUS SOLUTION INTRAVENOUS ONCE
Status: COMPLETED | OUTPATIENT
Start: 2021-07-29 | End: 2021-07-29

## 2021-07-29 RX ORDER — ONDANSETRON 2 MG/ML
4 INJECTION INTRAMUSCULAR; INTRAVENOUS EVERY 6 HOURS PRN
Status: DISCONTINUED | OUTPATIENT
Start: 2021-07-29 | End: 2021-08-02 | Stop reason: HOSPADM

## 2021-07-29 RX ORDER — PROMETHAZINE HYDROCHLORIDE 25 MG/ML
6.25 INJECTION, SOLUTION INTRAMUSCULAR; INTRAVENOUS ONCE
Status: COMPLETED | OUTPATIENT
Start: 2021-07-29 | End: 2021-07-29

## 2021-07-29 RX ORDER — HYDROXYZINE 50 MG/1
50 TABLET, FILM COATED ORAL 4 TIMES DAILY PRN
COMMUNITY
End: 2022-07-12

## 2021-07-29 RX ORDER — PANTOPRAZOLE SODIUM 40 MG/1
40 TABLET, DELAYED RELEASE ORAL 2 TIMES DAILY
COMMUNITY
End: 2022-07-12

## 2021-07-29 RX ORDER — POLYETHYLENE GLYCOL 3350 17 G/17G
17 POWDER, FOR SOLUTION ORAL DAILY PRN
Status: DISCONTINUED | OUTPATIENT
Start: 2021-07-29 | End: 2021-08-02 | Stop reason: HOSPADM

## 2021-07-29 RX ORDER — RABEPRAZOLE SODIUM 20 MG/1
20 TABLET, DELAYED RELEASE ORAL 2 TIMES DAILY
Status: ON HOLD | COMMUNITY
End: 2021-08-02 | Stop reason: HOSPADM

## 2021-07-29 RX ORDER — FENTANYL CITRATE 50 UG/ML
50 INJECTION, SOLUTION INTRAMUSCULAR; INTRAVENOUS
Status: DISCONTINUED | OUTPATIENT
Start: 2021-07-29 | End: 2021-07-29

## 2021-07-29 RX ORDER — SODIUM CHLORIDE 0.9 % (FLUSH) 0.9 %
5-40 SYRINGE (ML) INJECTION EVERY 12 HOURS SCHEDULED
Status: DISCONTINUED | OUTPATIENT
Start: 2021-07-29 | End: 2021-08-02 | Stop reason: HOSPADM

## 2021-07-29 RX ORDER — MAGNESIUM SULFATE IN WATER 40 MG/ML
2000 INJECTION, SOLUTION INTRAVENOUS PRN
Status: DISCONTINUED | OUTPATIENT
Start: 2021-07-29 | End: 2021-08-02 | Stop reason: HOSPADM

## 2021-07-29 RX ORDER — RIMEGEPANT SULFATE 75 MG/75MG
TABLET, ORALLY DISINTEGRATING ORAL
COMMUNITY

## 2021-07-29 RX ORDER — CIPROFLOXACIN 2 MG/ML
400 INJECTION, SOLUTION INTRAVENOUS EVERY 12 HOURS
Status: DISCONTINUED | OUTPATIENT
Start: 2021-07-29 | End: 2021-07-30

## 2021-07-29 RX ORDER — ACETAMINOPHEN 650 MG/1
650 SUPPOSITORY RECTAL EVERY 6 HOURS PRN
Status: DISCONTINUED | OUTPATIENT
Start: 2021-07-29 | End: 2021-08-02 | Stop reason: HOSPADM

## 2021-07-29 RX ORDER — PANTOPRAZOLE SODIUM 40 MG/10ML
40 INJECTION, POWDER, LYOPHILIZED, FOR SOLUTION INTRAVENOUS DAILY
Status: DISCONTINUED | OUTPATIENT
Start: 2021-07-29 | End: 2021-07-30

## 2021-07-29 RX ORDER — ONDANSETRON 4 MG/1
4 TABLET, ORALLY DISINTEGRATING ORAL EVERY 8 HOURS PRN
Status: DISCONTINUED | OUTPATIENT
Start: 2021-07-29 | End: 2021-08-02 | Stop reason: HOSPADM

## 2021-07-29 RX ORDER — FENTANYL CITRATE 50 UG/ML
50 INJECTION, SOLUTION INTRAMUSCULAR; INTRAVENOUS ONCE
Status: COMPLETED | OUTPATIENT
Start: 2021-07-29 | End: 2021-07-29

## 2021-07-29 RX ORDER — SODIUM CHLORIDE 0.9 % (FLUSH) 0.9 %
5-40 SYRINGE (ML) INJECTION PRN
Status: DISCONTINUED | OUTPATIENT
Start: 2021-07-29 | End: 2021-08-02 | Stop reason: HOSPADM

## 2021-07-29 RX ORDER — CHOLECALCIFEROL (VITAMIN D3) 125 MCG
1 CAPSULE ORAL DAILY
COMMUNITY

## 2021-07-29 RX ORDER — LORATADINE 10 MG/1
10 CAPSULE, LIQUID FILLED ORAL DAILY
COMMUNITY
End: 2021-07-29

## 2021-07-29 RX ORDER — POTASSIUM CHLORIDE 20 MEQ/1
40 TABLET, EXTENDED RELEASE ORAL PRN
Status: DISCONTINUED | OUTPATIENT
Start: 2021-07-29 | End: 2021-08-02 | Stop reason: HOSPADM

## 2021-07-29 RX ORDER — DICYCLOMINE HYDROCHLORIDE 10 MG/1
10 CAPSULE ORAL 3 TIMES DAILY
COMMUNITY

## 2021-07-29 RX ORDER — SODIUM CHLORIDE 9 MG/ML
25 INJECTION, SOLUTION INTRAVENOUS PRN
Status: DISCONTINUED | OUTPATIENT
Start: 2021-07-29 | End: 2021-08-02 | Stop reason: HOSPADM

## 2021-07-29 RX ORDER — TIOTROPIUM BROMIDE INHALATION SPRAY 1.56 UG/1
1 SPRAY, METERED RESPIRATORY (INHALATION) DAILY
COMMUNITY
End: 2022-07-12

## 2021-07-29 RX ORDER — ACETAMINOPHEN 325 MG/1
650 TABLET ORAL EVERY 6 HOURS PRN
Status: DISCONTINUED | OUTPATIENT
Start: 2021-07-29 | End: 2021-08-02 | Stop reason: HOSPADM

## 2021-07-29 RX ORDER — DICYCLOMINE HYDROCHLORIDE 10 MG/ML
20 INJECTION INTRAMUSCULAR ONCE
Status: COMPLETED | OUTPATIENT
Start: 2021-07-29 | End: 2021-07-29

## 2021-07-29 RX ORDER — LOSARTAN POTASSIUM 25 MG/1
25 TABLET ORAL DAILY
Status: DISCONTINUED | OUTPATIENT
Start: 2021-07-30 | End: 2021-08-02 | Stop reason: HOSPADM

## 2021-07-29 RX ORDER — MONTELUKAST SODIUM 10 MG/1
10 TABLET ORAL DAILY
COMMUNITY

## 2021-07-29 RX ORDER — TOPIRAMATE 100 MG/1
100 TABLET, FILM COATED ORAL 2 TIMES DAILY
Status: DISCONTINUED | OUTPATIENT
Start: 2021-07-30 | End: 2021-08-02 | Stop reason: HOSPADM

## 2021-07-29 RX ORDER — LORAZEPAM 1 MG/1
1 TABLET ORAL 3 TIMES DAILY PRN
Status: ON HOLD | COMMUNITY
End: 2021-08-02 | Stop reason: HOSPADM

## 2021-07-29 RX ORDER — ROPINIROLE 1 MG/1
2 TABLET, FILM COATED ORAL 3 TIMES DAILY
Status: DISCONTINUED | OUTPATIENT
Start: 2021-07-30 | End: 2021-08-02 | Stop reason: HOSPADM

## 2021-07-29 RX ORDER — PROMETHAZINE HYDROCHLORIDE 25 MG/ML
12.5 INJECTION, SOLUTION INTRAMUSCULAR; INTRAVENOUS EVERY 6 HOURS PRN
Status: DISCONTINUED | OUTPATIENT
Start: 2021-07-29 | End: 2021-08-02 | Stop reason: HOSPADM

## 2021-07-29 RX ORDER — SODIUM CHLORIDE 9 MG/ML
INJECTION, SOLUTION INTRAVENOUS CONTINUOUS
Status: DISCONTINUED | OUTPATIENT
Start: 2021-07-29 | End: 2021-08-02 | Stop reason: HOSPADM

## 2021-07-29 RX ADMIN — METRONIDAZOLE 500 MG: 500 INJECTION, SOLUTION INTRAVENOUS at 21:20

## 2021-07-29 RX ADMIN — DICYCLOMINE HYDROCHLORIDE 20 MG: 20 INJECTION, SOLUTION INTRAMUSCULAR at 22:52

## 2021-07-29 RX ADMIN — HYDROMORPHONE HYDROCHLORIDE 0.5 MG: 1 INJECTION, SOLUTION INTRAMUSCULAR; INTRAVENOUS; SUBCUTANEOUS at 15:16

## 2021-07-29 RX ADMIN — PANTOPRAZOLE SODIUM 40 MG: 40 INJECTION, POWDER, FOR SOLUTION INTRAVENOUS at 22:18

## 2021-07-29 RX ADMIN — SODIUM CHLORIDE: 9 INJECTION, SOLUTION INTRAVENOUS at 18:34

## 2021-07-29 RX ADMIN — IOPAMIDOL 80 ML: 755 INJECTION, SOLUTION INTRAVENOUS at 13:34

## 2021-07-29 RX ADMIN — LIDOCAINE HYDROCHLORIDE: 20 SOLUTION ORAL; TOPICAL at 14:15

## 2021-07-29 RX ADMIN — HYDROMORPHONE HYDROCHLORIDE 0.5 MG: 1 INJECTION, SOLUTION INTRAMUSCULAR; INTRAVENOUS; SUBCUTANEOUS at 22:18

## 2021-07-29 RX ADMIN — FENTANYL CITRATE 50 MCG: 50 INJECTION, SOLUTION INTRAMUSCULAR; INTRAVENOUS at 19:06

## 2021-07-29 RX ADMIN — SODIUM CHLORIDE 1000 ML: 9 INJECTION, SOLUTION INTRAVENOUS at 12:49

## 2021-07-29 RX ADMIN — CIPROFLOXACIN 400 MG: 2 INJECTION, SOLUTION INTRAVENOUS at 21:20

## 2021-07-29 RX ADMIN — FENTANYL CITRATE 50 MCG: 50 INJECTION INTRAMUSCULAR; INTRAVENOUS at 12:50

## 2021-07-29 RX ADMIN — FAMOTIDINE 20 MG: 10 INJECTION, SOLUTION INTRAVENOUS at 12:50

## 2021-07-29 RX ADMIN — PROMETHAZINE HYDROCHLORIDE 6.25 MG: 25 INJECTION INTRAMUSCULAR; INTRAVENOUS at 12:41

## 2021-07-29 RX ADMIN — ENOXAPARIN SODIUM 40 MG: 40 INJECTION, SOLUTION INTRAVENOUS; SUBCUTANEOUS at 22:18

## 2021-07-29 RX ADMIN — ONDANSETRON 4 MG: 2 INJECTION INTRAMUSCULAR; INTRAVENOUS at 19:06

## 2021-07-29 ASSESSMENT — PAIN SCALES - GENERAL
PAINLEVEL_OUTOF10: 8
PAINLEVEL_OUTOF10: 10
PAINLEVEL_OUTOF10: 9
PAINLEVEL_OUTOF10: 8
PAINLEVEL_OUTOF10: 10
PAINLEVEL_OUTOF10: 9

## 2021-07-29 ASSESSMENT — PAIN DESCRIPTION - LOCATION
LOCATION: ABDOMEN
LOCATION: ABDOMEN

## 2021-07-29 ASSESSMENT — PAIN DESCRIPTION - PAIN TYPE
TYPE: ACUTE PAIN

## 2021-07-29 ASSESSMENT — PAIN DESCRIPTION - ONSET
ONSET: ON-GOING
ONSET: ON-GOING

## 2021-07-29 ASSESSMENT — PAIN DESCRIPTION - PROGRESSION
CLINICAL_PROGRESSION: GRADUALLY WORSENING
CLINICAL_PROGRESSION: NOT CHANGED
CLINICAL_PROGRESSION: NOT CHANGED

## 2021-07-29 ASSESSMENT — PAIN DESCRIPTION - DESCRIPTORS
DESCRIPTORS: SHARP
DESCRIPTORS: DULL;STABBING

## 2021-07-29 ASSESSMENT — PAIN DESCRIPTION - ORIENTATION
ORIENTATION: MID;UPPER
ORIENTATION: MID;UPPER

## 2021-07-29 ASSESSMENT — PAIN - FUNCTIONAL ASSESSMENT: PAIN_FUNCTIONAL_ASSESSMENT: PREVENTS OR INTERFERES SOME ACTIVE ACTIVITIES AND ADLS

## 2021-07-29 ASSESSMENT — PAIN DESCRIPTION - FREQUENCY
FREQUENCY: CONTINUOUS
FREQUENCY: CONTINUOUS

## 2021-07-29 NOTE — PROGRESS NOTES
Pharmacy Medication History Note      List of current medications patient is taking is complete. Source of information: patient and dispense report    Changes made to medication list:  Medications removed (include reason, ex. therapy complete or physician discontinued):  Loratadine 10 mg- therapy complete  Metoprolol succinate- therapy complete  Dexlansoprazole- therapy complete  Duloxetine 30 and 60 mg- therapy complete  Sumatriptan- therapy complete  Potassium chloride 20mEq- therapy complete  Pentosan polysulfate-therapy complete  Propranolol 80 mg-therapy complete      Medications added/doses adjusted: Added lorazepam 1 mg take 1 tablet PO TID PRN for anxiety  Added albuterol 0.083% use 1 vial in nebulizer QID  Added dicyclomine 10 mg PO TID  Added ferrous sulfate 325 mg PO daily  Adjusted furosemide 20 mg to 40 mg daily  Added loratadine-D 12H- PO Q12H  Added metoprolol tartrate 50 mg take PO BID  Added montelukast 10 mg PO daily   Added rimegepant sulfate (Nurtec) dissolve 1 tablet PO PRN for migraine no more than 1 a day  Added Spiriva Respimat 1.25 mcg inhale 1 puff into lungs daily   Adjusted hydroxyzine 50 mg from TID to QID PRN  Added vitamin D3 2000 UT PO daily   Added Duoneb INH Q4H PRN  Added rabeprazole 40 mg PO BID  Adjusted promethazine 25 mg PO BID PRN for nausea  Adjusted ropinirole 2 mg PO nightly  Added potassium chloride 10 mEq PO QAM  Adjusted pantoprazole 40 mg from daily to BID  Added oxybutynin 5 mg to TID     Other notes (ex. Recent course of antibiotics, Coumadin dosing):  Patient was too nauseous to take daily medications yesterday. Last time she took daily medications on 7/27/21. However patient managed to take nightly medications (zolpidem and ropinirole on 7/28/21)  Patient states that she takes both rabeprazole and pantoprazole. She has filled them both regularly for the last few months. Denies use of other OTC or herbal medications.     Allergies reviewed    Electronically signed by Cally Chery on 7/29/2021 at 4:12 PM

## 2021-07-29 NOTE — ED NOTES
Patient transferred to [facility] 026 nurse informed.       Shelbie Johns  07/29/21 1207

## 2021-07-29 NOTE — H&P
Hospitalist H+P      Patient:  Roland Cosme    Unit/Bed:03/003A  YOB: 1970  MRN: [id number]   Acct: [de-identified]     PCP: Tsering Nixon MD  Date of Admission: 7/29/2021        Assessment and Plan:        1. Intractable nausea and vomiting we will start antiemetics, IV fluids, sips of liquid with p.o. medication, NPO  2. Abdominal pain with possible circumferential mass of transverse colon: We will consult GI and general surgery, pain control. 3. Hypokalemia we will start replacement protocol  4. Essential hypertension we will restart home medications and monitor response, may need to adjust as needed  5. History of DVT with pulmonary embolism we will continue Eliquis at this time. 6. Interstitial cystitis medication on hold  7. Psychiatric problems medication on hold    CC: Abdominal pain    HPI: 66-year-old white female obese who presents emergency department for evaluation abdominal pain. Patient states abdominal pain since 1 week ago which started in the epigastric and posteriorly it radiated to the right upper quadrant and left upper quadrant and described the pain as being stabbing. Increase with change in position. Especially when the patient bends over. Patient went to her GI appointment who referred to the patient to the ED. Patient's had multiple episodes of emesis since yesterday currently she is not able to tolerate oral intake additionally she had 3 days ago she had episodes of diarrhea. States she has been feeling warmer but denies a fever. Temperature today in the morning patient states his weight was 100.3. Past medical history includes essential hypertension interstitial cystitis psychiatric issues. ROS (14 point review of systems completed. Pertinent positives noted. Otherwise ROS is negative) :  Abdominal pain    PMH:  Per HPI and       Diagnosis Date    Anxiety     Arthritis     Asthma     Nuñez's esophagus     Blood circulation, collateral  GERD (gastroesophageal reflux disease)     Hypertension     Insomnia     Interstitial cystitis     Migraine     Pneumonia     Psychiatric problem     Spinal stenosis of lumbar region      SHX:        Procedure Laterality Date    ABDOMEN SURGERY  93    exploratory surgery    APPENDECTOMY      CARDIAC CATHETERIZATION  unsure    CARPAL TUNNEL RELEASE Left     CHOLECYSTECTOMY      COLONOSCOPY  2010    DILATATION, ESOPHAGUS  2010    ENDOSCOPY, COLON, DIAGNOSTIC  2010    OTHER SURGICAL HISTORY  21 Nov 2014    Cholecystectomy Laparoscopic (Dr. Luzmaria Manley, [facility])    OTHER SURGICAL HISTORY N/A 03-21-16    lumbar epidurala block L4-5    OTHER SURGICAL HISTORY N/A 04-04-16    lumbar epidural block L4-5    OTHER SURGICAL HISTORY Bilateral 6-13-16    facet blocks at L4-5, L5-S1    OTHER SURGICAL HISTORY Left 09/13/2016    RFA lumbar L4-S1    OVARIAN CYST REMOVAL  93    PATELLAR TENDON REPAIR Right 1994    3 screws in ankle.  TUBAL LIGATION  80    UPPER GASTROINTESTINAL ENDOSCOPY  2012     FHX:       Problem Relation Age of Onset    Heart Disease Mother     Inflam Bowel Dis Mother     Heart Disease Father     Heart Attack Brother      [facility] CENTER:   Social History     Socioeconomic History    Marital status: Single     Spouse name: None    Number of children: 0    Years of education: 15    Highest education level: None   Occupational History    Occupation: unemployed   Tobacco Use    Smoking status: Never Smoker    Smokeless tobacco: Never Used   Vaping Use    Vaping Use: Never used   Substance and Sexual Activity    Alcohol use:  Yes     Alcohol/week: 0.0 standard drinks     Comment: rarely    Drug use: No    Sexual activity: Not Currently   Other Topics Concern    None   Social History Narrative    None     Social Determinants of Health     Financial Resource Strain:     Difficulty of Paying Living Expenses:    Food Insecurity:     Worried About Running Out of Food in the Last Year:     Ran Out of Food in the Last Year:    Transportation Needs:     Lack of Transportation (Medical):  Lack of Transportation (Non-Medical):    Physical Activity:     Days of Exercise per Week:     Minutes of Exercise per Session:    Stress:     Feeling of Stress :    Social Connections:     Frequency of Communication with Friends and Family:     Frequency of Social Gatherings with Friends and Family:     Attends Protestant Services:     Active Member of Clubs or Organizations:     Attends Club or Organization Meetings:     Marital Status:    Intimate Partner Violence:     Fear of Current or Ex-Partner:     Emotionally Abused:     Physically Abused:     Sexually Abused: Allergies: Ketorolac, Adhesive tape, Compazine [prochlorperazine], Erythromycin, Haloperidol and related, Lyrica [pregabalin], Morphine, Reglan [metoclopramide], and Sulfa antibiotics  Medications:     sodium chloride      sodium chloride        sodium chloride flush  5-40 mL Intravenous 2 times per day    enoxaparin  40 mg Subcutaneous Daily    pantoprazole  40 mg Intravenous Daily    ciprofloxacin  400 mg Intravenous Q12H    metroNIDAZOLE  500 mg Intravenous Q8H     Prior to Admission medications    Medication Sig Start Date End Date Taking? Authorizing Provider   Metoprolol Succinate 50 MG CS24 Take by mouth   Yes Historical Provider, MD   rOPINIRole (REQUIP) 2 MG tablet Take 2 mg by mouth 3 times daily   Yes Historical Provider, MD   zolpidem (AMBIEN) 10 MG tablet Take by mouth nightly as needed for Sleep.    Yes Historical Provider, MD   pantoprazole (PROTONIX) 40 MG tablet Take 40 mg by mouth daily   Yes Historical Provider, MD   UNABLE TO FIND Headache medication   Yes Historical Provider, MD   hydrOXYzine (ATARAX) 50 MG tablet Take 50 mg by mouth 3 times daily as needed for Itching   Yes Historical Provider, MD   Prenatal Vit-Fe Fumarate-FA (PRENATAL VITAMIN) 27-1 MG TABS tablet Take 1 tablet by mouth daily   Yes Historical Provider, MD   loratadine (CLARITIN) 10 MG capsule Take 10 mg by mouth daily   Yes Historical Provider, MD   gabapentin (NEURONTIN) 600 MG tablet Take 1 tablet by mouth 4 times daily for 30 days. 7/2/21 8/1/21 Yes RENNY Almodovar CNP   oxyCODONE-acetaminophen (PERCOCET)  MG per tablet Take 1 tablet by mouth every 6 hours as needed for Pain for up to 30 days. 7/4/21 8/3/21 Yes RENNY Almodovar CNP   tiZANidine (ZANAFLEX) 4 MG tablet Take 1 tablet by mouth every 6 hours as needed (spasms) 7/2/21 8/1/21 Yes RENNY Almodovar CNP   losartan (COZAAR) 25 MG tablet Take 25 mg by mouth daily   Yes Historical Provider, MD   apixaban (ELIQUIS) 5 MG TABS tablet Take 5 mg by mouth 2 times daily   Yes Historical Provider, MD   Promethazine HCl (PHENERGAN PO) Take by mouth   Yes Historical Provider, MD   furosemide (LASIX) 20 MG tablet TAKE 2 TABLETS BY MOUTH IN THE MORNING FOR 3 DAYS 1 IN THE MORNING UNTIL FINISHED 5/31/19  Yes Historical Provider, MD   oxybutynin (DITROPAN) 5 MG tablet Take 1 tablet by mouth   Yes Historical Provider, MD   sucralfate (CARAFATE) 1 GM tablet Take 1 tablet by mouth 4 times daily 4/3/19  Yes Radha Garcia MD   potassium chloride (KLOR-CON M) 20 MEQ extended release tablet Take 2 tablets by mouth daily (with breakfast) 4/4/19  Yes Radha Garcia MD   naloxone (NARCAN) 4 MG/0.1ML LIQD nasal spray 1 spray by Nasal route as needed for Opioid Reversal 2/26/19  Yes RENNY Almodovar CNP   topiramate (TOPAMAX) 100 MG tablet Take 1 tablet by mouth 2 times daily 5/16/16  Yes Tequila Lenz MD   albuterol (PROVENTIL HFA;VENTOLIN HFA) 108 (90 BASE) MCG/ACT inhaler Inhale 2 puffs into the lungs every 4 hours as needed for Wheezing or Shortness of Breath Whichever brand is covered by insurance, substitute as necessary.  7/30/15  Yes Tequila Lenz MD   DULoxetine (CYMBALTA) 30 MG extended release capsule Take 1 capsule by mouth daily In addition to 60 mg tablet 7/16/19   Sandra Eric MD   dexlansoprazole (DEXILANT) 60 MG CPDR delayed release capsule Take by mouth    Historical Provider, MD   DULoxetine (CYMBALTA) 60 MG extended release capsule Take 1 capsule by mouth daily 4/30/19   Sandra Eric MD   SUMAtriptan Succinate (IMITREX PO) Take by mouth as needed     Historical Provider, MD   propranolol (INDERAL) 80 MG tablet Take 80 mg by mouth 2 times daily    Historical Provider, MD   pentosan polysulfate (ELMIRON) 100 MG capsule Take 1 capsule by mouth 3 times daily (before meals) 9/8/16   Colleen Lamas, APRN - CNP      PHYSICAL EXAM:    BP (!) 176/108   Pulse 96   Temp 99.4 °F (37.4 °C) (Oral)   Resp 20   Ht 5' 6\" (1.676 m)   Wt 200 lb (90.7 kg)   LMP 11/19/2014 (Exact Date)   SpO2 100%   BMI 32.28 kg/m²     General appearance:  No apparent distress, appears stated age and cooperative. HEENT:  Normal cephalic, atraumatic without obvious deformity. Pupils equal, round, and reactive to light. Extra ocular muscles intact. Conjunctivae/corneas clear. Neck: Supple, with full range of motion. no jugular venous distention. Trachea midline. no carotid bruits  Respiratory:  Normal respiratory effort. Clear to auscultation, bilaterally without Rales/Wheezes/Rhonchi. Breath sounds equal bilaterally  Cardiovascular:  Regular rate and rhythm with normal S1/S2 without murmurs, rubs or gallops. PMI non displaced  Abdomen: Soft, generalized tender especially midepigastric area and left and right upper abdomen, non-distended with normal bowel sounds. No guarding, rebound. Musculoskeletal:  No clubbing, cyanosis or edema bilaterally. Full range of motion without deformity. Skin: Skin color, texture, turgor normal.  No rashes or lesions, or suspicious lesions. Neurologic:  Neurovascularly intact without any focal sensory/motor deficits.  Cranial nerves: II-XII intact, grossly non-focal.  Psychiatric:  Alert and oriented, thought content appropriate, normal insight  Capillary Refill: Brisk,< 2 seconds   Peripheral Pulses: +2 palpable, equal bilaterally upper and lower extremities  Lymphatics: no lymphadenopathy    Data: (All radiographs, tracings, PFTs, and imaging are personally viewed and interpreted unless otherwise noted).    Recent Labs     07/29/21  1208   WBC 6.8   HGB 13.8   HCT 42.1         Recent Labs     07/29/21  1207      K 3.5      CO2 25   BUN 15   CREATININE 1.0   CALCIUM 10.2      Recent Labs     07/29/21  1207   AST 25   ALT 26   BILITOT 0.2*   ALKPHOS 82       No results for input(s): INR in the last 72 hours.  No results for input(s): Joeline Reeks in the last 72 hours. Radiology reports-   NM GASTRIC EMPTYING    Result Date: 7/20/2021  PROCEDURE: NM GASTRIC EMPTYING CLINICAL INFORMATION: Gastroparesis. Radiopharmaceutical: 1 mCi technetium 99m sulfur colloid in eggs. TECHNIQUE: Anterior and posterior images were obtained at multiple time points following radiopharmaceutical ingestion. A region of interest was drawn around the stomach and percentage of radiotracer retention was calculated. COMPARISON: None. FINDINGS: The stomach is normally located in the left upper quadrant of the abdomen. Gastric emptying is as follows: Time                         Percent retention                      Percent normal One hour                               83                                              90 2 hours                                  52                                              60 3 hours                                  23                                              30 4 hours                                   7                                                10     No scintigraphic evidence of delayed gastric emptying. **This report has been created using voice recognition software. It may contain minor errors which are inherent in voice recognition technology. ** Final report electronically signed by Dr. Bonnie Black on 7/20/2021 1:11 PM    CT ABDOMEN PELVIS W IV CONTRAST Additional Contrast? None    Result Date: 7/29/2021  PROCEDURE: CT ABDOMEN PELVIS W IV CONTRAST CLINICAL INFORMATION: Abdominal pain diverticulitis . COMPARISON: No prior study. TECHNIQUE: 2-D multiplanar postcontrast images of the abdomen and pelvis Isovue-370 IV contrast only All CT scans at this facility use dose modulation, iterative reconstruction, and/or weight-based dosing when appropriate to reduce radiation dose to as low as reasonably achievable. FINDINGS: Lung bases Lung bases are clear undersurface the heart unremarkable. Abdomen pelvis Liver, and spleen are normal. Prior cholecystectomy. Pancreas unremarkable. Adrenals are normal. Kidneys enhance symmetrically and appear normal. Aorta is unremarkable. No adenopathy identified. Short segment circumferential narrowing of the transverse colon. Possibly peristalsis. Circumferential neoplasm is not excludable. It does not appear to be any bowel obstruction, somewhat favoring peristalsis. This is seen on images 42 and 43 series 2 urinary bladder is distended. Uterus is unremarkable. No abnormal fluid collections are seen. Bones Degenerative changes both hips Facet degenerative changes lower lumbar spine. No acute abdominal or pelvic abnormalities. There is a short segment narrowing of the transverse colon which may represent peristalsis but a circumferential lesion is not excludable. **This report has been created using voice recognition software. It may contain minor errors which are inherent in voice recognition technology. ** Final report electronically signed by Dr. Sally Dykes on 7/29/2021 1:57 PM      Electronically signed by Lenard Graham DO on 7/29/2021 at 4:21 PM

## 2021-07-29 NOTE — ED NOTES
ED to inpatient nurses report    Chief Complaint   Patient presents with    Abdominal Pain      Present to ED from home  LOC: alert and orientated to name, place, date  Vital signs   Vitals:    07/29/21 1037 07/29/21 1233 07/29/21 1412 07/29/21 1516   BP: (!) 139/99 (!) 164/102 (!) 181/119 (!) 173/115   Pulse: 97 94 97 92   Resp: 14 16 16 20   Temp: 99.4 °F (37.4 °C)      TempSrc: Oral      SpO2: 94% 94% 98% 100%   Weight: 200 lb (90.7 kg)      Height: 5' 6\" (1.676 m)         Oxygen Baseline room air     Current needs required none  Bipap/Cpap No  LDAs:   Peripheral IV 07/29/21 Right Forearm (Active)   Site Assessment Clean;Dry; Intact 07/29/21 1248   Line Status Blood return noted;Brisk blood return;Flushed; Infusing 07/29/21 1248   Dressing Status Clean;Dry; Intact 07/29/21 1248   Dressing Intervention New 07/29/21 1248     Mobility: Requires assistance * 1  Pending ED orders: n/a   Present condition: pt stable     Electronically signed by Randall White RN on 7/29/2021 at 3:30 PM     Randall White RN  07/29/21 [id number]

## 2021-07-29 NOTE — ED PROVIDER NOTES
[facility] ENCOUNTER          Pt Name: Damian Sanches  MRN: [id number]  [place] 1970  Date of evaluation: 7/29/2021  Treating Resident Physician: Yaritza Mora MD  Supervising Physician: Noah Mcgowan MD    [address] [place] [address]       Chief Complaint   Patient presents with    Abdominal Pain     History obtained from the patient. HISTORY OF PRESENT ILLNESS    HPI  Damian Sanches is a 44 y.o. female who presents to the emergency department for evaluation of abdominal pain. Patient states abdominal pain since 1 week ago which starts in the epigastric and posteriorly is radiated to right upper quadrant and left upper quadrant described as stabbing, increases with change of position, when patient bend over, the pain has been increasing since yesterday currently 10/10, today patient went to a GI appointment who referred patient to the ED. Associated symptoms, patient refer multiple episodes of emesis since yesterday currently she is not able to tolerate oral intake, additionally 3 days ago episodes of diarrhea currently she denies bowel movements, she refers has been feeling warmer but denies fever, temperature today in the morning patient refers 100.3 F. This is the first time patient experienced this pain patient refers previous symptoms with less intensity when she was diagnosed with diverticulitis. The patient has no other acute complaints at this time. REVIEW OF SYSTEMS   Review of Systems   Constitutional: Negative. HENT: Negative. Eyes: Negative. Respiratory: Negative. Cardiovascular: Negative. Gastrointestinal: Negative for abdominal distention, blood in stool and rectal pain. Genitourinary: Negative. Musculoskeletal: Negative. Neurological: Negative. Psychiatric/Behavioral: Negative.           PAST MEDICAL AND SURGICAL HISTORY     Past Medical History:   Diagnosis Date    Anxiety     Arthritis     Asthma     Nuñez's esophagus     Blood circulation, collateral     GERD (gastroesophageal reflux disease)     Hx of blood clots     Hypertension     Insomnia     Interstitial cystitis     Migraine     Pneumonia     Psychiatric problem     Spinal stenosis of lumbar region      Past Surgical History:   Procedure Laterality Date    ABDOMEN SURGERY  93    exploratory surgery    APPENDECTOMY      CARDIAC CATHETERIZATION  unsure    CARPAL TUNNEL RELEASE Left     CHOLECYSTECTOMY      COLONOSCOPY  2010    DILATATION, ESOPHAGUS  2010    ENDOSCOPY, COLON, DIAGNOSTIC  2010    OTHER SURGICAL HISTORY  21 Nov 2014    Cholecystectomy Laparoscopic (Dr. Laura [facility], [facility])    OTHER SURGICAL HISTORY N/A 03-21-16    lumbar epidurala block L4-5    OTHER SURGICAL HISTORY N/A 04-04-16    lumbar epidural block L4-5    OTHER SURGICAL HISTORY Bilateral 6-13-16    facet blocks at L4-5, L5-S1    OTHER SURGICAL HISTORY Left 09/13/2016    RFA lumbar L4-S1    OVARIAN CYST REMOVAL  93    PATELLAR TENDON REPAIR Right 1994    3 screws in ankle.     TUBAL LIGATION  93    UPPER GASTROINTESTINAL ENDOSCOPY  2012         MEDICATIONS     Current Facility-Administered Medications:     HYDROmorphone (DILAUDID) injection 1 mg, 1 mg, Intravenous, Q3H PRN, Salud Carolina PA-C, 1 mg at 07/30/21 1522    metoprolol tartrate (LOPRESSOR) tablet 50 mg, 50 mg, Oral, BID, Jesenia Cortez PA-C, 50 mg at 07/30/21 1237    montelukast (SINGULAIR) tablet 10 mg, 10 mg, Oral, Daily, Jesenia Cortez PA-C, 10 mg at 07/30/21 1237    dicyclomine (BENTYL) capsule 10 mg, 10 mg, Oral, TID, Jesenia Cortez PA-C, 10 mg at 07/30/21 1444    ferrous sulfate (IRON 325) tablet 325 mg, 325 mg, Oral, Daily, Jesenia Cortez PA-C, 325 mg at 07/30/21 1237    furosemide (LASIX) tablet 40 mg, 40 mg, Oral, Daily, Jesenia Cortez PA-C, 40 mg at 07/30/21 1237    hydrOXYzine (ATARAX) tablet 50 mg, 50 mg, Oral, 4x Daily PRN, Armani Benz AYAD Cortez, 50 mg at 07/30/21 1444    gabapentin (NEURONTIN) tablet 600 mg, 600 mg, Oral, 4x Daily, Jesenia Cortez PA-C, 600 mg at 07/30/21 1650    oxybutynin (DITROPAN) tablet 5 mg, 5 mg, Oral, TID, Jesenia Cortez PA-C, 5 mg at 07/30/21 1444    sucralfate (CARAFATE) tablet 1 g, 1 g, Oral, 4x Daily, Jesenia Cortez PA-C, 1 g at 07/30/21 1650    tiZANidine (ZANAFLEX) tablet 4 mg, 4 mg, Oral, Q6H PRN, Jesenia Cortez PA-C    zolpidem (AMBIEN) tablet 5 mg, 5 mg, Oral, Nightly PRN, Jesenia Cortez PA-C    albuterol (PROVENTIL) nebulizer solution 2.5 mg, 2.5 mg, Nebulization, Q4H PRN, Jesenia Cortez PA-C    tiotropium (SPIRIVA RESPIMAT) 2.5 MCG/ACT inhaler 1 puff, 1 puff, Inhalation, Daily, Jesenia Cortez PA-C, 1 puff at 07/30/21 1807    polyethylene glycol (GLYCOLAX) packet 17 g, 17 g, Oral, BID, RENNY Mandujano CNP, 17 g at 07/30/21 1237    magnesium hydroxide (MILK OF MAGNESIA) 400 MG/5ML suspension 30 mL, 30 mL, Oral, Daily PRN, RENNY Lazar - CNP    hydrALAZINE (APRESOLINE) injection 10 mg, 10 mg, Intravenous, Q6H PRN, Jesenia Cortez PA-C    pantoprazole (PROTONIX) injection 40 mg, 40 mg, Intravenous, BID, Tara Lainez APRN - CNP    sodium chloride flush 0.9 % injection 5-40 mL, 5-40 mL, Intravenous, 2 times per day, Inell Gaby, DO    sodium chloride flush 0.9 % injection 5-40 mL, 5-40 mL, Intravenous, PRN, Thelma Rosenberg,     0.9 % sodium chloride infusion, 25 mL, Intravenous, PRN, Inell Gaby, DO    ondansetron (ZOFRAN-ODT) disintegrating tablet 4 mg, 4 mg, Oral, Q8H PRN, 4 mg at 07/30/21 0900 **OR** ondansetron (ZOFRAN) injection 4 mg, 4 mg, Intravenous, Q6H PRN, Inell Gaby, DO, 4 mg at 07/29/21 1906    polyethylene glycol (GLYCOLAX) packet 17 g, 17 g, Oral, Daily PRN, Inell Gaby, DO    acetaminophen (TYLENOL) tablet 650 mg, 650 mg, Oral, Q6H PRN **OR** acetaminophen (TYLENOL) suppository 650 mg, 650 mg, Rectal, Q6H PRN, Thelma Spicer Orgas, DO    0.9 % sodium chloride infusion, , Intravenous, Continuous, Nita Melody, DO, Last Rate: 100 mL/hr at 07/30/21 1913, New Bag at 07/30/21 1913    magnesium sulfate 2000 mg in 50 mL IVPB premix, 2,000 mg, Intravenous, PRN, Nita Melody, DO    potassium chloride (KLOR-CON M) extended release tablet 40 mEq, 40 mEq, Oral, PRN **OR** potassium bicarb-citric acid (EFFER-K) effervescent tablet 40 mEq, 40 mEq, Oral, PRN **OR** potassium chloride 10 mEq/100 mL IVPB (Peripheral Line), 10 mEq, Intravenous, PRN, Nita Melody, DO    losartan (COZAAR) tablet 25 mg, 25 mg, Oral, Daily, Alec Paganbeck, DO, 25 mg at 07/30/21 0858    rOPINIRole (REQUIP) tablet 2 mg, 2 mg, Oral, TID, Nita Melody, DO, 2 mg at 07/30/21 1445    topiramate (TOPAMAX) tablet 100 mg, 100 mg, Oral, BID, Nita Melody, DO, 100 mg at 07/30/21 0859    [Held by provider] apixaban (ELIQUIS) tablet 5 mg, 5 mg, Oral, BID, Nita Melody, DO, 5 mg at 07/30/21 4017    promethazine (PHENERGAN) injection 12.5 mg, 12.5 mg, Intramuscular, Q6H PRN, Pasquale Ventura PA-C, 12.5 mg at 07/30/21 1643      SOCIAL HISTORY     Social History     Social History Narrative    Not on file     Social History     Tobacco Use    Smoking status: Never Smoker    Smokeless tobacco: Never Used   Vaping Use    Vaping Use: Never used   Substance Use Topics    Alcohol use:  Yes     Alcohol/week: 0.0 standard drinks     Comment: rarely    Drug use: No         ALLERGIES     Allergies   Allergen Reactions    Ketorolac Hives    Adhesive Tape Other (See Comments)     blisters    Compazine [Prochlorperazine] Hives     Patient has no issues with promethazine    Erythromycin     Haloperidol And Related     Lyrica [Pregabalin]     Reglan [Metoclopramide] Other (See Comments)     States \" made me crazy - kicking and screaming - very anxious\"    Sulfa Antibiotics Hives         FAMILY HISTORY     Family History   Problem Relation Age of Onset    Heart Disease Mother     Inflam Bowel Dis Mother     Heart Disease Father     Heart Attack Brother          PREVIOUS RECORDS   Previous records reviewed: Previous medical history of hypertension GERD Nuñez's esophagus, PE and DVT, insomnia, spinal stenosis and recently UTI. Pharmacologic: Metoprolol, losartan, furosemide, pantoprazole, sucralfate, apixaban, oxycodone, gabapentin, tizanidine, Macrobid. Previous surgeries cystectomy. .        PHYSICAL EXAM     ED Triage Vitals [07/29/21 1037]   BP Temp Temp Source Pulse Resp SpO2 Height Weight   (!) 139/99 99.4 °F (37.4 °C) Oral 97 14 94 % 5' 6\" (1.676 m) 200 lb (90.7 kg)     Initial vital signs and nursing assessment reviewed and abnormal from High blood pressure. Body mass index is 31.99 kg/m². Pulsoximetry is normal per my interpretation. Additional Vital Signs:  Vitals:    07/30/21 1807   BP:    Pulse:    Resp:    Temp:    SpO2: 100%       Physical Exam  Constitutional:       General: She is in acute distress. Appearance: She is well-developed. HENT:      Head: Normocephalic. Mouth/Throat:      Mouth: Mucous membranes are moist.   Eyes:      Extraocular Movements: Extraocular movements intact. Cardiovascular:      Rate and Rhythm: Normal rate and regular rhythm. Heart sounds: Normal heart sounds. Pulmonary:      Effort: Pulmonary effort is normal.      Breath sounds: Normal breath sounds. Abdominal:      General: Abdomen is flat. Bowel sounds are normal. There is no distension. Palpations: Abdomen is soft. There is shifting dullness. There is no fluid wave, hepatomegaly, splenomegaly, mass or pulsatile mass. Tenderness: There is generalized abdominal tenderness and tenderness in the right upper quadrant, epigastric area and left upper quadrant. There is no guarding. Negative signs include McBurney's sign. Skin:     General: Skin is warm. Capillary Refill: Capillary refill takes less than 2 seconds.    Neurological:      General: No focal deficit present. Mental Status: She is alert and oriented to person, place, and time. MEDICAL DECISION MAKING   Initial Assessment:   1. Abdominal pain  2. Pancreatitis versus diverticulitis. Plan:    SSN 0.9% bolus 1000 cc   Fentanyl 50 mcg IV once   Phenergan 6.25 mg once   Famotidine milligrams IV once   Reassessment lab tests    53-year-old patient with abdominal pain suggestive of pancreatitis, previous medical history of GERD Nuñez's esophagus and gastritis. During physical evaluation patient in acute pain, dehydrated, with no peritoneal signs but with pain to palpation in upper abdomen . We decided to start IV hydration, pain control, H1 antagonist, to medic. Tests oriented to rule out pancreatitis as well has inflammatory response due to diverticulitis.         ED RESULTS   Laboratory results:  Labs Reviewed   COMPREHENSIVE METABOLIC PANEL W/ REFLEX TO MG FOR LOW K - Abnormal; Notable for the following components:       Result Value    Total Bilirubin 0.2 (*)     All other components within normal limits   GLOMERULAR FILTRATION RATE, ESTIMATED - Abnormal; Notable for the following components:    Est, Glom Filt Rate 58 (*)     All other components within normal limits   URINE WITH REFLEXED MICRO - Abnormal; Notable for the following components:    Leukocyte Esterase, Urine SMALL (*)     All other components within normal limits   GLOMERULAR FILTRATION RATE, ESTIMATED - Abnormal; Notable for the following components:    Est, Glom Filt Rate 75 (*)     All other components within normal limits   CULTURE, BLOOD 2    Narrative:     Source: blood-Adult-suboptimal <5.5oz./set volume       Site: (single bottle)Peripheral;            Current Antibiotics: not stated   CULTURE, BLOOD 1    Narrative:     Source: blood-Adult-suboptimal <5.5oz./set volume       Site: Peripheral VeinPeripheral;            Current Antibiotics: not stated   LIPASE   C-REACTIVE PROTEIN   CBC WITH AUTO DIFFERENTIAL   MAGNESIUM   ANION GAP   LACTIC ACID, PLASMA   BASIC METABOLIC PANEL W/ REFLEX TO MG FOR LOW K   CBC WITH AUTO DIFFERENTIAL   LACTIC ACID, PLASMA   ANION GAP   OSMOLALITY   BASIC METABOLIC PANEL W/ REFLEX TO MG FOR LOW K   CBC WITH AUTO DIFFERENTIAL       Radiologic studies results:  CT ABDOMEN PELVIS W IV CONTRAST Additional Contrast? None   Final Result      1. No acute findings in the abdomen or pelvis. 2. The transverse colon is collapsed, which limits evaluation of the previously seen focal segment of narrowing. If there is concern for underlying mass, correlation with colonoscopy is recommended. **This report has been created using voice recognition software. It may contain minor errors which are inherent in voice recognition technology. **      Final report electronically signed by Dr Vinny Stevenson on 7/30/2021 12:32 PM      CT ABDOMEN PELVIS W IV CONTRAST Additional Contrast? None   Final Result   No acute abdominal or pelvic abnormalities. There is a short segment narrowing of the transverse colon which may represent peristalsis but a circumferential lesion is not excludable. **This report has been created using voice recognition software. It may contain minor errors which are inherent in voice recognition technology. **      Final report electronically signed by Dr. Sarthak Zamorano on 7/29/2021 1:57 PM          ED Medications administered this visit:   Medications   sodium chloride flush 0.9 % injection 5-40 mL (5 mLs Intravenous Not Given 7/30/21 0855)   sodium chloride flush 0.9 % injection 5-40 mL (has no administration in time range)   0.9 % sodium chloride infusion (has no administration in time range)   ondansetron (ZOFRAN-ODT) disintegrating tablet 4 mg (4 mg Oral Given 7/30/21 0900)     Or   ondansetron (ZOFRAN) injection 4 mg ( Intravenous See Alternative 7/30/21 0900)   polyethylene glycol (GLYCOLAX) packet 17 g (has no administration in time range)   acetaminophen (TYLENOL) tablet 650 mg (has no administration in time range)     Or   acetaminophen (TYLENOL) suppository 650 mg (has no administration in time range)   0.9 % sodium chloride infusion ( Intravenous New Bag 7/30/21 1913)   magnesium sulfate 2000 mg in 50 mL IVPB premix (has no administration in time range)   potassium chloride (KLOR-CON M) extended release tablet 40 mEq (has no administration in time range)     Or   potassium bicarb-citric acid (EFFER-K) effervescent tablet 40 mEq (has no administration in time range)     Or   potassium chloride 10 mEq/100 mL IVPB (Peripheral Line) (has no administration in time range)   losartan (COZAAR) tablet 25 mg (25 mg Oral Given 7/30/21 0858)   rOPINIRole (REQUIP) tablet 2 mg (2 mg Oral Given 7/30/21 1445)   topiramate (TOPAMAX) tablet 100 mg (100 mg Oral Given 7/30/21 0859)   apixaban (ELIQUIS) tablet 5 mg ( Oral Automatically Held 8/2/21 2100)   promethazine (PHENERGAN) injection 12.5 mg (12.5 mg Intramuscular Given 7/30/21 1643)   HYDROmorphone (DILAUDID) injection 1 mg (1 mg Intravenous Given 7/30/21 1522)   metoprolol tartrate (LOPRESSOR) tablet 50 mg (50 mg Oral Given 7/30/21 1237)   montelukast (SINGULAIR) tablet 10 mg (10 mg Oral Given 7/30/21 1237)   dicyclomine (BENTYL) capsule 10 mg (10 mg Oral Given 7/30/21 1444)   ferrous sulfate (IRON 325) tablet 325 mg (325 mg Oral Given 7/30/21 1237)   furosemide (LASIX) tablet 40 mg (40 mg Oral Given 7/30/21 1237)   hydrOXYzine (ATARAX) tablet 50 mg (50 mg Oral Given 7/30/21 1444)   gabapentin (NEURONTIN) tablet 600 mg (600 mg Oral Given 7/30/21 1650)   oxybutynin (DITROPAN) tablet 5 mg (5 mg Oral Given 7/30/21 1444)   sucralfate (CARAFATE) tablet 1 g (1 g Oral Given 7/30/21 1650)   tiZANidine (ZANAFLEX) tablet 4 mg (has no administration in time range)   zolpidem (AMBIEN) tablet 5 mg (has no administration in time range)   albuterol (PROVENTIL) nebulizer solution 2.5 mg (has no administration in time range)   tiotropium Bacteria, UA NONE SEEN   Casts UA NONE SEEN   Crystals, UA NONE SEEN   Renal Epithelial, UA NONE SEEN   Yeast, Urine NONE SEEN   CAST. .. [JR]   1512 Normal   CBC Auto Differential:    WBC 6.8   RBC 5.02   Hemoglobin Quant 13.8   Hematocrit 42.1   MCV 83.9   MCH 27.5   MCHC 32.8   RDW-CV 13.9   RDW-SD 42.7   Platelet Count 979   MPV 10.4   Seg Neutrophils 55.2   Lymphocytes 35.9   Monocytes 6.9   Eosinophils 1.2   Basophils 0.7   Immature Granulocytes 0.1   Segs Absolute 3.8   Lymphocytes Absolute 2.4   Monocytes Absolute 0.5   Eosinophils Absolute 0.1   Basophils Absolute 0.0   Immature Grans (Abs) 0.01   Nucleated Red Blood Cells 0 [JR]   1512 Normal   Magnesium:    Magnesium 2.0 [JR]   1513 Normal   Comprehensive Metabolic Panel w/ Reflex to MG(!):    Glucose 83   Creatinine 1.0   BUN 15   Sodium 141   Potassium 3.5   Chloride 105   CO2 25   Calcium 10.2   AST 25   Alk Phos 82   Total Protein 7.0   Albumin 4.7   Bilirubin 0.2(!)   ALT 26 [JR]   1513 Normal   Lipase:    Lipase 20.7 [JR]   1513 Normal   C-Reactive Protein:    CRP < 0.30 [JR]      ED Course User Index  [JR] Anjali Bhat MD     CT abdomen:  No acute abdominal or pelvic abnormalities. There is a short segment narrowing of the transverse colon which may represent peristalsis but a circumferential lesion is not excludable. Reassessment:  44years old female patient with abdominal pain highly suggestive of pancreatitis versus diverticular disease complicated patient on previous medical history; however, lab tests showed  No elevation in lipase, normal liver enzymes, no leukocytosis no neutrophilia, CRP negative for inflammatory response. CT scan of the abdomen allowed diverticular disease complicated however it shows narrowing of the transverse colon.    Patient is a still with abdominal pain despite initial treatment with opioids as well has antiemetics H1 antagonists, with repeated the dose of opioids without achieving any controlled pain.  It is decided to admit the patient for pain control, vigilance. We called gastroenterology about this case. Dr. Donnell Doherty will be informed. MEDICATION CHANGES     Current Discharge Medication List            FINAL DISPOSITION     Final diagnoses:   Ischemia, intestine ([address]   Pain of upper abdomen     Condition: condition: fair  Dispo: Admit to med/surg floor      This transcription was electronically signed. Parts of this transcriptions may have been dictated by use of voice recognition software and electronically transcribed, and parts may have been transcribed with the assistance of an ED scribe. The transcription may contain errors not detected in proofreading. Please refer to my supervising physician's documentation if my documentation differs.     Electronically Signed: Carolyn Maher MD, 07/30/21, 7:42 PM         Carolyn Maher MD  Resident  07/29/21 [address] [place] [address] Chung Marks MD  Resident  07/30/21 0456

## 2021-07-29 NOTE — ED TRIAGE NOTES
Patient arrived to room 3 via EMS with c/o ABD pain. Patient stated she was at her GI doctor today and was sent here for the pain, thinking it was pancreatitis. Patient stated the pain is located in the middle to upper ABD area. Patient stated the pain was not as severe yesterday. Patient stated she completed Macrobid for a UTI.

## 2021-07-29 NOTE — PROGRESS NOTES
Pt admitted to  5K26 via wheelchair from ED. Complaints: abd pain, nausea. IV normal saline infusing into the forearm right, condition patent and no redness at a rate of 100 mls/ hour with about 800 mls in the bag still. IV site free of s/s of infection or infiltration. Vital signs obtained. Assessment and data collection initiated. Two nurse skin assessment performed by Adam Peters RN and Melvin RN. Oriented to room. Policies and procedures for  explained. All questions answered with no further questions at this time. Fall prevention and safety brochure discussed with patient. Bed alarm on. Call light in reach. Oriented to room. Benja Ventura, RN, RN 7/29/2021 7:13 PM     Explained patients right to have family, representative or physician notified of their admission. Patient has Declined for physician to be notified. Patient has Declined for family/representative to be notified.

## 2021-07-29 NOTE — ED NOTES
Bed: 003A  Expected date:   Expected time:   Means of arrival: [facility]  Comments:     Yovani Stevenson  07/29/21 1027

## 2021-07-30 ENCOUNTER — APPOINTMENT (OUTPATIENT)
Dept: CT IMAGING | Age: 51
End: 2021-07-30
Payer: COMMERCIAL

## 2021-07-30 LAB
ANION GAP SERPL CALCULATED.3IONS-SCNC: 9 MEQ/L (ref 8–16)
BASOPHILS # BLD: 0.7 %
BASOPHILS ABSOLUTE: 0 THOU/MM3 (ref 0–0.1)
BUN BLDV-MCNC: 10 MG/DL (ref 7–22)
CALCIUM SERPL-MCNC: 8.9 MG/DL (ref 8.5–10.5)
CHLORIDE BLD-SCNC: 110 MEQ/L (ref 98–111)
CO2: 23 MEQ/L (ref 23–33)
CREAT SERPL-MCNC: 0.8 MG/DL (ref 0.4–1.2)
EOSINOPHIL # BLD: 3.7 %
EOSINOPHILS ABSOLUTE: 0.2 THOU/MM3 (ref 0–0.4)
ERYTHROCYTE [DISTWIDTH] IN BLOOD BY AUTOMATED COUNT: 13.9 % (ref 11.5–14.5)
ERYTHROCYTE [DISTWIDTH] IN BLOOD BY AUTOMATED COUNT: 42.5 FL (ref 35–45)
GFR SERPL CREATININE-BSD FRML MDRD: 75 ML/MIN/1.73M2
GLUCOSE BLD-MCNC: 82 MG/DL (ref 70–108)
HCT VFR BLD CALC: 38.8 % (ref 37–47)
HEMOGLOBIN: 12.7 GM/DL (ref 12–16)
IMMATURE GRANS (ABS): 0 THOU/MM3 (ref 0–0.07)
IMMATURE GRANULOCYTES: 0 %
LACTIC ACID: 0.5 MMOL/L (ref 0.5–2)
LYMPHOCYTES # BLD: 51.2 %
LYMPHOCYTES ABSOLUTE: 2.8 THOU/MM3 (ref 1–4.8)
MCH RBC QN AUTO: 27.7 PG (ref 26–33)
MCHC RBC AUTO-ENTMCNC: 32.7 GM/DL (ref 32.2–35.5)
MCV RBC AUTO: 84.7 FL (ref 81–99)
MONOCYTES # BLD: 7.7 %
MONOCYTES ABSOLUTE: 0.4 THOU/MM3 (ref 0.4–1.3)
NUCLEATED RED BLOOD CELLS: 0 /100 WBC
OSMOLALITY CALCULATION: 281.2 MOSMOL/KG (ref 275–300)
PLATELET # BLD: 209 THOU/MM3 (ref 130–400)
PMV BLD AUTO: 10.2 FL (ref 9.4–12.4)
POTASSIUM REFLEX MAGNESIUM: 3.8 MEQ/L (ref 3.5–5.2)
RBC # BLD: 4.58 MILL/MM3 (ref 4.2–5.4)
SEG NEUTROPHILS: 36.7 %
SEGMENTED NEUTROPHILS ABSOLUTE COUNT: 2 THOU/MM3 (ref 1.8–7.7)
SODIUM BLD-SCNC: 142 MEQ/L (ref 135–145)
WBC # BLD: 5.5 THOU/MM3 (ref 4.8–10.8)

## 2021-07-30 PROCEDURE — 96376 TX/PRO/DX INJ SAME DRUG ADON: CPT

## 2021-07-30 PROCEDURE — 2580000003 HC RX 258: Performed by: INTERNAL MEDICINE

## 2021-07-30 PROCEDURE — 6360000004 HC RX CONTRAST MEDICATION: Performed by: SURGERY

## 2021-07-30 PROCEDURE — C9113 INJ PANTOPRAZOLE SODIUM, VIA: HCPCS | Performed by: NURSE PRACTITIONER

## 2021-07-30 PROCEDURE — G0378 HOSPITAL OBSERVATION PER HR: HCPCS

## 2021-07-30 PROCEDURE — 2500000003 HC RX 250 WO HCPCS: Performed by: PHYSICIAN ASSISTANT

## 2021-07-30 PROCEDURE — 36415 COLL VENOUS BLD VENIPUNCTURE: CPT

## 2021-07-30 PROCEDURE — 6370000000 HC RX 637 (ALT 250 FOR IP): Performed by: INTERNAL MEDICINE

## 2021-07-30 PROCEDURE — 6360000002 HC RX W HCPCS: Performed by: NURSE PRACTITIONER

## 2021-07-30 PROCEDURE — 85025 COMPLETE CBC W/AUTO DIFF WBC: CPT

## 2021-07-30 PROCEDURE — 74177 CT ABD & PELVIS W/CONTRAST: CPT

## 2021-07-30 PROCEDURE — 6360000002 HC RX W HCPCS: Performed by: PHYSICIAN ASSISTANT

## 2021-07-30 PROCEDURE — 96375 TX/PRO/DX INJ NEW DRUG ADDON: CPT

## 2021-07-30 PROCEDURE — 83605 ASSAY OF LACTIC ACID: CPT

## 2021-07-30 PROCEDURE — 2500000003 HC RX 250 WO HCPCS: Performed by: INTERNAL MEDICINE

## 2021-07-30 PROCEDURE — C9113 INJ PANTOPRAZOLE SODIUM, VIA: HCPCS | Performed by: INTERNAL MEDICINE

## 2021-07-30 PROCEDURE — 6370000000 HC RX 637 (ALT 250 FOR IP): Performed by: NURSE PRACTITIONER

## 2021-07-30 PROCEDURE — 80048 BASIC METABOLIC PNL TOTAL CA: CPT

## 2021-07-30 PROCEDURE — 6370000000 HC RX 637 (ALT 250 FOR IP): Performed by: PHYSICIAN ASSISTANT

## 2021-07-30 PROCEDURE — 96372 THER/PROPH/DIAG INJ SC/IM: CPT

## 2021-07-30 PROCEDURE — 94760 N-INVAS EAR/PLS OXIMETRY 1: CPT

## 2021-07-30 PROCEDURE — 94640 AIRWAY INHALATION TREATMENT: CPT

## 2021-07-30 PROCEDURE — 99243 OFF/OP CNSLTJ NEW/EST LOW 30: CPT | Performed by: SURGERY

## 2021-07-30 PROCEDURE — 99225 PR SBSQ OBSERVATION CARE/DAY 25 MINUTES: CPT | Performed by: PHYSICIAN ASSISTANT

## 2021-07-30 PROCEDURE — 6360000002 HC RX W HCPCS: Performed by: INTERNAL MEDICINE

## 2021-07-30 PROCEDURE — 96366 THER/PROPH/DIAG IV INF ADDON: CPT

## 2021-07-30 PROCEDURE — 96361 HYDRATE IV INFUSION ADD-ON: CPT

## 2021-07-30 RX ORDER — PANTOPRAZOLE SODIUM 40 MG/1
40 TABLET, DELAYED RELEASE ORAL 2 TIMES DAILY
Status: DISCONTINUED | OUTPATIENT
Start: 2021-07-30 | End: 2021-07-30

## 2021-07-30 RX ORDER — OXYBUTYNIN CHLORIDE 5 MG/1
5 TABLET ORAL 3 TIMES DAILY
Status: DISCONTINUED | OUTPATIENT
Start: 2021-07-30 | End: 2021-08-02

## 2021-07-30 RX ORDER — SENNA PLUS 8.6 MG/1
15 TABLET ORAL ONCE
Status: COMPLETED | OUTPATIENT
Start: 2021-07-30 | End: 2021-07-30

## 2021-07-30 RX ORDER — HYDRALAZINE HYDROCHLORIDE 20 MG/ML
10 INJECTION INTRAMUSCULAR; INTRAVENOUS EVERY 6 HOURS PRN
Status: DISCONTINUED | OUTPATIENT
Start: 2021-07-30 | End: 2021-08-02 | Stop reason: HOSPADM

## 2021-07-30 RX ORDER — POLYETHYLENE GLYCOL 3350 17 G/17G
238 POWDER, FOR SOLUTION ORAL ONCE
Status: COMPLETED | OUTPATIENT
Start: 2021-07-30 | End: 2021-07-30

## 2021-07-30 RX ORDER — SUCRALFATE 1 G/1
1 TABLET ORAL 4 TIMES DAILY
Status: DISCONTINUED | OUTPATIENT
Start: 2021-07-30 | End: 2021-08-02 | Stop reason: HOSPADM

## 2021-07-30 RX ORDER — ALBUTEROL SULFATE 2.5 MG/3ML
2.5 SOLUTION RESPIRATORY (INHALATION) EVERY 4 HOURS PRN
Status: DISCONTINUED | OUTPATIENT
Start: 2021-07-30 | End: 2021-08-02 | Stop reason: HOSPADM

## 2021-07-30 RX ORDER — METOPROLOL TARTRATE 5 MG/5ML
5 INJECTION INTRAVENOUS ONCE
Status: COMPLETED | OUTPATIENT
Start: 2021-07-30 | End: 2021-07-30

## 2021-07-30 RX ORDER — DICYCLOMINE HYDROCHLORIDE 10 MG/1
10 CAPSULE ORAL 3 TIMES DAILY
Status: DISCONTINUED | OUTPATIENT
Start: 2021-07-30 | End: 2021-08-02 | Stop reason: HOSPADM

## 2021-07-30 RX ORDER — MONTELUKAST SODIUM 10 MG/1
10 TABLET ORAL DAILY
Status: DISCONTINUED | OUTPATIENT
Start: 2021-07-30 | End: 2021-08-02 | Stop reason: HOSPADM

## 2021-07-30 RX ORDER — FERROUS SULFATE 325(65) MG
325 TABLET ORAL DAILY
Status: DISCONTINUED | OUTPATIENT
Start: 2021-07-30 | End: 2021-08-02 | Stop reason: HOSPADM

## 2021-07-30 RX ORDER — FUROSEMIDE 40 MG/1
40 TABLET ORAL DAILY
Status: DISCONTINUED | OUTPATIENT
Start: 2021-07-30 | End: 2021-08-02 | Stop reason: HOSPADM

## 2021-07-30 RX ORDER — POLYETHYLENE GLYCOL 3350 17 G/17G
17 POWDER, FOR SOLUTION ORAL 2 TIMES DAILY
Status: DISCONTINUED | OUTPATIENT
Start: 2021-07-30 | End: 2021-08-02 | Stop reason: HOSPADM

## 2021-07-30 RX ORDER — GABAPENTIN 600 MG/1
600 TABLET ORAL 4 TIMES DAILY
Status: DISCONTINUED | OUTPATIENT
Start: 2021-07-30 | End: 2021-08-02 | Stop reason: HOSPADM

## 2021-07-30 RX ORDER — PANTOPRAZOLE SODIUM 40 MG/10ML
40 INJECTION, POWDER, LYOPHILIZED, FOR SOLUTION INTRAVENOUS 2 TIMES DAILY
Status: DISCONTINUED | OUTPATIENT
Start: 2021-07-30 | End: 2021-08-02 | Stop reason: HOSPADM

## 2021-07-30 RX ORDER — TIZANIDINE 4 MG/1
4 TABLET ORAL EVERY 6 HOURS PRN
Status: DISCONTINUED | OUTPATIENT
Start: 2021-07-30 | End: 2021-08-02 | Stop reason: HOSPADM

## 2021-07-30 RX ORDER — HYDROXYZINE HYDROCHLORIDE 25 MG/1
50 TABLET, FILM COATED ORAL 4 TIMES DAILY PRN
Status: DISCONTINUED | OUTPATIENT
Start: 2021-07-30 | End: 2021-08-02 | Stop reason: HOSPADM

## 2021-07-30 RX ORDER — ZOLPIDEM TARTRATE 5 MG/1
5 TABLET ORAL NIGHTLY PRN
Status: DISCONTINUED | OUTPATIENT
Start: 2021-07-30 | End: 2021-08-02 | Stop reason: HOSPADM

## 2021-07-30 RX ORDER — METOPROLOL TARTRATE 50 MG/1
50 TABLET, FILM COATED ORAL 2 TIMES DAILY
Status: DISCONTINUED | OUTPATIENT
Start: 2021-07-30 | End: 2021-08-02 | Stop reason: HOSPADM

## 2021-07-30 RX ORDER — IPRATROPIUM BROMIDE AND ALBUTEROL SULFATE 2.5; .5 MG/3ML; MG/3ML
1 SOLUTION RESPIRATORY (INHALATION) EVERY 4 HOURS PRN
Status: DISCONTINUED | OUTPATIENT
Start: 2021-07-30 | End: 2021-07-30 | Stop reason: SDUPTHER

## 2021-07-30 RX ADMIN — LOSARTAN POTASSIUM 25 MG: 25 TABLET, FILM COATED ORAL at 08:58

## 2021-07-30 RX ADMIN — SUCRALFATE 1 G: 1 TABLET ORAL at 16:50

## 2021-07-30 RX ADMIN — OXYBUTYNIN CHLORIDE 5 MG: 5 TABLET ORAL at 20:36

## 2021-07-30 RX ADMIN — PROMETHAZINE HYDROCHLORIDE 12.5 MG: 25 INJECTION INTRAMUSCULAR; INTRAVENOUS at 03:27

## 2021-07-30 RX ADMIN — SENNOSIDES 129 MG: 8.6 TABLET, FILM COATED ORAL at 16:40

## 2021-07-30 RX ADMIN — SUCRALFATE 1 G: 1 TABLET ORAL at 20:36

## 2021-07-30 RX ADMIN — FUROSEMIDE 40 MG: 40 TABLET ORAL at 12:37

## 2021-07-30 RX ADMIN — POLYETHYLENE GLYCOL (3350) 17 G: 17 POWDER, FOR SOLUTION ORAL at 22:42

## 2021-07-30 RX ADMIN — TIOTROPIUM BROMIDE INHALATION SPRAY 1 PUFF: 3.12 SPRAY, METERED RESPIRATORY (INHALATION) at 18:07

## 2021-07-30 RX ADMIN — TOPIRAMATE 100 MG: 100 TABLET, FILM COATED ORAL at 20:36

## 2021-07-30 RX ADMIN — APIXABAN 5 MG: 5 TABLET, FILM COATED ORAL at 08:58

## 2021-07-30 RX ADMIN — CIPROFLOXACIN 400 MG: 2 INJECTION, SOLUTION INTRAVENOUS at 08:59

## 2021-07-30 RX ADMIN — HYDROMORPHONE HYDROCHLORIDE 0.5 MG: 1 INJECTION, SOLUTION INTRAMUSCULAR; INTRAVENOUS; SUBCUTANEOUS at 03:25

## 2021-07-30 RX ADMIN — PROMETHAZINE HYDROCHLORIDE 12.5 MG: 25 INJECTION INTRAMUSCULAR; INTRAVENOUS at 16:43

## 2021-07-30 RX ADMIN — HYDROMORPHONE HYDROCHLORIDE 1 MG: 1 INJECTION, SOLUTION INTRAMUSCULAR; INTRAVENOUS; SUBCUTANEOUS at 20:03

## 2021-07-30 RX ADMIN — OXYBUTYNIN CHLORIDE 5 MG: 5 TABLET ORAL at 14:44

## 2021-07-30 RX ADMIN — PROMETHAZINE HYDROCHLORIDE 12.5 MG: 25 INJECTION INTRAMUSCULAR; INTRAVENOUS at 22:48

## 2021-07-30 RX ADMIN — PANTOPRAZOLE SODIUM 40 MG: 40 INJECTION, POWDER, FOR SOLUTION INTRAVENOUS at 20:37

## 2021-07-30 RX ADMIN — TIZANIDINE 4 MG: 4 TABLET ORAL at 20:36

## 2021-07-30 RX ADMIN — MONTELUKAST SODIUM 10 MG: 10 TABLET ORAL at 12:37

## 2021-07-30 RX ADMIN — DICYCLOMINE HYDROCHLORIDE 10 MG: 10 CAPSULE ORAL at 14:44

## 2021-07-30 RX ADMIN — ROPINIROLE HYDROCHLORIDE 2 MG: 1 TABLET, FILM COATED ORAL at 08:59

## 2021-07-30 RX ADMIN — METOPROLOL TARTRATE 50 MG: 50 TABLET, FILM COATED ORAL at 12:37

## 2021-07-30 RX ADMIN — APIXABAN 5 MG: 5 TABLET, FILM COATED ORAL at 02:00

## 2021-07-30 RX ADMIN — ROPINIROLE HYDROCHLORIDE 2 MG: 1 TABLET, FILM COATED ORAL at 20:36

## 2021-07-30 RX ADMIN — ONDANSETRON 4 MG: 2 INJECTION INTRAMUSCULAR; INTRAVENOUS at 20:03

## 2021-07-30 RX ADMIN — TOPIRAMATE 100 MG: 100 TABLET, FILM COATED ORAL at 08:59

## 2021-07-30 RX ADMIN — HYDROMORPHONE HYDROCHLORIDE 1 MG: 1 INJECTION, SOLUTION INTRAMUSCULAR; INTRAVENOUS; SUBCUTANEOUS at 06:50

## 2021-07-30 RX ADMIN — ROPINIROLE HYDROCHLORIDE 2 MG: 1 TABLET, FILM COATED ORAL at 14:45

## 2021-07-30 RX ADMIN — POLYETHYLENE GLYCOL 3350 238 G: 17 POWDER, FOR SOLUTION ORAL at 17:31

## 2021-07-30 RX ADMIN — HYDROMORPHONE HYDROCHLORIDE 1 MG: 1 INJECTION, SOLUTION INTRAMUSCULAR; INTRAVENOUS; SUBCUTANEOUS at 15:22

## 2021-07-30 RX ADMIN — GABAPENTIN 600 MG: 600 TABLET, FILM COATED ORAL at 20:36

## 2021-07-30 RX ADMIN — METRONIDAZOLE 500 MG: 500 INJECTION, SOLUTION INTRAVENOUS at 06:51

## 2021-07-30 RX ADMIN — IOPAMIDOL 80 ML: 755 INJECTION, SOLUTION INTRAVENOUS at 12:15

## 2021-07-30 RX ADMIN — PANTOPRAZOLE SODIUM 40 MG: 40 INJECTION, POWDER, FOR SOLUTION INTRAVENOUS at 10:09

## 2021-07-30 RX ADMIN — TOPIRAMATE 100 MG: 100 TABLET, FILM COATED ORAL at 02:00

## 2021-07-30 RX ADMIN — SODIUM CHLORIDE: 9 INJECTION, SOLUTION INTRAVENOUS at 19:13

## 2021-07-30 RX ADMIN — GABAPENTIN 600 MG: 600 TABLET, FILM COATED ORAL at 12:37

## 2021-07-30 RX ADMIN — HYDROMORPHONE HYDROCHLORIDE 1 MG: 1 INJECTION, SOLUTION INTRAMUSCULAR; INTRAVENOUS; SUBCUTANEOUS at 10:09

## 2021-07-30 RX ADMIN — DICYCLOMINE HYDROCHLORIDE 10 MG: 10 CAPSULE ORAL at 20:36

## 2021-07-30 RX ADMIN — POLYETHYLENE GLYCOL (3350) 17 G: 17 POWDER, FOR SOLUTION ORAL at 12:37

## 2021-07-30 RX ADMIN — GABAPENTIN 600 MG: 600 TABLET, FILM COATED ORAL at 16:50

## 2021-07-30 RX ADMIN — METOPROLOL TARTRATE 5 MG: 5 INJECTION INTRAVENOUS at 02:22

## 2021-07-30 RX ADMIN — FERROUS SULFATE TAB 325 MG (65 MG ELEMENTAL FE) 325 MG: 325 (65 FE) TAB at 12:37

## 2021-07-30 RX ADMIN — ONDANSETRON 4 MG: 4 TABLET, ORALLY DISINTEGRATING ORAL at 09:00

## 2021-07-30 RX ADMIN — METOPROLOL TARTRATE 50 MG: 50 TABLET, FILM COATED ORAL at 20:36

## 2021-07-30 RX ADMIN — PROMETHAZINE HYDROCHLORIDE 12.5 MG: 25 INJECTION INTRAMUSCULAR; INTRAVENOUS at 10:15

## 2021-07-30 RX ADMIN — HYDROXYZINE HYDROCHLORIDE 50 MG: 25 TABLET ORAL at 14:44

## 2021-07-30 RX ADMIN — SUCRALFATE 1 G: 1 TABLET ORAL at 12:37

## 2021-07-30 RX ADMIN — ROPINIROLE HYDROCHLORIDE 2 MG: 1 TABLET, FILM COATED ORAL at 02:00

## 2021-07-30 ASSESSMENT — ENCOUNTER SYMPTOMS
RESPIRATORY NEGATIVE: 1
VOMITING: 1
BLOOD IN STOOL: 0
ABDOMINAL DISTENTION: 1
RECTAL PAIN: 0
CONSTIPATION: 1
NAUSEA: 1
EYES NEGATIVE: 1
ABDOMINAL PAIN: 1
ABDOMINAL DISTENTION: 0
DIARRHEA: 1

## 2021-07-30 ASSESSMENT — PAIN DESCRIPTION - PROGRESSION
CLINICAL_PROGRESSION: NOT CHANGED
CLINICAL_PROGRESSION: GRADUALLY IMPROVING

## 2021-07-30 ASSESSMENT — PAIN SCALES - GENERAL
PAINLEVEL_OUTOF10: 9
PAINLEVEL_OUTOF10: 7
PAINLEVEL_OUTOF10: 10
PAINLEVEL_OUTOF10: 8
PAINLEVEL_OUTOF10: 8
PAINLEVEL_OUTOF10: 9
PAINLEVEL_OUTOF10: 8
PAINLEVEL_OUTOF10: 10
PAINLEVEL_OUTOF10: 8
PAINLEVEL_OUTOF10: 6
PAINLEVEL_OUTOF10: 6
PAINLEVEL_OUTOF10: 10

## 2021-07-30 ASSESSMENT — PAIN DESCRIPTION - ORIENTATION
ORIENTATION: MID;UPPER
ORIENTATION: MID;UPPER

## 2021-07-30 ASSESSMENT — PAIN DESCRIPTION - LOCATION
LOCATION: ABDOMEN
LOCATION: ABDOMEN

## 2021-07-30 ASSESSMENT — PAIN DESCRIPTION - FREQUENCY
FREQUENCY: CONTINUOUS
FREQUENCY: CONTINUOUS

## 2021-07-30 ASSESSMENT — PAIN - FUNCTIONAL ASSESSMENT
PAIN_FUNCTIONAL_ASSESSMENT: PREVENTS OR INTERFERES SOME ACTIVE ACTIVITIES AND ADLS
PAIN_FUNCTIONAL_ASSESSMENT: PREVENTS OR INTERFERES SOME ACTIVE ACTIVITIES AND ADLS

## 2021-07-30 ASSESSMENT — PAIN DESCRIPTION - DESCRIPTORS
DESCRIPTORS: STABBING
DESCRIPTORS: STABBING

## 2021-07-30 ASSESSMENT — PAIN DESCRIPTION - ONSET
ONSET: ON-GOING
ONSET: ON-GOING

## 2021-07-30 ASSESSMENT — PAIN DESCRIPTION - PAIN TYPE
TYPE: ACUTE PAIN
TYPE: ACUTE PAIN

## 2021-07-30 NOTE — CONSULTS
Beulah Mcclelland MD  General Surgery consult    Pt Name: Fatmata Parr  MRN: [id number]  YOB: 1970  Date of evaluation: 7/30/2021  Primary Care Physician: Praveen Mayorga MD  Consulting Physician:  Hospitalist  Reason for evaluation: abdominal pain      Chief complaint:      Chief Complaint   Patient presents with    Abdominal Pain        SUBJECTIVE:     HPI:    Nayeli Lamas is a 44 y. o.female who was sent over to the emergency department from GI for severe abdominal pain. Patient states that it is in her epigastrium that is sharp and stabbing in nature it is a 10 out of 10 is unrelenting. Is been going on for 3 days. She has never had pain this bad. She has been for the last 4 to 5 days not been able to tolerate any oral intake. She has sensation of explosive pain. She is wax movement constipation and occasional diarrhea. She has not had any fevers or chills. She has been vomiting and unable to tolerate any oral intake. She says at [facility] they thought that she had pancreatitis and was sent to the emergency department for a CT scan. In the emergency department a CT scan was performed that demonstrated abnormal transverse colon with circumferential abnormality could not rule out mass versus imaging getting it in a peristalsis. Patient says there are no alleviating or aggravating factors other than attempting to eat anything. Review of Systems:  Review of Systems   Constitutional: Positive for chills. Negative for fatigue, fever and unexpected weight change. HENT: Negative. Eyes: Negative. Respiratory: Negative. Cardiovascular: Negative. Gastrointestinal: Positive for abdominal distention, abdominal pain, constipation, diarrhea, nausea and vomiting. Endocrine: Negative. Genitourinary: Negative. Musculoskeletal: Negative. Skin: Negative. Hematological: Negative.          Past Medical History  Past Medical History:   Diagnosis Date    Anxiety     Arthritis     Asthma     Nuñez's esophagus     Blood circulation, collateral     GERD (gastroesophageal reflux disease)     Hx of blood clots     Hypertension     Insomnia     Interstitial cystitis     Migraine     Pneumonia     Psychiatric problem     Spinal stenosis of lumbar region        Past Surgical History  Past Surgical History:   Procedure Laterality Date    ABDOMEN SURGERY  93    exploratory surgery    APPENDECTOMY      CARDIAC CATHETERIZATION  unsure    CARPAL TUNNEL RELEASE Left     CHOLECYSTECTOMY      COLONOSCOPY  2010    DILATATION, ESOPHAGUS  2010    ENDOSCOPY, COLON, DIAGNOSTIC  2010    OTHER SURGICAL HISTORY  21 Nov 2014    Cholecystectomy Laparoscopic (Dr. Talita A[facility], [facility])    OTHER SURGICAL HISTORY N/A 03-21-16    lumbar epidurala block L4-5    OTHER SURGICAL HISTORY N/A 04-04-16    lumbar epidural block L4-5    OTHER SURGICAL HISTORY Bilateral 6-13-16    facet blocks at L4-5, L5-S1    OTHER SURGICAL HISTORY Left 09/13/2016    RFA lumbar L4-S1    OVARIAN CYST REMOVAL  93    PATELLAR TENDON REPAIR Right 1994    3 screws in ankle.  TUBAL LIGATION  80    UPPER GASTROINTESTINAL ENDOSCOPY  2012       Medications  Prior to Admission medications    Medication Sig Start Date End Date Taking? Authorizing Provider   rOPINIRole (REQUIP) 2 MG tablet Take 2 mg by mouth 3 times daily   Yes Historical Provider, MD   zolpidem (AMBIEN) 10 MG tablet Take by mouth nightly as needed for Sleep.    Yes Historical Provider, MD   pantoprazole (PROTONIX) 40 MG tablet Take 40 mg by mouth 2 times daily    Yes Historical Provider, MD   hydrOXYzine (ATARAX) 50 MG tablet Take 50 mg by mouth 4 times daily as needed    Yes Historical Provider, MD   Prenatal Vit-Fe Fumarate-FA (PRENATAL VITAMIN) 27-1 MG TABS tablet Take 1 tablet by mouth daily   Yes Historical Provider, MD   ipratropium-albuterol (DUONEB) 0.5-2.5 (3) MG/3ML SOLN nebulizer solution Inhale 1 vial into the lungs every 4 hours as furosemide (LASIX) 20 MG tablet Take 40 mg by mouth daily  5/31/19  Yes Historical Provider, MD   oxybutynin (DITROPAN) 5 MG tablet Take 1 tablet by mouth 3 times daily    Yes Historical Provider, MD   sucralfate (CARAFATE) 1 GM tablet Take 1 tablet by mouth 4 times daily 4/3/19  Yes Melania Palacios MD   potassium chloride (KLOR-CON M) 20 MEQ extended release tablet Take 2 tablets by mouth daily (with breakfast) 4/4/19  Yes Melania Palacios MD   topiramate (TOPAMAX) 100 MG tablet Take 1 tablet by mouth 2 times daily 5/16/16  Yes Juan Banda MD   albuterol (PROVENTIL HFA;VENTOLIN HFA) 108 (90 BASE) MCG/ACT inhaler Inhale 2 puffs into the lungs every 4 hours as needed for Wheezing or Shortness of Breath Whichever brand is covered by insurance, substitute as necessary. 7/30/15  Yes Juan Banda MD   Promethazine HCl (PHENERGAN PO) Take 25 mg by mouth 2 times daily as needed (for nausea)     Historical Provider, MD   DULoxetine (CYMBALTA) 30 MG extended release capsule Take 1 capsule by mouth daily In addition to 60 mg tablet 7/16/19 7/29/21  Jolynn Ignacio MD   naloxone [facility]) 4 MG/0.1ML LIQD nasal spray 1 spray by Nasal route as needed for Opioid Reversal 2/26/19   Ibis Garcia, RENNY - CNP    Scheduled Meds:   metoprolol tartrate  50 mg Oral BID    montelukast  10 mg Oral Daily    dicyclomine  10 mg Oral TID    ferrous sulfate  325 mg Oral Daily    tiotropium  1 puff Inhalation Daily    furosemide  40 mg Oral Daily    gabapentin  600 mg Oral 4x Daily    oxybutynin  5 mg Oral TID    pantoprazole  40 mg Oral BID    sucralfate  1 g Oral 4x Daily    sodium chloride flush  5-40 mL Intravenous 2 times per day    losartan  25 mg Oral Daily    rOPINIRole  2 mg Oral TID    topiramate  100 mg Oral BID    apixaban  5 mg Oral BID     Continuous Infusions:   sodium chloride      sodium chloride 100 mL/hr at 07/29/21 3411     PRN Meds:. HYDROmorphone, ipratropium-albuterol, hydrOXYzine, tiZANidine, zolpidem, albuterol, sodium chloride flush, sodium chloride, ondansetron **OR** ondansetron, polyethylene glycol, acetaminophen **OR** acetaminophen, magnesium sulfate, potassium chloride **OR** potassium alternative oral replacement **OR** potassium chloride, ipratropium-albuterol, promethazine    Allergies  Allergies   Allergen Reactions    Ketorolac Hives    Adhesive Tape Other (See Comments)     blisters    Compazine [Prochlorperazine] Hives     Patient has no issues with promethazine    Erythromycin     Haloperidol And Related     Lyrica [Pregabalin]     Reglan [Metoclopramide] Other (See Comments)     States \" made me crazy - kicking and screaming - very anxious\"    Sulfa Antibiotics Hives        Family History  Family History   Problem Relation Age of Onset    Heart Disease Mother     Inflam Bowel Dis Mother     Heart Disease Father     Heart Attack Brother        Social History  Social History     Socioeconomic History    Marital status: Single     Spouse name: None    Number of children: 0    Years of education: 15    Highest education level: None   Occupational History    Occupation: unemployed   Tobacco Use    Smoking status: Never Smoker    Smokeless tobacco: Never Used   Vaping Use    Vaping Use: Never used   Substance and Sexual Activity    Alcohol use: Yes     Alcohol/week: 0.0 standard drinks     Comment: rarely    Drug use: No    Sexual activity: Not Currently   Other Topics Concern    None   Social History Narrative    None     Social Determinants of Health     Financial Resource Strain:     Difficulty of Paying Living Expenses:    Food Insecurity:     Worried About Running Out of Food in the Last Year:     Ran Out of Food in the Last Year:    Transportation Needs:     Lack of Transportation (Medical):      Lack of Transportation (Non-Medical):    Physical Activity:     Days of Exercise per Week:     Minutes of Exercise per Session:    Stress:  Feeling of Stress :    Social Connections:     Frequency of Communication with Friends and Family:     Frequency of Social Gatherings with Friends and Family:     Attends Mosque Services:     Active Member of Clubs or Organizations:     Attends Club or Organization Meetings:     Marital Status:    Intimate Partner Violence:     Fear of Current or Ex-Partner:     Emotionally Abused:     Physically Abused:     Sexually Abused:          OBJECTIVE:   CURRENT VITALS:  height is 5' 6\" (1.676 m) and weight is 198 lb 3.2 oz (89.9 kg). Her oral temperature is 97.5 °F (36.4 °C). Her blood pressure is 160/82 (abnormal) and her pulse is 77. Her respiration is 20 and oxygen saturation is 96%. Body mass index is 31.99 kg/m². Physical Exam  Constitutional:       Appearance: She is ill-appearing. Comments: Crying writhing in pain   HENT:      Head: Normocephalic and atraumatic. Nose: Nose normal.      Mouth/Throat:      Mouth: Mucous membranes are dry. Pharynx: No oropharyngeal exudate. Eyes:      Extraocular Movements: Extraocular movements intact. Pupils: Pupils are equal, round, and reactive to light. Cardiovascular:      Rate and Rhythm: Normal rate. Pulses: Normal pulses. Pulmonary:      Effort: Pulmonary effort is normal. No respiratory distress. Abdominal:      General: There is no distension. Palpations: Abdomen is soft. Tenderness: There is abdominal tenderness. There is guarding (Voluntary). Hernia: No hernia is present. Musculoskeletal:         General: No swelling. Normal range of motion. Cervical back: Normal range of motion. No rigidity. Skin:     General: Skin is warm. Coloration: Skin is not jaundiced. Neurological:      General: No focal deficit present. Mental Status: She is oriented to person, place, and time.    Psychiatric:         Mood and Affect: Mood normal.         Behavior: Behavior normal.          LABS:     Recent Labs     07/29/21  1207 07/29/21  1208 07/29/21  1416 07/29/21  1648 07/30/21  0633   WBC  --  6.8  --   --  5.5   HGB  --  13.8  --   --  12.7   HCT  --  42.1  --   --  38.8   PLT  --  243  --   --  209     --   --   --  142   K 3.5  --   --   --  3.8     --   --   --  110   CO2 25  --   --   --  23   BUN 15  --   --   --  10   CREATININE 1.0  --   --   --  0.8   MG 2.0  --   --   --   --    CALCIUM 10.2  --   --   --  8.9   AST 25  --   --   --   --    ALT 26  --   --   --   --    BILITOT 0.2*  --   --   --   --    LIPASE 20.7  --   --   --   --    LACTA  --   --   --  1.0 0.5   NITRU  --   --  NEGATIVE  --   --    COLORU  --   --  YELLOW  --   --    BACTERIA  --   --  NONE SEEN  --   --        RADIOLOGY:   I have personally reviewed the following films:  CT ABDOMEN PELVIS W IV CONTRAST Additional Contrast? None   Final Result   No acute abdominal or pelvic abnormalities. There is a short segment narrowing of the transverse colon which may represent peristalsis but a circumferential lesion is not excludable. **This report has been created using voice recognition software. It may contain minor errors which are inherent in voice recognition technology. **      Final report electronically signed by Dr. Tien Champion on 7/29/2021 1:57 PM          IMPRESSIONS:   52-year-old female with extreme abdominal pain, etiology unknown. CT findings of unclear etiology. At this time I would recommend pain control n.p.o. and IV fluids. I recommend GI consultation to evaluate for possible colonoscopy to ensure that there is not a circumferential lesion however if they are unable to do this I feel patient may benefit from repeat CT scan this would potentially rule out that this was just peristalsis that was caught on scan. We will follow along.        Electronically signed by Rosanna Knapp MD on 7/30/2021 at 11:08 AM

## 2021-07-30 NOTE — CARE COORDINATION
7/30/21, 9:59 AM EDT  DISCHARGE PLANNING EVALUATION:    Yamileth Lei       Admitted: 7/29/2021/ [address]  day: 0   Location: [facility]26/026-A Reason for admit: Intractable nausea and vomiting [R11.2]   PMH:  has a past medical history of Anxiety, Arthritis, Asthma, Nuñez's esophagus, Blood circulation, collateral, GERD (gastroesophageal reflux disease), Hx of blood clots, Hypertension, Insomnia, Interstitial cystitis, Migraine, Pneumonia, Psychiatric problem, and Spinal stenosis of lumbar region. Procedure: N/A  Barriers to Discharge:  Patient presents with abdominal pain, nausea, and vomiting. Consults to GI and General Surgery, IV fluids, IV Cipro, Eliquis, IV Flagyl, IV Protonix, prn medications, Potassium replacement protocol, GI panel, daily BMP and CBC, NPO, telemetry, up with assistance. PCP: Val Coleman MD   %    Patient Goals/Plan/Treatment Preferences: Met with Laura Manny. She is from home alone. Laura Bryant verifies her insurance and PCP. She can afford her medications and denies a need for DME. She may need assistance with transportation at discharge. Laura Bryant is independent managing her health care needs. She declines HH at this time. Transportation/Food Security/Housekeeping Addressed:  No issues identified.

## 2021-07-30 NOTE — CONSULTS
Consult History & Physical      Patient:  Lilla Gitelman  YOB: 1970  MRN: [id number]     Acct: [de-identified]    Chief Complaint:    Chief Complaint   Patient presents with    Abdominal Pain       Date of Service: Pt seen/examined in consultation on 7/30/2021    History Of Present Illness:      44 y.o. female who we are asked to see/evaluate by Wally Overton PA-C for medical management of abdominal pain, abnormal CT scan. PMHx Anxiety, Asthma, Barretts Esophagus WO dysplasia, Chronic constipation/IBS-C, GERD, Hx of PE/DVT, HTN, Chronic pain. She presented to the ED at [facility]  [facility] with our GI office for severe abdominal pain with concerns for pancreatitis. She reported severe upper mid abdominal pain x 1 wk PTA associated with nausea/vomiting that was not responding to Phenergan. CT in the ED concerning for a narrowing in the Transverse colon, peristalsis vs. circumferential lesion. She was admitted for intractable pain and abnormal CT. Surgery and GI consulted. She states her pain has never been this severe and is causing headaches. No known eliciting event. No known alleviating or aggravating factors. She noted variable stool consistencies; soft, watery, hard, no pattern. Denies significant constipation. No daily bowel regimen; uses Lactulose as needed and unable to quantify how often this is. States she takes Protonix in addition to Aciphex and carafate at home. Denies NSAID or EtOH use. No F/C, SOB, CP. Initially was started on Flagyl and Cipro this admission but this was discontinued today. No persistent diarrhea PTA. Denies hematochezia, melena. Unable to describe her emesis. She is a longtime patient of Dr. Riri Fernández and his nurse practitioner.  Prior to yesterday's office visit however he last OV was 11/2021 where it was recommended she continue PPI BID, low acid diet, Carafate only as needed, Lactulose PRN for constipation and dicyclomine 10 mg TID PRN for abd pain. GES study was also ordered. She failed to keep her f/up OV. She completed the GES 7/20/21 which was unremarkable. Last Colonoscopy was 4/18/2019 for hx of anemia, diarrhea and heme positive demonstrating pandiverticulosis, overall mild, otherwise normal. Random colon bopsies were negative. EGD/Dil 4/23/21 revealed erosive esophagitis and duodenitis, biopsies unremarkable. She is on chronic pain medications with her pain management specialist, percocet 10/and gabapentin. Past Medical History:    Past Medical History:   Diagnosis Date    Anxiety     Arthritis     Asthma     Nuñez's esophagus     Blood circulation, collateral     GERD (gastroesophageal reflux disease)     Hx of blood clots     Hypertension     Insomnia     Interstitial cystitis     Migraine     Pneumonia     Psychiatric problem     Spinal stenosis of lumbar region        Home Medications:  Prior to Admission medications    Medication Sig Start Date End Date Taking? Authorizing Provider   rOPINIRole (REQUIP) 2 MG tablet Take 2 mg by mouth 3 times daily   Yes Historical Provider, MD   zolpidem (AMBIEN) 10 MG tablet Take by mouth nightly as needed for Sleep.    Yes Historical Provider, MD   pantoprazole (PROTONIX) 40 MG tablet Take 40 mg by mouth 2 times daily    Yes Historical Provider, MD   hydrOXYzine (ATARAX) 50 MG tablet Take 50 mg by mouth 4 times daily as needed    Yes Historical Provider, MD   Prenatal Vit-Fe Fumarate-FA (PRENATAL VITAMIN) 27-1 MG TABS tablet Take 1 tablet by mouth daily   Yes Historical Provider, MD   ipratropium-albuterol (DUONEB) 0.5-2.5 (3) MG/3ML SOLN nebulizer solution Inhale 1 vial into the lungs every 4 hours as needed for Shortness of Breath   Yes Historical Provider, MD   metoprolol tartrate (LOPRESSOR) 50 MG tablet Take 50 mg by mouth 2 times daily   Yes Historical Provider, MD   montelukast (SINGULAIR) 10 MG tablet Take 10 mg by mouth daily   Yes Historical Provider, MD Loratadine-Pseudoephedrine (CLARITIN-D 12 HOUR PO) Take 1 tablet by mouth every 12 hours   Yes Historical Provider, MD   Rimegepant Sulfate (NURTEC) 75 MG TBDP Dissolve 1 tablet PO PRN for migraine no more than 1 a day   Yes Historical Provider, MD   Cholecalciferol (VITAMIN D3) 50 MCG (2000 UT) TABS Take 1 tablet by mouth daily   Yes Historical Provider, MD   LORazepam (ATIVAN) 1 MG tablet Take 1 mg by mouth 3 times daily as needed for Anxiety. Yes Historical Provider, MD   tiotropium (SPIRIVA RESPIMAT) 1.25 MCG/ACT AERS inhaler Inhale 1 puff into the lungs daily   Yes Historical Provider, MD   albuterol (PROVENTIL) (2.5 MG/3ML) 0.083% nebulizer solution Use 1 vial in nebulizer QID   Yes Historical Provider, MD   ferrous sulfate (IRON 325) 325 (65 Fe) MG tablet Take 325 mg by mouth daily   Yes Historical Provider, MD   RABEprazole (ACIPHEX) 20 MG tablet Take 20 mg by mouth 2 times daily   Yes Historical Provider, MD   dicyclomine (BENTYL) 10 MG capsule Take 10 mg by mouth 3 times daily   Yes Historical Provider, MD   gabapentin (NEURONTIN) 600 MG tablet Take 1 tablet by mouth 4 times daily for 30 days. 7/2/21 8/1/21 Yes RENNY Irving CNP   oxyCODONE-acetaminophen (PERCOCET)  MG per tablet Take 1 tablet by mouth every 6 hours as needed for Pain for up to 30 days.  7/4/21 8/3/21 Yes RENNY Irving CNP   tiZANidine (ZANAFLEX) 4 MG tablet Take 1 tablet by mouth every 6 hours as needed (spasms) 7/2/21 8/1/21 Yes RENNY Irving CNP   losartan (COZAAR) 25 MG tablet Take 25 mg by mouth daily   Yes Historical Provider, MD   apixaban (ELIQUIS) 5 MG TABS tablet Take 5 mg by mouth 2 times daily   Yes Historical Provider, MD   furosemide (LASIX) 20 MG tablet Take 40 mg by mouth daily  5/31/19  Yes Historical Provider, MD   oxybutynin (DITROPAN) 5 MG tablet Take 1 tablet by mouth 3 times daily    Yes Historical Provider, MD   sucralfate (CARAFATE) 1 GM tablet Take 1 tablet by mouth 4 times daily 4/3/19  Yes Chrissy Chin MD   potassium chloride (KLOR-CON M) 20 MEQ extended release tablet Take 2 tablets by mouth daily (with breakfast) 4/4/19  Yes Chrissy Chin MD   topiramate (TOPAMAX) 100 MG tablet Take 1 tablet by mouth 2 times daily 5/16/16  Yes Chacha Burr MD   albuterol (PROVENTIL HFA;VENTOLIN HFA) 108 (90 BASE) MCG/ACT inhaler Inhale 2 puffs into the lungs every 4 hours as needed for Wheezing or Shortness of Breath Whichever brand is covered by insurance, substitute as necessary. 7/30/15  Yes Chacha Burr MD   Promethazine HCl (PHENERGAN PO) Take 25 mg by mouth 2 times daily as needed (for nausea)     Historical Provider, MD   DULoxetine (CYMBALTA) 30 MG extended release capsule Take 1 capsule by mouth daily In addition to 60 mg tablet 7/16/19 7/29/21  Nicol Ortega MD   naloxone [facility]) 4 MG/0.1ML LIQD nasal spray 1 spray by Nasal route as needed for Opioid Reversal 2/26/19   Erica Foote, APRN - CNP       Surgical History:  Past Surgical History:   Procedure Laterality Date    ABDOMEN SURGERY  93    exploratory surgery    APPENDECTOMY      CARDIAC CATHETERIZATION  unsure    CARPAL TUNNEL RELEASE Left     CHOLECYSTECTOMY      COLONOSCOPY  2010    DILATATION, ESOPHAGUS  2010    ENDOSCOPY, COLON, DIAGNOSTIC  2010    OTHER SURGICAL HISTORY  21 Nov 2014    Cholecystectomy Laparoscopic (Dr. Micheal De Leon, [facility])    OTHER SURGICAL HISTORY N/A 03-21-16    lumbar epidurala block L4-5    OTHER SURGICAL HISTORY N/A 04-04-16    lumbar epidural block L4-5    OTHER SURGICAL HISTORY Bilateral 6-13-16    facet blocks at L4-5, L5-S1    OTHER SURGICAL HISTORY Left 09/13/2016    RFA lumbar L4-S1    OVARIAN CYST REMOVAL  93    PATELLAR TENDON REPAIR Right 1994    3 screws in ankle.     TUBAL LIGATION  80    UPPER GASTROINTESTINAL ENDOSCOPY  2012       Family History:  Family History   Problem Relation Age of Onset    Heart Disease Mother     Inflam Bowel Dis Mother     Heart Disease Father     Heart Attack Brother      Family hx of GI cancer: None    Past GI History:  See  HPI    Allergies:  Ketorolac, Adhesive tape, Compazine [prochlorperazine], Erythromycin, Haloperidol and related, Lyrica [pregabalin], Reglan [metoclopramide], and Sulfa antibiotics    Social History:   TOBACCO:   reports that she has never smoked. She has never used smokeless tobacco.  ETOH:   reports current alcohol use. Review Of Systems  GENERAL: No fever, chills. +?weight loss. ? Patient uncertain  EYES:  No  blurred vision, double vision   CARDIOVASCULAR: No chest pain or palpitations. RESPIRATORY:  No dyspnea or cough. GI:  See HPI  MUSCULOSKELETAL: No new painful or swollen joints or myalgias. :   No dysuria or hematuria. SKIN:  No rashes or jaundice. NEUROLOGIC:  +headaches. No seizures, numbness or tingling of arms, or legs. PSYCH:  No anxiety or depression. ENDOCRINE:  No polyuria or polydipsia. BLOOD:  No anemia, bleeding disorder, blood or blood product transfusion. PHYSICAL EXAM:  BP (!) 160/82   Pulse 77   Temp 97.5 °F (36.4 °C) (Oral)   Resp 20   Ht 5' 6\" (1.676 m)   Wt 198 lb 3.2 oz (89.9 kg)   LMP 11/19/2014 (Exact Date)   SpO2 96%   BMI 31.99 kg/m²     General appearance: Chronically ill, older than stated age appearing female. No apparent distress, cooperative. HEENT: Normal cephalic, atraumatic without obvious deformity. Pupils equal, round, and reactive to light. Neck: Supple, with full range of motion. No jugular venous distention. Trachea midline. Respiratory:  Normal respiratory effort. Clear to auscultation, bilaterally without Rales/Wheezes/Rhonchi. Cardiovascular: Regular rate and rhythm without murmurs, rubs or gallops. Abdomen: Soft, tender to palpation upper abd, non-distended with hypoactive bowel sounds  Musculoskeletal: No clubbing, cyanosis or edema bilaterally. Skin: Pink, warm, dry.  No rashes or lesions. Psychiatric: Alert and oriented, thought content appropriate, normal insight    Labs:   Recent Labs     07/30/21  0633   WBC 5.5   HGB 12.7   HCT 38.8        Recent Labs     07/30/21  0633      K 3.8      CO2 23   BUN 10   CREATININE 0.8   CALCIUM 8.9     Recent Labs     07/29/21  1207   AST 25   ALT 26   BILITOT 0.2*   ALKPHOS 82     No results for input(s): INR in the last 72 hours. Radiology:     CT Abd/Pelvis W C 7/29/21     No acute abdominal or pelvic abnormalities. There is a short segment narrowing of the transverse colon which may represent peristalsis but a circumferential lesion is not excludable. CT Abd/Pelvis W C 7/30/21  Impression       1. No acute findings in the abdomen or pelvis.       2. The transverse colon is collapsed, which limits evaluation of the previously seen focal segment of narrowing. If there is concern for underlying mass, correlation with colonoscopy is recommended. Code Status: Full Code    ASSESSMENT:  1. Abdominal pain- colon mass/stricture vs. Severe constipation/IBS vs. PUD/erosive gastritis vs. Combination vs. Other. 2. Abnormal CT  3. Chronic Constipation- IBS-C +/- OIC  4. Nausea with vomiting  5. Pandiverticulosis  6. GERD with esophagitis  7. Hypokalemia  8. Hx of DVT/PE  9. Chronic pain  10. Anxiety  11. Hx of DVT/PE  12. Migraine, hx, chronic      PLAN:     Bowel prep- Miralax and Senna   Colonoscopy tomorrow 7/31/21 with Dr. Leonie Marrero @ 56 for for further evaluation of possible transverse colon lesion/mass. Please call service if continue to pass brown stool tomorrow morning.    Miralax BID scheduled post prep   MOM PRN   Carafate QID, home dose   IVPPI BID   Clear liquids today, NPO at midnight   Hold Eliquis for procedure   Surgery following   Further plan to follow EGD       Case reviewed and impression/plan reviewed in collaboration with Dr. Brown Patch  Electronically signed by RENNY Becker - IAN on 7/30/2021 at 10:10 AM    [facility]  Thank you for the consultation.

## 2021-07-30 NOTE — PROGRESS NOTES
Hospitalist Progress Note    Patient:  Yamileth Lei    Unit/Bed:5K-26/026-A  YOB: 1970  MRN: [id number]   Acct: [de-identified]   PCP: Val Coleman MD  Code Status: Full Code  Date of Admission: 7/29/2021    Expected Discharge: 2-3 days  Disposition: Home . GI planning possible colonoscopy tomorrow    Assessment/Plan:    1. Abdominal pain, N/V: CT AP reported narrowing of the transverse colon, possibly representing peristalsis or circumferential lesion. GI and GS following, planning for colonoscopy 7/31 to further evaluate. Continue IV fluids, antiemetics, IV Protonix, home Carafate and Bentyl. 2. Hx IBS / Opioid induced constipation: Last BM 4 days ago. Possibly contributing to #1. Bowel regimen per GI    3. Hx DVT/PE:  On Eliquis, held for colonoscopy tomorrow. 4. Essential hypertension:  Resume home meds. PRN hydralazine. Monitor and adjust as indicated. 5. Chronic pain syndrome: Patient is currently on Dilaudid; stop IV pain meds and resume oral regimen when N/V is improved and pt is able to tolerate po. She follows with pain management. 6. Psychiatric history: resume home meds         Chief Complaint: Abdominal pain    HPI / Hospital Course: Per HPI \"44year-old white female obese who presents emergency department for evaluation abdominal pain. Patient states abdominal pain since 1 week ago which started in the epigastric and posteriorly it radiated to the right upper quadrant and left upper quadrant and described the pain as being stabbing. Increase with change in position. Especially when the patient bends over. Patient went to her GI appointment who referred to the patient to the ED. Patient's had multiple episodes of emesis since yesterday currently she is not able to tolerate oral intake additionally she had 3 days ago she had episodes of diarrhea. States she has been feeling warmer but denies a fever.   Temperature today in the morning patient states his weight was 100.3. Past medical history includes essential hypertension interstitial cystitis psychiatric issues. \"     Subjective (past 24 hours): Patient states that she has had 3 episodes of emesis today, unable to describe appearance. Reports continued severe generalized abdominal pain. She states her last BM was approximately 4 days ago. Patient states she does have chronic abdominal pain and IBS, but she has never had pain or vomiting this severe. She denies shortness of breath, chest pain, palpitations, fever/chills, urinary symptoms. ROS: Pertinent positives as noted in HPI. All other systems reviewed and negative. Past medical history, family history, social history and allergies reviewed again and is unchanged since admission. Medications:  Reviewed.   Infusion Medications    sodium chloride      sodium chloride 100 mL/hr at 07/29/21 1834     Scheduled Medications    metoprolol tartrate  50 mg Oral BID    montelukast  10 mg Oral Daily    dicyclomine  10 mg Oral TID    ferrous sulfate  325 mg Oral Daily    furosemide  40 mg Oral Daily    gabapentin  600 mg Oral 4x Daily    oxybutynin  5 mg Oral TID    sucralfate  1 g Oral 4x Daily    tiotropium  1 puff Inhalation Daily    polyethylene glycol  17 g Oral BID    pantoprazole  40 mg Intravenous BID    sodium chloride flush  5-40 mL Intravenous 2 times per day    losartan  25 mg Oral Daily    rOPINIRole  2 mg Oral TID    topiramate  100 mg Oral BID    [Held by provider] apixaban  5 mg Oral BID     PRN Meds: HYDROmorphone, hydrOXYzine, tiZANidine, zolpidem, albuterol, magnesium hydroxide, hydrALAZINE, sodium chloride flush, sodium chloride, ondansetron **OR** ondansetron, polyethylene glycol, acetaminophen **OR** acetaminophen, magnesium sulfate, potassium chloride **OR** potassium alternative oral replacement **OR** potassium chloride, promethazine    I/O:     Intake/Output Summary (Last 24 hours) at 7/30/2021 Professor Darleen Maldonado filed at 7/30/2021 1515  Gross per 24 hour   Intake 775.73 ml   Output 1300 ml   Net -524.27 ml       Diet:  Diet NPO Exceptions are: Sips of Water with Meds  ADULT DIET; Clear Liquid    Exam:  BP (!) 158/104   Pulse 102   Temp 98.1 °F (36.7 °C) (Oral)   Resp 20   Ht 5' 6\" (1.676 m)   Wt 198 lb 3.2 oz (89.9 kg)   LMP 11/19/2014 (Exact Date)   SpO2 100%   BMI 31.99 kg/m²   General:   Pleasant female. Appears anxious. NAD  HEENT:  normocephalic and atraumatic. No scleral icterus. PERR. Neck: supple. No JVD. No thyromegaly. Lungs: clear to auscultation. No retractions  Cardiac: RRR without murmur. Abdomen: soft. Generalized tenderness. Bowel sounds positive. Extremities:  No clubbing, cyanosis, or edema x 4. Vasculature: capillary refill < 3 seconds. Palpable LE pulses bilaterally. Skin:  warm and dry. Psych:  Alert and oriented x3. Affect appropriate  Lymph:  No supraclavicular adenopathy. Neurologic:  No focal deficit. No seizures. Data: (All radiographs, tracings, PFTs, and imaging are personally viewed and interpreted unless otherwise noted)  Labs:   Recent Labs     07/29/21  1208 07/30/21  0633   WBC 6.8 5.5   HGB 13.8 12.7   HCT 42.1 38.8    209     Recent Labs     07/29/21  1207 07/30/21  0633    142   K 3.5 3.8    110   CO2 25 23   BUN 15 10   CREATININE 1.0 0.8   CALCIUM 10.2 8.9     Recent Labs     07/29/21  1207   AST 25   ALT 26   BILITOT 0.2*   ALKPHOS 82     No results for input(s): INR in the last 72 hours. No results for input(s): Coleman Breeze in the last 72 hours.   Urinalysis:   Lab Results   Component Value Date    NITRU NEGATIVE 07/29/2021    WBCUA 5-9 07/29/2021    BACTERIA NONE SEEN 07/29/2021    RBCUA 0-2 07/29/2021    BLOODU NEGATIVE 07/29/2021    SPECGRAV 1.017 07/11/2017    GLUCOSEU NEGATIVE 07/29/2021     Urine culture: No results found for: LABURIN  Micro:   Blood culture #1:   Lab Results   Component Value Date    BC No circumferential lesion is not excludable. **This report has been created using voice recognition software. It may contain minor errors which are inherent in voice recognition technology. **      Final report electronically signed by Dr. Tiffanie Cyr on 7/29/2021 1:57 PM        CT ABDOMEN PELVIS W IV CONTRAST Additional Contrast? None    Result Date: 7/30/2021  CT ABDOMEN AND PELVIS WITH CONTRAST ENHANCEMENT: CLINICAL INFORMATION: Focal narrowing of the transverse colon. COMPARISON: CT abdomen and pelvis July 29, 2021. TECHNIQUE: Multiple axial 5 mm images of the abdomen, pelvis, and lung bases are obtained following the administration of intravenous contrast material. 80 mL Isovue-370 given intravenously. Coronal and sagittal reformats are generated from the axial data set. All CT scans at this facility use dose modulation, iterative reconstruction, and/or weight-based dosing when appropriate to reduce radiation dose to as low as reasonably achievable. FINDINGS: Lung bases: Visualized lung bases are clear. Liver: There are again a few tiny subcentimeter low-attenuation lesions in both hepatic lobes which are too small small to accurately characterize but may relate to cysts. There is an additional focal area of hypoattenuation in the medial left lobe adjacent to falciform ligament, likely relating to focal fat. Liver demonstrates a smooth contour. There are again postsurgical changes of cholecystectomy. There is again minimal intrahepatic biliary dilation. There is again mild dilation of the extrahepatic common duct which may relate to postcholecystectomy state. Pancreas, spleen and adrenal glands: Pancreas, spleen, and adrenal glands are normal. Kidneys: Kidneys are normal in appearance bilaterally. No hydronephrosis bilaterally. Bowel/Peritoneum: There is no small bowel or colonic dilation. The appendix is not seen, which may relate to prior appendectomy.  No inflammatory changes in the right lower quadrant. The transverse colon is somewhat collapsed, limiting evaluation. This includes the previously seen segment of narrowing involving the transverse colon. No pericolonic inflammatory changes are identified. No evidence of free intraperitoneal air. No free fluid or fluid collection in the abdomen or pelvis. No abdominal or pelvic lymphadenopathy. There is a tiny fat-containing umbilical hernia. Vascular Structures: Portal venous system is patent. Abdominal aorta is normal in caliber. Pelvis: Uterus is intact and is anteverted. Urinary bladder is grossly normal. Bones: There are degenerative changes of bilateral hip joints. There are no destructive osseous lesions identified. Vertebral body heights are maintained. There are degenerative changes in the visualized thoracolumbar spine. 1. No acute findings in the abdomen or pelvis. 2. The transverse colon is collapsed, which limits evaluation of the previously seen focal segment of narrowing. If there is concern for underlying mass, correlation with colonoscopy is recommended. **This report has been created using voice recognition software. It may contain minor errors which are inherent in voice recognition technology. ** Final report electronically signed by Dr Cherelle Martinez on 7/30/2021 12:32 PM    CT ABDOMEN PELVIS W IV CONTRAST Additional Contrast? None    Result Date: 7/29/2021  PROCEDURE: CT ABDOMEN PELVIS W IV CONTRAST CLINICAL INFORMATION: Abdominal pain diverticulitis . COMPARISON: No prior study. TECHNIQUE: 2-D multiplanar postcontrast images of the abdomen and pelvis Isovue-370 IV contrast only All CT scans at this facility use dose modulation, iterative reconstruction, and/or weight-based dosing when appropriate to reduce radiation dose to as low as reasonably achievable. FINDINGS: Lung bases Lung bases are clear undersurface the heart unremarkable. Abdomen pelvis Liver, and spleen are normal. Prior cholecystectomy. Pancreas unremarkable.  Adrenals are normal. Kidneys enhance symmetrically and appear normal. Aorta is unremarkable. No adenopathy identified. Short segment circumferential narrowing of the transverse colon. Possibly peristalsis. Circumferential neoplasm is not excludable. It does not appear to be any bowel obstruction, somewhat favoring peristalsis. This is seen on images 42 and 43 series 2 urinary bladder is distended. Uterus is unremarkable. No abnormal fluid collections are seen. Bones Degenerative changes both hips Facet degenerative changes lower lumbar spine. No acute abdominal or pelvic abnormalities. There is a short segment narrowing of the transverse colon which may represent peristalsis but a circumferential lesion is not excludable. **This report has been created using voice recognition software. It may contain minor errors which are inherent in voice recognition technology. ** Final report electronically signed by Dr. Chrissy Ortiz on 7/29/2021 1:57 PM      Tele:   [x] yes             [] no      Active Hospital Problems    Diagnosis Date Noted    Intractable nausea and vomiting [R11.2] 07/29/2021       Electronically signed by Elaina Polanco PA-C on 7/30/2021 at 5:49 PM

## 2021-07-31 LAB
ANION GAP SERPL CALCULATED.3IONS-SCNC: 11 MEQ/L (ref 8–16)
BASOPHILS # BLD: 0.5 %
BASOPHILS ABSOLUTE: 0 THOU/MM3 (ref 0–0.1)
BUN BLDV-MCNC: 9 MG/DL (ref 7–22)
CALCIUM SERPL-MCNC: 9.4 MG/DL (ref 8.5–10.5)
CHLORIDE BLD-SCNC: 104 MEQ/L (ref 98–111)
CO2: 22 MEQ/L (ref 23–33)
CREAT SERPL-MCNC: 0.8 MG/DL (ref 0.4–1.2)
EOSINOPHIL # BLD: 2.7 %
EOSINOPHILS ABSOLUTE: 0.1 THOU/MM3 (ref 0–0.4)
ERYTHROCYTE [DISTWIDTH] IN BLOOD BY AUTOMATED COUNT: 13.8 % (ref 11.5–14.5)
ERYTHROCYTE [DISTWIDTH] IN BLOOD BY AUTOMATED COUNT: 43.3 FL (ref 35–45)
GFR SERPL CREATININE-BSD FRML MDRD: 75 ML/MIN/1.73M2
GLUCOSE BLD-MCNC: 75 MG/DL (ref 70–108)
HCT VFR BLD CALC: 38.8 % (ref 37–47)
HEMOGLOBIN: 12.4 GM/DL (ref 12–16)
IMMATURE GRANS (ABS): 0.01 THOU/MM3 (ref 0–0.07)
IMMATURE GRANULOCYTES: 0.2 %
LYMPHOCYTES # BLD: 41.2 %
LYMPHOCYTES ABSOLUTE: 2.3 THOU/MM3 (ref 1–4.8)
MAGNESIUM: 2 MG/DL (ref 1.6–2.4)
MCH RBC QN AUTO: 27.5 PG (ref 26–33)
MCHC RBC AUTO-ENTMCNC: 32 GM/DL (ref 32.2–35.5)
MCV RBC AUTO: 86 FL (ref 81–99)
MONOCYTES # BLD: 9.5 %
MONOCYTES ABSOLUTE: 0.5 THOU/MM3 (ref 0.4–1.3)
NUCLEATED RED BLOOD CELLS: 0 /100 WBC
PLATELET # BLD: 207 THOU/MM3 (ref 130–400)
PMV BLD AUTO: 10 FL (ref 9.4–12.4)
POTASSIUM REFLEX MAGNESIUM: 3.4 MEQ/L (ref 3.5–5.2)
RBC # BLD: 4.51 MILL/MM3 (ref 4.2–5.4)
SEG NEUTROPHILS: 45.9 %
SEGMENTED NEUTROPHILS ABSOLUTE COUNT: 2.5 THOU/MM3 (ref 1.8–7.7)
SODIUM BLD-SCNC: 137 MEQ/L (ref 135–145)
WBC # BLD: 5.5 THOU/MM3 (ref 4.8–10.8)

## 2021-07-31 PROCEDURE — 86301 IMMUNOASSAY TUMOR CA 19-9: CPT

## 2021-07-31 PROCEDURE — 85025 COMPLETE CBC W/AUTO DIFF WBC: CPT

## 2021-07-31 PROCEDURE — 6360000002 HC RX W HCPCS: Performed by: INTERNAL MEDICINE

## 2021-07-31 PROCEDURE — 6370000000 HC RX 637 (ALT 250 FOR IP): Performed by: INTERNAL MEDICINE

## 2021-07-31 PROCEDURE — 6370000000 HC RX 637 (ALT 250 FOR IP): Performed by: NURSE PRACTITIONER

## 2021-07-31 PROCEDURE — C9113 INJ PANTOPRAZOLE SODIUM, VIA: HCPCS | Performed by: NURSE PRACTITIONER

## 2021-07-31 PROCEDURE — 80048 BASIC METABOLIC PNL TOTAL CA: CPT

## 2021-07-31 PROCEDURE — 36415 COLL VENOUS BLD VENIPUNCTURE: CPT

## 2021-07-31 PROCEDURE — 96376 TX/PRO/DX INJ SAME DRUG ADON: CPT

## 2021-07-31 PROCEDURE — G0378 HOSPITAL OBSERVATION PER HR: HCPCS

## 2021-07-31 PROCEDURE — 6360000002 HC RX W HCPCS: Performed by: NURSE PRACTITIONER

## 2021-07-31 PROCEDURE — 6360000002 HC RX W HCPCS: Performed by: PHYSICIAN ASSISTANT

## 2021-07-31 PROCEDURE — 99213 OFFICE O/P EST LOW 20 MIN: CPT | Performed by: SURGERY

## 2021-07-31 PROCEDURE — 83735 ASSAY OF MAGNESIUM: CPT

## 2021-07-31 PROCEDURE — 99226 PR SBSQ OBSERVATION CARE/DAY 35 MINUTES: CPT | Performed by: HOSPITALIST

## 2021-07-31 PROCEDURE — 6370000000 HC RX 637 (ALT 250 FOR IP): Performed by: PHYSICIAN ASSISTANT

## 2021-07-31 PROCEDURE — 94760 N-INVAS EAR/PLS OXIMETRY 1: CPT

## 2021-07-31 RX ORDER — POLYETHYLENE GLYCOL 3350 17 G/17G
238 POWDER, FOR SOLUTION ORAL ONCE
Status: COMPLETED | OUTPATIENT
Start: 2021-07-31 | End: 2021-07-31

## 2021-07-31 RX ADMIN — GABAPENTIN 600 MG: 600 TABLET, FILM COATED ORAL at 14:43

## 2021-07-31 RX ADMIN — SUCRALFATE 1 G: 1 TABLET ORAL at 17:19

## 2021-07-31 RX ADMIN — GABAPENTIN 600 MG: 600 TABLET, FILM COATED ORAL at 17:19

## 2021-07-31 RX ADMIN — FUROSEMIDE 40 MG: 40 TABLET ORAL at 09:50

## 2021-07-31 RX ADMIN — TOPIRAMATE 100 MG: 100 TABLET, FILM COATED ORAL at 09:50

## 2021-07-31 RX ADMIN — ROPINIROLE HYDROCHLORIDE 2 MG: 1 TABLET, FILM COATED ORAL at 21:18

## 2021-07-31 RX ADMIN — ONDANSETRON 4 MG: 2 INJECTION INTRAMUSCULAR; INTRAVENOUS at 21:19

## 2021-07-31 RX ADMIN — MONTELUKAST SODIUM 10 MG: 10 TABLET ORAL at 09:50

## 2021-07-31 RX ADMIN — HYDROMORPHONE HYDROCHLORIDE 1 MG: 1 INJECTION, SOLUTION INTRAMUSCULAR; INTRAVENOUS; SUBCUTANEOUS at 21:19

## 2021-07-31 RX ADMIN — GABAPENTIN 600 MG: 600 TABLET, FILM COATED ORAL at 09:49

## 2021-07-31 RX ADMIN — SUCRALFATE 1 G: 1 TABLET ORAL at 21:18

## 2021-07-31 RX ADMIN — DICYCLOMINE HYDROCHLORIDE 10 MG: 10 CAPSULE ORAL at 14:43

## 2021-07-31 RX ADMIN — OXYBUTYNIN CHLORIDE 5 MG: 5 TABLET ORAL at 14:43

## 2021-07-31 RX ADMIN — SUCRALFATE 1 G: 1 TABLET ORAL at 14:43

## 2021-07-31 RX ADMIN — PANTOPRAZOLE SODIUM 40 MG: 40 INJECTION, POWDER, FOR SOLUTION INTRAVENOUS at 21:48

## 2021-07-31 RX ADMIN — HYDROMORPHONE HYDROCHLORIDE 1 MG: 1 INJECTION, SOLUTION INTRAMUSCULAR; INTRAVENOUS; SUBCUTANEOUS at 17:13

## 2021-07-31 RX ADMIN — OXYBUTYNIN CHLORIDE 5 MG: 5 TABLET ORAL at 09:50

## 2021-07-31 RX ADMIN — HYDROMORPHONE HYDROCHLORIDE 1 MG: 1 INJECTION, SOLUTION INTRAMUSCULAR; INTRAVENOUS; SUBCUTANEOUS at 09:54

## 2021-07-31 RX ADMIN — HYDROMORPHONE HYDROCHLORIDE 1 MG: 1 INJECTION, SOLUTION INTRAMUSCULAR; INTRAVENOUS; SUBCUTANEOUS at 00:15

## 2021-07-31 RX ADMIN — SUCRALFATE 1 G: 1 TABLET ORAL at 09:50

## 2021-07-31 RX ADMIN — FERROUS SULFATE TAB 325 MG (65 MG ELEMENTAL FE) 325 MG: 325 (65 FE) TAB at 09:50

## 2021-07-31 RX ADMIN — PROMETHAZINE HYDROCHLORIDE 12.5 MG: 25 INJECTION INTRAMUSCULAR; INTRAVENOUS at 15:09

## 2021-07-31 RX ADMIN — ROPINIROLE HYDROCHLORIDE 2 MG: 1 TABLET, FILM COATED ORAL at 09:50

## 2021-07-31 RX ADMIN — POLYETHYLENE GLYCOL 3350 238 G: 17 POWDER, FOR SOLUTION ORAL at 17:06

## 2021-07-31 RX ADMIN — ROPINIROLE HYDROCHLORIDE 2 MG: 1 TABLET, FILM COATED ORAL at 14:43

## 2021-07-31 RX ADMIN — DICYCLOMINE HYDROCHLORIDE 10 MG: 10 CAPSULE ORAL at 09:49

## 2021-07-31 RX ADMIN — ONDANSETRON 4 MG: 2 INJECTION INTRAMUSCULAR; INTRAVENOUS at 09:54

## 2021-07-31 RX ADMIN — PANTOPRAZOLE SODIUM 40 MG: 40 INJECTION, POWDER, FOR SOLUTION INTRAVENOUS at 11:39

## 2021-07-31 RX ADMIN — GABAPENTIN 600 MG: 600 TABLET, FILM COATED ORAL at 21:18

## 2021-07-31 RX ADMIN — DICYCLOMINE HYDROCHLORIDE 10 MG: 10 CAPSULE ORAL at 21:20

## 2021-07-31 RX ADMIN — TOPIRAMATE 100 MG: 100 TABLET, FILM COATED ORAL at 21:18

## 2021-07-31 RX ADMIN — OXYBUTYNIN CHLORIDE 5 MG: 5 TABLET ORAL at 21:18

## 2021-07-31 ASSESSMENT — PAIN SCALES - GENERAL
PAINLEVEL_OUTOF10: 8

## 2021-07-31 ASSESSMENT — PAIN DESCRIPTION - LOCATION: LOCATION: ABDOMEN

## 2021-07-31 NOTE — PLAN OF CARE
"Chief Complaint   Patient presents with     Heart Failure     85 year old female with chronic diastolic heart failure presents for initial visit with labs prior.        Initial /80 (BP Location: Right arm, Patient Position: Sitting, Cuff Size: Adult Large)  Pulse 61  Wt 72.6 kg (160 lb)  LMP  (LMP Unknown)  SpO2 100%  BMI 27.46 kg/m2 Estimated body mass index is 27.46 kg/(m^2) as calculated from the following:    Height as of 10/16/18: 1.626 m (5' 4\").    Weight as of this encounter: 72.6 kg (160 lb)..  BP completed using cuff size: large    Alexander Melendez L.P.N.    " Problem: Nutrition  Goal: Optimal nutrition therapy  Outcome: Ongoing   Nutrition Problem #1: Inadequate oral intake  Intervention: Food and/or Nutrient Delivery: Continue Current Diet, Start Oral Nutrition Supplement, Vitamin Supplement  Nutritional Goals: Pt will receive adequate nutrition within 1-4 days

## 2021-07-31 NOTE — PROGRESS NOTES
[facility]  Gastroenterology Progress Note      Patient's Name/Date of Birth: [place] / 1970 (77 y.o.)  MRN: [id number]  Visit Date: 2021   Admit Date: 2021 10:27 AM  Referring Provider: Shawnee Cortés MD  Primary Care Physician: Raya Del Cid MD   [id number]       SUBJECTIVE:    Patient seen and examined. 24 hours events and chart reviewed. No acute issues overnight. Having upper abdominal pain. Having liquid brown BMs. Colonoscopy for today was cancelled due to this. OBJECTIVE:  Physical Exam  VITALS:  BP (!) 106/55   Pulse 63   Temp 98.6 °F (37 °C) (Oral)   Resp 16   Ht 5' 6\" (1.676 m)   Wt 198 lb 3.2 oz (89.9 kg)   LMP 2014 (Exact Date)   SpO2 100% Comment: o2 not needed at this time per o2 protocol  BMI 31.99 kg/m²  Body mass index is 31.99 kg/m². TEMPERATURE:  Current - Temp: 98.6 °F (37 °C);  Max - Temp  Av.4 °F (36.9 °C)  Min: 98.1 °F (36.7 °C)  Max: 98.6 °F (37 °C)    General:  No acute distress, A&Ox3  Eyes: anicteric sclera, no pallor, EOMI, PERRLA  Neck: Supple, no JVD, thyromegaly or masses, trachea midline  Heart: RRR, no murmurs, rubs or gallops  Lungs: CTAB, No wheezes, rales or rhonchi, normal respiratory effort  Abdomen: soft, nondistended, nontender, BS+  Extremities: No clubbing, cyanosis or edema  Neurologic:  No asterixis noted, CN intact grossly, A&Ox3, moving all extremities  Skin: No rashes or lesions, no jaundice      Labs:   CBC:   Lab Results   Component Value Date    WBC 5.5 2021    HGB 12.4 2021    HCT 38.8 2021    MCV 86.0 2021     2021     BMP:   Lab Results   Component Value Date     2021    K 3.4 2021     2021    CO2 22 2021    PHOS 3.8 2019    BUN 9 2021    CREATININE 0.8 2021    CALCIUM 9.4 2021     PT/INR:   Lab Results   Component Value Date    INR 1.50 2020     LFTs:   Lab Results Component Value Date    ALKPHOS 82 07/29/2021    ALT 26 07/29/2021    AST 25 07/29/2021    BILITOT 0.2 07/29/2021    BILIDIR <0.2 06/12/2021    LABALBU 4.7 07/29/2021    LIPASE 20.7 07/29/2021       Current Meds:  Scheduled Meds:   metoprolol tartrate  50 mg Oral BID    montelukast  10 mg Oral Daily    dicyclomine  10 mg Oral TID    ferrous sulfate  325 mg Oral Daily    furosemide  40 mg Oral Daily    gabapentin  600 mg Oral 4x Daily    oxybutynin  5 mg Oral TID    sucralfate  1 g Oral 4x Daily    tiotropium  1 puff Inhalation Daily    polyethylene glycol  17 g Oral BID    pantoprazole  40 mg Intravenous BID    sodium chloride flush  5-40 mL Intravenous 2 times per day    losartan  25 mg Oral Daily    rOPINIRole  2 mg Oral TID    topiramate  100 mg Oral BID    [Held by provider] apixaban  5 mg Oral BID     Continuous Infusions:   sodium chloride      sodium chloride 100 mL/hr at 07/30/21 1913       Diet:  Diet NPO Exceptions are: Sips of Water with Meds    Code Status:   Full Code    ASSESSMENT:    1. Abdominal pain- colon mass/stricture vs. Severe constipation/IBS vs. PUD/erosive gastritis vs. Combination vs. Other. 2. Abnormal CT  3. Chronic Constipation- IBS-C +/- OIC  4. Nausea with vomiting  5. Pandiverticulosis  6. GERD with esophagitis  7. Hypokalemia  8. Hx of DVT/PE  9. Chronic pain  10. Anxiety  11. Hx of DVT/PE  12. Migraine, hx, chronic    PLAN:    · Bowel prep- Miralax again today  · Colonoscopy tomorrow 8/1/21 with Dr. Alexandrea Hopkins @ 56 for for further evaluation of possible transverse colon lesion/mass. Please call service if continue to pass brown stool tomorrow morning. · Miralax BID scheduled post prep  · MOM PRN  · Carafate QID, home dose  · IVPPI BID  · Clear liquids today, NPO at midnight  · Hold Eliquis for procedure  · Surgery following    Thank you for allowing us to participate in the care of this patient.   Feel free to contact [facility] or myself with any questions or concerns. GI will continue to follow.     Andrew Ramires MD  Gastroenterology - [facility]

## 2021-07-31 NOTE — PROGRESS NOTES
epigastric tenderness, non-distended with normal bowel sounds. Musculoskeletal: passive and active ROM x 4 extremities. Skin: Skin color, texture, turgor normal.  No rashes or lesions. Neurologic:  Neurovascularly intact without any focal sensory/motor deficits. Cranial nerves: II-XII intact, grossly non-focal.  Psychiatric: Alert and oriented, thought content appropriate, normal insight  Capillary Refill: Brisk,< 3 seconds   Peripheral Pulses: +2 palpable, equal bilaterally     Labs:   Recent Labs     07/29/21  1208 07/30/21  0633 07/31/21  0628   WBC 6.8 5.5 5.5   HGB 13.8 12.7 12.4   HCT 42.1 38.8 38.8    209 207     Recent Labs     07/29/21  1207 07/30/21  0633 07/31/21  0628    142 137   K 3.5 3.8 3.4*    110 104   CO2 25 23 22*   BUN 15 10 9   CREATININE 1.0 0.8 0.8   CALCIUM 10.2 8.9 9.4     Recent Labs     07/29/21  1207   AST 25   ALT 26   BILITOT 0.2*   ALKPHOS 82     No results for input(s): INR in the last 72 hours. No results for input(s): Jadene Moh in the last 72 hours. Microbiology:    Blood culture #1:   Lab Results   Component Value Date    BC No growth-preliminary  07/29/2021       Blood culture #2:No results found for: Adelaide Gatica    Organism:  Lab Results   Component Value Date    ORG Staphylococcus aureus 06/24/2016         Lab Results   Component Value Date    LABGRAM  03/31/2019     Quality of sputum specimen: Specimen acceptable. Many segmented neutrophils observed. Few epithelial cells observed. Few gram negative bacilli. Few gram positive cocci in pairs. MRSA culture only:No results found for: [facility]    Urine culture: No results found for: LABURIN    Respiratory culture: No results found for: CULTRESP    Aerobic and Anaerobic :  Lab Results   Component Value Date    LABAERO  06/24/2016     moderate growth  In the treatment of gram positive infections, GENTAMICIN  should be CONSIDERED a SYNERGYSTIC agent ONLY.   Ciprofloxacin and Levofloxacin, regardless of in vitro  sensitivity, should not be used for staphylococcal infections  other than uncomplicated lower UTIs. Moxifloxacin, regardless of in vitro sensitivity, should  not be used for staphylococcal infections. Lab Results   Component Value Date    LABANAE  06/24/2016     No anaerobes isolated- preliminary  No anaerobes isolated         Urinalysis:      Lab Results   Component Value Date    NITRU NEGATIVE 07/29/2021    WBCUA 5-9 07/29/2021    BACTERIA NONE SEEN 07/29/2021    RBCUA 0-2 07/29/2021    BLOODU NEGATIVE 07/29/2021    SPECGRAV 1.017 07/11/2017    GLUCOSEU NEGATIVE 07/29/2021       Radiology:  CT ABDOMEN PELVIS W IV CONTRAST Additional Contrast? None   Final Result      1. No acute findings in the abdomen or pelvis. 2. The transverse colon is collapsed, which limits evaluation of the previously seen focal segment of narrowing. If there is concern for underlying mass, correlation with colonoscopy is recommended. **This report has been created using voice recognition software. It may contain minor errors which are inherent in voice recognition technology. **      Final report electronically signed by Dr Corinna Harper on 7/30/2021 12:32 PM      CT ABDOMEN PELVIS W IV CONTRAST Additional Contrast? None   Final Result   No acute abdominal or pelvic abnormalities. There is a short segment narrowing of the transverse colon which may represent peristalsis but a circumferential lesion is not excludable. **This report has been created using voice recognition software. It may contain minor errors which are inherent in voice recognition technology. **      Final report electronically signed by Dr. Stephanie Finney on 7/29/2021 1:57 PM        CT ABDOMEN PELVIS W IV CONTRAST Additional Contrast? None    Result Date: 7/30/2021  CT ABDOMEN AND PELVIS WITH CONTRAST ENHANCEMENT: CLINICAL INFORMATION: Focal narrowing of the transverse colon. COMPARISON: CT abdomen and pelvis July 29, 2021. TECHNIQUE: Multiple axial 5 mm images of the abdomen, pelvis, and lung bases are obtained following the administration of intravenous contrast material. 80 mL Isovue-370 given intravenously. Coronal and sagittal reformats are generated from the axial data set. All CT scans at this facility use dose modulation, iterative reconstruction, and/or weight-based dosing when appropriate to reduce radiation dose to as low as reasonably achievable. FINDINGS: Lung bases: Visualized lung bases are clear. Liver: There are again a few tiny subcentimeter low-attenuation lesions in both hepatic lobes which are too small small to accurately characterize but may relate to cysts. There is an additional focal area of hypoattenuation in the medial left lobe adjacent to falciform ligament, likely relating to focal fat. Liver demonstrates a smooth contour. There are again postsurgical changes of cholecystectomy. There is again minimal intrahepatic biliary dilation. There is again mild dilation of the extrahepatic common duct which may relate to postcholecystectomy state. Pancreas, spleen and adrenal glands: Pancreas, spleen, and adrenal glands are normal. Kidneys: Kidneys are normal in appearance bilaterally. No hydronephrosis bilaterally. Bowel/Peritoneum: There is no small bowel or colonic dilation. The appendix is not seen, which may relate to prior appendectomy. No inflammatory changes in the right lower quadrant. The transverse colon is somewhat collapsed, limiting evaluation. This includes the previously seen segment of narrowing involving the transverse colon. No pericolonic inflammatory changes are identified. No evidence of free intraperitoneal air. No free fluid or fluid collection in the abdomen or pelvis. No abdominal or pelvic lymphadenopathy. There is a tiny fat-containing umbilical hernia. Vascular Structures: Portal venous system is patent. Abdominal aorta is normal in caliber. Pelvis: Uterus is intact and is anteverted. Urinary bladder is grossly normal. Bones: There are degenerative changes of bilateral hip joints. There are no destructive osseous lesions identified. Vertebral body heights are maintained. There are degenerative changes in the visualized thoracolumbar spine. 1. No acute findings in the abdomen or pelvis. 2. The transverse colon is collapsed, which limits evaluation of the previously seen focal segment of narrowing. If there is concern for underlying mass, correlation with colonoscopy is recommended. **This report has been created using voice recognition software. It may contain minor errors which are inherent in voice recognition technology. ** Final report electronically signed by Dr Jessica Deshpande on 7/30/2021 12:32 PM    CT ABDOMEN PELVIS W IV CONTRAST Additional Contrast? None    Result Date: 7/29/2021  PROCEDURE: CT ABDOMEN PELVIS W IV CONTRAST CLINICAL INFORMATION: Abdominal pain diverticulitis . COMPARISON: No prior study. TECHNIQUE: 2-D multiplanar postcontrast images of the abdomen and pelvis Isovue-370 IV contrast only All CT scans at this facility use dose modulation, iterative reconstruction, and/or weight-based dosing when appropriate to reduce radiation dose to as low as reasonably achievable. FINDINGS: Lung bases Lung bases are clear undersurface the heart unremarkable. Abdomen pelvis Liver, and spleen are normal. Prior cholecystectomy. Pancreas unremarkable. Adrenals are normal. Kidneys enhance symmetrically and appear normal. Aorta is unremarkable. No adenopathy identified. Short segment circumferential narrowing of the transverse colon. Possibly peristalsis. Circumferential neoplasm is not excludable. It does not appear to be any bowel obstruction, somewhat favoring peristalsis. This is seen on images 42 and 43 series 2 urinary bladder is distended. Uterus is unremarkable. No abnormal fluid collections are seen. Bones Degenerative changes both hips Facet degenerative changes lower lumbar spine.

## 2021-07-31 NOTE — PROGRESS NOTES
Comprehensive Nutrition Assessment    Type and Reason for Visit:  Initial, Positive Nutrition Screen (Weight Loss/Decreased Appetite/Intake)    Nutrition Recommendations/Plan:   *Recommend a Multivitamin w/minerals daily. *Advance diet when medically feasible. *Started Ensure Clear TID. *Continue Zofran and Phenergan PRN d/t nausea/emesis. Nutrition Assessment: Pt. nutritionally compromised AEB pt report of poor appetite and intake over the past week consuming less than 50% of meals. At risk for further nutrition compromise r/t admit d/t abdominal pain, pt only on a Clear Liquid diet and underlying medical condition (hx Nuñez's Esophagus, GERD, HTN). Nutrition recommendations/interventions as per above. Malnutrition Assessment:  Malnutrition Status: At risk for malnutrition (Comment)    Context:  Acute Illness     Findings of the 6 clinical characteristics of malnutrition:  Energy Intake:  7 - 50% or less of estimated energy requirements for 5 or more days  Weight Loss:  Unable to assess (no weight hx per EMR)     Body Fat Loss:  No significant body fat loss     Muscle Mass Loss:  No significant muscle mass loss    Fluid Accumulation:  No significant fluid accumulation Extremities   Strength:  Not Performed    Estimated Daily Nutrient Needs:  Energy (kcal):  7144-8253 kcal/day (15-18 kcal/kg); Weight Used for Energy Requirements:  Current (90 kg)     Protein (g):  71-89 g/day (1.2-1.5 g/kg); Weight Used for Protein Requirements:  Ideal (IBW 59 kg)          Nutrition Related Findings: admit d/t abdominal pain x1 week; plan colonoscopy on 8/1 per GI; pt seen; she reports poor appetite and intake over the past week consuming less than 50% of meals d/t nausea/emesis; pt states she as unable to keep food down; pt denies any N/V at present or difficulty chewing/swallowing food; no BM yet; pt amenable to try Ensure Clear TID; last BM x2 on 7/31;  Rx includes: Iron, Lasix, Glycolax, Carafate, Zofran PRN and Phenergan PRN. Wounds:  None       Current Nutrition Therapies:    ADULT DIET; Clear Liquid  Diet NPO Exceptions are: Sips of Water with Meds  Adult Oral Nutrition Supplement; Clear Liquid Oral Supplement    Anthropometric Measures:  · Height: 5' 6\" (167.6 cm)  · Current Body Weight: 198 lb 3.2 oz (89.9 kg) (7/29; +trace edema BLE)   · Admission Body Weight: 198 lb 3.2 oz (89.9 kg) (7/29; +trace edema BLE)    · Usual Body Weight:  (~209 lbs per pt report. No actual weights per EMR)     · Ideal Body Weight: 130 lbs   · BMI: 32  · BMI Categories: Obese Class 1 (BMI 30.0-34. 9)       Nutrition Diagnosis:   · Inadequate oral intake related to inadequate protein-energy intake as evidenced by poor intake prior to admission, NPO or clear liquid status due to medical condition    Nutrition Interventions:   Food and/or Nutrient Delivery:  Continue Current Diet, Start Oral Nutrition Supplement, Vitamin Supplement  Nutrition Education/Counseling:  Education initiated (Encouraged po intake of meals and ONS at best effort)   Coordination of Nutrition Care:  Continue to monitor while inpatient    Goals:  Pt will receive adequate nutrition within 1-4 days       Nutrition Monitoring and Evaluation:   Behavioral-Environmental Outcomes:  None Identified   Food/Nutrient Intake Outcomes:  Diet Advancement/Tolerance, Supplement Intake, Food and Nutrient Intake, Vitamin/Mineral Intake  Physical Signs/Symptoms Outcomes:  Biochemical Data, Weight, Skin, Nutrition Focused Physical Findings, GI Status, Nausea or Vomiting, Diarrhea, Constipation     Discharge Planning:     Too soon to determine     Electronically signed by Nella Nixon, MS, RD, LD on 7/31/21 at 2:24 PM EDT    Contact: [phone]

## 2021-07-31 NOTE — PROGRESS NOTES
Chayo Faria MD  On behalf of Dr. Scott Newman  Daily Progress Note  Pt Name: Madelyn Kirkpatrick Record Number: [id number]  Date of Birth 1970   Today's Date: 7/31/2021  Chief complaint: When going get my colonoscopy Manisha Alistair  ASSESSMENT:   1. Hospital day # 1  2. Abdominal pain  3. Collapsed transverse colon on CT. Repeat imaging no acute findings in the abdomen and pelvis   has a past medical history of Anxiety, Arthritis, Asthma, Nuñez's esophagus, Blood circulation, collateral, GERD (gastroesophageal reflux disease), Hx of blood clots, Hypertension, Insomnia, Interstitial cystitis, Migraine, Pneumonia, Psychiatric problem, and Spinal stenosis of lumbar region. RECOMMENDATIONS:   1. IV hydration  2. Analgesics and antiemetics as needed  3. Bowel prep given. GI plans colonoscopy today  4. DVT prophylaxis per primary service. History of DVT. On Eliquis at home held for colonoscopy. 5. Activity as tolerated  SUBJECTIVE:   Eduardo Murray is stable complains of some abdominal pain. No nausea or emesis recorded. Tolerated bowel prep. Mostly liquid still some soft stool. She is scheduled for 10 AM today. MEDICATIONS   Scheduled Meds:   metoprolol tartrate  50 mg Oral BID    montelukast  10 mg Oral Daily    dicyclomine  10 mg Oral TID    ferrous sulfate  325 mg Oral Daily    furosemide  40 mg Oral Daily    gabapentin  600 mg Oral 4x Daily    oxybutynin  5 mg Oral TID    sucralfate  1 g Oral 4x Daily    tiotropium  1 puff Inhalation Daily    polyethylene glycol  17 g Oral BID    pantoprazole  40 mg Intravenous BID    sodium chloride flush  5-40 mL Intravenous 2 times per day    losartan  25 mg Oral Daily    rOPINIRole  2 mg Oral TID    topiramate  100 mg Oral BID    [Held by provider] apixaban  5 mg Oral BID     Continuous Infusions:   sodium chloride      sodium chloride 100 mL/hr at 07/30/21 1913     PRN Meds:. HYDROmorphone, hydrOXYzine, tiZANidine, zolpidem, albuterol, magnesium hydroxide, hydrALAZINE, trimethobenzamide, sodium chloride flush, sodium chloride, ondansetron **OR** ondansetron, polyethylene glycol, acetaminophen **OR** acetaminophen, magnesium sulfate, potassium chloride **OR** potassium alternative oral replacement **OR** potassium chloride, promethazine  OBJECTIVE   CURRENT VITALS:  height is 5' 6\" (1.676 m) and weight is 198 lb 3.2 oz (89.9 kg). Her oral temperature is 98.1 °F (36.7 °C). Her blood pressure is 113/67 and her pulse is 72. Her respiration is 18 and oxygen saturation is 98%. Temperature Range (24h):Temp: 98.1 °F (36.7 °C) Temp  Av.3 °F (36.8 °C)  Min: 98.1 °F (36.7 °C)  Max: 98.6 °F (37 °C)  BP Range (09R): Systolic (99TJM), FRF:698 , Min:113 , HBQ:411     Diastolic (32JKU), LQB:56, Min:67, Max:104    Pulse Range (24h): Pulse  Av.4  Min: 72  Max: 102  Respiration Range (24h): Resp  Av.5  Min: 18  Max: 20  Current Pulse Ox (24h):  SpO2: 98 %  Pulse Ox Range (24h):  SpO2  Av %  Min: 96 %  Max: 100 %  Oxygen Amount and Delivery:    Incentive Spirometry Tx:            GENERAL: alert, no distress  LUNGS: clear to ausculation, without wheezes, rales or rhonci  HEART: normal rate and regular rhythm  ABDOMEN: non-distended, soft, bowel sounds present in all 4 quadrants and no guarding or peritoneal signs  EXTREMITY: No edema       LABS     Recent Labs     21  1207 21  1208 21  0633 21  0628   WBC  --  6.8 5.5 5.5   HGB  --  13.8 12.7 12.4   HCT  --  42.1 38.8 38.8   PLT  --  243 209 207     --  142 137   K 3.5  --  3.8 3.4*     --  110 104   CO2 25  --  23 22*   BUN 15  --  10 9   CREATININE 1.0  --  0.8 0.8   MG 2.0  --   --  2.0   CALCIUM 10.2  --  8.9 9.4      No results for input(s): PTT, INR in the last 72 hours.     Invalid input(s): PT  Recent Labs     21  1207 21  1648 21  0633   AST 25  --   --    ALT 26  --   --    BILITOT 0.2*  --   -- LIPASE 20.7  --   --    LACTA  --  1.0 0.5     No results for input(s): TROPONINT in the last 72 hours. RADIOLOGY     CT ABDOMEN PELVIS W IV CONTRAST Additional Contrast? None   Final Result      1. No acute findings in the abdomen or pelvis. 2. The transverse colon is collapsed, which limits evaluation of the previously seen focal segment of narrowing. If there is concern for underlying mass, correlation with colonoscopy is recommended. **This report has been created using voice recognition software. It may contain minor errors which are inherent in voice recognition technology. **      Final report electronically signed by Dr Les James on 7/30/2021 12:32 PM      CT ABDOMEN PELVIS W IV CONTRAST Additional Contrast? None   Final Result   No acute abdominal or pelvic abnormalities. There is a short segment narrowing of the transverse colon which may represent peristalsis but a circumferential lesion is not excludable. **This report has been created using voice recognition software. It may contain minor errors which are inherent in voice recognition technology. **      Final report electronically signed by Dr. Scout Dixon on 7/29/2021 1:57 PM        New imaging reviewed  Electronically signed by Benitez Reich MD on 7/31/2021 at 9:16 AM

## 2021-08-01 ENCOUNTER — ANESTHESIA (OUTPATIENT)
Dept: ENDOSCOPY | Age: 51
End: 2021-08-01
Payer: COMMERCIAL

## 2021-08-01 ENCOUNTER — ANESTHESIA EVENT (OUTPATIENT)
Dept: ENDOSCOPY | Age: 51
End: 2021-08-01
Payer: COMMERCIAL

## 2021-08-01 VITALS
OXYGEN SATURATION: 100 % | SYSTOLIC BLOOD PRESSURE: 135 MMHG | RESPIRATION RATE: 13 BRPM | DIASTOLIC BLOOD PRESSURE: 49 MMHG | TEMPERATURE: 96.8 F

## 2021-08-01 LAB
ANION GAP SERPL CALCULATED.3IONS-SCNC: 12 MEQ/L (ref 8–16)
BASOPHILS # BLD: 0.6 %
BASOPHILS ABSOLUTE: 0 THOU/MM3 (ref 0–0.1)
BUN BLDV-MCNC: 10 MG/DL (ref 7–22)
CA 19-9: 7 U/ML (ref 0–35)
CALCIUM SERPL-MCNC: 9.1 MG/DL (ref 8.5–10.5)
CHLORIDE BLD-SCNC: 106 MEQ/L (ref 98–111)
CO2: 19 MEQ/L (ref 23–33)
CREAT SERPL-MCNC: 0.9 MG/DL (ref 0.4–1.2)
EOSINOPHIL # BLD: 2.3 %
EOSINOPHILS ABSOLUTE: 0.2 THOU/MM3 (ref 0–0.4)
ERYTHROCYTE [DISTWIDTH] IN BLOOD BY AUTOMATED COUNT: 13.8 % (ref 11.5–14.5)
ERYTHROCYTE [DISTWIDTH] IN BLOOD BY AUTOMATED COUNT: 45.1 FL (ref 35–45)
GFR SERPL CREATININE-BSD FRML MDRD: 66 ML/MIN/1.73M2
GLUCOSE BLD-MCNC: 72 MG/DL (ref 70–108)
HCT VFR BLD CALC: 37.6 % (ref 37–47)
HEMOGLOBIN: 11.5 GM/DL (ref 12–16)
IMMATURE GRANS (ABS): 0.01 THOU/MM3 (ref 0–0.07)
IMMATURE GRANULOCYTES: 0.1 %
LYMPHOCYTES # BLD: 40.6 %
LYMPHOCYTES ABSOLUTE: 2.8 THOU/MM3 (ref 1–4.8)
MAGNESIUM: 1.9 MG/DL (ref 1.6–2.4)
MCH RBC QN AUTO: 27.4 PG (ref 26–33)
MCHC RBC AUTO-ENTMCNC: 30.6 GM/DL (ref 32.2–35.5)
MCV RBC AUTO: 89.5 FL (ref 81–99)
MONOCYTES # BLD: 11.3 %
MONOCYTES ABSOLUTE: 0.8 THOU/MM3 (ref 0.4–1.3)
NUCLEATED RED BLOOD CELLS: 0 /100 WBC
PLATELET # BLD: 182 THOU/MM3 (ref 130–400)
PMV BLD AUTO: 10.4 FL (ref 9.4–12.4)
POTASSIUM REFLEX MAGNESIUM: 3.5 MEQ/L (ref 3.5–5.2)
RBC # BLD: 4.2 MILL/MM3 (ref 4.2–5.4)
SEG NEUTROPHILS: 45.1 %
SEGMENTED NEUTROPHILS ABSOLUTE COUNT: 3.1 THOU/MM3 (ref 1.8–7.7)
SODIUM BLD-SCNC: 137 MEQ/L (ref 135–145)
WBC # BLD: 6.9 THOU/MM3 (ref 4.8–10.8)

## 2021-08-01 PROCEDURE — 3700000001 HC ADD 15 MINUTES (ANESTHESIA): Performed by: INTERNAL MEDICINE

## 2021-08-01 PROCEDURE — 7100000010 HC PHASE II RECOVERY - FIRST 15 MIN: Performed by: INTERNAL MEDICINE

## 2021-08-01 PROCEDURE — 99225 PR SBSQ OBSERVATION CARE/DAY 25 MINUTES: CPT | Performed by: HOSPITALIST

## 2021-08-01 PROCEDURE — 96372 THER/PROPH/DIAG INJ SC/IM: CPT

## 2021-08-01 PROCEDURE — 2709999900 HC NON-CHARGEABLE SUPPLY: Performed by: INTERNAL MEDICINE

## 2021-08-01 PROCEDURE — 6360000002 HC RX W HCPCS: Performed by: INTERNAL MEDICINE

## 2021-08-01 PROCEDURE — 85025 COMPLETE CBC W/AUTO DIFF WBC: CPT

## 2021-08-01 PROCEDURE — 6370000000 HC RX 637 (ALT 250 FOR IP): Performed by: PHYSICIAN ASSISTANT

## 2021-08-01 PROCEDURE — 88305 TISSUE EXAM BY PATHOLOGIST: CPT

## 2021-08-01 PROCEDURE — 2500000003 HC RX 250 WO HCPCS: Performed by: NURSE ANESTHETIST, CERTIFIED REGISTERED

## 2021-08-01 PROCEDURE — 96376 TX/PRO/DX INJ SAME DRUG ADON: CPT

## 2021-08-01 PROCEDURE — G0378 HOSPITAL OBSERVATION PER HR: HCPCS

## 2021-08-01 PROCEDURE — 6360000002 HC RX W HCPCS: Performed by: NURSE PRACTITIONER

## 2021-08-01 PROCEDURE — 36415 COLL VENOUS BLD VENIPUNCTURE: CPT

## 2021-08-01 PROCEDURE — 3609010300 HC COLONOSCOPY W/BIOPSY SINGLE/MULTIPLE: Performed by: INTERNAL MEDICINE

## 2021-08-01 PROCEDURE — 6370000000 HC RX 637 (ALT 250 FOR IP): Performed by: NURSE PRACTITIONER

## 2021-08-01 PROCEDURE — 83735 ASSAY OF MAGNESIUM: CPT

## 2021-08-01 PROCEDURE — 51798 US URINE CAPACITY MEASURE: CPT

## 2021-08-01 PROCEDURE — 3700000000 HC ANESTHESIA ATTENDED CARE: Performed by: INTERNAL MEDICINE

## 2021-08-01 PROCEDURE — 80048 BASIC METABOLIC PNL TOTAL CA: CPT

## 2021-08-01 PROCEDURE — 6360000002 HC RX W HCPCS: Performed by: NURSE ANESTHETIST, CERTIFIED REGISTERED

## 2021-08-01 PROCEDURE — 6370000000 HC RX 637 (ALT 250 FOR IP): Performed by: INTERNAL MEDICINE

## 2021-08-01 PROCEDURE — 6360000002 HC RX W HCPCS: Performed by: PHYSICIAN ASSISTANT

## 2021-08-01 PROCEDURE — C9113 INJ PANTOPRAZOLE SODIUM, VIA: HCPCS | Performed by: NURSE PRACTITIONER

## 2021-08-01 PROCEDURE — 2720000010 HC SURG SUPPLY STERILE: Performed by: INTERNAL MEDICINE

## 2021-08-01 RX ORDER — PROPOFOL 10 MG/ML
INJECTION, EMULSION INTRAVENOUS PRN
Status: DISCONTINUED | OUTPATIENT
Start: 2021-08-01 | End: 2021-08-01 | Stop reason: SDUPTHER

## 2021-08-01 RX ORDER — LIDOCAINE HYDROCHLORIDE 20 MG/ML
INJECTION, SOLUTION INFILTRATION; PERINEURAL PRN
Status: DISCONTINUED | OUTPATIENT
Start: 2021-08-01 | End: 2021-08-01 | Stop reason: SDUPTHER

## 2021-08-01 RX ADMIN — HYDROMORPHONE HYDROCHLORIDE 1 MG: 1 INJECTION, SOLUTION INTRAMUSCULAR; INTRAVENOUS; SUBCUTANEOUS at 19:21

## 2021-08-01 RX ADMIN — OXYBUTYNIN CHLORIDE 5 MG: 5 TABLET ORAL at 11:06

## 2021-08-01 RX ADMIN — ONDANSETRON 4 MG: 2 INJECTION INTRAMUSCULAR; INTRAVENOUS at 23:41

## 2021-08-01 RX ADMIN — HYDROMORPHONE HYDROCHLORIDE 1 MG: 1 INJECTION, SOLUTION INTRAMUSCULAR; INTRAVENOUS; SUBCUTANEOUS at 07:53

## 2021-08-01 RX ADMIN — PROMETHAZINE HYDROCHLORIDE 12.5 MG: 25 INJECTION INTRAMUSCULAR; INTRAVENOUS at 02:14

## 2021-08-01 RX ADMIN — ROPINIROLE HYDROCHLORIDE 2 MG: 1 TABLET, FILM COATED ORAL at 14:44

## 2021-08-01 RX ADMIN — DICYCLOMINE HYDROCHLORIDE 10 MG: 10 CAPSULE ORAL at 20:13

## 2021-08-01 RX ADMIN — ONDANSETRON 4 MG: 2 INJECTION INTRAMUSCULAR; INTRAVENOUS at 07:51

## 2021-08-01 RX ADMIN — MONTELUKAST SODIUM 10 MG: 10 TABLET ORAL at 11:08

## 2021-08-01 RX ADMIN — GABAPENTIN 600 MG: 600 TABLET, FILM COATED ORAL at 20:13

## 2021-08-01 RX ADMIN — POLYETHYLENE GLYCOL (3350) 17 G: 17 POWDER, FOR SOLUTION ORAL at 21:00

## 2021-08-01 RX ADMIN — OXYBUTYNIN CHLORIDE 5 MG: 5 TABLET ORAL at 20:12

## 2021-08-01 RX ADMIN — FERROUS SULFATE TAB 325 MG (65 MG ELEMENTAL FE) 325 MG: 325 (65 FE) TAB at 11:07

## 2021-08-01 RX ADMIN — LIDOCAINE HYDROCHLORIDE 100 MG: 20 INJECTION, SOLUTION INFILTRATION; PERINEURAL at 09:38

## 2021-08-01 RX ADMIN — TOPIRAMATE 100 MG: 100 TABLET, FILM COATED ORAL at 20:14

## 2021-08-01 RX ADMIN — TIZANIDINE 4 MG: 4 TABLET ORAL at 11:20

## 2021-08-01 RX ADMIN — HYDROMORPHONE HYDROCHLORIDE 1 MG: 1 INJECTION, SOLUTION INTRAMUSCULAR; INTRAVENOUS; SUBCUTANEOUS at 23:41

## 2021-08-01 RX ADMIN — PANTOPRAZOLE SODIUM 40 MG: 40 INJECTION, POWDER, FOR SOLUTION INTRAVENOUS at 07:53

## 2021-08-01 RX ADMIN — SUCRALFATE 1 G: 1 TABLET ORAL at 20:14

## 2021-08-01 RX ADMIN — DICYCLOMINE HYDROCHLORIDE 10 MG: 10 CAPSULE ORAL at 14:44

## 2021-08-01 RX ADMIN — PROMETHAZINE HYDROCHLORIDE 12.5 MG: 25 INJECTION INTRAMUSCULAR; INTRAVENOUS at 19:24

## 2021-08-01 RX ADMIN — PROPOFOL 430 MG: 10 INJECTION, EMULSION INTRAVENOUS at 09:38

## 2021-08-01 RX ADMIN — GABAPENTIN 600 MG: 600 TABLET, FILM COATED ORAL at 11:06

## 2021-08-01 RX ADMIN — DICYCLOMINE HYDROCHLORIDE 10 MG: 10 CAPSULE ORAL at 11:07

## 2021-08-01 RX ADMIN — TOPIRAMATE 100 MG: 100 TABLET, FILM COATED ORAL at 11:06

## 2021-08-01 RX ADMIN — GABAPENTIN 600 MG: 600 TABLET, FILM COATED ORAL at 14:44

## 2021-08-01 RX ADMIN — HYDROMORPHONE HYDROCHLORIDE 1 MG: 1 INJECTION, SOLUTION INTRAMUSCULAR; INTRAVENOUS; SUBCUTANEOUS at 11:07

## 2021-08-01 RX ADMIN — ROPINIROLE HYDROCHLORIDE 2 MG: 1 TABLET, FILM COATED ORAL at 11:05

## 2021-08-01 RX ADMIN — SUCRALFATE 1 G: 1 TABLET ORAL at 14:44

## 2021-08-01 RX ADMIN — HYDROMORPHONE HYDROCHLORIDE 1 MG: 1 INJECTION, SOLUTION INTRAMUSCULAR; INTRAVENOUS; SUBCUTANEOUS at 14:45

## 2021-08-01 RX ADMIN — OXYBUTYNIN CHLORIDE 5 MG: 5 TABLET ORAL at 14:44

## 2021-08-01 RX ADMIN — TIZANIDINE 4 MG: 4 TABLET ORAL at 02:19

## 2021-08-01 RX ADMIN — SUCRALFATE 1 G: 1 TABLET ORAL at 11:06

## 2021-08-01 RX ADMIN — HYDROXYZINE HYDROCHLORIDE 50 MG: 25 TABLET ORAL at 14:44

## 2021-08-01 RX ADMIN — HYDROMORPHONE HYDROCHLORIDE 1 MG: 1 INJECTION, SOLUTION INTRAMUSCULAR; INTRAVENOUS; SUBCUTANEOUS at 02:14

## 2021-08-01 RX ADMIN — PANTOPRAZOLE SODIUM 40 MG: 40 INJECTION, POWDER, FOR SOLUTION INTRAVENOUS at 20:10

## 2021-08-01 RX ADMIN — ROPINIROLE HYDROCHLORIDE 2 MG: 1 TABLET, FILM COATED ORAL at 20:12

## 2021-08-01 ASSESSMENT — PAIN SCALES - GENERAL
PAINLEVEL_OUTOF10: 10
PAINLEVEL_OUTOF10: 8
PAINLEVEL_OUTOF10: 10
PAINLEVEL_OUTOF10: 7
PAINLEVEL_OUTOF10: 9
PAINLEVEL_OUTOF10: 10
PAINLEVEL_OUTOF10: 8
PAINLEVEL_OUTOF10: 7
PAINLEVEL_OUTOF10: 0
PAINLEVEL_OUTOF10: 7

## 2021-08-01 ASSESSMENT — ENCOUNTER SYMPTOMS: SHORTNESS OF BREATH: 1

## 2021-08-01 ASSESSMENT — PAIN DESCRIPTION - LOCATION: LOCATION: ABDOMEN

## 2021-08-01 NOTE — OP NOTE
[facility]  Colonoscopy Procedure Note      Patient: Yelena Juarez  : 1970      Procedure: Colonoscopy with biopsy    Date:  2021     Endoscopist:   Nikolay Jeter MD    Referring Physician: Zach Campbell MD  Primary Care Physician: Jessica Duke MD    Indications: This is a 44y.o. year old female who presents with constipation, abdominal pain, and abnormal CT A/P. Per chart review    She reported severe upper mid abdominal pain x 1 wk PTA associated with nausea/vomiting that was not responding to Phenergan. CT in the ED concerning for a narrowing in the Transverse colon, peristalsis vs. circumferential lesion. She was admitted for intractable pain and abnormal CT. Surgery and GI consulted.      She states her pain has never been this severe and is causing headaches. No known eliciting event. No known alleviating or aggravating factors. She noted variable stool consistencies; soft, watery, hard, no pattern. Denies significant constipation. No daily bowel regimen; uses Lactulose as needed and unable to quantify how often this is. States she takes Protonix in addition to Aciphex and carafate at home. Denies NSAID or EtOH use. No F/C, SOB, CP. Initially was started on Flagyl and Cipro this admission but this was discontinued today. No persistent diarrhea PTA. Denies hematochezia, melena. Unable to describe her emesis.      She is a longtime patient of Dr. Raymundo French and his nurse practitioner. Prior to yesterday's office visit however he last OV was 2021 where it was recommended she continue PPI BID, low acid diet, Carafate only as needed, Lactulose PRN for constipation and dicyclomine 10 mg TID PRN for abd pain. GES study was also ordered. She failed to keep her f/up OV. She completed the GES 21 which was unremarkable.  Last Colonoscopy was 2019 for hx of anemia, diarrhea and heme positive demonstrating pandiverticulosis, overall mild, otherwise normal. Random colon bopsies were negative. EGD/Dil 4/23/21 revealed erosive esophagitis and duodenitis, biopsies unremarkable. She is on chronic pain medications with her pain management specialist, percocet 10/and gabapentin. Anesthesia: MAC per Anesthesia. Please see anesthesia report. Consent:  The patient or their legal guardian has signed a consent and is aware of the potential risks, benefits, alternatives, and potential complications of this procedure. These include, but are not limited to hemorrhage, bleeding, post procedural pain, perforation, phlebitis, aspiration, hypotension, hypoxia, cardiovascular events such as arrhythmia, and possibly death. Additionally, the possibility of missed colonic polyps and interval colon cancer was discussed in the consent. Description of Procedure: The patient was then taken to the endoscopy suite and placed in the left lateral decubitus position and the above IV sedation was administered. The perianal area was inspected and a digital rectal examination was performed. A forward-viewing Olympus video adult colonoscope was lubricated and inserted through the patient's anus into the rectum. Under direct visualization, the scope was advanced to the terminal ileum. The cecum was identified by the appendiceal orifice and ileocecal valve. [place] pictures were obtained. The prep was good except fair in the ascending and sigmoid colon. The scope was then slowly withdrawn and circumferential examination of the mucosa was performed. The scope was then withdrawn into the rectum and retroflexed. A retroflexed view of the anal verge and rectum was obtained. The scope was straightened, the colon was decompressed and the scope was withdrawn from the patient. The patient tolerated the procedure well and was taken to the recovery area in good condition. There were no immediate complications.     Estimated Blood Loss: minimal    Findings:    Terminal Ileum: Visualized and normal.    Colon: Scattered diverticulosis in ascending colon and sigmoid colon. Evidence of scarring in the descending colon with long linear scar noted. Cold biopsies were taken and placed in Jar 1. FAIR prep in ascending and sigmoid colon. Rectum with retroflexion: Grade I internal hemorrhoids. Recommendations:   - Await pathology. - Continue current medications, restart home medications, continue Bentyl 10mg TID, would increase miralax to 2caps BID along with lactulose BID  - Unclear etiology of abdominal pain, multifactorial, follow up as an outpatient  - For colon cancer screening in this average-risk patient, colonoscopy may be repeated in 10 years  - Return patient to [facility] for ongoing care  - Restart previous diet  - GI will sign off.  Follow up with Mohit Moss CNP in [place] [id number] office in 4-6 weeks      Electronically signed by Leonie Marrero MD on 8/1/2021 at 10:02 Allen Sales MD  [facility]

## 2021-08-01 NOTE — PROGRESS NOTES
Patient wanted to hold her meds until after procedure, stated she was educated prior colonoscopy to do so.

## 2021-08-01 NOTE — PROGRESS NOTES
Dispo: pt medically stable for discharge; refusing to leave d/t severe pain not controlled with home narcotics          Chief Complaint: Abdo pain    Initial H and P:-    Admitted for evaluation and management of abdo pain; GI and Gen Sx following. I took over care on 8/1/2021      Subjective (past 24 hours):   Continues to c/o constant sharp/stabbing epigastric abdo pain; denies N/V. Denies urinary symptoms/fever/chills/CP/SOB       Past medical history, family history, social history and allergies reviewed again and is unchanged since admission. ROS (12 point review of systems completed. Pertinent positives noted.  Otherwise ROS is negative)     Medications:  Reviewed    Infusion Medications    sodium chloride      sodium chloride 100 mL/hr at 07/30/21 1913     Scheduled Medications    metoprolol tartrate  50 mg Oral BID    montelukast  10 mg Oral Daily    dicyclomine  10 mg Oral TID    ferrous sulfate  325 mg Oral Daily    furosemide  40 mg Oral Daily    gabapentin  600 mg Oral 4x Daily    oxybutynin  5 mg Oral TID    sucralfate  1 g Oral 4x Daily    tiotropium  1 puff Inhalation Daily    polyethylene glycol  17 g Oral BID    pantoprazole  40 mg Intravenous BID    sodium chloride flush  5-40 mL Intravenous 2 times per day    losartan  25 mg Oral Daily    rOPINIRole  2 mg Oral TID    topiramate  100 mg Oral BID    [Held by provider] apixaban  5 mg Oral BID     PRN Meds: HYDROmorphone, hydrOXYzine, tiZANidine, zolpidem, albuterol, magnesium hydroxide, hydrALAZINE, trimethobenzamide, sodium chloride flush, sodium chloride, ondansetron **OR** ondansetron, polyethylene glycol, acetaminophen **OR** acetaminophen, magnesium sulfate, potassium chloride **OR** potassium alternative oral replacement **OR** potassium chloride, promethazine      Intake/Output Summary (Last 24 hours) at 8/1/2021 0739  Last data filed at 7/31/2021 1359  Gross per 24 hour   Intake 1509 ml   Output 500 ml   Net 1009 ml Diet:  Diet NPO Exceptions are: Sips of Water with Meds    Exam:  /67   Pulse 64   Temp 98.8 °F (37.1 °C) (Oral)   Resp 16   Ht 5' 6\" (1.676 m)   Wt 198 lb 3.2 oz (89.9 kg)   LMP 11/19/2014 (Exact Date)   SpO2 100%   BMI 31.99 kg/m²   General appearance: Obese. No apparent distress, appears stated age and cooperative. HEENT: Pupils equal, round, and reactive to light. Conjunctivae/corneas clear. Neck: Supple, with full range of motion. No jugular venous distention. Trachea midline. Respiratory:  Normal respiratory effort. Clear to auscultation, bilaterally without Rales/Wheezes/Rhonchi. Cardiovascular: Regular rate and rhythm with normal S1/S2 without murmurs, rubs or gallops. Abdomen: Soft, round, obese; minimal easily distractible epigastric tenderness, non-distended with normal bowel sounds. Musculoskeletal: passive and active ROM x 4 extremities. Skin: Skin color, texture, turgor normal.  No rashes or lesions. Neurologic:  Neurovascularly intact without any focal sensory/motor deficits. Cranial nerves: II-XII intact, grossly non-focal.  Psychiatric: Alert and oriented, thought content appropriate, normal insight  Capillary Refill: Brisk,< 3 seconds   Peripheral Pulses: +2 palpable, equal bilaterally     Labs:   Recent Labs     07/30/21  0633 07/31/21  0628 08/01/21  0641   WBC 5.5 5.5 6.9   HGB 12.7 12.4 11.5*   HCT 38.8 38.8 37.6    207 182     Recent Labs     07/30/21  0633 07/31/21  0628 08/01/21  0641    137 137   K 3.8 3.4* 3.5    104 106   CO2 23 22* 19*   BUN 10 9 10   CREATININE 0.8 0.8 0.9   CALCIUM 8.9 9.4 9.1     Recent Labs     07/29/21  1207   AST 25   ALT 26   BILITOT 0.2*   ALKPHOS 82     No results for input(s): INR in the last 72 hours. No results for input(s): Bebo Quintero in the last 72 hours.     Microbiology:    Blood culture #1:   Lab Results   Component Value Date    BC No growth-preliminary  07/29/2021       Blood culture #2:No results found for: BLOODCULT2    Organism:  Lab Results   Component Value Date    ORG Staphylococcus aureus 06/24/2016         Lab Results   Component Value Date    LABGRAM  03/31/2019     Quality of sputum specimen: Specimen acceptable. Many segmented neutrophils observed. Few epithelial cells observed. Few gram negative bacilli. Few gram positive cocci in pairs. MRSA culture only:No results found for: [facility]    Urine culture: No results found for: LABURIN    Respiratory culture: No results found for: CULTRESP    Aerobic and Anaerobic :  Lab Results   Component Value Date    LABAERO  06/24/2016     moderate growth  In the treatment of gram positive infections, GENTAMICIN  should be CONSIDERED a SYNERGYSTIC agent ONLY. Ciprofloxacin and Levofloxacin, regardless of in vitro  sensitivity, should not be used for staphylococcal infections  other than uncomplicated lower UTIs. Moxifloxacin, regardless of in vitro sensitivity, should  not be used for staphylococcal infections. Lab Results   Component Value Date    LABANAE  06/24/2016     No anaerobes isolated- preliminary  No anaerobes isolated         Urinalysis:      Lab Results   Component Value Date    NITRU NEGATIVE 07/29/2021    WBCUA 5-9 07/29/2021    BACTERIA NONE SEEN 07/29/2021    RBCUA 0-2 07/29/2021    BLOODU NEGATIVE 07/29/2021    SPECGRAV 1.017 07/11/2017    GLUCOSEU NEGATIVE 07/29/2021       Radiology:  CT ABDOMEN PELVIS W IV CONTRAST Additional Contrast? None   Final Result      1. No acute findings in the abdomen or pelvis. 2. The transverse colon is collapsed, which limits evaluation of the previously seen focal segment of narrowing. If there is concern for underlying mass, correlation with colonoscopy is recommended. **This report has been created using voice recognition software. It may contain minor errors which are inherent in voice recognition technology. **      Final report electronically signed by Dr Clement Mccray on 7/30/2021 12:32 PM      CT ABDOMEN PELVIS W IV CONTRAST Additional Contrast? None   Final Result   No acute abdominal or pelvic abnormalities. There is a short segment narrowing of the transverse colon which may represent peristalsis but a circumferential lesion is not excludable. **This report has been created using voice recognition software. It may contain minor errors which are inherent in voice recognition technology. **      Final report electronically signed by Dr. Alayna Quiroz on 7/29/2021 1:57 PM        CT ABDOMEN PELVIS W IV CONTRAST Additional Contrast? None    Result Date: 7/30/2021  CT ABDOMEN AND PELVIS WITH CONTRAST ENHANCEMENT: CLINICAL INFORMATION: Focal narrowing of the transverse colon. COMPARISON: CT abdomen and pelvis July 29, 2021. TECHNIQUE: Multiple axial 5 mm images of the abdomen, pelvis, and lung bases are obtained following the administration of intravenous contrast material. 80 mL Isovue-370 given intravenously. Coronal and sagittal reformats are generated from the axial data set. All CT scans at this facility use dose modulation, iterative reconstruction, and/or weight-based dosing when appropriate to reduce radiation dose to as low as reasonably achievable. FINDINGS: Lung bases: Visualized lung bases are clear. Liver: There are again a few tiny subcentimeter low-attenuation lesions in both hepatic lobes which are too small small to accurately characterize but may relate to cysts. There is an additional focal area of hypoattenuation in the medial left lobe adjacent to falciform ligament, likely relating to focal fat. Liver demonstrates a smooth contour. There are again postsurgical changes of cholecystectomy. There is again minimal intrahepatic biliary dilation. There is again mild dilation of the extrahepatic common duct which may relate to postcholecystectomy state.  Pancreas, spleen and adrenal glands: Pancreas, spleen, and adrenal glands are normal. Kidneys: Kidneys are normal in appearance bilaterally. No hydronephrosis bilaterally. Bowel/Peritoneum: There is no small bowel or colonic dilation. The appendix is not seen, which may relate to prior appendectomy. No inflammatory changes in the right lower quadrant. The transverse colon is somewhat collapsed, limiting evaluation. This includes the previously seen segment of narrowing involving the transverse colon. No pericolonic inflammatory changes are identified. No evidence of free intraperitoneal air. No free fluid or fluid collection in the abdomen or pelvis. No abdominal or pelvic lymphadenopathy. There is a tiny fat-containing umbilical hernia. Vascular Structures: Portal venous system is patent. Abdominal aorta is normal in caliber. Pelvis: Uterus is intact and is anteverted. Urinary bladder is grossly normal. Bones: There are degenerative changes of bilateral hip joints. There are no destructive osseous lesions identified. Vertebral body heights are maintained. There are degenerative changes in the visualized thoracolumbar spine. 1. No acute findings in the abdomen or pelvis. 2. The transverse colon is collapsed, which limits evaluation of the previously seen focal segment of narrowing. If there is concern for underlying mass, correlation with colonoscopy is recommended. **This report has been created using voice recognition software. It may contain minor errors which are inherent in voice recognition technology. ** Final report electronically signed by Dr Jose L Joseph on 7/30/2021 12:32 PM    CT ABDOMEN PELVIS W IV CONTRAST Additional Contrast? None    Result Date: 7/29/2021  PROCEDURE: CT ABDOMEN PELVIS W IV CONTRAST CLINICAL INFORMATION: Abdominal pain diverticulitis . COMPARISON: No prior study.  TECHNIQUE: 2-D multiplanar postcontrast images of the abdomen and pelvis Isovue-370 IV contrast only All CT scans at this facility use dose modulation, iterative reconstruction, and/or weight-based dosing when appropriate to reduce radiation dose to as low as reasonably achievable. FINDINGS: Lung bases Lung bases are clear undersurface the heart unremarkable. Abdomen pelvis Liver, and spleen are normal. Prior cholecystectomy. Pancreas unremarkable. Adrenals are normal. Kidneys enhance symmetrically and appear normal. Aorta is unremarkable. No adenopathy identified. Short segment circumferential narrowing of the transverse colon. Possibly peristalsis. Circumferential neoplasm is not excludable. It does not appear to be any bowel obstruction, somewhat favoring peristalsis. This is seen on images 42 and 43 series 2 urinary bladder is distended. Uterus is unremarkable. No abnormal fluid collections are seen. Bones Degenerative changes both hips Facet degenerative changes lower lumbar spine. No acute abdominal or pelvic abnormalities. There is a short segment narrowing of the transverse colon which may represent peristalsis but a circumferential lesion is not excludable. **This report has been created using voice recognition software. It may contain minor errors which are inherent in voice recognition technology. ** Final report electronically signed by Dr. Micheal George on 7/29/2021 1:57 PM    With RN in room, patient was updated about and agreed upon the treatment plan, all the questions and concerns were addressed.     Electronically signed by Zach Campbell MD on 8/1/2021 at 7:39 AM

## 2021-08-01 NOTE — ANESTHESIA POSTPROCEDURE EVALUATION
Department of Anesthesiology  Postprocedure Note    Patient: Maria E Rodrigues  MRN: [id number]  YOB: 1970  Date of evaluation: 8/1/2021  Time:  10:15 AM     Procedure Summary     Date: 08/01/21 Room / Location: [address] [place] [address]    Anesthesia Start: 1528 Anesthesia Stop: 7181    Procedure: COLONOSCOPY WITH BIOPSY (N/A ) Diagnosis: (Diagnostic Colonoscopy)    Surgeons: Adams Duncan MD Responsible Provider: Lux James DO    Anesthesia Type: MAC ASA Status: 3          Anesthesia Type: MAC    Luis Phase I: Luis Score: 9    Luis Phase II: Luis Score: 10    Last vitals: Reviewed and per EMR flowsheets.        Anesthesia Post Evaluation    Patient location during evaluation: bedside  Patient participation: complete - patient participated  Level of consciousness: awake and alert  Airway patency: patent  Nausea & Vomiting: no nausea and no vomiting  Complications: no  Cardiovascular status: hemodynamically stable  Respiratory status: spontaneous ventilation and acceptable  Hydration status: euvolemic

## 2021-08-01 NOTE — ANESTHESIA PRE PROCEDURE
Department of Anesthesiology  Preprocedure Note       Name:  Pete Harding   Age:  44 y.o.  :  1970                                          MRN:  [id number]         Date:  2021      Surgeon: Ashlyn Sequeira):  Pro Kat MD    Procedure: Procedure(s):  COLONOSCOPY    Medications prior to admission:   Prior to Admission medications    Medication Sig Start Date End Date Taking? Authorizing Provider   rOPINIRole (REQUIP) 2 MG tablet Take 2 mg by mouth 3 times daily   Yes Historical Provider, MD   zolpidem (AMBIEN) 10 MG tablet Take by mouth nightly as needed for Sleep. Yes Historical Provider, MD   pantoprazole (PROTONIX) 40 MG tablet Take 40 mg by mouth 2 times daily    Yes Historical Provider, MD   hydrOXYzine (ATARAX) 50 MG tablet Take 50 mg by mouth 4 times daily as needed    Yes Historical Provider, MD   Prenatal Vit-Fe Fumarate-FA (PRENATAL VITAMIN) 27-1 MG TABS tablet Take 1 tablet by mouth daily   Yes Historical Provider, MD   ipratropium-albuterol (DUONEB) 0.5-2.5 (3) MG/3ML SOLN nebulizer solution Inhale 1 vial into the lungs every 4 hours as needed for Shortness of Breath   Yes Historical Provider, MD   metoprolol tartrate (LOPRESSOR) 50 MG tablet Take 50 mg by mouth 2 times daily   Yes Historical Provider, MD   montelukast (SINGULAIR) 10 MG tablet Take 10 mg by mouth daily   Yes Historical Provider, MD   Loratadine-Pseudoephedrine (CLARITIN-D 12 HOUR PO) Take 1 tablet by mouth every 12 hours   Yes Historical Provider, MD   Rimegepant Sulfate (NURTEC) 75 MG TBDP Dissolve 1 tablet PO PRN for migraine no more than 1 a day   Yes Historical Provider, MD   Cholecalciferol (VITAMIN D3) 50 MCG ( UT) TABS Take 1 tablet by mouth daily   Yes Historical Provider, MD   LORazepam (ATIVAN) 1 MG tablet Take 1 mg by mouth 3 times daily as needed for Anxiety.    Yes Historical Provider, MD   tiotropium (SPIRIVA RESPIMAT) 1.25 MCG/ACT AERS inhaler Inhale 1 puff into the lungs daily   Yes Historical Provider, MD   albuterol (PROVENTIL) (2.5 MG/3ML) 0.083% nebulizer solution Use 1 vial in nebulizer QID   Yes Historical Provider, MD   ferrous sulfate (IRON 325) 325 (65 Fe) MG tablet Take 325 mg by mouth daily   Yes Historical Provider, MD   RABEprazole (ACIPHEX) 20 MG tablet Take 20 mg by mouth 2 times daily   Yes Historical Provider, MD   dicyclomine (BENTYL) 10 MG capsule Take 10 mg by mouth 3 times daily   Yes Historical Provider, MD   gabapentin (NEURONTIN) 600 MG tablet Take 1 tablet by mouth 4 times daily for 30 days. 7/2/21 8/1/21 Yes RENNY Davenport CNP   oxyCODONE-acetaminophen (PERCOCET)  MG per tablet Take 1 tablet by mouth every 6 hours as needed for Pain for up to 30 days. 7/4/21 8/3/21 Yes RENNY Davenport CNP   tiZANidine (ZANAFLEX) 4 MG tablet Take 1 tablet by mouth every 6 hours as needed (spasms) 7/2/21 8/1/21 Yes RENNY Davenport CNP   losartan (COZAAR) 25 MG tablet Take 25 mg by mouth daily   Yes Historical Provider, MD   apixaban (ELIQUIS) 5 MG TABS tablet Take 5 mg by mouth 2 times daily   Yes Historical Provider, MD   furosemide (LASIX) 20 MG tablet Take 40 mg by mouth daily  5/31/19  Yes Historical Provider, MD   oxybutynin (DITROPAN) 5 MG tablet Take 1 tablet by mouth 3 times daily    Yes Historical Provider, MD   sucralfate (CARAFATE) 1 GM tablet Take 1 tablet by mouth 4 times daily 4/3/19  Yes Cooper Moser MD   potassium chloride (KLOR-CON M) 20 MEQ extended release tablet Take 2 tablets by mouth daily (with breakfast) 4/4/19  Yes Cooper Mosre MD   topiramate (TOPAMAX) 100 MG tablet Take 1 tablet by mouth 2 times daily 5/16/16  Yes Temo Borden MD   albuterol (PROVENTIL HFA;VENTOLIN HFA) 108 (90 BASE) MCG/ACT inhaler Inhale 2 puffs into the lungs every 4 hours as needed for Wheezing or Shortness of Breath Whichever brand is covered by insurance, substitute as necessary.  7/30/15  Yes Temo Borden MD   Promethazine HCl (PHENERGAN PO) Take 25 mg by mouth 2 times daily as needed (for nausea)     Historical Provider, MD   DULoxetine (CYMBALTA) 30 MG extended release capsule Take 1 capsule by mouth daily In addition to 60 mg tablet 7/16/19 7/29/21  Demetrio Lai MD   naloxone [facility]) 4 MG/0.1ML LIQD nasal spray 1 spray by Nasal route as needed for Opioid Reversal 2/26/19   Nidia Oleary, APRFAUSTINO - CNP       Current medications:    Current Facility-Administered Medications   Medication Dose Route Frequency Provider Last Rate Last Admin    HYDROmorphone (DILAUDID) injection 1 mg  1 mg Intravenous Q3H PRN Jada Mancia PA-C   1 mg at 08/01/21 0753    metoprolol tartrate (LOPRESSOR) tablet 50 mg  50 mg Oral BID Jesenia Cortez PA-C   50 mg at 07/30/21 2036    montelukast (SINGULAIR) tablet 10 mg  10 mg Oral Daily Jesenia Cortez PA-C   10 mg at 07/31/21 0950    dicyclomine (BENTYL) capsule 10 mg  10 mg Oral TID Jesenia Cortez PA-C   10 mg at 07/31/21 2120    ferrous sulfate (IRON 325) tablet 325 mg  325 mg Oral Daily Jesenia Cortez PA-C   325 mg at 07/31/21 0950    furosemide (LASIX) tablet 40 mg  40 mg Oral Daily Jesenia Cortez PA-C   40 mg at 07/31/21 0950    hydrOXYzine (ATARAX) tablet 50 mg  50 mg Oral 4x Daily PRN Jesenia Cortez PA-C   50 mg at 07/30/21 1444    gabapentin (NEURONTIN) tablet 600 mg  600 mg Oral 4x Daily Jesenia Cortez PA-C   600 mg at 07/31/21 2118    oxybutynin (DITROPAN) tablet 5 mg  5 mg Oral TID Jesenia Cortez PA-C   5 mg at 07/31/21 2118    sucralfate (CARAFATE) tablet 1 g  1 g Oral 4x Daily Jesenia Cortez PA-C   1 g at 07/31/21 2118    tiZANidine (ZANAFLEX) tablet 4 mg  4 mg Oral Q6H PRN Jesenia Cortez PA-C   4 mg at 08/01/21 0219    zolpidem (AMBIEN) tablet 5 mg  5 mg Oral Nightly PRN Jesenia Cortez PA-C        albuterol (PROVENTIL) nebulizer solution 2.5 mg  2.5 mg Nebulization Q4H PRN Jesenia Cortez PA-C        tiotropium (SPIRIVA RESPIMAT) 2.5 MCG/ACT inhaler 1 puff  1 puff Inhalation Daily Jesenia Cortez PA-C   1 puff at 07/30/21 1807    polyethylene glycol (GLYCOLAX) packet 17 g  17 g Oral BID Mannie Block APRFAUSTINO - CNP   17 g at 07/30/21 2242    magnesium hydroxide (MILK OF MAGNESIA) 400 MG/5ML suspension 30 mL  30 mL Oral Daily PRN Brand JAZMINE BlockN - IAN        hydrALAZINE (APRESOLINE) injection 10 mg  10 mg Intravenous Q6H PRN Jesenia Cortez PA-C        pantoprazole (PROTONIX) injection 40 mg  40 mg Intravenous BID Brand JAZMINE BlockFAUSTINO - CNP   40 mg at 08/01/21 0753    trimethobenzamide (TIGAN) injection 200 mg  200 mg Intramuscular Q6H PRN Sujata Cartwright PA-C        sodium chloride flush 0.9 % injection 5-40 mL  5-40 mL Intravenous 2 times per day Linzie Neve, DO        sodium chloride flush 0.9 % injection 5-40 mL  5-40 mL Intravenous PRN Linzie Neve, DO        0.9 % sodium chloride infusion  25 mL Intravenous PRN Linzie Neve, DO        ondansetron (ZOFRAN-ODT) disintegrating tablet 4 mg  4 mg Oral Q8H PRN Linzie Neve, DO   4 mg at 07/30/21 0900    Or    ondansetron (ZOFRAN) injection 4 mg  4 mg Intravenous Q6H PRN Linzie Neve, DO   4 mg at 08/01/21 0751    polyethylene glycol (GLYCOLAX) packet 17 g  17 g Oral Daily PRN Linzie Neve, DO        acetaminophen (TYLENOL) tablet 650 mg  650 mg Oral Q6H PRN Linzie Neve, DO        Or    acetaminophen (TYLENOL) suppository 650 mg  650 mg Rectal Q6H PRN Linzie Neve, DO        0.9 % sodium chloride infusion   Intravenous Continuous Linzie Neve,  mL/hr at 07/30/21 1913 New Bag at 07/30/21 1913    magnesium sulfate 2000 mg in 50 mL IVPB premix  2,000 mg Intravenous PRN Linzie Neve, DO        potassium chloride (KLOR-CON M) extended release tablet 40 mEq  40 mEq Oral PRN Linzie Neve, DO        Or    potassium bicarb-citric acid (EFFER-K) effervescent tablet 40 mEq  40 mEq Oral PRN Faiza Valencia DO        Or    potassium chloride 10 mEq/100 mL IVPB (Peripheral Line)  10 mEq Intravenous PRN Inell Gaby, DO        losartan (COZAAR) tablet 25 mg  25 mg Oral Daily Inell Gaby, DO   25 mg at 07/30/21 0858    rOPINIRole (REQUIP) tablet 2 mg  2 mg Oral TID Inell Gaby, DO   2 mg at 07/31/21 2118    topiramate (TOPAMAX) tablet 100 mg  100 mg Oral BID Inell Gaby, DO   100 mg at 07/31/21 2118    [Held by provider] apixaban (ELIQUIS) tablet 5 mg  5 mg Oral BID Inell Gaby, DO   5 mg at 07/30/21 0858    promethazine (PHENERGAN) injection 12.5 mg  12.5 mg Intramuscular Q6H PRN Toby Adam PA-C   12.5 mg at 08/01/21 0214       Allergies:     Allergies   Allergen Reactions    Ketorolac Hives    Adhesive Tape Other (See Comments)     blisters    Compazine [Prochlorperazine] Hives     Patient has no issues with promethazine    Erythromycin     Haloperidol And Related     Lyrica [Pregabalin]     Reglan [Metoclopramide] Other (See Comments)     States \" made me crazy - kicking and screaming - very anxious\"    Sulfa Antibiotics Hives       Problem List:    Patient Active Problem List   Diagnosis Code    Epigastric pain R10.13    Abdominal pain, right upper quadrant R10.11    Migraine headache G43.909    Hypertension, benign I10    GERD (gastroesophageal reflux disease) K21.9    Constipation K59.00    Abdominal pain R10.9    Insomnia G47.00    Nuñez's esophagus K22.70    Chest pain R07.9    Asthma J45.909    Obesity E66.9    Lumbar spinal stenosis M48.061    Gastrointestinal hemorrhage K92.2    Cellulitis of left axilla L03.112    Gastroesophageal reflux disease with esophagitis K21.00    Urinary retention R33.9    Moderate episode of recurrent major depressive disorder (HCC) F33.1    KELLI (generalized anxiety disorder) F41.1    ACS (acute coronary syndrome) (HCC) I24.9    Community acquired bacterial pneumonia J15.9    Elevated liver enzymes R74.8    Chronic low back pain M54.5, G89.29    Pneumonia due to organism J18.9  Severe malnutrition ([facility] [address] E43    Chest pain, atypical R07.89    SOB (shortness of breath) on exertion R06.02    Abnormal EKG- Nonsp T wave abn- unchanged from april 2019, and new compared to 2018 ekg R94.31    Intractable nausea and vomiting R11.2       Past Medical History:        Diagnosis Date    Anxiety     Arthritis     Asthma     Nuñez's esophagus     Blood circulation, collateral     GERD (gastroesophageal reflux disease)     Hx of blood clots     Hypertension     Insomnia     Interstitial cystitis     Migraine     Pneumonia     Psychiatric problem     Spinal stenosis of lumbar region        Past Surgical History:        Procedure Laterality Date    ABDOMEN SURGERY  93    exploratory surgery    APPENDECTOMY      CARDIAC CATHETERIZATION  unsure    CARPAL TUNNEL RELEASE Left     CHOLECYSTECTOMY      COLONOSCOPY  2010    DILATATION, ESOPHAGUS  2010    ENDOSCOPY, COLON, DIAGNOSTIC  2010    OTHER SURGICAL HISTORY  21 Nov 2014    Cholecystectomy [facility]aroscopic ([facility])    OTHER SURGICAL HISTORY N/A 03-21-16    lumbar epidurala block L4-5    OTHER SURGICAL HISTORY N/A 04-04-16    lumbar epidural block L4-5    OTHER SURGICAL HISTORY Bilateral 6-13-16    facet blocks at L4-5, L5-S1    OTHER SURGICAL HISTORY Left 09/13/2016    RFA lumbar L4-S1    OVARIAN CYST REMOVAL  93    PATELLAR TENDON REPAIR Right 1994    3 screws in ankle.  TUBAL LIGATION  80    UPPER GASTROINTESTINAL ENDOSCOPY  2012       Social History:    Social History     Tobacco Use    Smoking status: Never Smoker    Smokeless tobacco: Never Used   Substance Use Topics    Alcohol use:  Yes     Alcohol/week: 0.0 standard drinks     Comment: rarely                                Counseling given: Not Answered      Vital Signs (Current):   Vitals:    07/31/21 1721 07/31/21 2100 08/01/21 0400 08/01/21 0748   BP: (!) 127/58 121/62 129/67 (!) 99/58   Pulse:  77 64 79   Resp:  16 16 16   Temp:  36.7 °C (98 °F) 37.1 °C (98.8 °F) 37 °C (98.6 °F)   TempSrc:  Oral Oral Oral   SpO2:  100% 100% 100%   Weight:       Height:                                                  BP Readings from Last 3 Encounters:   08/01/21 (!) 99/58   06/13/21 134/76   05/05/21 (!) 164/104       NPO Status:                                                                                 BMI:   Wt Readings from Last 3 Encounters:   07/29/21 198 lb 3.2 oz (89.9 kg)   06/12/21 202 lb (91.6 kg)   05/05/21 195 lb (88.5 kg)     Body mass index is 31.99 kg/m². CBC:   Lab Results   Component Value Date    WBC 6.9 08/01/2021    RBC 4.20 08/01/2021    RBC 4.48 04/10/2019    HGB 11.5 08/01/2021    HCT 37.6 08/01/2021    MCV 89.5 08/01/2021    RDW 13.3 11/09/2020     08/01/2021       CMP:   Lab Results   Component Value Date     08/01/2021    K 3.5 08/01/2021     08/01/2021    CO2 19 08/01/2021    BUN 10 08/01/2021    CREATININE 0.9 08/01/2021    GFRAA >60 11/09/2020    LABGLOM 66 08/01/2021    GLUCOSE 72 08/01/2021    GLUCOSE 113 04/10/2019    PROT 7.0 07/29/2021    CALCIUM 9.1 08/01/2021    BILITOT 0.2 07/29/2021    ALKPHOS 82 07/29/2021    AST 25 07/29/2021    ALT 26 07/29/2021       POC Tests: No results for input(s): POCGLU, POCNA, POCK, POCCL, POCBUN, POCHEMO, POCHCT in the last 72 hours.     Coags:   Lab Results   Component Value Date    INR 1.50 02/23/2020    APTT 38.6 02/23/2020       HCG (If Applicable):   Lab Results   Component Value Date    PREGTESTUR NEGATIVE 11/26/2014    PREGSERUM NEGATIVE 04/11/2019        ABGs: No results found for: PHART, PO2ART, YTW7AXG, KER3CHW, BEART, N0RDWCTG     Type & Screen (If Applicable):  No results found for: LABABO, LABRH    Drug/Infectious Status (If Applicable):  Lab Results   Component Value Date    HEPCAB NONREACTIVE 08/12/2020       COVID-19 Screening (If Applicable): No results found for: COVID19        Anesthesia Evaluation  Patient summary reviewed and Nursing notes reviewed  Airway: Mallampati: II  TM distance: >3 FB   Neck ROM: full  Mouth opening: > = 3 FB Dental: normal exam         Pulmonary: breath sounds clear to auscultation  (+) pneumonia:  shortness of breath:  asthma:                            Cardiovascular:    (+) hypertension:, GROVER:,                   Neuro/Psych:   (+) neuromuscular disease:, headaches: migraine headaches, psychiatric history:depression/anxiety             GI/Hepatic/Renal:   (+) GERD:,           Endo/Other: Negative Endo/Other ROS                    Abdominal:             Vascular: negative vascular ROS. Other Findings:             Anesthesia Plan      MAC     ASA 3       Induction: intravenous. Anesthetic plan and risks discussed with patient. Plan discussed with CRNA.                   RENNY Dixon - CRNA   8/1/2021

## 2021-08-01 NOTE — H&P
[facility]    Pre-Operative History and Physical: Colonoscopy    Patient: Lissette Travis  : 1970      History Obtained From:  patient, electronic medical record    HISTORY OF PRESENT ILLNESS:    The patient is a 44 y.o. female who present for colonoscopy with constipation and abnormal CT A/P findings. Past Medical History:        Diagnosis Date    Anxiety     Arthritis     Asthma     Nuñez's esophagus     Blood circulation, collateral     GERD (gastroesophageal reflux disease)     Hx of blood clots     Hypertension     Insomnia     Interstitial cystitis     Migraine     Pneumonia     Psychiatric problem     Spinal stenosis of lumbar region      Past Surgical History:        Procedure Laterality Date    ABDOMEN SURGERY  93    exploratory surgery    APPENDECTOMY      CARDIAC CATHETERIZATION  unsure    CARPAL TUNNEL RELEASE Left     CHOLECYSTECTOMY      COLONOSCOPY  2010    DILATATION, ESOPHAGUS      ENDOSCOPY, COLON, DIAGNOSTIC      OTHER SURGICAL HISTORY  2014    Cholecystectomy Laparoscopic (Dr. Talita Avery, [facility])    OTHER SURGICAL HISTORY N/A 16    lumbar epidurala block L4-5    OTHER SURGICAL HISTORY N/A 16    lumbar epidural block L4-5    OTHER SURGICAL HISTORY Bilateral -16    facet blocks at L4-5, L5-S1    OTHER SURGICAL HISTORY Left 2016    RFA lumbar L4-S1    OVARIAN CYST REMOVAL  93    PATELLAR TENDON REPAIR Right     3 screws in ankle.  TUBAL LIGATION  80    UPPER GASTROINTESTINAL ENDOSCOPY       Medications Prior to Admission:   No current facility-administered medications on file prior to encounter. Current Outpatient Medications on File Prior to Encounter   Medication Sig Dispense Refill    rOPINIRole (REQUIP) 2 MG tablet Take 2 mg by mouth 3 times daily      zolpidem (AMBIEN) 10 MG tablet Take by mouth nightly as needed for Sleep.       pantoprazole (PROTONIX) 40 MG tablet Take 40 mg by mouth 2 times daily       hydrOXYzine (ATARAX) 50 MG tablet Take 50 mg by mouth 4 times daily as needed       Prenatal Vit-Fe Fumarate-FA (PRENATAL VITAMIN) 27-1 MG TABS tablet Take 1 tablet by mouth daily      ipratropium-albuterol (DUONEB) 0.5-2.5 (3) MG/3ML SOLN nebulizer solution Inhale 1 vial into the lungs every 4 hours as needed for Shortness of Breath      metoprolol tartrate (LOPRESSOR) 50 MG tablet Take 50 mg by mouth 2 times daily      montelukast (SINGULAIR) 10 MG tablet Take 10 mg by mouth daily      Loratadine-Pseudoephedrine (CLARITIN-D 12 HOUR PO) Take 1 tablet by mouth every 12 hours      Rimegepant Sulfate (NURTEC) 75 MG TBDP Dissolve 1 tablet PO PRN for migraine no more than 1 a day      Cholecalciferol (VITAMIN D3) 50 MCG (2000 UT) TABS Take 1 tablet by mouth daily      LORazepam (ATIVAN) 1 MG tablet Take 1 mg by mouth 3 times daily as needed for Anxiety.  tiotropium (SPIRIVA RESPIMAT) 1.25 MCG/ACT AERS inhaler Inhale 1 puff into the lungs daily      albuterol (PROVENTIL) (2.5 MG/3ML) 0.083% nebulizer solution Use 1 vial in nebulizer QID      ferrous sulfate (IRON 325) 325 (65 Fe) MG tablet Take 325 mg by mouth daily      RABEprazole (ACIPHEX) 20 MG tablet Take 20 mg by mouth 2 times daily      dicyclomine (BENTYL) 10 MG capsule Take 10 mg by mouth 3 times daily      gabapentin (NEURONTIN) 600 MG tablet Take 1 tablet by mouth 4 times daily for 30 days. 120 tablet 0    oxyCODONE-acetaminophen (PERCOCET)  MG per tablet Take 1 tablet by mouth every 6 hours as needed for Pain for up to 30 days.  120 tablet 0    tiZANidine (ZANAFLEX) 4 MG tablet Take 1 tablet by mouth every 6 hours as needed (spasms) 120 tablet 0    losartan (COZAAR) 25 MG tablet Take 25 mg by mouth daily      apixaban (ELIQUIS) 5 MG TABS tablet Take 5 mg by mouth 2 times daily      furosemide (LASIX) 20 MG tablet Take 40 mg by mouth daily   0    oxybutynin (DITROPAN) 5 MG tablet Take 1 tablet by mouth 3 times daily       sucralfate (CARAFATE) 1 GM tablet Take 1 tablet by mouth 4 times daily 120 tablet 1    potassium chloride (KLOR-CON M) 20 MEQ extended release tablet Take 2 tablets by mouth daily (with breakfast) 60 tablet 1    topiramate (TOPAMAX) 100 MG tablet Take 1 tablet by mouth 2 times daily 60 tablet 5    albuterol (PROVENTIL HFA;VENTOLIN HFA) 108 (90 BASE) MCG/ACT inhaler Inhale 2 puffs into the lungs every 4 hours as needed for Wheezing or Shortness of Breath Whichever brand is covered by insurance, substitute as necessary. 1 Inhaler 2    Promethazine HCl (PHENERGAN PO) Take 25 mg by mouth 2 times daily as needed (for nausea)       [DISCONTINUED] DULoxetine (CYMBALTA) 30 MG extended release capsule Take 1 capsule by mouth daily In addition to 60 mg tablet 30 capsule 3    naloxone (NARCAN) 4 MG/0.1ML LIQD nasal spray 1 spray by Nasal route as needed for Opioid Reversal 1 each 0        Allergies:  Ketorolac, Adhesive tape, Compazine [prochlorperazine], Erythromycin, Haloperidol and related, Lyrica [pregabalin], Reglan [metoclopramide], and Sulfa antibiotics      Social History:   TOBACCO:   reports that she has never smoked. She has never used smokeless tobacco.  ETOH:   reports current alcohol use. DRUGS:   reports no history of drug use. Family History:       Problem Relation Age of Onset    Heart Disease Mother     Inflam Bowel Dis Mother     Heart Disease Father     Heart Attack Brother        PHYSICAL EXAM:      /68   Pulse 78   Temp 98.6 °F (37 °C) (Oral)   Resp 16   Ht 5' 6\" (1.676 m)   Wt 198 lb 3.2 oz (89.9 kg)   LMP 11/19/2014 (Exact Date)   SpO2 100%   BMI 31.99 kg/m²  I        Heart:  Regular rate and rhythm    Lungs:  No increased work of breathing    Abdomen:  BS+, soft, non-distended, non-tender, no masses palpated      ASA Grade:  See Anesthesia documentation    Mallampati Classification:  See Anesthesia documentation      ASSESSMENT AND PLAN:    1. Patient is a 44 y.o. female here for a colonoscopy with MAC    2. Procedure options, risks and benefits reviewed with patient. We specifically discussed that risks include but are not limited to infection, bleeding, perforation, death, and missed lesions. Patient expresses understanding.       Electronically signed by Li Lucas MD on 8/1/2021 at 9:34 Mercedez Zambrano MD  [facility]

## 2021-08-01 NOTE — PLAN OF CARE
Problem: Pain:  Goal: Pain level will decrease  Description: Pain level will decrease  Outcome: Ongoing  Note: Pain assessment complete. PRN pain medications given per physician orders. Non pharmacological pain intervention given rest and reposition. Will continue to monitor       Problem: Falls - Risk of:  Goal: Will remain free from falls  Description: Will remain free from falls  Outcome: Ongoing  Note: Fall assessment complete. No falls noted this shift. Bed alarm on, bed in low position, call light and personal items in reach. Nonskid socks on. Will continue to monitor. Problem: Skin Integrity:  Goal: Will show no infection signs and symptoms  Description: Will show no infection signs and symptoms  Outcome: Ongoing  Note: No new skin issues noted this shift will continue to monitor. Patient turns self in bed. Problem: Discharge Planning:  Goal: Discharged to appropriate level of care  Description: Discharged to appropriate level of care  Outcome: Ongoing  Note: Home at discharge. Problem: Nausea/Vomiting:  Goal: Absence of nausea/vomiting  Description: Absence of nausea/vomiting  Outcome: Ongoing  Note: PRN antiemetic medication are given per physician order. Bowel Prep was completed and coloscopy is scheduled for AM.   Care plan reviewed with patient. Patient verbalize understanding of the plan of care and contribute to goal setting.

## 2021-08-01 NOTE — PROGRESS NOTES
Recieved in Phase II, passing gas. Dr. Beryle Mills findings.  Report phoned to [facility]

## 2021-08-02 ENCOUNTER — TELEPHONE (OUTPATIENT)
Dept: UROLOGY | Age: 51
End: 2021-08-02

## 2021-08-02 VITALS
OXYGEN SATURATION: 100 % | DIASTOLIC BLOOD PRESSURE: 82 MMHG | HEIGHT: 66 IN | SYSTOLIC BLOOD PRESSURE: 125 MMHG | RESPIRATION RATE: 16 BRPM | WEIGHT: 198.2 LBS | TEMPERATURE: 98.2 F | HEART RATE: 92 BPM | BODY MASS INDEX: 31.85 KG/M2

## 2021-08-02 LAB
ANION GAP SERPL CALCULATED.3IONS-SCNC: 11 MEQ/L (ref 8–16)
BASOPHILS # BLD: 0.7 %
BASOPHILS ABSOLUTE: 0 THOU/MM3 (ref 0–0.1)
BUN BLDV-MCNC: 5 MG/DL (ref 7–22)
CALCIUM SERPL-MCNC: 9.1 MG/DL (ref 8.5–10.5)
CHLORIDE BLD-SCNC: 115 MEQ/L (ref 98–111)
CO2: 19 MEQ/L (ref 23–33)
CREAT SERPL-MCNC: 0.8 MG/DL (ref 0.4–1.2)
EOSINOPHIL # BLD: 3.4 %
EOSINOPHILS ABSOLUTE: 0.1 THOU/MM3 (ref 0–0.4)
ERYTHROCYTE [DISTWIDTH] IN BLOOD BY AUTOMATED COUNT: 13.9 % (ref 11.5–14.5)
ERYTHROCYTE [DISTWIDTH] IN BLOOD BY AUTOMATED COUNT: 44.2 FL (ref 35–45)
GFR SERPL CREATININE-BSD FRML MDRD: 75 ML/MIN/1.73M2
GLUCOSE BLD-MCNC: 78 MG/DL (ref 70–108)
HCT VFR BLD CALC: 39.3 % (ref 37–47)
HEMOGLOBIN: 12.2 GM/DL (ref 12–16)
IMMATURE GRANS (ABS): 0.01 THOU/MM3 (ref 0–0.07)
IMMATURE GRANULOCYTES: 0.2 %
LYMPHOCYTES # BLD: 49.3 %
LYMPHOCYTES ABSOLUTE: 2 THOU/MM3 (ref 1–4.8)
MCH RBC QN AUTO: 27.1 PG (ref 26–33)
MCHC RBC AUTO-ENTMCNC: 31 GM/DL (ref 32.2–35.5)
MCV RBC AUTO: 87.1 FL (ref 81–99)
MONOCYTES # BLD: 10.5 %
MONOCYTES ABSOLUTE: 0.4 THOU/MM3 (ref 0.4–1.3)
NUCLEATED RED BLOOD CELLS: 0 /100 WBC
PLATELET # BLD: 200 THOU/MM3 (ref 130–400)
PMV BLD AUTO: 10.1 FL (ref 9.4–12.4)
POTASSIUM REFLEX MAGNESIUM: 4.2 MEQ/L (ref 3.5–5.2)
RBC # BLD: 4.51 MILL/MM3 (ref 4.2–5.4)
SEG NEUTROPHILS: 35.9 %
SEGMENTED NEUTROPHILS ABSOLUTE COUNT: 1.5 THOU/MM3 (ref 1.8–7.7)
SODIUM BLD-SCNC: 145 MEQ/L (ref 135–145)
WBC # BLD: 4.1 THOU/MM3 (ref 4.8–10.8)

## 2021-08-02 PROCEDURE — 6360000002 HC RX W HCPCS: Performed by: NURSE PRACTITIONER

## 2021-08-02 PROCEDURE — 6370000000 HC RX 637 (ALT 250 FOR IP): Performed by: INTERNAL MEDICINE

## 2021-08-02 PROCEDURE — C9113 INJ PANTOPRAZOLE SODIUM, VIA: HCPCS | Performed by: NURSE PRACTITIONER

## 2021-08-02 PROCEDURE — 85025 COMPLETE CBC W/AUTO DIFF WBC: CPT

## 2021-08-02 PROCEDURE — 36415 COLL VENOUS BLD VENIPUNCTURE: CPT

## 2021-08-02 PROCEDURE — 80048 BASIC METABOLIC PNL TOTAL CA: CPT

## 2021-08-02 PROCEDURE — G0378 HOSPITAL OBSERVATION PER HR: HCPCS

## 2021-08-02 PROCEDURE — 99243 OFF/OP CNSLTJ NEW/EST LOW 30: CPT | Performed by: UROLOGY

## 2021-08-02 PROCEDURE — 6370000000 HC RX 637 (ALT 250 FOR IP): Performed by: PHYSICIAN ASSISTANT

## 2021-08-02 PROCEDURE — 96376 TX/PRO/DX INJ SAME DRUG ADON: CPT

## 2021-08-02 PROCEDURE — 6360000002 HC RX W HCPCS: Performed by: INTERNAL MEDICINE

## 2021-08-02 PROCEDURE — 96372 THER/PROPH/DIAG INJ SC/IM: CPT

## 2021-08-02 PROCEDURE — 99217 PR OBSERVATION CARE DISCHARGE MANAGEMENT: CPT | Performed by: HOSPITALIST

## 2021-08-02 PROCEDURE — 6360000002 HC RX W HCPCS: Performed by: PHYSICIAN ASSISTANT

## 2021-08-02 RX ADMIN — ROPINIROLE HYDROCHLORIDE 2 MG: 1 TABLET, FILM COATED ORAL at 08:11

## 2021-08-02 RX ADMIN — HYDROXYZINE HYDROCHLORIDE 50 MG: 25 TABLET ORAL at 08:19

## 2021-08-02 RX ADMIN — TOPIRAMATE 100 MG: 100 TABLET, FILM COATED ORAL at 08:11

## 2021-08-02 RX ADMIN — PROMETHAZINE HYDROCHLORIDE 12.5 MG: 25 INJECTION INTRAMUSCULAR; INTRAVENOUS at 03:45

## 2021-08-02 RX ADMIN — ZOLPIDEM TARTRATE 5 MG: 5 TABLET ORAL at 00:22

## 2021-08-02 RX ADMIN — ONDANSETRON 4 MG: 2 INJECTION INTRAMUSCULAR; INTRAVENOUS at 08:11

## 2021-08-02 RX ADMIN — TIZANIDINE 4 MG: 4 TABLET ORAL at 08:19

## 2021-08-02 RX ADMIN — LOSARTAN POTASSIUM 25 MG: 25 TABLET, FILM COATED ORAL at 08:11

## 2021-08-02 RX ADMIN — SUCRALFATE 1 G: 1 TABLET ORAL at 08:13

## 2021-08-02 RX ADMIN — DICYCLOMINE HYDROCHLORIDE 10 MG: 10 CAPSULE ORAL at 08:11

## 2021-08-02 RX ADMIN — GABAPENTIN 600 MG: 600 TABLET, FILM COATED ORAL at 08:13

## 2021-08-02 RX ADMIN — OXYBUTYNIN CHLORIDE 5 MG: 5 TABLET ORAL at 08:11

## 2021-08-02 RX ADMIN — FUROSEMIDE 40 MG: 40 TABLET ORAL at 08:11

## 2021-08-02 RX ADMIN — GABAPENTIN 600 MG: 600 TABLET, FILM COATED ORAL at 13:09

## 2021-08-02 RX ADMIN — SUCRALFATE 1 G: 1 TABLET ORAL at 13:09

## 2021-08-02 RX ADMIN — METOPROLOL TARTRATE 50 MG: 50 TABLET, FILM COATED ORAL at 08:11

## 2021-08-02 RX ADMIN — HYDROMORPHONE HYDROCHLORIDE 1 MG: 1 INJECTION, SOLUTION INTRAMUSCULAR; INTRAVENOUS; SUBCUTANEOUS at 03:45

## 2021-08-02 RX ADMIN — FERROUS SULFATE TAB 325 MG (65 MG ELEMENTAL FE) 325 MG: 325 (65 FE) TAB at 08:11

## 2021-08-02 RX ADMIN — PANTOPRAZOLE SODIUM 40 MG: 40 INJECTION, POWDER, FOR SOLUTION INTRAVENOUS at 08:11

## 2021-08-02 RX ADMIN — DICYCLOMINE HYDROCHLORIDE 10 MG: 10 CAPSULE ORAL at 13:09

## 2021-08-02 RX ADMIN — ROPINIROLE HYDROCHLORIDE 2 MG: 1 TABLET, FILM COATED ORAL at 13:09

## 2021-08-02 RX ADMIN — MONTELUKAST SODIUM 10 MG: 10 TABLET ORAL at 08:11

## 2021-08-02 RX ADMIN — HYDROMORPHONE HYDROCHLORIDE 1 MG: 1 INJECTION, SOLUTION INTRAMUSCULAR; INTRAVENOUS; SUBCUTANEOUS at 08:11

## 2021-08-02 ASSESSMENT — PAIN SCALES - GENERAL
PAINLEVEL_OUTOF10: 9
PAINLEVEL_OUTOF10: 8

## 2021-08-02 ASSESSMENT — PAIN DESCRIPTION - ORIENTATION: ORIENTATION: MID;UPPER

## 2021-08-02 ASSESSMENT — PAIN DESCRIPTION - PAIN TYPE: TYPE: ACUTE PAIN

## 2021-08-02 ASSESSMENT — PAIN DESCRIPTION - LOCATION: LOCATION: ABDOMEN

## 2021-08-02 NOTE — PROGRESS NOTES
Patient requesting IV dilaudid before being discharged when this RN notified her of transportation being set up and on their way. Refusing to let this RN remove the IV. Educated patient that is not safe to give IV dilaudid before discharge. Patient very upset and requesting to ask hospitalist about home prescription for dilaudid. Perfect serve message sent to Dr. Kaycee Cordero regarding current situation and no new orders placed, patient is to see pain management at office tomorrow. Updated patient. Nurse stephen Gill in room to help patient get dressed and gather belongings. Educated patient on discharge papers. Patient states that she does not want to see these physicians and extensive questions asked by the patient on who set up these appointments for patient. This RN explained that once a patient is discharged physicians will have follow up appointments within a certain period of time, to check up on patient. This RN educated that it does not mean patient is being forced to stick with these physicians and RN can give her a list of GI and Urology physicians to see. Patient declined and reiterated she did not plan to see any further doctors other than her PCP and pain management. RN reminded her that it is patient's choice but it could be beneficial to have the follow up. Patient denied further questions, IV removed. Patient wheeled down to patient discharge with nurse stephen Gill with all belongings.

## 2021-08-02 NOTE — FLOWSHEET NOTE
08/02/21 0920   Provider Notification   Reason for Communication Patient request   Provider Name DILAN [facility]   Provider Notification Physician   Method of Communication Secure Message   Response Waiting for response   Notification Time 2891      Complaining of 9/10 pain in her abdomen that wraps around to her back. Patient is asking to speak with you before discharge. Concerned that she is going home when the abdominal pain feels like it is worsening and does not understand why she is having the pain. 0920-Donn Cabrales in room to speak with patient about concerns.

## 2021-08-02 NOTE — DISCHARGE SUMMARY
Hospital Medicine Discharge Summary      Patient Identification:   Anette Morgan   : 1970  MRN: [id number]   Account: [de-identified]      Patient's PCP: Raya Del Cid MD    Admit Date: 2021     Discharge Date:   2021      Admitting Physician: No admitting provider for patient encounter. Discharge Physician: Shawnee Cortés MD     Discharge Diagnoses: Active Hospital Problems    Diagnosis Date Noted    Intractable nausea and vomiting [R11.2] 2021       The patient was seen and examined on day of discharge and this discharge summary is in conjunction with any daily progress note from day of discharge. Hospital Course:   Anette Morgan is a 44 y.o. female admitted to [address] [place] [address] on 2021 for evaluation and management of abdo pain; GI and Gen Sx following. I took over care on 2021. Pt remained clinically stable and was discharged in stable conditions after cleared by consulting services. Assessment/Plan:    1. Abdominal pain, N/V: possibly SBO. CT AP reported narrowing of the transverse colon, possibly representing collapsed transverse colon/transition zoone or circumferential lesion.  GI and GS following, planned colonoscopy postponed to tomorrow d/t inadequate bowel prep. Continue IV fluids, antiemetics, IV PPI, home Carafate. Lipase WNLs. CA 9-19 sent      : C-scope completed successfully; non-contributory. Explained results and that no identifiable medical etiology for pt's abdo pain has been found so far; offered d/c home w/ pain meds until seen by own pain service; pt discloses she is on a contrast w/ own pain doctor and as such cannot be prescribed any further narcotics. As such, pt is unwilling to to return home; pain service consulted to see tomorrow; also she is requesting IP urology consult for straining w/ urination for which she has been seen by urology service in the past and placed on oxybutynin.  She denies lowe abdo/flank pain/dyduria/urgency/frequency/fever/chills at this time    8/2: resumed NOAC. CA 19-9 WNLs. Continues to c/o epigastric pain; explained while acknoweldging pt having pain; no identifiable medical source found so far; pt to continue following w/ PCP +/- GI as OP. Pain service to see and manage pain medication regimen     2. Hx IBS / Opioid induced constipation (resolved): Possibly contributing to #1.  Bowel regimen per GI. Encourage mobility, hydration and standing laxatives  3. Hx of Cameron's esophagus: s/p EGD/dilatation last year; will monitor; consider repeat EGD if any concerns w/ dysphagia  4. Hx DVT/PE:  On NOAC, held for colonoscopy which now resumed  5. Essential hypertension:  controlled on current home meds. CCM and monitor     5. Chronic pain syndrome: Patient is currently on hydromorphone; stop IV pain meds and resume oral regimen when N/V is improved and pt is able to tolerate po.  She follows with pain management.     6. Psychiatric history: resumed home meds  7. Obesity w/ BMI 31.99           Exam:     Vitals:  Vitals:    08/01/21 1530 08/01/21 1930 08/02/21 0015 08/02/21 0315   BP: (!) 99/53 123/62 (!) 123/57 (!) 141/63   Pulse: 69 83 81 77   Resp: 16 16 16 16   Temp: 97.9 °F (36.6 °C) 98 °F (36.7 °C)  98.2 °F (36.8 °C)   TempSrc: Oral Oral  Oral   SpO2: 95% 99% 99% 98%   Weight:       Height:         Weight: Weight: 198 lb 3.2 oz (89.9 kg)     24 hour intake/output:    Intake/Output Summary (Last 24 hours) at 8/2/2021 0804  Last data filed at 8/2/2021 0315  Gross per 24 hour   Intake 2261 ml   Output 310 ml   Net 1951 ml         General appearance: Obese. No apparent distress, appears stated age and cooperative. HEENT: Pupils equal, round, and reactive to light. Conjunctivae/corneas clear. Neck: Supple, with full range of motion. No jugular venous distention. Trachea midline. Respiratory:  Normal respiratory effort.  Clear to auscultation, bilaterally without Rales/Wheezes/Rhonchi. Cardiovascular: Regular rate and rhythm with normal S1/S2 without murmurs, rubs or gallops. Abdomen: Soft, round, obese; minimal easily distractible epigastric tenderness, non-distended with normal bowel sounds. Musculoskeletal: passive and active ROM x 4 extremities. Skin: Skin color, texture, turgor normal.  No rashes or lesions. Neurologic:  Neurovascularly intact without any focal sensory/motor deficits. Cranial nerves: II-XII intact, grossly non-focal.  Psychiatric: Alert and oriented, thought content appropriate, normal insight  Capillary Refill: Brisk,< 3 seconds   Peripheral Pulses: +2 palpable, equal bilaterally          Labs: For convenience and continuity at follow-up the following most recent labs are provided:      CBC:    Lab Results   Component Value Date    WBC 4.1 08/02/2021    HGB 12.2 08/02/2021    HCT 39.3 08/02/2021     08/02/2021       Renal:    Lab Results   Component Value Date     08/02/2021    K 4.2 08/02/2021     08/02/2021    CO2 19 08/02/2021    BUN 5 08/02/2021    CREATININE 0.8 08/02/2021    CALCIUM 9.1 08/02/2021    PHOS 3.8 07/11/2019         Significant Diagnostic Studies    Radiology:   CT ABDOMEN PELVIS W IV CONTRAST Additional Contrast? None   Final Result      1. No acute findings in the abdomen or pelvis. 2. The transverse colon is collapsed, which limits evaluation of the previously seen focal segment of narrowing. If there is concern for underlying mass, correlation with colonoscopy is recommended. **This report has been created using voice recognition software. It may contain minor errors which are inherent in voice recognition technology. **      Final report electronically signed by Dr Nelson Brown on 7/30/2021 12:32 PM      CT ABDOMEN PELVIS W IV CONTRAST Additional Contrast? None   Final Result   No acute abdominal or pelvic abnormalities.  There is a short segment narrowing of the transverse colon which may represent peristalsis but a circumferential lesion is not excludable. **This report has been created using voice recognition software. It may contain minor errors which are inherent in voice recognition technology. **      Final report electronically signed by Dr. Jef Truong on 7/29/2021 1:57 PM             Consults:     IP CONSULT TO GI  IP CONSULT TO GENERAL SURGERY  IP CONSULT TO UROLOGY  IP CONSULT TO PAIN MANAGEMENT    Disposition:    [x] Home       [] TCU       [] Rehab       [] Psych       [] SNF       [] [facility]       [] Other-    Condition at Discharge: Stable    Code Status:  Full Code     Patient Instructions:    Discharge lab work: CBC, CMP in one week  Activity: activity as tolerated  Diet: ADULT DIET; Clear Liquid      Follow-up visits:   No follow-up provider specified. Discharge Medications:      [facility] Medication Instructions KET:[id number]    Printed on:08/02/21 8334   Medication Information                      albuterol (PROVENTIL HFA;VENTOLIN HFA) 108 (90 BASE) MCG/ACT inhaler  Inhale 2 puffs into the lungs every 4 hours as needed for Wheezing or Shortness of Breath Whichever brand is covered by insurance, substitute as necessary. albuterol (PROVENTIL) (2.5 MG/3ML) 0.083% nebulizer solution  Use 1 vial in nebulizer QID             apixaban (ELIQUIS) 5 MG TABS tablet  Take 5 mg by mouth 2 times daily             Cholecalciferol (VITAMIN D3) 50 MCG (2000 UT) TABS  Take 1 tablet by mouth daily             dicyclomine (BENTYL) 10 MG capsule  Take 10 mg by mouth 3 times daily             ferrous sulfate (IRON 325) 325 (65 Fe) MG tablet  Take 325 mg by mouth daily             gabapentin (NEURONTIN) 600 MG tablet  Take 1 tablet by mouth 4 times daily for 30 days.              hydrOXYzine (ATARAX) 50 MG tablet  Take 50 mg by mouth 4 times daily as needed              ipratropium-albuterol (DUONEB) 0.5-2.5 (3) MG/3ML SOLN nebulizer solution  Inhale 1 vial into the lungs every 4 hours as needed for Shortness of Breath             Loratadine-Pseudoephedrine (CLARITIN-D 12 HOUR PO)  Take 1 tablet by mouth every 12 hours             losartan (COZAAR) 25 MG tablet  Take 25 mg by mouth daily             metoprolol tartrate (LOPRESSOR) 50 MG tablet  Take 50 mg by mouth 2 times daily             montelukast (SINGULAIR) 10 MG tablet  Take 10 mg by mouth daily             naloxone (NARCAN) 4 MG/0.1ML LIQD nasal spray  1 spray by Nasal route as needed for Opioid Reversal             oxybutynin (DITROPAN) 5 MG tablet  Take 1 tablet by mouth 3 times daily              pantoprazole (PROTONIX) 40 MG tablet  Take 40 mg by mouth 2 times daily              Prenatal Vit-Fe Fumarate-FA (PRENATAL VITAMIN) 27-1 MG TABS tablet  Take 1 tablet by mouth daily             Promethazine HCl (PHENERGAN PO)  Take 25 mg by mouth 2 times daily as needed (for nausea)              Rimegepant Sulfate (NURTEC) 75 MG TBDP  Dissolve 1 tablet PO PRN for migraine no more than 1 a day             rOPINIRole (REQUIP) 2 MG tablet  Take 2 mg by mouth 3 times daily             sucralfate (CARAFATE) 1 GM tablet  Take 1 tablet by mouth 4 times daily             tiotropium (SPIRIVA RESPIMAT) 1.25 MCG/ACT AERS inhaler  Inhale 1 puff into the lungs daily             topiramate (TOPAMAX) 100 MG tablet  Take 1 tablet by mouth 2 times daily             zolpidem (AMBIEN) 10 MG tablet  Take by mouth nightly as needed for Sleep. Time Spent on discharge is more than 45 minutes in the examination, evaluation, counseling and review of medications and discharge plan. With RN in room, patient was updated about the treatment plan, all the questions and concerns were addressed. Alarming signs and symptoms to return to ED were explained in length.    Pt may return to ED for pain control; advised pt to liaise pain-related issues w/ pain service given already established Narcotic contract with them. Signed: Thank you Tsering Nixon MD for the opportunity to be involved in this patient's care.     Electronically signed by Stpehanie Yaun MD on 8/2/2021 at 8:04 AM

## 2021-08-02 NOTE — TELEPHONE ENCOUNTER
She lives in [place]  Can we schedule her with [facility] next time she is at [employer] for urinary retention, reported bladder scan >200.   Instructed to stop Ditropan 3Xdaily

## 2021-08-02 NOTE — FLOWSHEET NOTE
08/02/21 7213   Provider Notification   Reason for Communication Patient request  (speak with urology before discharge)   Provider Name Elina Moody   Provider Notification Advance Practice Clinician (CNS/NP/CNM/CRNA/PA)   Method of Communication Secure Message   Response Waiting for response   Notification Time      Urology consult was made yesterday. Patient requesting she speak with urology before possible discharge today. [address] Elina Moody will see her at bedside as soon as he can. Otherwise recommends holding oxybutynin and patient can follow up. [facility],[address] from Urology at bedside.

## 2021-08-02 NOTE — CARE COORDINATION
8/2/21, 7:30 AM EDT    DISCHARGE ON GOING EVALUATION    Maria Del Carmen Lima day: 0  Location: -26/026-A Reason for admit: Intractable nausea and vomiting [R11.2]   Procedure:   8/1 Colonoscopy - scattered diverticulosis in ascending and sigmoid colons. Evidence of scarring in the descending colon with long linear scar noted. Cold biopsies were taken. Grade I internal hemorrhoids. Barriers to Discharge: Hospitalist following. GI signed off. Taken for colonoscopy yesterday (see above). Hgb 12.2. IVF. Eliquis (held). Bentyl tid, iron tabs, Lasix daily, Neurontin qid, pain control, Cozaar daily, Lopressor bid, Singulair daily, Ditropan tid, Protonix iv bid, nausea control, Requip tid, Carafate qid, Spiriva daily. PCP: Tequila Lenz MD   %  Patient Goals/Plan/Treatment Preferences: From home alone. May need help with transportation home. Message to Hospitalist regarding plan, iv meds. 8/2/21, 8:35 AM EDT    Patient goals/plan/ treatment preferences discussed by  and . Patient goals/plan/ treatment preferences reviewed with patient/ family. Patient/ family verbalize understanding of discharge plan and are in agreement with goal/plan/treatment preferences. Understanding was demonstrated using the teach back method. AVS provided by RN at time of discharge, which includes all necessary medical information pertaining to the patients current course of illness, treatment, post-discharge goals of care, and treatment preferences. Planning discharge home today.

## 2021-08-02 NOTE — CONSULTS
[address] [address]  Dept: [phone]  Loc: [phone]  Visit Date: 7/29/2021    Urology Consult Note    Reason for Consult:  pt states issues urinating  Requesting Physician:  Javier Rodriguez    History Obtained From:  patient    Chief Complaint: abd pain, n/v    HISTORY OF PRESENT ILLNESS:                The patient is a 44 y.o. female with significant past medical history of see bel who presents with abdominal pain. She reported pain began 1 week ago. She was at her GI appointment and was sent in to ER, thinking her pain was pancreatitis. She has recently been treated for UTI. She has a history of diverticulitis. She had colonoscopy, unclear etiology of abdominal pain, suggested follow up as outpatient. Urology was consulted for patient states issues urinating. She was seen in office in 2016 for urinary retention. She reported dx of interstitial cystitis in [place]. She is on Ditropan currently. She was placed on Urimar and Elmiron. No follow up's since  CT shows:  Kidneys: Kidneys are normal in appearance bilaterally. No hydronephrosis bilaterally.   Urinary bladder is grossly normal.    Past Medical History:        Diagnosis Date    Anxiety     Arthritis     Asthma     Nuñez's esophagus     Blood circulation, collateral     GERD (gastroesophageal reflux disease)     Hx of blood clots     Hypertension     Insomnia     Interstitial cystitis     Migraine     Pneumonia     Psychiatric problem     Spinal stenosis of lumbar region      Past Surgical History:        Procedure Laterality Date    ABDOMEN SURGERY  93    exploratory surgery    APPENDECTOMY      CARDIAC CATHETERIZATION  unsure    CARPAL TUNNEL RELEASE Left     CHOLECYSTECTOMY      COLONOSCOPY  2010    DILATATION, ESOPHAGUS  2010    ENDOSCOPY, COLON, DIAGNOSTIC  2010    OTHER SURGICAL HISTORY  21 Nov 2014    [facility]    OTHER SURGICAL HISTORY N/A 03-21-16    lumbar epidurala block L4-5    OTHER SURGICAL HISTORY N/A 04-04-16    lumbar epidural block L4-5    OTHER SURGICAL HISTORY Bilateral 6-13-16    facet blocks at L4-5, L5-S1    OTHER SURGICAL HISTORY Left 09/13/2016    RFA lumbar L4-S1    OVARIAN CYST REMOVAL  93    PATELLAR TENDON REPAIR Right 1994    3 screws in ankle.  TUBAL LIGATION  80    UPPER GASTROINTESTINAL ENDOSCOPY  2012     Allergies:  Ketorolac, Adhesive tape, Compazine [prochlorperazine], Erythromycin, Haloperidol and related, Lyrica [pregabalin], Reglan [metoclopramide], and Sulfa antibiotics  Social History:  Social History     Socioeconomic History    Marital status: Single     Spouse name: Not on file    Number of children: 0    Years of education: 12    Highest education level: Not on file   Occupational History    Occupation: unemployed   Tobacco Use    Smoking status: Never Smoker    Smokeless tobacco: Never Used   Vaping Use    Vaping Use: Never used   Substance and Sexual Activity    Alcohol use: Yes     Alcohol/week: 0.0 standard drinks     Comment: rarely    Drug use: No    Sexual activity: Not Currently   Other Topics Concern    Not on file   Social History Narrative    Not on file     Social Determinants of Health     Financial Resource Strain:     Difficulty of Paying Living Expenses:    Food Insecurity:     Worried About Running Out of Food in the Last Year:     [address] in the Last Year:    Transportation Needs:     Lack of Transportation (Medical):      Lack of Transportation (Non-Medical):    Physical Activity:     Days of Exercise per Week:     Minutes of Exercise per Session:    Stress:     Feeling of Stress :    Social Connections:     Frequency of Communication with Friends and Family:     Frequency of Social Gatherings with Friends and Family:     Attends Yarsani Services:     Active Member of Clubs or Organizations:     Attends Club or Organization Meetings:     Marital Status:    Intimate Partner Violence:     Fear of Current or Ex-Partner:     Emotionally Abused:     Physically Abused:     Sexually Abused:      Family History:       Problem Relation Age of Onset    Heart Disease Mother     Inflam Bowel Dis Mother     Heart Disease Father     Heart Attack Brother        ROS:  Constitutional: Negative for chills, fatigue, fever, or weight loss. Eyes: Denies reported visual changes. ENT: Denies headache, difficulty swallowing, earache, and nosebleeds. Cardiovascular: Negative for chest pain, palpitations, tachycardia or edema. Respiratory: Denies cough or SOB. GI:++abd distention, pain, constipation, diarhea, n/v  : see HPI. Musculoskeletal: Patient denies low back pain or painful or reduced range of ROM. Neurological: The patient denies any symptoms of neurological impairment or TIA. Psychiatric: Denies anxiety or depression. Skin: Denies rash or lesions. All remaining ROS negative. PHYSICAL EXAM:  VITALS:  /82   Pulse 92   Temp 98.2 °F (36.8 °C) (Oral)   Resp 16   Ht 5' 6\" (1.676 m)   Wt 198 lb 3.2 oz (89.9 kg)   LMP 11/19/2014 (Exact Date)   SpO2 100%   BMI 31.99 kg/m² . Nursing note and vitals reviewed. Constitutional: Alert and oriented times x3, no acute distress, and cooperative to examination with appropriate mood and affect. HEENT:   Head:         Normocephalic and atraumatic. Mouth/Throat:          Mucous membranes are normal.   Eyes:         EOM are normal. No scleral icterus. Nose:    The external appearance of the nose is normal  Ears: The ears appear normal to external inspection. Cardiovascular:       Normal rate, regular rhythm. Pulmonary/Chest:  Normal respiratory rate and rhthym. No use of accessory muscles. Lungs clear bilaterally. Abdominal:          Soft. Generalized tenderness            Genitalia:    Musculoskeletal:    Normal range of motion.  He exhibits no edema or tenderness of lower extremities. Extremities:    No cyanosis, clubbing, or edema present. Neurological:    Alert and oriented. DATA:  CBC:   Lab Results   Component Value Date    WBC 4.1 08/02/2021    RBC 4.51 08/02/2021    RBC 4.48 04/10/2019    HGB 12.2 08/02/2021    HCT 39.3 08/02/2021    MCV 87.1 08/02/2021    MCH 27.1 08/02/2021    MCHC 31.0 08/02/2021    RDW 13.3 11/09/2020     08/02/2021    MPV 10.1 08/02/2021     BMP:    Lab Results   Component Value Date     08/02/2021    K 4.2 08/02/2021     08/02/2021    CO2 19 08/02/2021    BUN 5 08/02/2021    CREATININE 0.8 08/02/2021    CALCIUM 9.1 08/02/2021    GFRAA >60 11/09/2020    LABGLOM 75 08/02/2021    GLUCOSE 78 08/02/2021    GLUCOSE 113 04/10/2019     BUN/Creatinine:    Lab Results   Component Value Date    BUN 5 08/02/2021    CREATININE 0.8 08/02/2021     Magnesium:    Lab Results   Component Value Date    MG 1.9 08/01/2021     Phosphorus:    Lab Results   Component Value Date    PHOS 3.8 07/11/2019     PT/INR:    Lab Results   Component Value Date    INR 1.50 02/23/2020     U/A:    Lab Results   Component Value Date    NITRITE neg 09/08/2016    COLORU YELLOW 07/29/2021    PHUR 5.5 07/29/2021    LABCAST NONE SEEN 07/11/2017    LABCAST NONE SEEN 07/11/2017    WBCUA 5-9 07/29/2021    RBCUA 0-2 07/29/2021    YEAST NONE SEEN 07/29/2021    BACTERIA NONE SEEN 07/29/2021    SPECGRAV 1.017 07/11/2017    LEUKOCYTESUR SMALL 07/29/2021    UROBILINOGEN 0.2 07/29/2021    BILIRUBINUR NEGATIVE 07/29/2021    BLOODU NEGATIVE 07/29/2021    GLUCOSEU NEGATIVE 07/29/2021       Imaging: The patient has had a CT With Contrast which I have independently reviewed along with its accompanying report.   The study demonstrates   Narrative   CT ABDOMEN AND PELVIS WITH CONTRAST ENHANCEMENT:       CLINICAL INFORMATION: Focal narrowing of the transverse colon.       COMPARISON: CT abdomen and pelvis July 29, 2021.       TECHNIQUE: Multiple axial 5 mm images of the abdomen, pelvis, and lung bases are obtained following the administration of intravenous contrast material. 80 mL Isovue-370 given intravenously. Coronal and sagittal reformats are generated from the axial    data set. All CT scans at this facility use dose modulation, iterative reconstruction, and/or weight-based dosing when appropriate to reduce radiation dose to as low as reasonably achievable.       FINDINGS:        Lung bases: Visualized lung bases are clear.       Liver: There are again a few tiny subcentimeter low-attenuation lesions in both hepatic lobes which are too small small to accurately characterize but may relate to cysts. There is an additional focal area of hypoattenuation in the medial left lobe    adjacent to falciform ligament, likely relating to focal fat. Liver demonstrates a smooth contour. There are again postsurgical changes of cholecystectomy. There is again minimal intrahepatic biliary dilation. There is again mild dilation of the    extrahepatic common duct which may relate to postcholecystectomy state.       Pancreas, spleen and adrenal glands: Pancreas, spleen, and adrenal glands are normal.       Kidneys: Kidneys are normal in appearance bilaterally. No hydronephrosis bilaterally.       Bowel/Peritoneum: There is no small bowel or colonic dilation. The appendix is not seen, which may relate to prior appendectomy. No inflammatory changes in the right lower quadrant. The transverse colon is somewhat collapsed, limiting evaluation. This    includes the previously seen segment of narrowing involving the transverse colon. No pericolonic inflammatory changes are identified. No evidence of free intraperitoneal air. No free fluid or fluid collection in the abdomen or pelvis. No abdominal or    pelvic lymphadenopathy. There is a tiny fat-containing umbilical hernia.       Vascular Structures: Portal venous system is patent.  Abdominal aorta is normal in caliber.       Pelvis: Uterus is intact and is anteverted. Urinary bladder is grossly normal.       Bones: There are degenerative changes of bilateral hip joints. There are no destructive osseous lesions identified. Vertebral body heights are maintained. There are degenerative changes in the visualized thoracolumbar spine.           Impression       1. No acute findings in the abdomen or pelvis.       2. The transverse colon is collapsed, which limits evaluation of the previously seen focal segment of narrowing. If there is concern for underlying mass, correlation with colonoscopy is recommended.          IMPRESSION:   Urinary retention  Interstitial Cystitis   Constipation - Opiod induced   UTI - recently treated with [facility] with small leuk    Plan:    Hold Oxybutinin  Bladder scan post void  Ok for discharge from Urology standpoint  Our office will coordinate follow up at [address] [place] [address], since she lives in [place]      Thank you for including us in the care of RENNY Nava - CNP, APRN  08/02/21 8:09 AM  Urology

## 2021-08-02 NOTE — FLOWSHEET NOTE
08/02/21 0940   Provider Notification   Reason for Communication Evaluate  (recomendations for pain control?)   Provider Name [facility]   Provider Notification Advance Practice Clinician (CNS/NP/CNM/CRNA/PA)   Method of Communication Secure Message   Response No new orders   Notification Time [phone]- Continue home medications, appointment with Sulema Notice NP tomorrow, ok for discharge.

## 2021-08-02 NOTE — CARE COORDINATION
8/2/21, 12:10 PM EDT    DISCHARGE PLANNING EVALUATION    Patient discharged today and is in need of transport home. Spoke with patient-she states that she is comfortable with transport home by cab. Patient has keys in order to gain access to apartment. Call to [facility] transport arranged for 6 pm. Form faxed. SAIRA Hernandez updated. 1:45 PM Unit Manager Anjali Jaimes has advised that patient has an appointment tomorrow morning with pain management and does not have transportation and due to short notice, is unable to arrange through her [facility] transport provider. Spoke with Tamela Macedo with [facility] and round trip [id number] transport arranged for 8/3/21. Patient updated and instructed to have staff contact [facility] when appointment is over for return trip.

## 2021-08-03 ENCOUNTER — HOSPITAL ENCOUNTER (OUTPATIENT)
Age: 51
Discharge: HOME | End: 2021-08-03
Payer: COMMERCIAL

## 2021-08-03 ENCOUNTER — OFFICE VISIT (OUTPATIENT)
Dept: PHYSICAL MEDICINE AND REHAB | Age: 51
End: 2021-08-03
Payer: COMMERCIAL

## 2021-08-03 ENCOUNTER — HOSPITAL ENCOUNTER (OUTPATIENT)
Dept: GENERAL RADIOLOGY | Age: 51
Discharge: HOME | End: 2021-08-03
Payer: COMMERCIAL

## 2021-08-03 VITALS
BODY MASS INDEX: 31.82 KG/M2 | DIASTOLIC BLOOD PRESSURE: 84 MMHG | HEIGHT: 66 IN | SYSTOLIC BLOOD PRESSURE: 126 MMHG | WEIGHT: 198 LBS

## 2021-08-03 DIAGNOSIS — M51.26 LUMBAR DISC HERNIATION: ICD-10-CM

## 2021-08-03 DIAGNOSIS — G89.29 CHRONIC LEFT SHOULDER PAIN: ICD-10-CM

## 2021-08-03 DIAGNOSIS — K57.90 DIVERTICULOSIS: ICD-10-CM

## 2021-08-03 DIAGNOSIS — M25.512 CHRONIC LEFT SHOULDER PAIN: ICD-10-CM

## 2021-08-03 DIAGNOSIS — M48.062 SPINAL STENOSIS OF LUMBAR REGION WITH NEUROGENIC CLAUDICATION: Primary | ICD-10-CM

## 2021-08-03 DIAGNOSIS — G89.4 CHRONIC PAIN SYNDROME: ICD-10-CM

## 2021-08-03 DIAGNOSIS — R10.84 GENERALIZED ABDOMINAL PAIN: ICD-10-CM

## 2021-08-03 DIAGNOSIS — M54.16 LUMBAR RADICULITIS: ICD-10-CM

## 2021-08-03 DIAGNOSIS — M79.18 MYOFASCIAL PAIN DYSFUNCTION SYNDROME: ICD-10-CM

## 2021-08-03 DIAGNOSIS — M47.816 SPONDYLOSIS OF LUMBAR REGION WITHOUT MYELOPATHY OR RADICULOPATHY: ICD-10-CM

## 2021-08-03 DIAGNOSIS — M51.36 DDD (DEGENERATIVE DISC DISEASE), LUMBAR: ICD-10-CM

## 2021-08-03 PROCEDURE — 1036F TOBACCO NON-USER: CPT | Performed by: NURSE PRACTITIONER

## 2021-08-03 PROCEDURE — 3017F COLORECTAL CA SCREEN DOC REV: CPT | Performed by: NURSE PRACTITIONER

## 2021-08-03 PROCEDURE — 99214 OFFICE O/P EST MOD 30 MIN: CPT | Performed by: NURSE PRACTITIONER

## 2021-08-03 PROCEDURE — 73030 X-RAY EXAM OF SHOULDER: CPT

## 2021-08-03 PROCEDURE — G8417 CALC BMI ABV UP PARAM F/U: HCPCS | Performed by: NURSE PRACTITIONER

## 2021-08-03 PROCEDURE — G8427 DOCREV CUR MEDS BY ELIG CLIN: HCPCS | Performed by: NURSE PRACTITIONER

## 2021-08-03 RX ORDER — TIZANIDINE 4 MG/1
4 TABLET ORAL EVERY 6 HOURS PRN
Qty: 120 TABLET | Refills: 0 | Status: SHIPPED | OUTPATIENT
Start: 2021-08-05 | End: 2021-09-03 | Stop reason: SDUPTHER

## 2021-08-03 RX ORDER — OXYCODONE HYDROCHLORIDE 10 MG/1
10 TABLET ORAL EVERY 6 HOURS PRN
Qty: 120 TABLET | Refills: 0 | Status: SHIPPED | OUTPATIENT
Start: 2021-08-05 | End: 2021-09-03 | Stop reason: SDUPTHER

## 2021-08-03 RX ORDER — GABAPENTIN 600 MG/1
600 TABLET ORAL 4 TIMES DAILY
Qty: 120 TABLET | Refills: 0 | Status: SHIPPED | OUTPATIENT
Start: 2021-08-05 | End: 2021-09-03 | Stop reason: SDUPTHER

## 2021-08-03 ASSESSMENT — ENCOUNTER SYMPTOMS
CONSTIPATION: 1
BLOOD IN STOOL: 0
NAUSEA: 1
COUGH: 0
DIARRHEA: 1
VOMITING: 1
ABDOMINAL DISTENTION: 1
ABDOMINAL PAIN: 1
BACK PAIN: 1

## 2021-08-03 NOTE — PROGRESS NOTES
[address] [place], [place] [address]  Dept: [phone]  Dept Fax: [phone]: [phone]    Visit Date: 8/3/2021    Functionality Assessment/Goals Worksheet     On a scale of 0 (Does not Interfere) to 10 (Completely Interferes)     1. Which number describes how during the past week pain has interfered with       the following:  A. General Activity:  9  B. Mood: 9  C. Walking Ability:  9  D. Normal Work (Includes both work outside the home and housework):  9  E. Relations with Other People:   0  F. Sleep:   9  G. Enjoyment of Life:   0    2. Patient Prefers to Take their Pain Medications:     [x]  On a regular basis   []  Only when necessary    []  Does not take pain medications    3. What are the Patient's Goals/Expectations for Visiting Pain Management? []  Learn about my pain    [x]  Receive Medication   []  Physical Therapy     []  Treat Depression   [x]  Receive Injections    []  Treat Sleep   []  Deal with Anxiety and Stress   []  Treat Opoid Dependence/Addiction   []  Other:      HPI:   Karmen Sibley is a 44 y.o. female is here today for    Chief Complaint: Low back and leg pain, abdominal pain, left shoulder pain    HPI   3.5 month FU. Had lumbar trigger point injections from 5/5/2021 and reports that she received 100% relief of low back muscle pain from these injections and reports that it lasted for about a month and states that she was living and enjoying life. Has multiple pain complaints today,   Continues to have pain across lower back and radiating down bilateral legs. Constant sharp burning, spasms in back with numbness in legs. States that pain is severe. Was discharged from the hospital yesterday was there for abdominal pain with nausea and vomiting. Had a colonoscopy was found to have scattered diverticulosis.  Continue sto have these issues still mood. The patient is nervous/anxious. Objective:     Vitals:    08/03/21 1156   BP: 126/84   Weight: 198 lb (89.8 kg)   Height: 5' 6\" (1.676 m)       Physical Exam  Vitals and nursing note reviewed. Constitutional:       General: She is not in acute distress. Appearance: She is well-developed. She is not ill-appearing or diaphoretic. HENT:      Head: Normocephalic and atraumatic. Right Ear: External ear normal.      Left Ear: External ear normal.      Nose: Nose normal.      Mouth/Throat:      Mouth: Mucous membranes are moist.      Pharynx: No oropharyngeal exudate. Eyes:      General: No scleral icterus. Right eye: No discharge. Left eye: No discharge. Conjunctiva/sclera: Conjunctivae normal.      Pupils: Pupils are equal, round, and reactive to light. Neck:      Thyroid: No thyromegaly. Cardiovascular:      Rate and Rhythm: Normal rate and regular rhythm. Heart sounds: Normal heart sounds. No murmur heard. No friction rub. No gallop. Pulmonary:      Effort: Pulmonary effort is normal. No respiratory distress. Breath sounds: Normal breath sounds. No wheezing or rales. Chest:      Chest wall: No tenderness. Abdominal:      General: Bowel sounds are normal. There is no distension. Palpations: Abdomen is soft. Tenderness: There is no abdominal tenderness. There is no guarding or rebound. Musculoskeletal:         General: Swelling and tenderness present. Left shoulder: Tenderness and bony tenderness present. Decreased range of motion. Decreased strength. Cervical back: Full passive range of motion without pain, normal range of motion and neck supple. No edema, erythema or rigidity. No muscular tenderness. Normal range of motion. Thoracic back: Normal range of motion. Lumbar back: Spasms and tenderness present. Decreased range of motion. Right lower leg: No edema. Left lower leg: No edema.    Skin:     General: Skin is warm. Coloration: Skin is pale. Findings: No erythema or rash. Neurological:      General: No focal deficit present. Mental Status: She is alert and oriented to person, place, and time. She is not disoriented. Cranial Nerves: No cranial nerve deficit. Sensory: Sensory deficit present. Motor: Weakness present. No atrophy or abnormal muscle tone. Coordination: Coordination abnormal.      Gait: Gait abnormal.      Deep Tendon Reflexes: Reflexes are normal and symmetric. Comments: SLR + 960degrees sitting   4/5 strength bilateral lower extremities       Psychiatric:         Attention and Perception: She is attentive. Mood and Affect: Mood normal. Affect is flat and tearful. Affect is not labile, blunt, angry or inappropriate. Speech: She is communicative. Speech is not rapid and pressured, delayed, slurred or tangential.         Behavior: Behavior is not agitated, slowed, aggressive, withdrawn, hyperactive or combative. Thought Content: Thought content is not paranoid or delusional. Thought content does not include homicidal or suicidal ideation. Thought content does not include homicidal or suicidal plan. Cognition and Memory: Memory is not impaired. She does not exhibit impaired recent memory or impaired remote memory. Judgment: Judgment is not impulsive or inappropriate. JOYCE test: negative   Yeomans test: negative   Gaenslen test: negative      Assessment:     1. Spinal stenosis of lumbar region with neurogenic claudication    2. Lumbar radiculitis    3. Lumbar disc herniation    4. DDD (degenerative disc disease), lumbar    5. Spondylosis of lumbar region without myelopathy or radiculopathy    6. Generalized abdominal pain    7. Diverticulosis    8. Chronic left shoulder pain    9. Chronic pain syndrome    10. Myofascial pain dysfunction syndrome            Plan:      · OARRS reviewed.  Current MED: 60.00  · Patient was offered naloxone for home. · Discussed long term side effects of medications, tolerance, dependency and addiction. · Previous UDS reviewed  · UDS preformed today for compliance. · Patient told can not receive any pain medications from any other source. · No evidence of abuse, diversion or aberrant behavior.  Medications and/or procedures to improve function and quality of life- patient understanding with this and that may not be pain free   Discussed with patient about safe storage of medications at home   Discussed possible weaning of medication dosing dependent on treatment/procedure results.  Discussed with patient about risks with procedure including infection, reaction to medication, increased pain, or bleeding.  Reviewed hospital notes in detail reviewed Colonoscopy. Continue to follow with GI.   · Procedure notes reviewed in detail. · Has had L-facet MBB and LESI with no improvement   · Received 100% relief of lumbar trigger point injections for over a month. · Plan to repeat lumbar trigger point injections . in office with Dr. Isa Delgado. · Not a candidate for SCS d/t insurance. 2 different surgeans recommended lumbar surgery but she does not want   · Ordered left shoulder xray d/t pain discussed injection in future. Will need previosu OIO notes   · With next refill discontinued Percocet and start Oxy IR 10 mg QID prn. Continue Zanaflex. ordered refill  · Continue Neurontin to 600 mg scheduled QID- Ordered refill   · Updated UDS ordered today. · Ordered physical therapy at [address] [place] [address] for dry needle therapy       Meds. Prescribed:   Orders Placed This Encounter   Medications    gabapentin (NEURONTIN) 600 MG tablet     Sig: Take 1 tablet by mouth 4 times daily for 30 days. Dispense:  120 tablet     Refill:  0     Please consider 90 day supplies to promote better adherence.  Fill 11/27/2020    tiZANidine (ZANAFLEX) 4 MG tablet     Sig: Take 1 tablet by mouth every 6 hours as needed (spasms) Dispense:  120 tablet     Refill:  0    oxyCODONE HCl (OXY-IR) 10 MG immediate release tablet     Sig: Take 1 tablet by mouth every 6 hours as needed for Pain for up to 30 days. Intended supply: 30 days     Dispense:  120 tablet     Refill:  0     Reduce doses taken as pain becomes manageable       Return for lumbar trigger point injections in office with Dr. Calderon Sr.  .               Electronically signed by RENNY Shields CNP on8/3/2021 at 12:32 PM

## 2021-08-04 LAB
BLOOD CULTURE, ROUTINE: NORMAL
BLOOD CULTURE, ROUTINE: NORMAL
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
## 2021-09-02 DIAGNOSIS — M54.16 LUMBAR RADICULITIS: ICD-10-CM

## 2021-09-02 DIAGNOSIS — M51.36 DDD (DEGENERATIVE DISC DISEASE), LUMBAR: ICD-10-CM

## 2021-09-02 DIAGNOSIS — M47.816 SPONDYLOSIS OF LUMBAR REGION WITHOUT MYELOPATHY OR RADICULOPATHY: ICD-10-CM

## 2021-09-02 DIAGNOSIS — M48.062 SPINAL STENOSIS OF LUMBAR REGION WITH NEUROGENIC CLAUDICATION: ICD-10-CM

## 2021-09-02 DIAGNOSIS — G89.4 CHRONIC PAIN SYNDROME: ICD-10-CM

## 2021-09-02 DIAGNOSIS — R10.84 GENERALIZED ABDOMINAL PAIN: ICD-10-CM

## 2021-09-02 DIAGNOSIS — M51.26 LUMBAR DISC HERNIATION: ICD-10-CM

## 2021-09-02 DIAGNOSIS — M79.18 MYOFASCIAL PAIN DYSFUNCTION SYNDROME: ICD-10-CM

## 2021-09-02 NOTE — TELEPHONE ENCOUNTER
Damian Sanches called requesting a refill on the following medications:  Requested Prescriptions     Pending Prescriptions Disp Refills    oxyCODONE HCl (OXY-IR) 10 MG immediate release tablet 120 tablet 0     Sig: Take 1 tablet by mouth every 6 hours as needed for Pain for up to 30 days. Intended supply: 30 days    tiZANidine (ZANAFLEX) 4 MG tablet 120 tablet 0     Sig: Take 1 tablet by mouth every 6 hours as needed (spasms)    gabapentin (NEURONTIN) 600 MG tablet 120 tablet 0     Sig: Take 1 tablet by mouth 4 times daily for 30 days. Pharmacy verified: [facility]  . pv      Date of last visit: 8/3/2021  Date of next visit (if applicable): 1/08/9508

## 2021-09-03 ENCOUNTER — HOSPITAL ENCOUNTER (OUTPATIENT)
Dept: GENERAL RADIOLOGY | Age: 51
Discharge: HOME | End: 2021-09-03

## 2021-09-03 ENCOUNTER — HOSPITAL ENCOUNTER (OUTPATIENT)
Dept: CT IMAGING | Age: 51
Discharge: HOME | End: 2021-09-03

## 2021-09-03 DIAGNOSIS — Z00.6 EXAMINATION FOR NORMAL COMPARISON OR CONTROL IN CLINICAL RESEARCH: ICD-10-CM

## 2021-09-03 RX ORDER — OXYCODONE HYDROCHLORIDE 10 MG/1
10 TABLET ORAL EVERY 6 HOURS PRN
Qty: 120 TABLET | Refills: 0 | Status: SHIPPED | OUTPATIENT
Start: 2021-09-04 | End: 2021-10-04 | Stop reason: SDUPTHER

## 2021-09-03 RX ORDER — GABAPENTIN 600 MG/1
600 TABLET ORAL 4 TIMES DAILY
Qty: 120 TABLET | Refills: 0 | Status: SHIPPED | OUTPATIENT
Start: 2021-09-03 | End: 2021-10-04 | Stop reason: SDUPTHER

## 2021-09-03 RX ORDER — TIZANIDINE 4 MG/1
4 TABLET ORAL EVERY 6 HOURS PRN
Qty: 120 TABLET | Refills: 0 | Status: SHIPPED | OUTPATIENT
Start: 2021-09-04 | End: 2021-10-04 | Stop reason: SDUPTHER

## 2021-09-03 NOTE — TELEPHONE ENCOUNTER
OARRS reviewed. UDS: + for Ambien, Oxycodone, Gabapentin. + for Hydromorphone-Inconsistent  Last seen: 8/3/2021.  Follow-up:   Future Appointments   Date Time Provider Laisha Galalrdoi   9/7/2021  8:20 AM Linda Sales MD [facility] [address] [facility]   9/7/2021  2:15 PM MD FAUSTINO Bliss [facility] - Leila Workman   9/29/2021  1:45 PM Rebecca Smith MD [facility]

## 2021-09-07 ENCOUNTER — OFFICE VISIT (OUTPATIENT)
Dept: PULMONOLOGY | Age: 51
End: 2021-09-07
Payer: COMMERCIAL

## 2021-09-07 VITALS
OXYGEN SATURATION: 99 % | SYSTOLIC BLOOD PRESSURE: 130 MMHG | TEMPERATURE: 99.6 F | DIASTOLIC BLOOD PRESSURE: 78 MMHG | BODY MASS INDEX: 33.3 KG/M2 | HEART RATE: 89 BPM | WEIGHT: 207.2 LBS | HEIGHT: 66 IN

## 2021-09-07 DIAGNOSIS — R06.09 DYSPNEA ON EXERTION: Primary | ICD-10-CM

## 2021-09-07 DIAGNOSIS — J45.40 MODERATE PERSISTENT ASTHMA WITHOUT COMPLICATION: ICD-10-CM

## 2021-09-07 DIAGNOSIS — J20.9 ACUTE BRONCHITIS, UNSPECIFIED ORGANISM: ICD-10-CM

## 2021-09-07 DIAGNOSIS — I10 ESSENTIAL HYPERTENSION: ICD-10-CM

## 2021-09-07 DIAGNOSIS — G47.10 HYPERSOMNIA: ICD-10-CM

## 2021-09-07 PROCEDURE — G8427 DOCREV CUR MEDS BY ELIG CLIN: HCPCS | Performed by: INTERNAL MEDICINE

## 2021-09-07 PROCEDURE — G8417 CALC BMI ABV UP PARAM F/U: HCPCS | Performed by: INTERNAL MEDICINE

## 2021-09-07 PROCEDURE — 3017F COLORECTAL CA SCREEN DOC REV: CPT | Performed by: INTERNAL MEDICINE

## 2021-09-07 PROCEDURE — 99204 OFFICE O/P NEW MOD 45 MIN: CPT | Performed by: INTERNAL MEDICINE

## 2021-09-07 PROCEDURE — 1036F TOBACCO NON-USER: CPT | Performed by: INTERNAL MEDICINE

## 2021-09-07 RX ORDER — CIPROFLOXACIN 500 MG/1
500 TABLET, FILM COATED ORAL 2 TIMES DAILY
COMMUNITY
End: 2022-07-12

## 2021-09-07 RX ORDER — BUDESONIDE AND FORMOTEROL FUMARATE DIHYDRATE 160; 4.5 UG/1; UG/1
2 AEROSOL RESPIRATORY (INHALATION) 2 TIMES DAILY
Qty: 1 EACH | Refills: 11 | Status: SHIPPED | OUTPATIENT
Start: 2021-09-07 | End: 2022-09-07

## 2021-09-07 RX ORDER — ALBUTEROL SULFATE 2.5 MG/3ML
2.5 SOLUTION RESPIRATORY (INHALATION) EVERY 6 HOURS PRN
Qty: 120 EACH | Refills: 8 | Status: SHIPPED | OUTPATIENT
Start: 2021-09-07 | End: 2022-09-07

## 2021-09-07 NOTE — PROGRESS NOTES
Neck Circumference -   14  Mallampati - 3  Lung Nodule Screening     [] Qualifies    [x] Does not qualify   [] Declined    [] Completed

## 2021-09-21 ENCOUNTER — HOSPITAL ENCOUNTER (OUTPATIENT)
Dept: NON INVASIVE DIAGNOSTICS | Age: 51
Discharge: HOME | End: 2021-09-21
Payer: COMMERCIAL

## 2021-09-21 DIAGNOSIS — G47.10 HYPERSOMNIA: ICD-10-CM

## 2021-09-21 DIAGNOSIS — J45.40 MODERATE PERSISTENT ASTHMA WITHOUT COMPLICATION: ICD-10-CM

## 2021-09-21 DIAGNOSIS — R06.09 DYSPNEA ON EXERTION: ICD-10-CM

## 2021-09-21 DIAGNOSIS — I10 ESSENTIAL HYPERTENSION: ICD-10-CM

## 2021-09-21 LAB
LV EF: 60 %
LVEF MODALITY: NORMAL

## 2021-09-21 PROCEDURE — 93306 TTE W/DOPPLER COMPLETE: CPT

## 2021-10-01 DIAGNOSIS — M51.26 LUMBAR DISC HERNIATION: ICD-10-CM

## 2021-10-01 DIAGNOSIS — M48.062 SPINAL STENOSIS OF LUMBAR REGION WITH NEUROGENIC CLAUDICATION: ICD-10-CM

## 2021-10-01 DIAGNOSIS — G89.4 CHRONIC PAIN SYNDROME: ICD-10-CM

## 2021-10-01 DIAGNOSIS — M79.18 MYOFASCIAL PAIN DYSFUNCTION SYNDROME: ICD-10-CM

## 2021-10-01 DIAGNOSIS — M47.816 SPONDYLOSIS OF LUMBAR REGION WITHOUT MYELOPATHY OR RADICULOPATHY: ICD-10-CM

## 2021-10-01 DIAGNOSIS — M51.36 DDD (DEGENERATIVE DISC DISEASE), LUMBAR: ICD-10-CM

## 2021-10-01 DIAGNOSIS — R10.84 GENERALIZED ABDOMINAL PAIN: ICD-10-CM

## 2021-10-01 DIAGNOSIS — M54.16 LUMBAR RADICULITIS: ICD-10-CM

## 2021-10-01 NOTE — TELEPHONE ENCOUNTER
Carola Martinez called requesting a refill on the following medications:  Requested Prescriptions     Pending Prescriptions Disp Refills    tiZANidine (ZANAFLEX) 4 MG tablet 120 tablet 0     Sig: Take 1 tablet by mouth every 6 hours as needed (spasms)    oxyCODONE HCl (OXY-IR) 10 MG immediate release tablet 120 tablet 0     Sig: Take 1 tablet by mouth every 6 hours as needed for Pain for up to 30 days. Intended supply: 30 days    gabapentin (NEURONTIN) 600 MG tablet 120 tablet 0     Sig: Take 1 tablet by mouth 4 times daily for 30 days. Pharmacy verified:[facility]   . pv      Date of last visit:   Date of next visit (if applicable): Visit date not found

## 2021-10-04 RX ORDER — TIZANIDINE 4 MG/1
4 TABLET ORAL EVERY 6 HOURS PRN
Qty: 120 TABLET | Refills: 0 | Status: SHIPPED | OUTPATIENT
Start: 2021-10-04 | End: 2021-11-01 | Stop reason: SDUPTHER

## 2021-10-04 RX ORDER — OXYCODONE HYDROCHLORIDE 10 MG/1
10 TABLET ORAL EVERY 6 HOURS PRN
Qty: 120 TABLET | Refills: 0 | Status: SHIPPED | OUTPATIENT
Start: 2021-10-04 | End: 2021-11-01 | Stop reason: SDUPTHER

## 2021-10-04 RX ORDER — GABAPENTIN 600 MG/1
600 TABLET ORAL 4 TIMES DAILY
Qty: 120 TABLET | Refills: 0 | Status: SHIPPED | OUTPATIENT
Start: 2021-10-04 | End: 2021-11-01 | Stop reason: SDUPTHER

## 2021-10-04 NOTE — TELEPHONE ENCOUNTER
OARRS reviewed. UDS: + for  Gabapentin oxycodone ambien consistent. Last seen: 5/5/2021.  Follow-up:   Future Appointments   Date Time Provider Laisha López   10/5/2021  7:30 AM [facility] PULMONARY FUNCTION ROOM 1 [facility] PFT [address] [place] [address]   11/2/2021 12:15 PM RENNY Manzo - CNP [facility] - [address] [place] [address]   11/9/2021  2:20 PM MD Beverly Santos [facility] - [address] [place] [address]   11/10/2021  8:00 PM SCHEDULE, STR SLEEP TECH [facility]   11/17/2021  1:30 PM Leia Montalvo  [address] [place] [address]

## 2021-10-22 ENCOUNTER — APPOINTMENT (OUTPATIENT)
Dept: GENERAL RADIOLOGY | Age: 51
End: 2021-10-22
Payer: COMMERCIAL

## 2021-10-22 ENCOUNTER — OFFICE VISIT (OUTPATIENT)
Dept: FAMILY MEDICINE CLINIC | Age: 51
End: 2021-10-22
Payer: COMMERCIAL

## 2021-10-22 ENCOUNTER — APPOINTMENT (OUTPATIENT)
Dept: CT IMAGING | Age: 51
End: 2021-10-22
Payer: COMMERCIAL

## 2021-10-22 ENCOUNTER — NURSE ONLY (OUTPATIENT)
Dept: LAB | Age: 51
End: 2021-10-22

## 2021-10-22 ENCOUNTER — HOSPITAL ENCOUNTER (EMERGENCY)
Age: 51
Discharge: HOME | End: 2021-10-22
Attending: EMERGENCY MEDICINE
Payer: COMMERCIAL

## 2021-10-22 VITALS
DIASTOLIC BLOOD PRESSURE: 89 MMHG | RESPIRATION RATE: 16 BRPM | TEMPERATURE: 98.9 F | WEIGHT: 210 LBS | SYSTOLIC BLOOD PRESSURE: 114 MMHG | HEART RATE: 73 BPM | BODY MASS INDEX: 33.89 KG/M2 | OXYGEN SATURATION: 99 %

## 2021-10-22 VITALS
TEMPERATURE: 98.2 F | OXYGEN SATURATION: 99 % | HEART RATE: 84 BPM | DIASTOLIC BLOOD PRESSURE: 88 MMHG | WEIGHT: 210.6 LBS | SYSTOLIC BLOOD PRESSURE: 130 MMHG | BODY MASS INDEX: 33.99 KG/M2

## 2021-10-22 DIAGNOSIS — I24.9 ACS (ACUTE CORONARY SYNDROME) (HCC): ICD-10-CM

## 2021-10-22 DIAGNOSIS — R06.89 DYSPNEA AND RESPIRATORY ABNORMALITIES: Primary | ICD-10-CM

## 2021-10-22 DIAGNOSIS — R06.00 DYSPNEA AND RESPIRATORY ABNORMALITIES: Primary | ICD-10-CM

## 2021-10-22 DIAGNOSIS — R07.9 CHEST PAIN, UNSPECIFIED TYPE: ICD-10-CM

## 2021-10-22 DIAGNOSIS — R06.02 SOB (SHORTNESS OF BREATH): ICD-10-CM

## 2021-10-22 DIAGNOSIS — R07.1 CHEST PAIN ON BREATHING: Primary | ICD-10-CM

## 2021-10-22 LAB
ALBUMIN SERPL-MCNC: 4.4 G/DL (ref 3.5–5.1)
ALP BLD-CCNC: 87 U/L (ref 38–126)
ALT SERPL-CCNC: 19 U/L (ref 11–66)
ANION GAP SERPL CALCULATED.3IONS-SCNC: 11 MEQ/L (ref 8–16)
AST SERPL-CCNC: 24 U/L (ref 5–40)
BASOPHILS # BLD: 0.6 %
BASOPHILS # BLD: 1 %
BASOPHILS ABSOLUTE: 0 THOU/MM3 (ref 0–0.1)
BASOPHILS ABSOLUTE: 0 THOU/MM3 (ref 0–0.1)
BILIRUB SERPL-MCNC: < 0.2 MG/DL (ref 0.3–1.2)
BILIRUBIN DIRECT: < 0.2 MG/DL (ref 0–0.3)
BILIRUBIN URINE: NEGATIVE
BILIRUBIN URINE: NEGATIVE
BLOOD, URINE: NEGATIVE
BLOOD, URINE: NEGATIVE
BUN BLDV-MCNC: 24 MG/DL (ref 7–22)
CALCIUM SERPL-MCNC: 9.9 MG/DL (ref 8.5–10.5)
CHARACTER, URINE: CLEAR
CHARACTER, URINE: CLEAR
CHLORIDE BLD-SCNC: 105 MEQ/L (ref 98–111)
CO2: 28 MEQ/L (ref 23–33)
COLOR: YELLOW
COLOR: YELLOW
CREAT SERPL-MCNC: 1 MG/DL (ref 0.4–1.2)
EOSINOPHIL # BLD: 0.5 %
EOSINOPHIL # BLD: 1.2 %
EOSINOPHILS ABSOLUTE: 0 THOU/MM3 (ref 0–0.4)
EOSINOPHILS ABSOLUTE: 0 THOU/MM3 (ref 0–0.4)
ERYTHROCYTE [DISTWIDTH] IN BLOOD BY AUTOMATED COUNT: 14.3 % (ref 11.5–14.5)
ERYTHROCYTE [DISTWIDTH] IN BLOOD BY AUTOMATED COUNT: 14.3 % (ref 11.5–14.5)
ERYTHROCYTE [DISTWIDTH] IN BLOOD BY AUTOMATED COUNT: 44.6 FL (ref 35–45)
ERYTHROCYTE [DISTWIDTH] IN BLOOD BY AUTOMATED COUNT: 46.1 FL (ref 35–45)
GFR SERPL CREATININE-BSD FRML MDRD: 58 ML/MIN/1.73M2
GLUCOSE BLD-MCNC: 82 MG/DL (ref 70–108)
GLUCOSE URINE: NEGATIVE MG/DL
GLUCOSE URINE: NEGATIVE MG/DL
HCT VFR BLD CALC: 40.9 % (ref 37–47)
HCT VFR BLD CALC: 42.6 % (ref 37–47)
HEMOGLOBIN: 13.1 GM/DL (ref 12–16)
HEMOGLOBIN: 13.4 GM/DL (ref 12–16)
IMMATURE GRANS (ABS): 0.01 THOU/MM3 (ref 0–0.07)
IMMATURE GRANS (ABS): 0.01 THOU/MM3 (ref 0–0.07)
IMMATURE GRANULOCYTES: 0.2 %
IMMATURE GRANULOCYTES: 0.2 %
KETONES, URINE: NEGATIVE
KETONES, URINE: NEGATIVE
LEUKOCYTE ESTERASE, URINE: NEGATIVE
LEUKOCYTE ESTERASE, URINE: NEGATIVE
LIPASE: 24.3 U/L (ref 5.6–51.3)
LYMPHOCYTES # BLD: 32.9 %
LYMPHOCYTES # BLD: 40.6 %
LYMPHOCYTES ABSOLUTE: 1.3 THOU/MM3 (ref 1–4.8)
LYMPHOCYTES ABSOLUTE: 2.5 THOU/MM3 (ref 1–4.8)
MCH RBC QN AUTO: 27 PG (ref 26–33)
MCH RBC QN AUTO: 27.9 PG (ref 26–33)
MCHC RBC AUTO-ENTMCNC: 30.8 GM/DL (ref 32.2–35.5)
MCHC RBC AUTO-ENTMCNC: 32.8 GM/DL (ref 32.2–35.5)
MCV RBC AUTO: 85.2 FL (ref 81–99)
MCV RBC AUTO: 87.8 FL (ref 81–99)
MONOCYTES # BLD: 7.7 %
MONOCYTES # BLD: 8.6 %
MONOCYTES ABSOLUTE: 0.3 THOU/MM3 (ref 0.4–1.3)
MONOCYTES ABSOLUTE: 0.5 THOU/MM3 (ref 0.4–1.3)
NITRITE, URINE: NEGATIVE
NITRITE, URINE: NEGATIVE
NUCLEATED RED BLOOD CELLS: 0 /100 WBC
NUCLEATED RED BLOOD CELLS: 0 /100 WBC
OSMOLALITY CALCULATION: 290 MOSMOL/KG (ref 275–300)
PH UA: 7 (ref 5–9)
PH UA: 7 (ref 5–9)
PLATELET # BLD: 227 THOU/MM3 (ref 130–400)
PLATELET # BLD: 260 THOU/MM3 (ref 130–400)
PMV BLD AUTO: 11 FL (ref 9.4–12.4)
PMV BLD AUTO: 12.5 FL (ref 9.4–12.4)
POTASSIUM REFLEX MAGNESIUM: 3.6 MEQ/L (ref 3.5–5.2)
PRO-BNP: 218 PG/ML (ref 0–900)
PROTEIN UA: NEGATIVE
PROTEIN UA: NEGATIVE
RBC # BLD: 4.8 MILL/MM3 (ref 4.2–5.4)
RBC # BLD: 4.85 MILL/MM3 (ref 4.2–5.4)
SARS-COV-2, NAAT: NOT  DETECTED
SEDIMENTATION RATE, ERYTHROCYTE: 7 MM/HR (ref 0–20)
SEG NEUTROPHILS: 49.5 %
SEG NEUTROPHILS: 57 %
SEGMENTED NEUTROPHILS ABSOLUTE COUNT: 2.3 THOU/MM3 (ref 1.8–7.7)
SEGMENTED NEUTROPHILS ABSOLUTE COUNT: 3.1 THOU/MM3 (ref 1.8–7.7)
SODIUM BLD-SCNC: 144 MEQ/L (ref 135–145)
SPECIFIC GRAVITY, URINE: 1.01 (ref 1–1.03)
SPECIFIC GRAVITY, URINE: 1.01 (ref 1–1.03)
TOTAL PROTEIN: 6.8 G/DL (ref 6.1–8)
TROPONIN T: < 0.01 NG/ML
TROPONIN T: < 0.01 NG/ML
UROBILINOGEN, URINE: 0.2 EU/DL (ref 0–1)
UROBILINOGEN, URINE: 0.2 EU/DL (ref 0–1)
WBC # BLD: 4.1 THOU/MM3 (ref 4.8–10.8)
WBC # BLD: 6.2 THOU/MM3 (ref 4.8–10.8)

## 2021-10-22 PROCEDURE — 85025 COMPLETE CBC W/AUTO DIFF WBC: CPT

## 2021-10-22 PROCEDURE — 81003 URINALYSIS AUTO W/O SCOPE: CPT

## 2021-10-22 PROCEDURE — 84484 ASSAY OF TROPONIN QUANT: CPT

## 2021-10-22 PROCEDURE — 83880 ASSAY OF NATRIURETIC PEPTIDE: CPT

## 2021-10-22 PROCEDURE — 80076 HEPATIC FUNCTION PANEL: CPT

## 2021-10-22 PROCEDURE — 6360000004 HC RX CONTRAST MEDICATION: Performed by: EMERGENCY MEDICINE

## 2021-10-22 PROCEDURE — 96376 TX/PRO/DX INJ SAME DRUG ADON: CPT

## 2021-10-22 PROCEDURE — 6360000002 HC RX W HCPCS: Performed by: STUDENT IN AN ORGANIZED HEALTH CARE EDUCATION/TRAINING PROGRAM

## 2021-10-22 PROCEDURE — 99284 EMERGENCY DEPT VISIT MOD MDM: CPT

## 2021-10-22 PROCEDURE — 93000 ELECTROCARDIOGRAM COMPLETE: CPT | Performed by: NURSE PRACTITIONER

## 2021-10-22 PROCEDURE — 99213 OFFICE O/P EST LOW 20 MIN: CPT | Performed by: NURSE PRACTITIONER

## 2021-10-22 PROCEDURE — 3017F COLORECTAL CA SCREEN DOC REV: CPT | Performed by: NURSE PRACTITIONER

## 2021-10-22 PROCEDURE — G8417 CALC BMI ABV UP PARAM F/U: HCPCS | Performed by: NURSE PRACTITIONER

## 2021-10-22 PROCEDURE — 36415 COLL VENOUS BLD VENIPUNCTURE: CPT

## 2021-10-22 PROCEDURE — G8484 FLU IMMUNIZE NO ADMIN: HCPCS | Performed by: NURSE PRACTITIONER

## 2021-10-22 PROCEDURE — 71275 CT ANGIOGRAPHY CHEST: CPT

## 2021-10-22 PROCEDURE — 1036F TOBACCO NON-USER: CPT | Performed by: NURSE PRACTITIONER

## 2021-10-22 PROCEDURE — 93005 ELECTROCARDIOGRAM TRACING: CPT | Performed by: EMERGENCY MEDICINE

## 2021-10-22 PROCEDURE — 71045 X-RAY EXAM CHEST 1 VIEW: CPT

## 2021-10-22 PROCEDURE — 96374 THER/PROPH/DIAG INJ IV PUSH: CPT

## 2021-10-22 PROCEDURE — 87635 SARS-COV-2 COVID-19 AMP PRB: CPT

## 2021-10-22 PROCEDURE — G8427 DOCREV CUR MEDS BY ELIG CLIN: HCPCS | Performed by: NURSE PRACTITIONER

## 2021-10-22 PROCEDURE — 80048 BASIC METABOLIC PNL TOTAL CA: CPT

## 2021-10-22 PROCEDURE — 96375 TX/PRO/DX INJ NEW DRUG ADDON: CPT

## 2021-10-22 PROCEDURE — 83690 ASSAY OF LIPASE: CPT

## 2021-10-22 RX ORDER — ONDANSETRON 2 MG/ML
4 INJECTION INTRAMUSCULAR; INTRAVENOUS ONCE
Status: COMPLETED | OUTPATIENT
Start: 2021-10-22 | End: 2021-10-22

## 2021-10-22 RX ORDER — MORPHINE SULFATE 4 MG/ML
4 INJECTION, SOLUTION INTRAMUSCULAR; INTRAVENOUS ONCE
Status: COMPLETED | OUTPATIENT
Start: 2021-10-22 | End: 2021-10-22

## 2021-10-22 RX ADMIN — ONDANSETRON 4 MG: 2 INJECTION INTRAMUSCULAR; INTRAVENOUS at 17:55

## 2021-10-22 RX ADMIN — MORPHINE SULFATE 4 MG: 4 INJECTION, SOLUTION INTRAMUSCULAR; INTRAVENOUS at 19:21

## 2021-10-22 RX ADMIN — MORPHINE SULFATE 4 MG: 4 INJECTION, SOLUTION INTRAMUSCULAR; INTRAVENOUS at 17:54

## 2021-10-22 RX ADMIN — IOPAMIDOL 80 ML: 755 INJECTION, SOLUTION INTRAVENOUS at 20:05

## 2021-10-22 SDOH — ECONOMIC STABILITY: FOOD INSECURITY: WITHIN THE PAST 12 MONTHS, THE FOOD YOU BOUGHT JUST DIDN'T LAST AND YOU DIDN'T HAVE MONEY TO GET MORE.: NEVER TRUE

## 2021-10-22 SDOH — ECONOMIC STABILITY: FOOD INSECURITY: WITHIN THE PAST 12 MONTHS, YOU WORRIED THAT YOUR FOOD WOULD RUN OUT BEFORE YOU GOT MONEY TO BUY MORE.: NEVER TRUE

## 2021-10-22 ASSESSMENT — ENCOUNTER SYMPTOMS
GASTROINTESTINAL NEGATIVE: 1
CHEST TIGHTNESS: 1
COUGH: 1
EYE REDNESS: 0
SORE THROAT: 0
EYE ITCHING: 0
ABDOMINAL PAIN: 0
RHINORRHEA: 0
VOMITING: 0
COUGH: 0
BACK PAIN: 1
EYE REDNESS: 0
DIARRHEA: 0
EYE PAIN: 0
SINUS PAIN: 0
SHORTNESS OF BREATH: 1
NAUSEA: 1
CHEST TIGHTNESS: 0
SHORTNESS OF BREATH: 1
BACK PAIN: 0
WHEEZING: 1

## 2021-10-22 ASSESSMENT — PAIN SCALES - GENERAL
PAINLEVEL_OUTOF10: 8
PAINLEVEL_OUTOF10: 6
PAINLEVEL_OUTOF10: 8

## 2021-10-22 ASSESSMENT — PAIN DESCRIPTION - DESCRIPTORS: DESCRIPTORS: ACHING

## 2021-10-22 ASSESSMENT — PAIN DESCRIPTION - ORIENTATION: ORIENTATION: MID

## 2021-10-22 ASSESSMENT — PAIN DESCRIPTION - PAIN TYPE: TYPE: ACUTE PAIN

## 2021-10-22 ASSESSMENT — PAIN DESCRIPTION - LOCATION: LOCATION: CHEST

## 2021-10-22 ASSESSMENT — PAIN DESCRIPTION - FREQUENCY: FREQUENCY: CONTINUOUS

## 2021-10-22 ASSESSMENT — SOCIAL DETERMINANTS OF HEALTH (SDOH): HOW HARD IS IT FOR YOU TO PAY FOR THE VERY BASICS LIKE FOOD, HOUSING, MEDICAL CARE, AND HEATING?: NOT HARD AT ALL

## 2021-10-22 NOTE — ED NOTES
Bed: 032A  Expected date: 10/22/21  Expected time: 4:55 PM  Means of arrival: [address] [facility]  Comments:     Evangelina Alva RN  10/22/21 5214

## 2021-10-22 NOTE — ED NOTES
Patient states that morphine did not help at all, and pain level remains at an 8/10.      Herlinda Miller RN  10/22/21 8962

## 2021-10-22 NOTE — PROGRESS NOTES
[address]  Dept: Noah TED Lopez (:  1970) is a 44 y.o. female,New patient, here for evaluation of the following chief complaint(s):  Urinary Tract Infection (just gave a urine same at the lab), Shortness of Breath, and Wheezing (felt some earlier)      ASSESSMENT/PLAN:  Pt with left sided chest pain, pain with inspiration, dark colored phlegm, hx of dvt/pe and on chronic anticoagulation  Pt does not feel well enough to drive  She is bent over in her chair  Squad called and pt transported to ER via [address]. Report called to [facility]    1. Chest pain on breathing  -     EKG 12 lead; Future  2. SOB (shortness of breath)  -     EKG 12 lead; Future  3. ACS (acute coronary syndrome) (HCC)      No follow-ups on file. SUBJECTIVE/OBJECTIVE:  HPI   Shortness of breath since this morning   No fever   Some nausea  No vomiting or diarrhea   Asthmatic bronchitis in the past   Left side chest pain   Hunched over in chair   Cannot speak in full sentences     Review of Systems   Constitutional: Positive for activity change, appetite change and fatigue. HENT: Negative. Eyes: Negative for pain, redness and itching. Respiratory: Positive for cough, shortness of breath and wheezing. Negative for chest tightness. Cardiovascular: Negative for chest pain and leg swelling. Gastrointestinal: Negative. Endocrine: Negative. Genitourinary: Negative. Musculoskeletal: Positive for back pain and myalgias. Negative for arthralgias. Skin: Negative. Allergic/Immunologic: Negative for environmental allergies and food allergies. Neurological: Negative. Hematological: Negative for adenopathy. Does not bruise/bleed easily. Psychiatric/Behavioral: Negative. Physical Exam  Vitals and nursing note reviewed. Constitutional:       Appearance: She is ill-appearing. HENT:      Head: Normocephalic.       Right Ear: External ear normal.      Left Ear: External ear normal.      Nose: No congestion or rhinorrhea. Eyes:      Conjunctiva/sclera: Conjunctivae normal.   Cardiovascular:      Rate and Rhythm: Regular rhythm. Tachycardia present. Heart sounds: Murmur heard. Pulmonary:      Breath sounds: Rales (left lower lobe left middle lobe) present. Musculoskeletal:         General: Normal range of motion. Cervical back: Tenderness present. Skin:     Capillary Refill: Capillary refill takes more than 3 seconds. Coloration: Skin is pale. Neurological:      General: No focal deficit present. Mental Status: She is alert. Mental status is at baseline. Psychiatric:         Mood and Affect: Mood normal.         Thought Content: Thought content normal.                 An electronic signature was used to authenticate this note.     --RENNY Sesay - CNP

## 2021-10-22 NOTE — ED TRIAGE NOTES
Patient presents to ED with chief complaint of Chest pain. Patient states she was at her doctors office when they told her it sounds like she has diminished breath sounds on the left side, and they were worried about her history of blood clots. Patient states she is having chest pain rated at an 8/10. Patient was given 4 baby aspirin and 1 nitro in EMS. Patient states the nitro did not help at all, and refused to take more than one. AOx4 on arrival. Call light in reach.

## 2021-10-22 NOTE — ED NOTES
Patient resting in bed. Respirations easy and unlabored. No distress noted. Call light within reach.        Brisa Haynes RN  10/22/21 3991

## 2021-10-22 NOTE — ED PROVIDER NOTES
Musculoskeletal: Negative for back pain. Skin: Negative for rash. Neurological: Negative for light-headedness and headaches. Psychiatric/Behavioral: Negative for agitation. PAST MEDICAL AND SURGICAL HISTORY     Past Medical History:   Diagnosis Date    Anxiety     Arthritis     Asthma     Nuñez's esophagus     Blood circulation, collateral     GERD (gastroesophageal reflux disease)     Hx of blood clots     Hypertension     Insomnia     Interstitial cystitis     Migraine     Pneumonia     Psychiatric problem     Spinal stenosis of lumbar region      Past Surgical History:   Procedure Laterality Date    ABDOMEN SURGERY  93    exploratory surgery    APPENDECTOMY      CARDIAC CATHETERIZATION  unsure    CARPAL TUNNEL RELEASE Left     CHOLECYSTECTOMY      COLONOSCOPY  2010    COLONOSCOPY N/A 8/1/2021    COLONOSCOPY WITH BIOPSY performed by George Fraire MD at [address] ESOPHAGUS  2010    ENDOSCOPY, COLON, DIAGNOSTIC  2010    OTHER SURGICAL HISTORY  21 Nov 2014    Cholecystectomy Laparoscopic (Dr. Alexandra Timmons, [facility])    OTHER SURGICAL HISTORY N/A 03-21-16    lumbar epidurala block L4-5    OTHER SURGICAL HISTORY N/A 04-04-16    lumbar epidural block L4-5    OTHER SURGICAL HISTORY Bilateral 6-13-16    facet blocks at L4-5, L5-S1    OTHER SURGICAL HISTORY Left 09/13/2016    RFA lumbar L4-S1    OVARIAN CYST REMOVAL  93    PATELLAR TENDON REPAIR Right 1994    3 screws in ankle.  TUBAL LIGATION  80    UPPER GASTROINTESTINAL ENDOSCOPY  2012         MEDICATIONS   No current facility-administered medications for this encounter. Current Outpatient Medications:     tiZANidine (ZANAFLEX) 4 MG tablet, Take 1 tablet by mouth every 6 hours as needed (spasms), Disp: 120 tablet, Rfl: 0    oxyCODONE HCl (OXY-IR) 10 MG immediate release tablet, Take 1 tablet by mouth every 6 hours as needed for Pain for up to 30 days.  Intended supply: 30 days, Disp: 120 tablet, Rfl: 0    gabapentin (NEURONTIN) 600 MG tablet, Take 1 tablet by mouth 4 times daily for 30 days. , Disp: 120 tablet, Rfl: 0    ciprofloxacin (CIPRO) 500 MG tablet, Take 500 mg by mouth 2 times daily, Disp: , Rfl:     Meth-Hyo-M Bl-Na Phos-Ph Sal (UTIRA-C PO), Take by mouth, Disp: , Rfl:     albuterol (PROVENTIL) (2.5 MG/3ML) 0.083% nebulizer solution, Take 3 mLs by nebulization every 6 hours as needed for Wheezing Use 1 vial in nebulizer QID, Disp: 120 each, Rfl: 8    budesonide-formoterol (SYMBICORT) 160-4.5 MCG/ACT AERO, Inhale 2 puffs into the lungs 2 times daily Rinse mouth after its use., Disp: 1 each, Rfl: 11    rOPINIRole (REQUIP) 2 MG tablet, Take 2 mg by mouth 3 times daily, Disp: , Rfl:     zolpidem (AMBIEN) 10 MG tablet, Take by mouth nightly as needed for Sleep., Disp: , Rfl:     pantoprazole (PROTONIX) 40 MG tablet, Take 40 mg by mouth 2 times daily , Disp: , Rfl:     hydrOXYzine (ATARAX) 50 MG tablet, Take 50 mg by mouth 4 times daily as needed , Disp: , Rfl:     Prenatal Vit-Fe Fumarate-FA (PRENATAL VITAMIN) 27-1 MG TABS tablet, Take 1 tablet by mouth daily, Disp: , Rfl:     metoprolol tartrate (LOPRESSOR) 50 MG tablet, Take 50 mg by mouth 2 times daily, Disp: , Rfl:     montelukast (SINGULAIR) 10 MG tablet, Take 10 mg by mouth daily, Disp: , Rfl:     Loratadine-Pseudoephedrine (CLARITIN-D 12 HOUR PO), Take 1 tablet by mouth every 12 hours, Disp: , Rfl:     Rimegepant Sulfate (NURTEC) 75 MG TBDP, Dissolve 1 tablet PO PRN for migraine no more than 1 a day, Disp: , Rfl:     Cholecalciferol (VITAMIN D3) 50 MCG (2000 UT) TABS, Take 1 tablet by mouth daily, Disp: , Rfl:     tiotropium (SPIRIVA RESPIMAT) 1.25 MCG/ACT AERS inhaler, Inhale 1 puff into the lungs daily, Disp: , Rfl:     ferrous sulfate (IRON 325) 325 (65 Fe) MG tablet, Take 325 mg by mouth daily, Disp: , Rfl:     dicyclomine (BENTYL) 10 MG capsule, Take 10 mg by mouth 3 times daily, Disp: , Rfl:     losartan (COZAAR) 25 MG tablet, Take 25 mg by mouth daily, Disp: , Rfl:     apixaban (ELIQUIS) 5 MG TABS tablet, Take 5 mg by mouth 2 times daily, Disp: , Rfl:     Promethazine HCl (PHENERGAN PO), Take 25 mg by mouth 2 times daily as needed (for nausea) , Disp: , Rfl:     oxybutynin (DITROPAN) 5 MG tablet, Take 1 tablet by mouth 3 times daily , Disp: , Rfl:     sucralfate (CARAFATE) 1 GM tablet, Take 1 tablet by mouth 4 times daily, Disp: 120 tablet, Rfl: 1    naloxone (NARCAN) 4 MG/0.1ML LIQD nasal spray, 1 spray by Nasal route as needed for Opioid Reversal, Disp: 1 each, Rfl: 0    topiramate (TOPAMAX) 100 MG tablet, Take 1 tablet by mouth 2 times daily, Disp: 60 tablet, Rfl: 5    albuterol (PROVENTIL HFA;VENTOLIN HFA) 108 (90 BASE) MCG/ACT inhaler, Inhale 2 puffs into the lungs every 4 hours as needed for Wheezing or Shortness of Breath Whichever brand is covered by insurance, substitute as necessary. , Disp: 1 Inhaler, Rfl: 2      SOCIAL HISTORY     Social History     Social History Narrative    Not on file     Social History     Tobacco Use    Smoking status: Never Smoker    Smokeless tobacco: Never Used   Vaping Use    Vaping Use: Never used   Substance Use Topics    Alcohol use:  Yes     Alcohol/week: 0.0 standard drinks     Comment: rarely    Drug use: No         ALLERGIES     Allergies   Allergen Reactions    Ketorolac Hives    Adhesive Tape Other (See Comments)     blisters    Compazine [Prochlorperazine] Hives     Patient has no issues with promethazine    Erythromycin     Haloperidol And Related     Lyrica [Pregabalin]     Reglan [Metoclopramide] Other (See Comments)     States \" made me crazy - kicking and screaming - very anxious\"    Sulfa Antibiotics Hives         FAMILY HISTORY     Family History   Problem Relation Age of Onset    Heart Disease Mother     Inflam Bowel Dis Mother     Heart Disease Father     Heart Attack Brother          PREVIOUS RECORDS   Previous records reviewed: Patient was seen here on 7/29/2021 for ischemia of the intestine and was admitted. PHYSICAL EXAM     ED Triage Vitals   BP Temp Temp src Pulse Resp SpO2 Height Weight   -- -- -- -- -- -- -- --     Initial vital signs and nursing assessment reviewed and normal. Pulsoximetry is normal per my interpretation. Additional Vital Signs:  Vitals:    10/22/21 2018   BP: 114/89   Pulse: 73   Resp: 16   Temp:    SpO2: 99%       Physical Exam  Vitals and nursing note reviewed. Constitutional:       Appearance: She is not ill-appearing or toxic-appearing. HENT:      Head: Normocephalic and atraumatic. Right Ear: External ear normal.      Left Ear: External ear normal.      Nose: Nose normal.      Mouth/Throat:      Mouth: Mucous membranes are moist.      Pharynx: Oropharynx is clear. Eyes:      General: No scleral icterus. Conjunctiva/sclera: Conjunctivae normal.   Cardiovascular:      Rate and Rhythm: Normal rate and regular rhythm. Pulses: Normal pulses. Heart sounds: Normal heart sounds. Pulmonary:      Effort: Pulmonary effort is normal. No respiratory distress. Comments: Diminished breath sounds bilaterally. Abdominal:      General: Abdomen is flat. There is no distension. Palpations: Abdomen is soft. Tenderness: There is no abdominal tenderness. There is no guarding or rebound. Musculoskeletal:         General: Normal range of motion. Cervical back: Normal range of motion and neck supple. No rigidity. No muscular tenderness. Right lower leg: No edema. Left lower leg: No edema. Lymphadenopathy:      Cervical: No cervical adenopathy. Skin:     General: Skin is warm and dry. Capillary Refill: Capillary refill takes less than 2 seconds. Coloration: Skin is not jaundiced. Neurological:      General: No focal deficit present. Mental Status: She is alert and oriented to person, place, and time.    Psychiatric:      Comments: Anxious         MEDICAL DECISION MAKING Initial Assessment: This is a 49-year-old female with past medical history of pulmonary embolisms, atrial fibrillation who presents with substernal chest pain rating to her left upper chest wall that is sharp in nature pleuritic in nature. Nonexertional.  With associated shortness of breath as well. She is on Eliquis and denies any recent missed doses. Any other recent travel or risk factors. She has been managing some dysuria and being treated with a urinary tract infection however had persistence of her symptoms and was at an urgent care when she developed chest pain and EMS was called to bring her here for further evaluation. She is not Covid vaccinated. Differential Diagnosis Included but not limited to: COVID-19, viral illness, ACS, pericarditis, pleural effusion, pneumonia, pulmonary embolism    MDM:   Patient CTA shows no signs of pulmonary embolism nor signs of significant pneumonia. Although chest x-ray shows a perihilar infiltrate most likely is atelectasis. Patient has no significant cough no fevers at home. Her dyspnea may be anxiety related however at this time she is hemodynamically stable has not been hypoxic and will follow up with her primary care physician next few days. ED RESULTS   Laboratory results:  Labs Reviewed   BASIC METABOLIC PANEL W/ REFLEX TO MG FOR LOW K - Abnormal; Notable for the following components:       Result Value    BUN 24 (*)     All other components within normal limits   HEPATIC FUNCTION PANEL - Abnormal; Notable for the following components:     Total Bilirubin <0.2 (*)     All other components within normal limits   GLOMERULAR FILTRATION RATE, ESTIMATED - Abnormal; Notable for the following components:    Est, Glom Filt Rate 58 (*)     All other components within normal limits   COVID-19, RAPID   CBC WITH AUTO DIFFERENTIAL   TROPONIN   BRAIN NATRIURETIC PEPTIDE   URINE RT REFLEX TO CULTURE   LIPASE   ANION GAP   OSMOLALITY   TROPONIN       Radiologic studies results:  CTA CHEST W WO CONTRAST   Final Result   1. No acute intrathoracic findings. No CT angiographic evidence of pulmonary embolism. **This report has been created using voice recognition software. It may contain minor errors which are inherent in voice recognition technology. **          Final report electronically signed by Dr. Jazmine Ramirez on 10/22/2021 8:58 PM      XR CHEST PORTABLE   Final Result   1. Mild ill-defined infiltrate overlying the left perihilar region is noted which may related to mild atelectasis or pneumonia. **This report has been created using voice recognition software. It may contain minor errors which are inherent in voice recognition technology. **      Final report electronically signed by Dr. Jazmine Ramirez on 10/22/2021 6:04 PM          ED Medications administered this visit:   Medications   morphine injection 4 mg (4 mg IntraVENous Given 10/22/21 1754)   ondansetron (ZOFRAN) injection 4 mg (4 mg IntraVENous Given 10/22/21 1755)   morphine injection 4 mg (4 mg IntraVENous Given 10/22/21 1921)   iopamidol (ISOVUE-370) 76 % injection 80 mL (80 mLs IntraVENous Given 10/22/21 2005)         ED COURSE     ED Course as of Oct 22 2106   Fri Oct 22, 2021   1808 \"   IMPRESSION:  1.  Mild ill-defined infiltrate overlying the left perihilar region is noted which may related to mild atelectasis or pneumonia.   \"   XR CHEST PORTABLE [AL]   1834 Troponin T: < 0.010 [AL]   1834 Pro-BNP: 218.0 [AL]   1848 SARS-CoV-2, NAAT: NOT  DETECTED [AL]   6492 Basic Metabolic Panel w/ Reflex to MG(!):    Sodium 144   Potassium 3.6   Chloride 105   CO2 28   Glucose 82   BUN 24(!)   Creatinine 1.0   Calcium 9.9 [AL]   1848 Within normal limits within normal limits   Hepatic Function Panel(!):    Albumin 4.4   Bilirubin <0.2(!)   Bilirubin, Direct <0.2   Alk Phos 87   AST 24   ALT 19   Total Protein 6.8 [AL]   1848 No signs of infection   Urinalysis Reflex to Culture:    Glucose, UA NEGATIVE Bilirubin, Urine NEGATIVE   Ketones, Urine NEGATIVE   Specific Gravity, Urine 1.012   Blood, Urine NEGATIVE   pH, UA 7.0   Protein, UA NEGATIVE   Urobilinogen, Urine 0.2   Nitrite, Urine NEGATIVE   Leukocyte Esterase, Urine NEGATIVE   Color, UA YELLOW   Character, Urine CLEAR [AL]   3513 Patient continues to have dyspnea. CT was ordered for further evaluation. She is in the room hyperventilating at this time. However she has not become tachycardic and has not been hypoxic. [AL]   2104 \"IMPRESSION:  1. No acute intrathoracic findings. No CT angiographic evidence of pulmonary embolism.   \"   CTA CHEST W WO CONTRAST [AL]      ED Course User Index  [AL] Saravanan Kim MD     Strict return precautions and follow up instructions were discussed with the patient prior to discharge, with which the patient agrees. MEDICATION CHANGES     New Prescriptions    No medications on file         FINAL DISPOSITION     Final diagnoses:   Dyspnea and respiratory abnormalities   Chest pain, unspecified type     Condition: condition: good  Dispo: Discharge to home      This transcription was electronically signed. Parts of this transcriptions may have been dictated by use of voice recognition software and electronically transcribed, and parts may have been transcribed with the assistance of an ED scribe. The transcription may contain errors not detected in proofreading. Please refer to my supervising physician's documentation if my documentation differs.     Electronically Signed: Saravanan Kim MD, 10/22/21, 9:06 PM         Saravanan Kim MD  Resident  10/22/21 8833

## 2021-10-22 NOTE — PATIENT INSTRUCTIONS
keep a list of the medicines you take. How can you care for yourself at home? · Follow your asthma action plan to prevent and treat attacks. If you don't have an asthma action plan, work with your doctor to create one. · Take your asthma medicines exactly as prescribed. Talk to your doctor right away if you have any questions about how to take them. ? Use your quick-relief medicine when you have symptoms of an asthma attack. Some people need to use quick-relief medicine before they exercise to prevent asthma symptoms. Albuterol is a quick-relief medicine that is often used. In some cases, a certain type of controller inhaler is used as a quick-relief medicine. Ask your doctor what to use for quick relief. ? Take your controller medicine. If you have symptoms often, you will likely need to take it every day. Controller medicine usually includes an inhaled corticosteroid. The goal is to prevent problems before they occur. ? If your doctor prescribed corticosteroid pills to use during an attack, take them exactly as prescribed. It may take hours for the pills to work, but they may make the episode shorter and help you breathe better. ? Keep your quick-relief medicine with you at all times. · Talk to your doctor before using other medicines. Some medicines, such as aspirin, can cause asthma attacks in some people. · If you have a peak flow meter, use it to check how well you are breathing. This can help you predict when an asthma attack is going to occur. Then you can take medicine to prevent the asthma attack or make it less severe. · Do not smoke or allow others to smoke around you. Avoid smoky places. Smoking makes asthma worse. If you need help quitting, talk to your doctor about stop-smoking programs and medicines. These can increase your chances of quitting for good. · Learn what triggers an asthma attack for you, and avoid the triggers when you can.  Common triggers include colds, smoke, air pollution, dust, pollen, mold, pets, cockroaches, stress, and cold air. · Avoid colds and the flu. Talk to your doctor about getting a pneumococcal vaccine shot. If you have had one before, ask your doctor if you need a second dose. Get a flu vaccine every fall. If you must be around people with colds or the flu, wash your hands often. When should you call for help? Call 911 anytime you think you may need emergency care. For example, call if:    · You have severe trouble breathing. Call your doctor now or seek immediate medical care if:    · Your symptoms do not get better after you have followed your asthma action plan.     · You have new or worse trouble breathing.     · Your coughing and wheezing get worse.     · You cough up dark brown or bloody mucus (sputum).     · You have a new or higher fever. Watch closely for changes in your health, and be sure to contact your doctor if:    · You need to use quick-relief medicine on more than 2 days a week within a month (unless it is just for exercise).     · You cough more deeply or more often, especially if you notice more mucus or a change in the color of your mucus.     · You are not getting better as expected. Where can you learn more? Go to https://[employer]. org and sign in to your [employer] account. Enter O690 in the [employer] box to learn more about \"Asthma Attack: Care Instructions. \"     If you do not have an account, please click on the \"Sign Up Now\" link. Current as of: July 6, 2021               Content Version: 13.0  © 2006-2021 [employer]wise[employer] Incorporated. Care instructions adapted under license by [facility] ([facility]). If you have questions about a medical condition or this instruction, always ask your healthcare professional. [place] [address] any warranty or liability for your use of this information.

## 2021-10-22 NOTE — ED NOTES
Patient resting in bed. Respirations easy and unlabored. No distress noted. Call light within reach. Pain remains at an 8/10.      Blanco Cunningham RN  10/22/21 1911

## 2021-10-23 LAB
ALBUMIN SERPL-MCNC: 4.5 G/DL (ref 3.5–5.1)
ALP BLD-CCNC: 87 U/L (ref 38–126)
ALT SERPL-CCNC: 19 U/L (ref 11–66)
ANION GAP SERPL CALCULATED.3IONS-SCNC: 13 MEQ/L (ref 8–16)
AST SERPL-CCNC: 22 U/L (ref 5–40)
BILIRUB SERPL-MCNC: < 0.2 MG/DL (ref 0.3–1.2)
BUN BLDV-MCNC: 24 MG/DL (ref 7–22)
CALCIUM SERPL-MCNC: 9.7 MG/DL (ref 8.5–10.5)
CHLORIDE BLD-SCNC: 104 MEQ/L (ref 98–111)
CO2: 28 MEQ/L (ref 23–33)
CREAT SERPL-MCNC: 1 MG/DL (ref 0.4–1.2)
EKG ATRIAL RATE: 77 BPM
EKG P AXIS: 59 DEGREES
EKG P-R INTERVAL: 170 MS
EKG Q-T INTERVAL: 406 MS
EKG QRS DURATION: 78 MS
EKG QTC CALCULATION (BAZETT): 459 MS
EKG R AXIS: 54 DEGREES
EKG T AXIS: 28 DEGREES
EKG VENTRICULAR RATE: 77 BPM
GFR SERPL CREATININE-BSD FRML MDRD: 58 ML/MIN/1.73M2
GLUCOSE BLD-MCNC: 125 MG/DL (ref 70–108)
POTASSIUM SERPL-SCNC: 3.7 MEQ/L (ref 3.5–5.2)
SODIUM BLD-SCNC: 145 MEQ/L (ref 135–145)
T4 FREE: 1.01 NG/DL (ref 0.93–1.76)
TOTAL PROTEIN: 6.9 G/DL (ref 6.1–8)
TSH SERPL DL<=0.05 MIU/L-ACNC: 1.29 UIU/ML (ref 0.4–4.2)
URIC ACID: 7.9 MG/DL (ref 2.4–5.7)

## 2021-10-23 PROCEDURE — 93010 ELECTROCARDIOGRAM REPORT: CPT | Performed by: NUCLEAR MEDICINE

## 2021-10-23 NOTE — ED NOTES
Patient resting in bed. Respirations easy and unlabored. No distress noted. Call light within reach.        Michael Wallace RN  10/22/21 2019

## 2021-10-29 ENCOUNTER — HOSPITAL ENCOUNTER (OUTPATIENT)
Dept: PULMONOLOGY | Age: 51
Discharge: HOME | End: 2021-10-29
Payer: COMMERCIAL

## 2021-10-29 ENCOUNTER — APPOINTMENT (OUTPATIENT)
Dept: GENERAL RADIOLOGY | Age: 51
End: 2021-10-29
Payer: COMMERCIAL

## 2021-10-29 ENCOUNTER — HOSPITAL ENCOUNTER (EMERGENCY)
Age: 51
Discharge: HOME | End: 2021-10-29
Attending: EMERGENCY MEDICINE
Payer: COMMERCIAL

## 2021-10-29 VITALS
SYSTOLIC BLOOD PRESSURE: 124 MMHG | OXYGEN SATURATION: 100 % | WEIGHT: 211 LBS | RESPIRATION RATE: 18 BRPM | TEMPERATURE: 98.9 F | HEIGHT: 66 IN | BODY MASS INDEX: 33.91 KG/M2 | DIASTOLIC BLOOD PRESSURE: 94 MMHG | HEART RATE: 73 BPM

## 2021-10-29 DIAGNOSIS — J45.40 MODERATE PERSISTENT ASTHMA WITHOUT COMPLICATION: ICD-10-CM

## 2021-10-29 DIAGNOSIS — R06.09 DYSPNEA ON EXERTION: ICD-10-CM

## 2021-10-29 DIAGNOSIS — G47.10 HYPERSOMNIA: ICD-10-CM

## 2021-10-29 DIAGNOSIS — I10 ESSENTIAL HYPERTENSION: ICD-10-CM

## 2021-10-29 DIAGNOSIS — R07.9 CHEST PAIN, UNSPECIFIED TYPE: Primary | ICD-10-CM

## 2021-10-29 LAB
ANION GAP SERPL CALCULATED.3IONS-SCNC: 13 MEQ/L (ref 8–16)
BASOPHILS # BLD: 0.7 %
BASOPHILS ABSOLUTE: 0 THOU/MM3 (ref 0–0.1)
BUN BLDV-MCNC: 27 MG/DL (ref 7–22)
CALCIUM SERPL-MCNC: 9.7 MG/DL (ref 8.5–10.5)
CHLORIDE BLD-SCNC: 102 MEQ/L (ref 98–111)
CO2: 27 MEQ/L (ref 23–33)
CREAT SERPL-MCNC: 1.1 MG/DL (ref 0.4–1.2)
D-DIMER QUANTITATIVE: 462 NG/ML FEU (ref 0–500)
EKG ATRIAL RATE: 71 BPM
EKG P AXIS: 67 DEGREES
EKG P-R INTERVAL: 166 MS
EKG Q-T INTERVAL: 408 MS
EKG QRS DURATION: 82 MS
EKG QTC CALCULATION (BAZETT): 443 MS
EKG R AXIS: 41 DEGREES
EKG T AXIS: 34 DEGREES
EKG VENTRICULAR RATE: 71 BPM
EOSINOPHIL # BLD: 2.7 %
EOSINOPHILS ABSOLUTE: 0.2 THOU/MM3 (ref 0–0.4)
ERYTHROCYTE [DISTWIDTH] IN BLOOD BY AUTOMATED COUNT: 14.6 % (ref 11.5–14.5)
ERYTHROCYTE [DISTWIDTH] IN BLOOD BY AUTOMATED COUNT: 46.3 FL (ref 35–45)
GFR SERPL CREATININE-BSD FRML MDRD: 52 ML/MIN/1.73M2
GLUCOSE BLD-MCNC: 60 MG/DL (ref 70–108)
HCT VFR BLD CALC: 41.3 % (ref 37–47)
HEMOGLOBIN: 13.3 GM/DL (ref 12–16)
IMMATURE GRANS (ABS): 0.01 THOU/MM3 (ref 0–0.07)
IMMATURE GRANULOCYTES: 0.2 %
LYMPHOCYTES # BLD: 40.9 %
LYMPHOCYTES ABSOLUTE: 2.4 THOU/MM3 (ref 1–4.8)
MCH RBC QN AUTO: 27.9 PG (ref 26–33)
MCHC RBC AUTO-ENTMCNC: 32.2 GM/DL (ref 32.2–35.5)
MCV RBC AUTO: 86.6 FL (ref 81–99)
MONOCYTES # BLD: 9.4 %
MONOCYTES ABSOLUTE: 0.5 THOU/MM3 (ref 0.4–1.3)
NUCLEATED RED BLOOD CELLS: 0 /100 WBC
PLATELET # BLD: 241 THOU/MM3 (ref 130–400)
PMV BLD AUTO: 11.1 FL (ref 9.4–12.4)
POTASSIUM REFLEX MAGNESIUM: 4.3 MEQ/L (ref 3.5–5.2)
PRO-BNP: 116.2 PG/ML (ref 0–900)
RBC # BLD: 4.77 MILL/MM3 (ref 4.2–5.4)
SEG NEUTROPHILS: 46.1 %
SEGMENTED NEUTROPHILS ABSOLUTE COUNT: 2.7 THOU/MM3 (ref 1.8–7.7)
SODIUM BLD-SCNC: 142 MEQ/L (ref 135–145)
TROPONIN T: < 0.01 NG/ML
WBC # BLD: 5.8 THOU/MM3 (ref 4.8–10.8)

## 2021-10-29 PROCEDURE — 36415 COLL VENOUS BLD VENIPUNCTURE: CPT

## 2021-10-29 PROCEDURE — 94010 BREATHING CAPACITY TEST: CPT

## 2021-10-29 PROCEDURE — 96374 THER/PROPH/DIAG INJ IV PUSH: CPT

## 2021-10-29 PROCEDURE — 94726 PLETHYSMOGRAPHY LUNG VOLUMES: CPT

## 2021-10-29 PROCEDURE — 71045 X-RAY EXAM CHEST 1 VIEW: CPT

## 2021-10-29 PROCEDURE — 84484 ASSAY OF TROPONIN QUANT: CPT

## 2021-10-29 PROCEDURE — 85379 FIBRIN DEGRADATION QUANT: CPT

## 2021-10-29 PROCEDURE — 80048 BASIC METABOLIC PNL TOTAL CA: CPT

## 2021-10-29 PROCEDURE — 94729 DIFFUSING CAPACITY: CPT

## 2021-10-29 PROCEDURE — 83880 ASSAY OF NATRIURETIC PEPTIDE: CPT

## 2021-10-29 PROCEDURE — 99285 EMERGENCY DEPT VISIT HI MDM: CPT

## 2021-10-29 PROCEDURE — 6360000002 HC RX W HCPCS: Performed by: EMERGENCY MEDICINE

## 2021-10-29 PROCEDURE — 93005 ELECTROCARDIOGRAM TRACING: CPT | Performed by: STUDENT IN AN ORGANIZED HEALTH CARE EDUCATION/TRAINING PROGRAM

## 2021-10-29 PROCEDURE — 96375 TX/PRO/DX INJ NEW DRUG ADDON: CPT

## 2021-10-29 PROCEDURE — 85025 COMPLETE CBC W/AUTO DIFF WBC: CPT

## 2021-10-29 RX ORDER — MORPHINE SULFATE 4 MG/ML
4 INJECTION, SOLUTION INTRAMUSCULAR; INTRAVENOUS ONCE
Status: COMPLETED | OUTPATIENT
Start: 2021-10-29 | End: 2021-10-29

## 2021-10-29 RX ORDER — ONDANSETRON 2 MG/ML
4 INJECTION INTRAMUSCULAR; INTRAVENOUS ONCE
Status: COMPLETED | OUTPATIENT
Start: 2021-10-29 | End: 2021-10-29

## 2021-10-29 RX ADMIN — ONDANSETRON 4 MG: 2 INJECTION INTRAMUSCULAR; INTRAVENOUS at 15:32

## 2021-10-29 RX ADMIN — MORPHINE SULFATE 4 MG: 4 INJECTION, SOLUTION INTRAMUSCULAR; INTRAVENOUS at 15:32

## 2021-10-29 ASSESSMENT — PAIN SCALES - GENERAL
PAINLEVEL_OUTOF10: 10
PAINLEVEL_OUTOF10: 9
PAINLEVEL_OUTOF10: 9

## 2021-10-29 ASSESSMENT — ENCOUNTER SYMPTOMS
SORE THROAT: 0
VOMITING: 0
BLOOD IN STOOL: 0
DIARRHEA: 0
TROUBLE SWALLOWING: 0
ABDOMINAL PAIN: 0
NAUSEA: 0
SHORTNESS OF BREATH: 0
COUGH: 0

## 2021-10-29 ASSESSMENT — PAIN DESCRIPTION - PAIN TYPE
TYPE: ACUTE PAIN
TYPE: ACUTE PAIN

## 2021-10-29 ASSESSMENT — PAIN DESCRIPTION - LOCATION
LOCATION: CHEST
LOCATION: CHEST

## 2021-10-29 NOTE — ED PROVIDER NOTES
[place] ENCOUNTER          Pt Name: Kristyn Caba  MRN: [id number]  [place] 1970  Date of evaluation: 10/29/2021  Treating Resident Physician: Mily Poole MD  Supervising Physician: Jose Reyes, [facility]       Chief Complaint   Patient presents with    Chest Pain     History obtained from the patient. HISTORY OF PRESENT ILLNESS    HPI  Kristyn Caba is a 44 y.o. female with PMHx of pulmonary embolism and atrial fibrillation, currently on Eliquis who presents to the emergency department for evaluation of chest pain. Patient was at a pulmonology appointment upstairs undergoing pulmonary function testing to evaluate her lungs post COVID-19 infection and she developed acute onset substernal chest pain with radiation to the left arm and neck. She reports nausea but denies palpitations, syncope, leg swelling and shortness of breath  The patient has no other acute complaints at this time. REVIEW OF SYSTEMS   Review of Systems   Constitutional: Negative for chills, diaphoresis, fatigue and fever. HENT: Negative for sore throat and trouble swallowing. Eyes: Negative for visual disturbance. Respiratory: Negative for cough and shortness of breath. Cardiovascular: Positive for chest pain. Negative for palpitations and leg swelling. Gastrointestinal: Negative for abdominal pain, blood in stool, diarrhea, nausea and vomiting. Genitourinary: Negative for dysuria, hematuria and urgency. Musculoskeletal: Negative for arthralgias and myalgias. Skin: Negative for rash. Neurological: Negative for seizures, syncope, numbness and headaches.          PAST MEDICAL AND SURGICAL HISTORY     Past Medical History:   Diagnosis Date    Anxiety     Arthritis     Asthma     Nuñez's esophagus     Blood circulation, collateral     GERD (gastroesophageal reflux disease)     Hx of blood clots     Hypertension     Insomnia     Interstitial cystitis     Migraine     Pneumonia     Psychiatric problem     Spinal stenosis of lumbar region      Past Surgical History:   Procedure Laterality Date    ABDOMEN SURGERY  93    exploratory surgery    APPENDECTOMY      CARDIAC CATHETERIZATION  unsure    CARPAL TUNNEL RELEASE Left     CHOLECYSTECTOMY      COLONOSCOPY  2010    COLONOSCOPY N/A 8/1/2021    COLONOSCOPY WITH BIOPSY performed by Devendra Jarquin MD at [address] ESOPHAGUS  2010    ENDOSCOPY, COLON, DIAGNOSTIC  2010    OTHER SURGICAL HISTORY  21 Nov 2014    Cholecystectomy Laparoscopic (Dr. Meek Herrera, [facility])    OTHER SURGICAL HISTORY N/A 03-21-16    lumbar epidurala block L4-5    OTHER SURGICAL HISTORY N/A 04-04-16    lumbar epidural block L4-5    OTHER SURGICAL HISTORY Bilateral 6-13-16    facet blocks at L4-5, L5-S1    OTHER SURGICAL HISTORY Left 09/13/2016    RFA lumbar L4-S1    OVARIAN CYST REMOVAL  93    PATELLAR TENDON REPAIR Right 1994    3 screws in ankle.  TUBAL LIGATION  93    UPPER GASTROINTESTINAL ENDOSCOPY  2012         MEDICATIONS     Current Facility-Administered Medications:     morphine injection 4 mg, 4 mg, IntraVENous, Once, Ileana Martinez, DO    ondansetron [facility]) injection 4 mg, 4 mg, IntraVENous, Once, Ileana Martinez, DO    Current Outpatient Medications:     tiZANidine (ZANAFLEX) 4 MG tablet, Take 1 tablet by mouth every 6 hours as needed (spasms), Disp: 120 tablet, Rfl: 0    gabapentin (NEURONTIN) 600 MG tablet, Take 1 tablet by mouth 4 times daily for 30 days. , Disp: 120 tablet, Rfl: 0    ciprofloxacin (CIPRO) 500 MG tablet, Take 500 mg by mouth 2 times daily, Disp: , Rfl:     Meth-Hyo-M Bl-Na Phos-Ph Sal (UTIRA-C PO), Take by mouth, Disp: , Rfl:     rOPINIRole (REQUIP) 2 MG tablet, Take 2 mg by mouth 3 times daily, Disp: , Rfl:     zolpidem (AMBIEN) 10 MG tablet, Take by mouth nightly as needed for Sleep., Disp: , Rfl:     pantoprazole (PROTONIX) 40 MG tablet, Take 40 mg by mouth 2 times daily , Disp: , Rfl:     Prenatal Vit-Fe Fumarate-FA (PRENATAL VITAMIN) 27-1 MG TABS tablet, Take 1 tablet by mouth daily, Disp: , Rfl:     metoprolol tartrate (LOPRESSOR) 50 MG tablet, Take 50 mg by mouth 2 times daily, Disp: , Rfl:     Loratadine-Pseudoephedrine (CLARITIN-D 12 HOUR PO), Take 1 tablet by mouth every 12 hours, Disp: , Rfl:     Rimegepant Sulfate (NURTEC) 75 MG TBDP, Dissolve 1 tablet PO PRN for migraine no more than 1 a day, Disp: , Rfl:     Cholecalciferol (VITAMIN D3) 50 MCG (2000 UT) TABS, Take 1 tablet by mouth daily, Disp: , Rfl:     ferrous sulfate (IRON 325) 325 (65 Fe) MG tablet, Take 325 mg by mouth daily, Disp: , Rfl:     dicyclomine (BENTYL) 10 MG capsule, Take 10 mg by mouth 3 times daily, Disp: , Rfl:     losartan (COZAAR) 25 MG tablet, Take 25 mg by mouth daily, Disp: , Rfl:     apixaban (ELIQUIS) 5 MG TABS tablet, Take 5 mg by mouth 2 times daily, Disp: , Rfl:     Promethazine HCl (PHENERGAN PO), Take 25 mg by mouth 2 times daily as needed (for nausea) , Disp: , Rfl:     oxybutynin (DITROPAN) 5 MG tablet, Take 1 tablet by mouth 3 times daily , Disp: , Rfl:     sucralfate (CARAFATE) 1 GM tablet, Take 1 tablet by mouth 4 times daily, Disp: 120 tablet, Rfl: 1    naloxone (NARCAN) 4 MG/0.1ML LIQD nasal spray, 1 spray by Nasal route as needed for Opioid Reversal, Disp: 1 each, Rfl: 0    topiramate (TOPAMAX) 100 MG tablet, Take 1 tablet by mouth 2 times daily, Disp: 60 tablet, Rfl: 5    oxyCODONE HCl (OXY-IR) 10 MG immediate release tablet, Take 1 tablet by mouth every 6 hours as needed for Pain for up to 30 days.  Intended supply: 30 days, Disp: 120 tablet, Rfl: 0    albuterol (PROVENTIL) (2.5 MG/3ML) 0.083% nebulizer solution, Take 3 mLs by nebulization every 6 hours as needed for Wheezing Use 1 vial in nebulizer QID, Disp: 120 each, Rfl: 8    budesonide-formoterol (SYMBICORT) 160-4.5 MCG/ACT AERO, Inhale 2 puffs into the lungs 2 times daily Rinse mouth after its use., Disp: 1 each, Rfl: 11    hydrOXYzine (ATARAX) 50 MG tablet, Take 50 mg by mouth 4 times daily as needed , Disp: , Rfl:     montelukast (SINGULAIR) 10 MG tablet, Take 10 mg by mouth daily, Disp: , Rfl:     tiotropium (SPIRIVA RESPIMAT) 1.25 MCG/ACT AERS inhaler, Inhale 1 puff into the lungs daily, Disp: , Rfl:     albuterol (PROVENTIL HFA;VENTOLIN HFA) 108 (90 BASE) MCG/ACT inhaler, Inhale 2 puffs into the lungs every 4 hours as needed for Wheezing or Shortness of Breath Whichever brand is covered by insurance, substitute as necessary. , Disp: 1 Inhaler, Rfl: 2      SOCIAL HISTORY     Social History     Social History Narrative    Not on file     Social History     Tobacco Use    Smoking status: Never Smoker    Smokeless tobacco: Never Used   Vaping Use    Vaping Use: Never used   Substance Use Topics    Alcohol use: Yes     Alcohol/week: 0.0 standard drinks     Comment: rarely    Drug use: No         ALLERGIES     Allergies   Allergen Reactions    Ketorolac Hives    Adhesive Tape Other (See Comments)     blisters    Compazine [Prochlorperazine] Hives     Patient has no issues with promethazine    Erythromycin     Haloperidol And Related     Lyrica [Pregabalin]     Reglan [Metoclopramide] Other (See Comments)     States \" made me crazy - kicking and screaming - very anxious\"    Sulfa Antibiotics Hives         FAMILY HISTORY     Family History   Problem Relation Age of Onset    Heart Disease Mother     Inflam Bowel Dis Mother     Heart Disease Father     Heart Attack Brother          PREVIOUS RECORDS   Previous records reviewed: I reviewed the patient's past medical records including relevant labs, imaging and procedures.         PHYSICAL EXAM     ED Triage Vitals [10/29/21 1331]   BP Temp Temp Source Pulse Resp SpO2 Height Weight   112/67 98.9 °F (37.2 °C) Oral 73 13 100 % 5' 6\" (1.676 m) 211 lb (95.7 kg)     Initial vital signs and nursing assessment reviewed and normal. Body mass index is 34.06 kg/m². Pulsoximetry is normal per my interpretation. Additional Vital Signs:  Vitals:    10/29/21 1521   BP: (!) 105/43   Pulse: 83   Resp: 18   Temp:    SpO2: 100%       Physical Exam  Vitals and nursing note reviewed. Constitutional:       Appearance: Normal appearance. HENT:      Head: Normocephalic and atraumatic. Right Ear: Tympanic membrane normal.      Left Ear: Tympanic membrane normal.      Nose: Nose normal.      Mouth/Throat:      Mouth: Mucous membranes are moist.      Pharynx: Oropharynx is clear. No oropharyngeal exudate. Eyes:      General: No scleral icterus. Extraocular Movements: Extraocular movements intact. Conjunctiva/sclera: Conjunctivae normal.      Pupils: Pupils are equal, round, and reactive to light. Cardiovascular:      Rate and Rhythm: Normal rate and regular rhythm. Pulses: Normal pulses. Heart sounds: Normal heart sounds. No murmur heard. No friction rub. No gallop. Pulmonary:      Effort: Pulmonary effort is normal. No respiratory distress. Breath sounds: Normal breath sounds. Abdominal:      Palpations: Abdomen is soft. Tenderness: There is no abdominal tenderness. There is no guarding or rebound. Musculoskeletal:         General: No swelling or tenderness. Normal range of motion. Cervical back: Normal range of motion and neck supple. Right lower leg: No edema. Left lower leg: No edema. Skin:     General: Skin is warm and dry. Capillary Refill: Capillary refill takes less than 2 seconds. Neurological:      General: No focal deficit present. Mental Status: She is alert and oriented to person, place, and time. Cranial Nerves: No cranial nerve deficit. Motor: No weakness. MEDICAL DECISION MAKING   Initial Assessment:   18-year-old female with past medical history of PE and COVID-19 infection.   Patient presented to our ED 1 week ago and CTA was negative for PE and dissection. Work-up was negative. We will still evaluate for ACS and other acute causes of chest pain. Differential diagnosis includes but is not limited to:  ACS, aortic dissection, pneumonia, PE, costochondritis    MDM:   EKG and troponin were normal therefore I have low suspicion for ACS. Chest x-ray was unremarkable thereby ruling out pneumonia or pneumothorax. Patient had a normal CTA a week ago therefore I do not suspect dissection or PE. Furthermore D-dimer is not elevated. Multiple etiologies were considered in the workup of this patient's complaint and presentation. The patient's chest pain is not suggestive of pulmonary embolus, cardiac ischemia, aortic dissection, or other serious etiology. The presentation is not consistent with these entities. Given the extremely low risk of these diagnoses further testing and evaluation for these possibilities in the Emergency Department today is not indicated at this time. Strict follow up and return precautions have been discussed with the patient and they agree. The patient's HEART score is 2, indicating a low risk for major adverse cardiac events within the next 6 weeks. ED RESULTS   Laboratory results:  Labs Reviewed   CBC WITH AUTO DIFFERENTIAL - Abnormal; Notable for the following components:       Result Value    RDW-CV 14.6 (*)     RDW-SD 46.3 (*)     All other components within normal limits   BASIC METABOLIC PANEL W/ REFLEX TO MG FOR LOW K - Abnormal; Notable for the following components:    Glucose 60 (*)     BUN 27 (*)     All other components within normal limits   GLOMERULAR FILTRATION RATE, ESTIMATED - Abnormal; Notable for the following components:    Est, Glom Filt Rate 52 (*)     All other components within normal limits   TROPONIN   BRAIN NATRIURETIC PEPTIDE   D-DIMER, QUANTITATIVE   ANION GAP       Radiologic studies results:  XR CHEST PORTABLE   Final Result   Improved aeration is noted.  No acute intrathoracic process is observed. **This report has been created using voice recognition software. It may contain minor errors which are inherent in voice recognition technology. **      Final report electronically signed by Dr Luisa Lott on 10/29/2021 2:33 PM          ED Medications administered this visit:   Medications   morphine injection 4 mg (has no administration in time range)   ondansetron (ZOFRAN) injection 4 mg (has no administration in time range)         ED COURSE              Strict return precautions and follow up instructions were discussed with the patient prior to discharge, with which the patient agrees. MEDICATION CHANGES     New Prescriptions    No medications on file         FINAL DISPOSITION     Final diagnoses:   Chest pain, unspecified type     Condition: condition: stable  Dispo: Discharge to home      This transcription was electronically signed. Parts of this transcriptions may have been dictated by use of voice recognition software and electronically transcribed, and parts may have been transcribed with the assistance of an ED scribe. The transcription may contain errors not detected in proofreading. Please refer to my supervising physician's documentation if my documentation differs.     Electronically Signed: Venus Johnson MD, 10/29/21, 3:30 PM         Lissette Dc MD  Resident  10/29/21 7733       Lissette Dc MD  Resident  10/29/21 [address] Fabrice Hernandez MD  Resident  10/29/21 3940

## 2021-10-29 NOTE — ED NOTES
In for rounding on pt. Pt is requesting something for pain and nausea.      Noni Scott  10/29/21 2999

## 2021-10-29 NOTE — ED TRIAGE NOTES
PT states I was having a PFT & suddenly developed chest pain.   EKG completed & connected to monitor, rates pain 10/10

## 2021-11-01 DIAGNOSIS — G89.4 CHRONIC PAIN SYNDROME: ICD-10-CM

## 2021-11-01 DIAGNOSIS — R10.84 GENERALIZED ABDOMINAL PAIN: ICD-10-CM

## 2021-11-01 DIAGNOSIS — M47.816 SPONDYLOSIS OF LUMBAR REGION WITHOUT MYELOPATHY OR RADICULOPATHY: ICD-10-CM

## 2021-11-01 DIAGNOSIS — M51.36 DDD (DEGENERATIVE DISC DISEASE), LUMBAR: ICD-10-CM

## 2021-11-01 DIAGNOSIS — M54.16 LUMBAR RADICULITIS: ICD-10-CM

## 2021-11-01 DIAGNOSIS — M48.062 SPINAL STENOSIS OF LUMBAR REGION WITH NEUROGENIC CLAUDICATION: ICD-10-CM

## 2021-11-01 DIAGNOSIS — M51.26 LUMBAR DISC HERNIATION: ICD-10-CM

## 2021-11-01 DIAGNOSIS — M79.18 MYOFASCIAL PAIN DYSFUNCTION SYNDROME: ICD-10-CM

## 2021-11-01 RX ORDER — TIZANIDINE 4 MG/1
4 TABLET ORAL EVERY 6 HOURS PRN
Qty: 120 TABLET | Refills: 0 | Status: SHIPPED | OUTPATIENT
Start: 2021-11-03 | End: 2022-01-03 | Stop reason: SDUPTHER

## 2021-11-01 RX ORDER — GABAPENTIN 600 MG/1
600 TABLET ORAL 4 TIMES DAILY
Qty: 120 TABLET | Refills: 0 | Status: SHIPPED | OUTPATIENT
Start: 2021-11-03 | End: 2021-12-03

## 2021-11-01 RX ORDER — OXYCODONE HYDROCHLORIDE 10 MG/1
10 TABLET ORAL EVERY 6 HOURS PRN
Qty: 120 TABLET | Refills: 0 | Status: SHIPPED | OUTPATIENT
Start: 2021-11-03 | End: 2021-12-06 | Stop reason: SDUPTHER

## 2021-11-01 NOTE — TELEPHONE ENCOUNTER
Izabel Rain called requesting a refill on the following medications:  Requested Prescriptions     Pending Prescriptions Disp Refills    tiZANidine (ZANAFLEX) 4 MG tablet 120 tablet 0     Sig: Take 1 tablet by mouth every 6 hours as needed (spasms)    oxyCODONE HCl (OXY-IR) 10 MG immediate release tablet 120 tablet 0     Sig: Take 1 tablet by mouth every 6 hours as needed for Pain for up to 30 days. Intended supply: 30 days    gabapentin (NEURONTIN) 600 MG tablet 120 tablet 0     Sig: Take 1 tablet by mouth 4 times daily for 30 days.      Pharmacy verified: [facility] Villeg[facility]      Date of last visit: 8/3/21  Date of next visit (if applicable): 65/41/1571

## 2021-11-01 NOTE — TELEPHONE ENCOUNTER
OARRS reviewed. UDS: + for Zolpidem, Hydromorphone, Gabapentin, Noroxycodone, Oxycodone, Oxymorphone, Zolpidem-Carboxyl  Last seen: 8/3/2021.  Follow-up: 11/18/21

## 2021-11-30 ENCOUNTER — APPOINTMENT (OUTPATIENT)
Dept: GENERAL RADIOLOGY | Age: 51
End: 2021-11-30
Payer: COMMERCIAL

## 2021-11-30 ENCOUNTER — HOSPITAL ENCOUNTER (EMERGENCY)
Age: 51
Discharge: HOME | End: 2021-11-30
Attending: EMERGENCY MEDICINE
Payer: COMMERCIAL

## 2021-11-30 ENCOUNTER — OFFICE VISIT (OUTPATIENT)
Dept: PHYSICAL MEDICINE AND REHAB | Age: 51
End: 2021-11-30
Payer: COMMERCIAL

## 2021-11-30 ENCOUNTER — APPOINTMENT (OUTPATIENT)
Dept: CT IMAGING | Age: 51
End: 2021-11-30
Payer: COMMERCIAL

## 2021-11-30 VITALS
DIASTOLIC BLOOD PRESSURE: 80 MMHG | SYSTOLIC BLOOD PRESSURE: 127 MMHG | OXYGEN SATURATION: 98 % | HEART RATE: 66 BPM | TEMPERATURE: 98.1 F | RESPIRATION RATE: 15 BRPM

## 2021-11-30 VITALS
SYSTOLIC BLOOD PRESSURE: 102 MMHG | DIASTOLIC BLOOD PRESSURE: 80 MMHG | BODY MASS INDEX: 33.91 KG/M2 | WEIGHT: 211 LBS | HEIGHT: 66 IN

## 2021-11-30 DIAGNOSIS — I95.9 HYPOTENSION, UNSPECIFIED HYPOTENSION TYPE: ICD-10-CM

## 2021-11-30 DIAGNOSIS — M79.18 MYOFASCIAL PAIN DYSFUNCTION SYNDROME: Primary | ICD-10-CM

## 2021-11-30 DIAGNOSIS — E86.0 DEHYDRATION: Primary | ICD-10-CM

## 2021-11-30 LAB
ALBUMIN SERPL-MCNC: 4 G/DL (ref 3.5–5.1)
ALP BLD-CCNC: 98 U/L (ref 38–126)
ALT SERPL-CCNC: 25 U/L (ref 11–66)
ANION GAP SERPL CALCULATED.3IONS-SCNC: 11 MEQ/L (ref 8–16)
AST SERPL-CCNC: 22 U/L (ref 5–40)
BASOPHILS # BLD: 1 %
BASOPHILS ABSOLUTE: 0.1 THOU/MM3 (ref 0–0.1)
BILIRUB SERPL-MCNC: 0.2 MG/DL (ref 0.3–1.2)
BILIRUBIN DIRECT: < 0.2 MG/DL (ref 0–0.3)
BUN BLDV-MCNC: 26 MG/DL (ref 7–22)
CALCIUM SERPL-MCNC: 9.6 MG/DL (ref 8.5–10.5)
CHLORIDE BLD-SCNC: 103 MEQ/L (ref 98–111)
CO2: 25 MEQ/L (ref 23–33)
CREAT SERPL-MCNC: 1.1 MG/DL (ref 0.4–1.2)
D-DIMER QUANTITATIVE: 562 NG/ML FEU (ref 0–500)
EKG ATRIAL RATE: 59 BPM
EKG P AXIS: 48 DEGREES
EKG P-R INTERVAL: 184 MS
EKG Q-T INTERVAL: 444 MS
EKG QRS DURATION: 78 MS
EKG QTC CALCULATION (BAZETT): 439 MS
EKG R AXIS: 19 DEGREES
EKG T AXIS: 26 DEGREES
EKG VENTRICULAR RATE: 59 BPM
EOSINOPHIL # BLD: 2 %
EOSINOPHILS ABSOLUTE: 0.1 THOU/MM3 (ref 0–0.4)
ERYTHROCYTE [DISTWIDTH] IN BLOOD BY AUTOMATED COUNT: 15.2 % (ref 11.5–14.5)
ERYTHROCYTE [DISTWIDTH] IN BLOOD BY AUTOMATED COUNT: 48.8 FL (ref 35–45)
ETHYL ALCOHOL, SERUM: < 0.01 %
GFR SERPL CREATININE-BSD FRML MDRD: 52 ML/MIN/1.73M2
GLUCOSE BLD-MCNC: 100 MG/DL (ref 70–108)
HCT VFR BLD CALC: 42.2 % (ref 37–47)
HEMOGLOBIN: 13.4 GM/DL (ref 12–16)
IMMATURE GRANS (ABS): 0.02 THOU/MM3 (ref 0–0.07)
IMMATURE GRANULOCYTES: 0.3 %
LYMPHOCYTES # BLD: 37.1 %
LYMPHOCYTES ABSOLUTE: 2.7 THOU/MM3 (ref 1–4.8)
MCH RBC QN AUTO: 27.8 PG (ref 26–33)
MCHC RBC AUTO-ENTMCNC: 31.8 GM/DL (ref 32.2–35.5)
MCV RBC AUTO: 87.6 FL (ref 81–99)
MONOCYTES # BLD: 7.4 %
MONOCYTES ABSOLUTE: 0.5 THOU/MM3 (ref 0.4–1.3)
NUCLEATED RED BLOOD CELLS: 0 /100 WBC
OSMOLALITY CALCULATION: 282.4 MOSMOL/KG (ref 275–300)
PLATELET # BLD: 263 THOU/MM3 (ref 130–400)
PMV BLD AUTO: 11 FL (ref 9.4–12.4)
POTASSIUM REFLEX MAGNESIUM: 4.5 MEQ/L (ref 3.5–5.2)
PRO-BNP: 174.5 PG/ML (ref 0–900)
RBC # BLD: 4.82 MILL/MM3 (ref 4.2–5.4)
SEG NEUTROPHILS: 52.2 %
SEGMENTED NEUTROPHILS ABSOLUTE COUNT: 3.8 THOU/MM3 (ref 1.8–7.7)
SODIUM BLD-SCNC: 139 MEQ/L (ref 135–145)
TOTAL PROTEIN: 6.7 G/DL (ref 6.1–8)
TROPONIN T: < 0.01 NG/ML
WBC # BLD: 7.3 THOU/MM3 (ref 4.8–10.8)

## 2021-11-30 PROCEDURE — 6360000004 HC RX CONTRAST MEDICATION: Performed by: EMERGENCY MEDICINE

## 2021-11-30 PROCEDURE — 82077 ASSAY SPEC XCP UR&BREATH IA: CPT

## 2021-11-30 PROCEDURE — 96361 HYDRATE IV INFUSION ADD-ON: CPT

## 2021-11-30 PROCEDURE — 93005 ELECTROCARDIOGRAM TRACING: CPT | Performed by: STUDENT IN AN ORGANIZED HEALTH CARE EDUCATION/TRAINING PROGRAM

## 2021-11-30 PROCEDURE — 71275 CT ANGIOGRAPHY CHEST: CPT

## 2021-11-30 PROCEDURE — 85025 COMPLETE CBC W/AUTO DIFF WBC: CPT

## 2021-11-30 PROCEDURE — 20553 NJX 1/MLT TRIGGER POINTS 3/>: CPT | Performed by: PAIN MEDICINE

## 2021-11-30 PROCEDURE — 80076 HEPATIC FUNCTION PANEL: CPT

## 2021-11-30 PROCEDURE — 2580000003 HC RX 258: Performed by: STUDENT IN AN ORGANIZED HEALTH CARE EDUCATION/TRAINING PROGRAM

## 2021-11-30 PROCEDURE — 96360 HYDRATION IV INFUSION INIT: CPT

## 2021-11-30 PROCEDURE — 71045 X-RAY EXAM CHEST 1 VIEW: CPT

## 2021-11-30 PROCEDURE — 99283 EMERGENCY DEPT VISIT LOW MDM: CPT

## 2021-11-30 PROCEDURE — 83880 ASSAY OF NATRIURETIC PEPTIDE: CPT

## 2021-11-30 PROCEDURE — 80048 BASIC METABOLIC PNL TOTAL CA: CPT

## 2021-11-30 PROCEDURE — 85379 FIBRIN DEGRADATION QUANT: CPT

## 2021-11-30 PROCEDURE — 36415 COLL VENOUS BLD VENIPUNCTURE: CPT

## 2021-11-30 PROCEDURE — 84484 ASSAY OF TROPONIN QUANT: CPT

## 2021-11-30 RX ORDER — FUROSEMIDE 40 MG/1
TABLET ORAL
COMMUNITY
Start: 2021-11-29

## 2021-11-30 RX ORDER — 0.9 % SODIUM CHLORIDE 0.9 %
1000 INTRAVENOUS SOLUTION INTRAVENOUS ONCE
Status: COMPLETED | OUTPATIENT
Start: 2021-11-30 | End: 2021-11-30

## 2021-11-30 RX ORDER — 0.9 % SODIUM CHLORIDE 0.9 %
500 INTRAVENOUS SOLUTION INTRAVENOUS ONCE
Status: COMPLETED | OUTPATIENT
Start: 2021-11-30 | End: 2021-11-30

## 2021-11-30 RX ORDER — NALOXONE HYDROCHLORIDE 1 MG/ML
0.04 INJECTION INTRAMUSCULAR; INTRAVENOUS; SUBCUTANEOUS PRN
Status: DISCONTINUED | OUTPATIENT
Start: 2021-11-30 | End: 2021-11-30

## 2021-11-30 RX ADMIN — SODIUM CHLORIDE 500 ML: 9 INJECTION, SOLUTION INTRAVENOUS at 13:20

## 2021-11-30 RX ADMIN — IOPAMIDOL 80 ML: 755 INJECTION, SOLUTION INTRAVENOUS at 15:47

## 2021-11-30 RX ADMIN — SODIUM CHLORIDE 1000 ML: 9 INJECTION, SOLUTION INTRAVENOUS at 14:56

## 2021-11-30 ASSESSMENT — ENCOUNTER SYMPTOMS
ABDOMINAL PAIN: 0
SHORTNESS OF BREATH: 0
WHEEZING: 0
CHEST TIGHTNESS: 0
COLOR CHANGE: 0
DIARRHEA: 0
NAUSEA: 0
SINUS PAIN: 0
SORE THROAT: 0
SINUS PRESSURE: 0
CONSTIPATION: 0
COUGH: 0
BACK PAIN: 1
VOMITING: 0

## 2021-11-30 NOTE — PROGRESS NOTES
Procedure  Visit Date: 11/30/21    Imani Lim is a 46 y.o. female presents today in the office for the following:      History of Present Illness   Steven Axel is here today for trigger point injections. Pre-Op Diagnosis   Myofacial pain syndrome     Post-Op Diagnosis   Myofacial pain syndrome     Procedure Documentation   Patient identified. Consent signed. Site identified. A solution of 9cc 0.25% marcaine and 40mg (1cc) DepoMedrol was combined in each syringe. Site was sprayed with Ethyl chloride and then prepped with alcohol swap X 3. Then with a 25g needle entered each trigger point and after negative aspiration, injected 1-2 cc of Marcaine/DepoMedrol solution at each site. A total of 20 cc was used for procedure. Total of 10 sites were injected into 3 muscles. Muscles injected Trapezius, thoracic and lumbar spinal muscles  Vitals:    11/30/21 1105   BP: 102/80   Weight: 211 lb (95.7 kg)   Height: 5' 6\" (1.676 m)       Conclusion   No complications encountered. Patient discharged stable condition. Patient told if any problems to call office or go to ER. No orders of the defined types were placed in this encounter.       Electronically signed by Shen Lam MD on 11/30/21 at 11:13 AM EST

## 2021-11-30 NOTE — PROGRESS NOTES
[address] [facility] [address]  Dept: [phone]  Dept Fax: [id number]: [phone]    Visit Date: 11/30/2021    Functionality Assessment/Goals Worksheet     On a scale of 0 (Does not Interfere) to 10 (Completely Interferes)     1. Which number describes how during the past week pain has interfered with       the following:  A. General Activity:  9  B. Mood: 7  C. Walking Ability:  9  D. Normal Work (Includes both work outside the home and housework):  9  E. Relations with Other People:   4  F. Sleep:   9  G. Enjoyment of Life:   7    2. Patient Prefers to Take their Pain Medications:     [x]  On a regular basis   []  Only when necessary    []  Does not take pain medications    3. What are the Patient's Goals/Expectations for Visiting Pain Management?      []  Learn about my pain    []  Receive Medication   []  Physical Therapy     []  Treat Depression   []  Receive Injections    []  Treat Sleep   []  Deal with Anxiety and Stress   []  Treat Opoid Dependence/Addiction   [x]  Other:

## 2021-11-30 NOTE — ED NOTES
Pt resting on cot. Respirations easy and unlabored. No needs voiced.      Tahir Licona RN  11/30/21 5123

## 2021-11-30 NOTE — ED PROVIDER NOTES
[facility] ENCOUNTER        Pt Name: James Pennington  MRN: [id number]  [place] 1970  Date of evaluation: 11/30/2021  Treating Resident Physician: Brent Corey DO  Supervising Physician: [address] [place] [address]       Chief Complaint   Patient presents with    Hypotension     History obtained from the patient. HISTORY OF PRESENT ILLNESS    HPI  Jaems Pennington is a 46 y.o. female who presents to the emergency department for evaluation of hypertension. Patient is coming from her pain management clinic for which she was getting trigger point injections in her back. During the procedure they noted some hypotension with systolic pressure in the 57R. Patient states last time this happened was approximately 4 years ago which actually took an extra pill of her beta-blocker medication. Denies any extra pain medications although she does have significant analgesic home regimen. Admits slurred speech, no vision changes, no headache, nausea, vomiting, numbness or tingling. Associated left-sided chest pain without radiation rated 4 out of 10 that began while squad was picking her up from the pain management clinic. Stable in route with blood sugar reported in the 140s. The patient has no other acute complaints at this time. REVIEW OF SYSTEMS   Review of Systems   Constitutional: Negative for chills and fever. HENT: Negative for sinus pressure, sinus pain and sore throat. Respiratory: Negative for cough, chest tightness, shortness of breath and wheezing. Cardiovascular: Positive for chest pain. Negative for palpitations and leg swelling. Gastrointestinal: Negative for abdominal pain, constipation, diarrhea, nausea and vomiting. Genitourinary: Negative for difficulty urinating and dysuria. Musculoskeletal: Positive for back pain. Negative for neck pain and neck stiffness. Skin: Negative for color change and rash. Neurological: Positive for speech difficulty and light-headedness. Negative for dizziness, syncope, weakness, numbness and headaches. PAST MEDICAL AND SURGICAL HISTORY     Past Medical History:   Diagnosis Date    Anxiety     Arthritis     Asthma     Nuñez's esophagus     Blood circulation, collateral     GERD (gastroesophageal reflux disease)     Hx of blood clots     Hypertension     Insomnia     Interstitial cystitis     Migraine     Pneumonia     Psychiatric problem     Spinal stenosis of lumbar region      Past Surgical History:   Procedure Laterality Date    ABDOMEN SURGERY  93    exploratory surgery    APPENDECTOMY      CARDIAC CATHETERIZATION  unsure    CARPAL TUNNEL RELEASE Left     CHOLECYSTECTOMY      COLONOSCOPY  2010    COLONOSCOPY N/A 8/1/2021    COLONOSCOPY WITH BIOPSY performed by Kori Ko MD at [address] ESOPHAGUS  2010    ENDOSCOPY, COLON, DIAGNOSTIC  2010    OTHER SURGICAL HISTORY  21 Nov 2014    Cholecystectomy Laparoscopic (Dr. Sara Osman, [facility])    OTHER SURGICAL HISTORY N/A 03-21-16    lumbar epidurala block L4-5    OTHER SURGICAL HISTORY N/A 04-04-16    lumbar epidural block L4-5    OTHER SURGICAL HISTORY Bilateral 6-13-16    facet blocks at L4-5, L5-S1    OTHER SURGICAL HISTORY Left 09/13/2016    RFA lumbar L4-S1    OVARIAN CYST REMOVAL  93    PATELLAR TENDON REPAIR Right 1994    3 screws in ankle.  TUBAL LIGATION  80    UPPER GASTROINTESTINAL ENDOSCOPY  2012         MEDICATIONS   No current facility-administered medications for this encounter. Current Outpatient Medications:     furosemide (LASIX) 40 MG tablet, , Disp: , Rfl:     tiZANidine (ZANAFLEX) 4 MG tablet, Take 1 tablet by mouth every 6 hours as needed (spasms), Disp: 120 tablet, Rfl: 0    oxyCODONE HCl (OXY-IR) 10 MG immediate release tablet, Take 1 tablet by mouth every 6 hours as needed for Pain for up to 30 days.  Intended supply: 30 days, Disp: 120 tablet, Rfl: 0    gabapentin (NEURONTIN) 600 MG tablet, Take 1 tablet by mouth 4 times daily for 30 days. , Disp: 120 tablet, Rfl: 0    ciprofloxacin (CIPRO) 500 MG tablet, Take 500 mg by mouth 2 times daily, Disp: , Rfl:     Meth-Hyo-M Bl-Na Phos-Ph Sal (UTIRA-C PO), Take by mouth, Disp: , Rfl:     albuterol (PROVENTIL) (2.5 MG/3ML) 0.083% nebulizer solution, Take 3 mLs by nebulization every 6 hours as needed for Wheezing Use 1 vial in nebulizer QID, Disp: 120 each, Rfl: 8    budesonide-formoterol (SYMBICORT) 160-4.5 MCG/ACT AERO, Inhale 2 puffs into the lungs 2 times daily Rinse mouth after its use., Disp: 1 each, Rfl: 11    rOPINIRole (REQUIP) 2 MG tablet, Take 2 mg by mouth 3 times daily, Disp: , Rfl:     zolpidem (AMBIEN) 10 MG tablet, Take by mouth nightly as needed for Sleep., Disp: , Rfl:     pantoprazole (PROTONIX) 40 MG tablet, Take 40 mg by mouth 2 times daily , Disp: , Rfl:     hydrOXYzine (ATARAX) 50 MG tablet, Take 50 mg by mouth 4 times daily as needed , Disp: , Rfl:     Prenatal Vit-Fe Fumarate-FA (PRENATAL VITAMIN) 27-1 MG TABS tablet, Take 1 tablet by mouth daily, Disp: , Rfl:     metoprolol tartrate (LOPRESSOR) 50 MG tablet, Take 50 mg by mouth 2 times daily, Disp: , Rfl:     montelukast (SINGULAIR) 10 MG tablet, Take 10 mg by mouth daily, Disp: , Rfl:     Loratadine-Pseudoephedrine (CLARITIN-D 12 HOUR PO), Take 1 tablet by mouth every 12 hours, Disp: , Rfl:     Rimegepant Sulfate (NURTEC) 75 MG TBDP, Dissolve 1 tablet PO PRN for migraine no more than 1 a day, Disp: , Rfl:     Cholecalciferol (VITAMIN D3) 50 MCG (2000 UT) TABS, Take 1 tablet by mouth daily, Disp: , Rfl:     tiotropium (SPIRIVA RESPIMAT) 1.25 MCG/ACT AERS inhaler, Inhale 1 puff into the lungs daily, Disp: , Rfl:     ferrous sulfate (IRON 325) 325 (65 Fe) MG tablet, Take 325 mg by mouth daily, Disp: , Rfl:     dicyclomine (BENTYL) 10 MG capsule, Take 10 mg by mouth 3 times daily, Disp: , Rfl:     losartan (COZAAR) seen this department 1 month ago for unspecified chest pain. [facility] PHYSICAL EXAM     ED Triage Vitals [11/30/21 1304]   BP Temp Temp src Pulse Resp SpO2 Height Weight   (!) 73/49 98.1 °F (36.7 °C) -- 60 16 98 % -- --     Initial vital signs and nursing assessment reviewed and Normal except for mild hypotension. There is no height or weight on file to calculate BMI. Pulsoximetry is normal per my interpretation. Additional Vital Signs:  Vitals:    11/30/21 1452   BP: 105/65   Pulse: 65   Resp: 21   Temp:    SpO2: 99%       Physical Exam  Constitutional:       General: She is not in acute distress. Appearance: She is not ill-appearing, toxic-appearing or diaphoretic. HENT:      Head: Normocephalic and atraumatic. Nose: No congestion or rhinorrhea. Mouth/Throat:      Mouth: Mucous membranes are moist.      Pharynx: Oropharynx is clear. No oropharyngeal exudate or posterior oropharyngeal erythema. Eyes:      General: No visual field deficit or scleral icterus. Right eye: No discharge. Left eye: No discharge. Extraocular Movements: Extraocular movements intact. Conjunctiva/sclera: Conjunctivae normal.   Cardiovascular:      Rate and Rhythm: Normal rate and regular rhythm. Pulses: Normal pulses. Heart sounds: No murmur heard. No friction rub. No gallop. Pulmonary:      Effort: Pulmonary effort is normal.      Breath sounds: No wheezing, rhonchi or rales. Abdominal:      Palpations: Abdomen is soft. Tenderness: There is no abdominal tenderness. There is no guarding or rebound. Musculoskeletal:      Cervical back: Normal range of motion. No rigidity. Right lower leg: No edema. Left lower leg: No edema. Skin:     General: Skin is warm and dry. Capillary Refill: Capillary refill takes less than 2 seconds. Neurological:      General: No focal deficit present. Mental Status: She is alert and oriented to person, place, and time. GCS: GCS eye subscore is 4. GCS verbal subscore is 5. GCS motor subscore is 6. Cranial Nerves: No facial asymmetry. Motor: No weakness, tremor or pronator drift. Coordination: Finger-Nose-Finger Test and Heel to Vedia Tavo Test normal.      Comments: Slurred speech. Alert and oriented x4. Sensation intact in the face, upper and lower extremities. Full strength of the upper and lower extremities. Unlikely stroke at this time. Suspect intoxication. MEDICAL DECISION MAKING   Initial Assessment:   3 40-year-old female with complex pain medication history presenting for slurred speech and hypotension from her pain management office after receiving trigger point injections. Alert and oriented with no focal neurologic deficit. She has good pulses in all four extremities. Mild slurring of speech but has no difficulty following directions or answering questions. Denies taking any medications including her pain medications this morning prior to going to her her chronic management appointment. Vital signs are stable with hypotension with systolic in the 08X. Afebrile with pulse in the 60s and no hypoxia. Patient is compliant with her Eliquis for history of DVT/PE. No respiratory distress or shortness of breath at this time. Associated chest pain. Plan:    ACS evaluation   Fluids   Chest x-ray   Ethanol   D-dimer   Narcan, patient refused   Expect disposition is resuscitation and discharge home        ED RESULTS   Laboratory results:  Labs Reviewed   CBC WITH AUTO DIFFERENTIAL - Abnormal; Notable for the following components:       Result Value    MCHC 31.8 (*)     RDW-CV 15.2 (*)     RDW-SD 48.8 (*)     All other components within normal limits   BASIC METABOLIC PANEL W/ REFLEX TO MG FOR LOW K - Abnormal; Notable for the following components:    BUN 26 (*)     All other components within normal limits   HEPATIC FUNCTION PANEL - Abnormal; Notable for the following components:     Total Bilirubin 0.2 (*)     All other components within normal limits   D-DIMER, QUANTITATIVE - Abnormal; Notable for the following components:    D-Dimer, Quant 562.00 (*)     All other components within normal limits   GLOMERULAR FILTRATION RATE, ESTIMATED - Abnormal; Notable for the following components:    Est, Glom Filt Rate 52 (*)     All other components within normal limits   TROPONIN   BRAIN NATRIURETIC PEPTIDE   ETHANOL   OSMOLALITY   ANION GAP       Radiologic studies results:  CTA CHEST W WO CONTRAST   Final Result    No evidence of pulmonary embolus or acute intrathoracic abnormality. **This report has been created using voice recognition software. It may contain minor errors which are inherent in voice recognition technology. **      Final report electronically signed by Dr. Lila Randall MD on 11/30/2021 4:26 PM      XR CHEST PORTABLE   Final Result      Left basilar atelectasis/infiltrate. **This report has been created using voice recognition software. It may contain minor errors which are inherent in voice recognition technology. **      Final report electronically signed by Dr. Ward Horne on 11/30/2021 2:00 PM          ED Medications administered this visit:   Medications   0.9 % sodium chloride bolus (0 mLs IntraVENous Stopped 11/30/21 1454)   0.9 % sodium chloride bolus (0 mLs IntraVENous Stopped 11/30/21 1556)   iopamidol (ISOVUE-370) 76 % injection 80 mL (80 mLs IntraVENous Given 11/30/21 1547)         ED COURSE     ED Course as of 11/30/21 1639   Tue Nov 30, 2021   1636 Patient doing much better with response of blood pressure to fluids. No acute laboratory abnormalities. CTA for minimally elevated D-dimer negative. Discharged home with close outpatient follow-up.  [EM]      ED Course User Index  [EM] Sujatha Anthony DO         Strict return precautions and follow up instructions were discussed with the patient prior to discharge, with which the patient agrees. MEDICATION CHANGES       FINAL DISPOSITION     Final diagnoses:   Dehydration   Hypotension, unspecified hypotension type     Condition: condition: stable  Dispo: Discharge to home      This transcription was electronically signed. Parts of this transcriptions may have been dictated by use of voice recognition software and electronically transcribed, and parts may have been transcribed with the assistance of an ED scribe. The transcription may contain errors not detected in proofreading. Please refer to my supervising physician's documentation if my documentation differs.     Electronically Signed: Lorrie Peralta DO, 11/30/21, 4:39 PM       Lorrie Peralta DO  Resident  11/30/21 2482

## 2021-11-30 NOTE — Clinical Note
Ashley Abdul was seen and treated in our emergency department on 11/30/2021. She may return to work on 12/01/2021. If you have any questions or concerns, please don't hesitate to call.       Jl Mcgowan, DO

## 2021-11-30 NOTE — ED TRIAGE NOTES
Presents to ER from pain clinic. Reports BP low prior to treatments at pain management.  Pt reports BP in 90s at pain clinic

## 2021-12-02 DIAGNOSIS — M47.816 SPONDYLOSIS OF LUMBAR REGION WITHOUT MYELOPATHY OR RADICULOPATHY: ICD-10-CM

## 2021-12-02 DIAGNOSIS — G89.4 CHRONIC PAIN SYNDROME: ICD-10-CM

## 2021-12-02 DIAGNOSIS — M79.18 MYOFASCIAL PAIN DYSFUNCTION SYNDROME: ICD-10-CM

## 2021-12-03 DIAGNOSIS — M47.816 SPONDYLOSIS OF LUMBAR REGION WITHOUT MYELOPATHY OR RADICULOPATHY: ICD-10-CM

## 2021-12-03 DIAGNOSIS — G89.4 CHRONIC PAIN SYNDROME: ICD-10-CM

## 2021-12-03 DIAGNOSIS — M51.36 DDD (DEGENERATIVE DISC DISEASE), LUMBAR: ICD-10-CM

## 2021-12-03 DIAGNOSIS — M54.16 LUMBAR RADICULITIS: ICD-10-CM

## 2021-12-03 DIAGNOSIS — M51.26 LUMBAR DISC HERNIATION: ICD-10-CM

## 2021-12-03 DIAGNOSIS — R10.84 GENERALIZED ABDOMINAL PAIN: ICD-10-CM

## 2021-12-03 DIAGNOSIS — M48.062 SPINAL STENOSIS OF LUMBAR REGION WITH NEUROGENIC CLAUDICATION: ICD-10-CM

## 2021-12-03 RX ORDER — GABAPENTIN 600 MG/1
600 TABLET ORAL 4 TIMES DAILY
Qty: 120 TABLET | Refills: 0 | Status: SHIPPED | OUTPATIENT
Start: 2021-12-03 | End: 2022-01-03 | Stop reason: SDUPTHER

## 2021-12-03 NOTE — TELEPHONE ENCOUNTER
Benja Kelley called requesting a refill on the following medications:  Requested Prescriptions     Pending Prescriptions Disp Refills    oxyCODONE HCl (OXY-IR) 10 MG immediate release tablet 120 tablet 0     Sig: Take 1 tablet by mouth every 6 hours as needed for Pain for up to 30 days. Intended supply: 30 days     [address] [place] [address].       Date of last visit: 11/30/2021  Date of next visit (if applicable): 1/7/8359      Please advice patient

## 2021-12-03 NOTE — TELEPHONE ENCOUNTER
OARRS reviewed. UDS: + for Zolpidem, hydromorphone, Gabapentin, Noroxycodone, Oxycodone, Oxymorphone, Zolpidem-Carboxyl   Last seen: 8/3/21.   Follow-up: 1/3/22

## 2021-12-06 RX ORDER — OXYCODONE HYDROCHLORIDE 10 MG/1
10 TABLET ORAL EVERY 6 HOURS PRN
Qty: 120 TABLET | Refills: 0 | Status: SHIPPED | OUTPATIENT
Start: 2021-12-06 | End: 2022-01-03 | Stop reason: SDUPTHER

## 2021-12-06 NOTE — TELEPHONE ENCOUNTER
Patient is wanting to know why this hasnt been refilled. [facility] but for some reason this one has not.  If this is not able to be refilled she would like a call back to let her know why.  [phone]

## 2021-12-06 NOTE — TELEPHONE ENCOUNTER
OARRS reviewed. UDS: + for Zolpidem, Hydromorphone, Gabapentin, Noroxycodone, Oxycodone, Oxymorphone   Last seen: 8/3/2021.  Follow-up: 1/3/22

## 2022-01-03 ENCOUNTER — VIRTUAL VISIT (OUTPATIENT)
Dept: PHYSICAL MEDICINE AND REHAB | Age: 52
End: 2022-01-03
Payer: COMMERCIAL

## 2022-01-03 DIAGNOSIS — G89.4 CHRONIC PAIN SYNDROME: ICD-10-CM

## 2022-01-03 DIAGNOSIS — R10.84 GENERALIZED ABDOMINAL PAIN: ICD-10-CM

## 2022-01-03 DIAGNOSIS — M79.18 MYOFASCIAL PAIN DYSFUNCTION SYNDROME: ICD-10-CM

## 2022-01-03 DIAGNOSIS — M51.36 DDD (DEGENERATIVE DISC DISEASE), LUMBAR: ICD-10-CM

## 2022-01-03 DIAGNOSIS — M47.816 SPONDYLOSIS OF LUMBAR REGION WITHOUT MYELOPATHY OR RADICULOPATHY: ICD-10-CM

## 2022-01-03 DIAGNOSIS — G89.29 CHRONIC LEFT SHOULDER PAIN: ICD-10-CM

## 2022-01-03 DIAGNOSIS — M51.26 LUMBAR DISC HERNIATION: ICD-10-CM

## 2022-01-03 DIAGNOSIS — M48.062 SPINAL STENOSIS OF LUMBAR REGION WITH NEUROGENIC CLAUDICATION: Primary | ICD-10-CM

## 2022-01-03 DIAGNOSIS — M25.512 CHRONIC LEFT SHOULDER PAIN: ICD-10-CM

## 2022-01-03 DIAGNOSIS — M54.16 LUMBAR RADICULITIS: ICD-10-CM

## 2022-01-03 PROCEDURE — 99214 OFFICE O/P EST MOD 30 MIN: CPT | Performed by: NURSE PRACTITIONER

## 2022-01-03 PROCEDURE — G8427 DOCREV CUR MEDS BY ELIG CLIN: HCPCS | Performed by: NURSE PRACTITIONER

## 2022-01-03 PROCEDURE — 3017F COLORECTAL CA SCREEN DOC REV: CPT | Performed by: NURSE PRACTITIONER

## 2022-01-03 RX ORDER — OXYCODONE HYDROCHLORIDE 10 MG/1
10 TABLET ORAL EVERY 6 HOURS PRN
Qty: 120 TABLET | Refills: 0 | Status: SHIPPED | OUTPATIENT
Start: 2022-01-05 | End: 2022-02-03 | Stop reason: SDUPTHER

## 2022-01-03 RX ORDER — TIZANIDINE 4 MG/1
4 TABLET ORAL EVERY 6 HOURS PRN
Qty: 120 TABLET | Refills: 0 | Status: SHIPPED | OUTPATIENT
Start: 2022-01-03 | End: 2022-02-03 | Stop reason: SDUPTHER

## 2022-01-03 RX ORDER — GABAPENTIN 600 MG/1
600 TABLET ORAL 4 TIMES DAILY
Qty: 120 TABLET | Refills: 0 | Status: SHIPPED | OUTPATIENT
Start: 2022-01-03 | End: 2022-02-03 | Stop reason: SDUPTHER

## 2022-01-03 ASSESSMENT — ENCOUNTER SYMPTOMS
BACK PAIN: 1
SHORTNESS OF BREATH: 1
DIARRHEA: 1
CHEST TIGHTNESS: 1
ABDOMINAL DISTENTION: 1
VOICE CHANGE: 1
VOMITING: 1
BLOOD IN STOOL: 0
ABDOMINAL PAIN: 1
NAUSEA: 1
COUGH: 0
SORE THROAT: 0
CONSTIPATION: 1

## 2022-01-03 NOTE — PROGRESS NOTES
HPI:   Giana Vargas is a 46 y.o. female is here today for    Chief Complaint: Low back pain, leg pain     HPI   [facility] [address] -- Audio/Visual (During BFDKS-26 public health emergency). This visit was completed virtually using doxy. me. Location of visit: patients home, providers office.     Patient asked to change virtual visit d/t being sick. She looks like she does not feel good. FU from lumbar trigger point injections with Dr. Matty Quinteros from 11/30/2021. Patient received about 60% to 70% relief of trigger point injections for only a week. States that pain was not as bad. Went to ER that day for dehydration   Main complaint remains pain across lower back and radiating down bilateral legs. Pain has been up and down. Sharp, aching pain    Also complaints of of pain in upper back that is intermittent,     Still trying to hold off on Lumbar surgery  Percocet prn Continues to help. Pain increases with bending, lifting, twisting  and getting up and down. Medications reviewed. Patient denies side effects with medications. Patient states she is taking medications as prescribed. Shedenies receiving pain medications from other sources. She had 3 ER visits since last visit  any ER visits since last visit. Pain scale with out pain medications or at its worst is 8-10/10. Pain scale with pain medications or at its best is 5-6/10. Last dose of Oxycodone and neurontin was today   Drug screen reviewed from 8/3/2021 and was appropriate  Pill count  Was notcompleted  today d/t video visit     Left shoulder xray:   FINDINGS: There is no fracture or dislocation. Mild joint space narrowing is present at the acromioclavicular joint. Soft tissues are unremarkable.           Impression       No fracture or dislocation.          The patientis allergic to ketorolac, adhesive tape, compazine [prochlorperazine], erythromycin, haloperidol and related, lyrica [pregabalin], reglan [metoclopramide], and sulfa antibiotics. Subjective:      Review of Systems   Constitutional: Positive for appetite change, fatigue and fever. HENT: Positive for voice change. Negative for sore throat. Respiratory: Positive for chest tightness and shortness of breath. Negative for cough. Cardiovascular: Positive for leg swelling. Gastrointestinal: Positive for abdominal distention, abdominal pain, constipation, diarrhea, nausea and vomiting. Negative for blood in stool. Musculoskeletal: Positive for arthralgias, back pain, gait problem and myalgias. Neurological: Positive for weakness and numbness. Psychiatric/Behavioral: Positive for decreased concentration and dysphoric mood. The patient is nervous/anxious. Objective: There were no vitals filed for this visit. Physical Exam  Constitutional:       General: She is not in acute distress. Appearance: She is ill-appearing. HENT:      Head: Normocephalic and atraumatic. Neurological:      General: No focal deficit present. Mental Status: She is alert and oriented to person, place, and time. Psychiatric:      Comments: Flat               Assessment:     1. Spinal stenosis of lumbar region with neurogenic claudication    2. Lumbar radiculitis    3. Lumbar disc herniation    4. DDD (degenerative disc disease), lumbar    5. Spondylosis of lumbar region without myelopathy or radiculopathy    6. Myofascial pain dysfunction syndrome    7. Chronic left shoulder pain    8. Chronic pain syndrome    9. Generalized abdominal pain            Plan:      · OARRS reviewed. Current MED: 60.00  · Patient was offered naloxone for home. · Discussed long term side effects of medications, tolerance, dependency and addiction. · Previous UDS reviewed  · UDS preformed today for compliance. · Patient told can not receive any pain medications from any other source. · No evidence of abuse, diversion or aberrant behavior.    Medications and/or procedures to improve function and quality of life- patient understanding with this and that may not be pain free   Discussed with patient about safe storage of medications at home   Discussed possible weaning of medication dosing dependent on treatment/procedure results.  Discussed with patient about risks with procedure including infection, reaction to medication, increased pain, or bleeding. · Procedure notes reviewed in detail. · Has had L-facet MBB and LESI with no improvement    Received 80% relief from lumbar trigger point injections for 1 full week   · Not a candidate for SCS d/t insurance. 2 different surgeans recommended lumbar surgery but she does not want \"I am petrified of it\"   Reviewed left shoulder xray- had injection at [facility] with minimal relief   Continue oxy IR 10 mg QID prn- ordered refill    Continue Zanaflex prn, Neurontin 600 mg QID- ordered refill    Patient has been complaint will bring in for FU in 6 weeks and will need updated UDS       Meds. Prescribed:   Orders Placed This Encounter   Medications    oxyCODONE HCl (OXY-IR) 10 MG immediate release tablet     Sig: Take 1 tablet by mouth every 6 hours as needed for Pain for up to 30 days. Intended supply: 30 days     Dispense:  120 tablet     Refill:  0     Reduce doses taken as pain becomes manageable    gabapentin (NEURONTIN) 600 MG tablet     Sig: Take 1 tablet by mouth 4 times daily for 30 days. Dispense:  120 tablet     Refill:  0    tiZANidine (ZANAFLEX) 4 MG tablet     Sig: Take 1 tablet by mouth every 6 hours as needed (spasms)     Dispense:  120 tablet     Refill:  0       Return in about 6 weeks (around 2/14/2022), or if symptoms worsen or fail to improve, for FU in person needs updated UDS.                Electronically signed by RENNY Yeung CNP on1/3/2022 at 12:04 PM

## 2022-01-10 ENCOUNTER — NURSE TRIAGE (OUTPATIENT)
Dept: OTHER | Facility: CLINIC | Age: 52
End: 2022-01-10

## 2022-01-10 NOTE — TELEPHONE ENCOUNTER
Received call from [facility] with [employer] Complaint. No triage caller disconnected before transfer. No pcp , but was seen in 2021    Attention Provider: Thank you for allowing me to participate in the care of your patient. The patient was connected to triage in response to information provided to the ECC/PSC. Please do not respond through this encounter as the response is not directed to a shared pool.       Reason for Disposition   Message left on identified voicemail    Protocols used: NO CONTACT OR DUPLICATE CONTACT CALL-ADULT-[place]

## 2022-01-11 ENCOUNTER — APPOINTMENT (OUTPATIENT)
Dept: GENERAL RADIOLOGY | Age: 52
End: 2022-01-11
Payer: COMMERCIAL

## 2022-01-11 ENCOUNTER — HOSPITAL ENCOUNTER (EMERGENCY)
Age: 52
Discharge: HOME | End: 2022-01-11
Attending: EMERGENCY MEDICINE
Payer: COMMERCIAL

## 2022-01-11 VITALS
SYSTOLIC BLOOD PRESSURE: 154 MMHG | DIASTOLIC BLOOD PRESSURE: 100 MMHG | TEMPERATURE: 97.4 F | HEIGHT: 66 IN | HEART RATE: 82 BPM | RESPIRATION RATE: 21 BRPM | WEIGHT: 203 LBS | BODY MASS INDEX: 32.62 KG/M2 | OXYGEN SATURATION: 100 %

## 2022-01-11 DIAGNOSIS — F41.9 ANXIETY: ICD-10-CM

## 2022-01-11 DIAGNOSIS — J06.9 ACUTE UPPER RESPIRATORY INFECTION: Primary | ICD-10-CM

## 2022-01-11 DIAGNOSIS — R07.89 ATYPICAL CHEST PAIN: ICD-10-CM

## 2022-01-11 LAB
ALBUMIN SERPL-MCNC: 4.2 G/DL (ref 3.5–5.1)
ALP BLD-CCNC: 86 U/L (ref 38–126)
ALT SERPL-CCNC: 23 U/L (ref 11–66)
ANION GAP SERPL CALCULATED.3IONS-SCNC: 17 MEQ/L (ref 8–16)
AST SERPL-CCNC: 22 U/L (ref 5–40)
BASOPHILS # BLD: 0.8 %
BASOPHILS ABSOLUTE: 0.1 THOU/MM3 (ref 0–0.1)
BILIRUB SERPL-MCNC: 0.2 MG/DL (ref 0.3–1.2)
BUN BLDV-MCNC: 26 MG/DL (ref 7–22)
CALCIUM SERPL-MCNC: 9.9 MG/DL (ref 8.5–10.5)
CHLORIDE BLD-SCNC: 108 MEQ/L (ref 98–111)
CO2: 20 MEQ/L (ref 23–33)
CREAT SERPL-MCNC: 1.1 MG/DL (ref 0.4–1.2)
EOSINOPHIL # BLD: 1.5 %
EOSINOPHILS ABSOLUTE: 0.1 THOU/MM3 (ref 0–0.4)
ERYTHROCYTE [DISTWIDTH] IN BLOOD BY AUTOMATED COUNT: 13.7 % (ref 11.5–14.5)
ERYTHROCYTE [DISTWIDTH] IN BLOOD BY AUTOMATED COUNT: 43.1 FL (ref 35–45)
ETHYL ALCOHOL, SERUM: < 0.01 %
GFR SERPL CREATININE-BSD FRML MDRD: 52 ML/MIN/1.73M2
GLUCOSE BLD-MCNC: 88 MG/DL (ref 70–108)
HCT VFR BLD CALC: 44.1 % (ref 37–47)
HEMOGLOBIN: 14.1 GM/DL (ref 12–16)
IMMATURE GRANS (ABS): 0.01 THOU/MM3 (ref 0–0.07)
IMMATURE GRANULOCYTES: 0.2 %
LYMPHOCYTES # BLD: 47.6 %
LYMPHOCYTES ABSOLUTE: 3.1 THOU/MM3 (ref 1–4.8)
MCH RBC QN AUTO: 27.4 PG (ref 26–33)
MCHC RBC AUTO-ENTMCNC: 32 GM/DL (ref 32.2–35.5)
MCV RBC AUTO: 85.8 FL (ref 81–99)
MONOCYTES # BLD: 5.9 %
MONOCYTES ABSOLUTE: 0.4 THOU/MM3 (ref 0.4–1.3)
NUCLEATED RED BLOOD CELLS: 0 /100 WBC
OSMOLALITY CALCULATION: 292.9 MOSMOL/KG (ref 275–300)
PLATELET # BLD: 231 THOU/MM3 (ref 130–400)
PMV BLD AUTO: 10.7 FL (ref 9.4–12.4)
POTASSIUM REFLEX MAGNESIUM: 3.7 MEQ/L (ref 3.5–5.2)
RBC # BLD: 5.14 MILL/MM3 (ref 4.2–5.4)
SARS-COV-2, NAAT: NOT  DETECTED
SEG NEUTROPHILS: 44 %
SEGMENTED NEUTROPHILS ABSOLUTE COUNT: 2.9 THOU/MM3 (ref 1.8–7.7)
SODIUM BLD-SCNC: 145 MEQ/L (ref 135–145)
TOTAL PROTEIN: 6.5 G/DL (ref 6.1–8)
TROPONIN T: < 0.01 NG/ML
WBC # BLD: 6.5 THOU/MM3 (ref 4.8–10.8)

## 2022-01-11 PROCEDURE — 85025 COMPLETE CBC W/AUTO DIFF WBC: CPT

## 2022-01-11 PROCEDURE — 71045 X-RAY EXAM CHEST 1 VIEW: CPT

## 2022-01-11 PROCEDURE — 93005 ELECTROCARDIOGRAM TRACING: CPT | Performed by: EMERGENCY MEDICINE

## 2022-01-11 PROCEDURE — 2580000003 HC RX 258: Performed by: EMERGENCY MEDICINE

## 2022-01-11 PROCEDURE — 80053 COMPREHEN METABOLIC PANEL: CPT

## 2022-01-11 PROCEDURE — 36415 COLL VENOUS BLD VENIPUNCTURE: CPT

## 2022-01-11 PROCEDURE — 82077 ASSAY SPEC XCP UR&BREATH IA: CPT

## 2022-01-11 PROCEDURE — 87635 SARS-COV-2 COVID-19 AMP PRB: CPT

## 2022-01-11 PROCEDURE — 99285 EMERGENCY DEPT VISIT HI MDM: CPT

## 2022-01-11 PROCEDURE — 6370000000 HC RX 637 (ALT 250 FOR IP): Performed by: EMERGENCY MEDICINE

## 2022-01-11 PROCEDURE — 84484 ASSAY OF TROPONIN QUANT: CPT

## 2022-01-11 RX ORDER — NITROGLYCERIN 0.4 MG/1
0.4 TABLET SUBLINGUAL EVERY 5 MIN PRN
Status: DISCONTINUED | OUTPATIENT
Start: 2022-01-11 | End: 2022-01-11 | Stop reason: HOSPADM

## 2022-01-11 RX ORDER — 0.9 % SODIUM CHLORIDE 0.9 %
1000 INTRAVENOUS SOLUTION INTRAVENOUS ONCE
Status: COMPLETED | OUTPATIENT
Start: 2022-01-11 | End: 2022-01-11

## 2022-01-11 RX ADMIN — SODIUM CHLORIDE 1000 ML: 9 INJECTION, SOLUTION INTRAVENOUS at 17:20

## 2022-01-11 RX ADMIN — NITROGLYCERIN 0.4 MG: 0.4 TABLET SUBLINGUAL at 16:32

## 2022-01-11 ASSESSMENT — ENCOUNTER SYMPTOMS
TROUBLE SWALLOWING: 0
WHEEZING: 0
NAUSEA: 0
SHORTNESS OF BREATH: 1
EYE REDNESS: 0
DIARRHEA: 0
BACK PAIN: 0
COUGH: 1
ABDOMINAL PAIN: 0
VOMITING: 0

## 2022-01-11 ASSESSMENT — PAIN SCALES - GENERAL: PAINLEVEL_OUTOF10: 10

## 2022-01-11 NOTE — ED TRIAGE NOTES
Pt brought in by ems states she was sent in by her provider to get covid antibodies, pt states she tested positive on 12/20 and since then has been around her neighbor who tested positive yesterday, pt c/o cp sob with body aches and f/c,  has hx of PE and cardiac cath

## 2022-01-11 NOTE — ED PROVIDER NOTES
[address] [facility] [address] [address] EMERGENCY DEPT    EMERGENCY MEDICINE     Pt Name: Azeem Wilson  MRN: [id number]  [place] 1970  Date of evaluation: 1/11/2022  Provider: Liam Frey MD,     CHIEF COMPLAINT       Chief Complaint   Patient presents with    Shortness of Breath    Chest Pain       HISTORY OF PRESENT ILLNESS    Azeem Wilson is a pleasant 46 y.o. female who presents to the emergency department from home by EMS for complaints of chest pain, fever, body aches, shortness of breath. Patient states that she had COVID in December however her neighbor just tested positive for COVID yesterday. She states the past 2 days that she has had fevers with a T-max of 100, body aches, and shortness of breath. Today she called her PCP who was going to try and get her antibodies for COVID but he felt that she needed to be seen in the ER secondary to her chest pain and difficulty breathing. Patient states the chest pain started around 830 this morning. She has not taken anything for the chest pain. She describes as sharp and stabbing in her left chest.  She states it radiates into her left shoulder. She denies any diaphoresis. She also endorses that her blood pressure has been up and down today. Triage notes and Nursing notes were reviewed by myself. Any discrepancies are addressed above.     PAST MEDICAL HISTORY     Past Medical History:   Diagnosis Date    Anxiety     Arthritis     Asthma     Nuñez's esophagus     Blood circulation, collateral     GERD (gastroesophageal reflux disease)     Hx of blood clots     Hypertension     Insomnia     Interstitial cystitis     Migraine     Pneumonia     Psychiatric problem     Spinal stenosis of lumbar region        SURGICAL HISTORY       Past Surgical History:   Procedure Laterality Date    ABDOMEN SURGERY  93    exploratory surgery    APPENDECTOMY      CARDIAC CATHETERIZATION  unsure    CARPAL TUNNEL RELEASE Left     CHOLECYSTECTOMY      COLONOSCOPY  2010    COLONOSCOPY N/A 8/1/2021    COLONOSCOPY WITH BIOPSY performed by Dillon Wang MD at [address] ESOPHAGUS  2010    ENDOSCOPY, COLON, DIAGNOSTIC  2010    OTHER SURGICAL HISTORY  21 Nov 2014    Cholecystectomy Laparoscopic (Dr. Elijah Renee, [facility])    OTHER SURGICAL HISTORY N/A 03-21-16    lumbar epidurala block L4-5    OTHER SURGICAL HISTORY N/A 04-04-16    lumbar epidural block L4-5    OTHER SURGICAL HISTORY Bilateral 6-13-16    facet blocks at L4-5, L5-S1    OTHER SURGICAL HISTORY Left 09/13/2016    RFA lumbar L4-S1    OVARIAN CYST REMOVAL  93    PATELLAR TENDON REPAIR Right 1994    3 screws in ankle.  TUBAL LIGATION  93    UPPER GASTROINTESTINAL ENDOSCOPY  2012       CURRENT MEDICATIONS       Discharge Medication List as of 1/11/2022  6:18 PM      CONTINUE these medications which have NOT CHANGED    Details   oxyCODONE HCl (OXY-IR) 10 MG immediate release tablet Take 1 tablet by mouth every 6 hours as needed for Pain for up to 30 days. Intended supply: 30 days, Disp-120 tablet, R-0Normal      gabapentin (NEURONTIN) 600 MG tablet Take 1 tablet by mouth 4 times daily for 30 days. , Disp-120 tablet, R-0Normal      tiZANidine (ZANAFLEX) 4 MG tablet Take 1 tablet by mouth every 6 hours as needed (spasms), Disp-120 tablet, R-0Normal      furosemide (LASIX) 40 MG tablet Historical Med      ciprofloxacin (CIPRO) 500 MG tablet Take 500 mg by mouth 2 times dailyHistorical Med      Meth-Hyo-M Bl-Na Phos-Ph Sal (UTIRA-C PO) Take by mouthHistorical Med      albuterol (PROVENTIL) (2.5 MG/3ML) 0.083% nebulizer solution Take 3 mLs by nebulization every 6 hours as needed for Wheezing Use 1 vial in nebulizer QID, Disp-120 each, R-8Normal      budesonide-formoterol (SYMBICORT) 160-4.5 MCG/ACT AERO Inhale 2 puffs into the lungs 2 times daily Rinse mouth after its use., Disp-1 each, R-11Normal      rOPINIRole (REQUIP) 2 MG tablet Take 2 mg by mouth 3 times dailyHistorical Med zolpidem (AMBIEN) 10 MG tablet Take by mouth nightly as needed for Sleep. Historical Med      pantoprazole (PROTONIX) 40 MG tablet Take 40 mg by mouth 2 times daily Historical Med      hydrOXYzine (ATARAX) 50 MG tablet Take 50 mg by mouth 4 times daily as needed Historical Med      Prenatal Vit-Fe Fumarate-FA (PRENATAL VITAMIN) 27-1 MG TABS tablet Take 1 tablet by mouth dailyHistorical Med      metoprolol tartrate (LOPRESSOR) 50 MG tablet Take 50 mg by mouth 2 times dailyHistorical Med      montelukast (SINGULAIR) 10 MG tablet Take 10 mg by mouth dailyHistorical Med      Loratadine-Pseudoephedrine (CLARITIN-D 12 HOUR PO) Take 1 tablet by mouth every 12 hoursHistorical Med      Rimegepant Sulfate (NURTEC) 75 MG TBDP Dissolve 1 tablet PO PRN for migraine no more than 1 a dayHistorical Med      Cholecalciferol (VITAMIN D3) 50 MCG (2000 UT) TABS Take 1 tablet by mouth dailyHistorical Med      tiotropium (SPIRIVA RESPIMAT) 1.25 MCG/ACT AERS inhaler Inhale 1 puff into the lungs dailyHistorical Med      ferrous sulfate (IRON 325) 325 (65 Fe) MG tablet Take 325 mg by mouth dailyHistorical Med      dicyclomine (BENTYL) 10 MG capsule Take 10 mg by mouth 3 times dailyHistorical Med      losartan (COZAAR) 25 MG tablet Take 25 mg by mouth dailyHistorical Med      apixaban (ELIQUIS) 5 MG TABS tablet Take 5 mg by mouth 2 times dailyHistorical Med      Promethazine HCl (PHENERGAN PO) Take 25 mg by mouth 2 times daily as needed (for nausea) Historical Med      oxybutynin (DITROPAN) 5 MG tablet Take 1 tablet by mouth 3 times daily Historical Med      sucralfate (CARAFATE) 1 GM tablet Take 1 tablet by mouth 4 times daily, Disp-120 tablet, R-1Normal      naloxone (NARCAN) 4 MG/0.1ML LIQD nasal spray 1 spray by Nasal route as needed for Opioid Reversal, Disp-1 each, R-0Normal      topiramate (TOPAMAX) 100 MG tablet Take 1 tablet by mouth 2 times daily, Disp-60 tablet, R-5      albuterol (PROVENTIL HFA;VENTOLIN HFA) 108 (90 BASE) MCG/ACT inhaler Inhale 2 puffs into the lungs every 4 hours as needed for Wheezing or Shortness of Breath Whichever brand is covered by insurance, substitute as necessary. , Disp-1 Inhaler, R-2             ALLERGIES     Ketorolac, Adhesive tape, Compazine [prochlorperazine], Erythromycin, Haloperidol and related, Lyrica [pregabalin], Reglan [metoclopramide], and Sulfa antibiotics    FAMILY HISTORY       Family History   Problem Relation Age of Onset    Heart Disease Mother     Inflam Bowel Dis Mother     Heart Disease Father     Heart Attack Brother         SOCIAL HISTORY       Social History     Socioeconomic History    Marital status: Single     Spouse name: None    Number of children: 0    Years of education: 15    Highest education level: None   Occupational History    Occupation: unemployed   Tobacco Use    Smoking status: Never Smoker    Smokeless tobacco: Never Used   Vaping Use    Vaping Use: Never used   Substance and Sexual Activity    Alcohol use: Yes     Alcohol/week: 0.0 standard drinks     Comment: rarely    Drug use: No    Sexual activity: Not Currently   Other Topics Concern    None   Social History Narrative    None     Social Determinants of Health     Financial Resource Strain: Low Risk     Difficulty of Paying Living Expenses: Not hard at all   Food Insecurity: No Food Insecurity    Worried About Running Out of Food in the Last Year: Never true    Sade of Food in the Last Year: Never true   Transportation Needs:     Lack of Transportation (Medical): Not on file    Lack of Transportation (Non-Medical):  Not on file   Physical Activity:     Days of Exercise per Week: Not on file    Minutes of Exercise per Session: Not on file   Stress:     Feeling of Stress : Not on file   Social Connections:     Frequency of Communication with Friends and Family: Not on file    Frequency of Social Gatherings with Friends and Family: Not on file    Attends [employer] Services: Not on file   [facility] [employer] Workout or Organizations: Not on file    Attends Club or Organization Meetings: Not on file    Marital Status: Not on file   Intimate Partner Violence:     Fear of Current or Ex-Partner: Not on file    Emotionally Abused: Not on file    Physically Abused: Not on file    Sexually Abused: Not on file   Housing Stability:     Unable to Pay for Housing in the Last Year: Not on file    Number of [place] in the Last Year: Not on file    Unstable Housing in the Last Year: Not on file       REVIEW OF SYSTEMS     Review of Systems   Constitutional: Positive for fatigue and fever. HENT: Negative for congestion and trouble swallowing. Eyes: Negative for redness. Respiratory: Positive for cough and shortness of breath. Negative for wheezing. Cardiovascular: Positive for chest pain. Gastrointestinal: Negative for abdominal pain, diarrhea, nausea and vomiting. Genitourinary: Negative for dysuria. Musculoskeletal: Positive for myalgias. Negative for back pain. Skin: Negative for rash. Allergic/Immunologic: Negative for immunocompromised state. Neurological: Negative for light-headedness. Hematological: Does not bruise/bleed easily. Except as noted above the remainder of the review of systems was reviewed and is.    PHYSICAL EXAM    (up to 7 for level 4, 8 or more for level 5)     ED Triage Vitals [01/11/22 1528]   BP Temp Temp Source Pulse Resp SpO2 Height Weight   (!) 170/104 97.4 °F (36.3 °C) Oral 89 17 100 % 5' 6\" (1.676 m) 203 lb (92.1 kg)       Physical Exam    DIAGNOSTIC RESULTS     EKG:(none if blank)  All EKG's are interpreted by [facility] who either signs or Co-signs this chart in the absence of a cardiologist.        RADIOLOGY: (none if blank)   Interpretation per the Radiologistbelow, if available at the time of this note:    XR CHEST PORTABLE   Final Result   No acute disease no significant change            **This report has been created using voice recognition software. It may contain minor errors which are inherent in voice recognition technology. **      Final report electronically signed by Dr. Ishmael Mondragon on 1/11/2022 4:13 PM          LABS:  Labs Reviewed   CBC WITH AUTO DIFFERENTIAL - Abnormal; Notable for the following components:       Result Value    MCHC 32.0 (*)     All other components within normal limits   COMPREHENSIVE METABOLIC PANEL W/ REFLEX TO MG FOR LOW K - Abnormal; Notable for the following components:    BUN 26 (*)     CO2 20 (*)     Total Bilirubin 0.2 (*)     All other components within normal limits   ANION GAP - Abnormal; Notable for the following components:    Anion Gap 17.0 (*)     All other components within normal limits   GLOMERULAR FILTRATION RATE, ESTIMATED - Abnormal; Notable for the following components:    Est, Glom Filt Rate 52 (*)     All other components within normal limits   COVID-19, RAPID   TROPONIN   ETHANOL   OSMOLALITY   URINALYSIS   URINE DRUG SCREEN       All other labs were within normal range or not returned as of this dictation. Please note, any cultures that may have been sent were not resulted at the time of this patient visit. EMERGENCY DEPARTMENT COURSE andMedical Decision Making:     MDM  Number of Diagnoses or Management Options  Acute upper respiratory infection  Anxiety  Atypical chest pain  Diagnosis management comments: 78-year-old female presents emergency room for shortness of breath and chest pain. Differential to include COVID, pneumonia, pneumothorax, ACS, electrolyte abnormality, PE, pulmonary edema. Will evaluate with COVID test, CBC, CMP, troponin, UA, urine drug screen, alcohol level. Patient has normal oxygen saturation though she is intermittently tachypneic. She states that her family doc sent her in for Regeneron however she does not even know if she has COVID. I am unable to find any notes that state this. Patient has a long list of allergies.   We will try nitro for chest pain relief. Aspirin was given in the ambulance. /  ED Course as of 01/11/22 2232 Tue Jan 11, 2022 1816 Patient's COVID test is negative. On reevaluation she states that she still having some chest pain and the nitro did not help. Troponin is negative. Patient has extensive opiate history and I do not feel that morphine is indicated given that her troponin is negative and EKG does not show any ischemia. Patient's chest pain started this morning so presumably her troponin should be elevated if she was having any ischemia by this point. I offered Tylenol to the patient and she accepted that. I explained results to patient that her oxygen is 100%, COVID is negative, and chest x-ray is within normal limits. It is possible that she is having anxiety or another upper respiratory infection causing her respiratory symptoms. [DD]      ED Course User Index  [DD] Ashley Drummond MD         The patient was evaluated during the global COVID-19 pandemic, and that diagnosis was considered upon their initial presentation. Their evaluation, treatment and testing was consistent with current guidelines for patients who present with complaints or symptoms that may be related to COVID-19. Strict returnprecautions and follow up instructions were discussed with the patient with which the patient agrees        ED Medications administered this visit:    Medications   0.9 % sodium chloride bolus (0 mLs IntraVENous Stopped 1/11/22 1818)         Procedures: (None if blank)       CLINICAL       1. Acute upper respiratory infection    2. Atypical chest pain    3.  Anxiety          DISPOSITION/PLAN   DISPOSITION Decision To Discharge 01/11/2022 06:17:33 PM      PATIENT REFERRED TO:  Waldo Oliveros MD  [address] [place], [place] [id number]    Schedule an appointment as soon as possible for a visit   If symptoms worsen      DISCHARGE MEDICATIONS:  Discharge Medication List as of 1/11/2022  6:18 PM                 (Please note that portions of this note were completed with a voice recognition program.  Efforts were made to edit the dictations but occasionallywords are mis-transcribed.)      Electronically signed by Margie Schneider MD on 1/11/22 at 4:03 PM EST    Attending Physician, Emergency Department       Nunzio Sacks, MD  01/11/22 2383

## 2022-01-11 NOTE — ED NOTES
Pt discharged with belongings, alert oriented ambulatory. Discharge education provided. All questions answered.      Chastity Shoemaker, RN  01/11/22 5741

## 2022-01-11 NOTE — ED NOTES
Bed: 016A  Expected date: 1/11/22  Expected time: 3:03 PM  Means of arrival:   Comments:  Wallace Abraham, SAIRA  01/11/22 9342

## 2022-01-13 LAB
EKG ATRIAL RATE: 91 BPM
EKG P AXIS: 56 DEGREES
EKG P-R INTERVAL: 176 MS
EKG Q-T INTERVAL: 398 MS
EKG QRS DURATION: 84 MS
EKG QTC CALCULATION (BAZETT): 489 MS
EKG R AXIS: 50 DEGREES
EKG T AXIS: 19 DEGREES
EKG VENTRICULAR RATE: 91 BPM

## 2022-02-03 DIAGNOSIS — G89.4 CHRONIC PAIN SYNDROME: ICD-10-CM

## 2022-02-03 DIAGNOSIS — M51.36 DDD (DEGENERATIVE DISC DISEASE), LUMBAR: ICD-10-CM

## 2022-02-03 DIAGNOSIS — M51.26 LUMBAR DISC HERNIATION: ICD-10-CM

## 2022-02-03 DIAGNOSIS — R10.84 GENERALIZED ABDOMINAL PAIN: ICD-10-CM

## 2022-02-03 DIAGNOSIS — M79.18 MYOFASCIAL PAIN DYSFUNCTION SYNDROME: ICD-10-CM

## 2022-02-03 DIAGNOSIS — M48.062 SPINAL STENOSIS OF LUMBAR REGION WITH NEUROGENIC CLAUDICATION: ICD-10-CM

## 2022-02-03 DIAGNOSIS — M47.816 SPONDYLOSIS OF LUMBAR REGION WITHOUT MYELOPATHY OR RADICULOPATHY: ICD-10-CM

## 2022-02-03 DIAGNOSIS — M54.16 LUMBAR RADICULITIS: ICD-10-CM

## 2022-02-03 RX ORDER — GABAPENTIN 600 MG/1
600 TABLET ORAL 4 TIMES DAILY
Qty: 120 TABLET | Refills: 0 | Status: SHIPPED | OUTPATIENT
Start: 2022-02-03 | End: 2022-02-28 | Stop reason: SDUPTHER

## 2022-02-03 RX ORDER — OXYCODONE HYDROCHLORIDE 10 MG/1
10 TABLET ORAL EVERY 6 HOURS PRN
Qty: 120 TABLET | Refills: 0 | Status: SHIPPED | OUTPATIENT
Start: 2022-02-03 | End: 2022-02-28 | Stop reason: SDUPTHER

## 2022-02-03 RX ORDER — TIZANIDINE 4 MG/1
4 TABLET ORAL EVERY 6 HOURS PRN
Qty: 120 TABLET | Refills: 0 | Status: SHIPPED | OUTPATIENT
Start: 2022-02-03 | End: 2022-02-28 | Stop reason: SDUPTHER

## 2022-02-03 NOTE — TELEPHONE ENCOUNTER
OARRS reviewed. UDS: + for  . Gabapentin, ambien, oxycodone  Last seen: 1/3/2022.  Follow-up:   Future Appointments   Date Time Provider Laisha López   2/24/2022 11:30 AM RENNY Rodriguez - CNP [facility]

## 2022-02-03 NOTE — TELEPHONE ENCOUNTER
Dorian Luciano called requesting a refill on the following medications:  Requested Prescriptions     Pending Prescriptions Disp Refills    oxyCODONE HCl (OXY-IR) 10 MG immediate release tablet 120 tablet 0     Sig: Take 1 tablet by mouth every 6 hours as needed for Pain for up to 30 days. Intended supply: 30 days    gabapentin (NEURONTIN) 600 MG tablet 120 tablet 0     Sig: Take 1 tablet by mouth 4 times daily for 30 days.  tiZANidine (ZANAFLEX) 4 MG tablet 120 tablet 0     Sig: Take 1 tablet by mouth every 6 hours as needed (spasms)     Pharmacy verified:[facility]   . pv      Date of last visit:   Date of next visit (if applicable): 5/13/3282

## 2022-02-28 DIAGNOSIS — G89.4 CHRONIC PAIN SYNDROME: ICD-10-CM

## 2022-02-28 DIAGNOSIS — M79.18 MYOFASCIAL PAIN DYSFUNCTION SYNDROME: ICD-10-CM

## 2022-02-28 DIAGNOSIS — M47.816 SPONDYLOSIS OF LUMBAR REGION WITHOUT MYELOPATHY OR RADICULOPATHY: ICD-10-CM

## 2022-02-28 DIAGNOSIS — M51.36 DDD (DEGENERATIVE DISC DISEASE), LUMBAR: ICD-10-CM

## 2022-02-28 DIAGNOSIS — M54.16 LUMBAR RADICULITIS: ICD-10-CM

## 2022-02-28 DIAGNOSIS — M48.062 SPINAL STENOSIS OF LUMBAR REGION WITH NEUROGENIC CLAUDICATION: ICD-10-CM

## 2022-02-28 DIAGNOSIS — R10.84 GENERALIZED ABDOMINAL PAIN: ICD-10-CM

## 2022-02-28 DIAGNOSIS — M51.26 LUMBAR DISC HERNIATION: ICD-10-CM

## 2022-02-28 RX ORDER — OXYCODONE HYDROCHLORIDE 10 MG/1
10 TABLET ORAL EVERY 6 HOURS PRN
Qty: 120 TABLET | Refills: 0 | Status: SHIPPED | OUTPATIENT
Start: 2022-03-05 | End: 2022-04-01 | Stop reason: SDUPTHER

## 2022-02-28 RX ORDER — TIZANIDINE 4 MG/1
4 TABLET ORAL EVERY 6 HOURS PRN
Qty: 120 TABLET | Refills: 0 | Status: SHIPPED | OUTPATIENT
Start: 2022-03-05 | End: 2022-04-01 | Stop reason: SDUPTHER

## 2022-02-28 RX ORDER — GABAPENTIN 600 MG/1
600 TABLET ORAL 4 TIMES DAILY
Qty: 120 TABLET | Refills: 0 | Status: SHIPPED | OUTPATIENT
Start: 2022-03-05 | End: 2022-04-01 | Stop reason: SDUPTHER

## 2022-02-28 NOTE — TELEPHONE ENCOUNTER
Jovan Blackwood called requesting a refill on the following medications:  Requested Prescriptions     Pending Prescriptions Disp Refills    tiZANidine (ZANAFLEX) 4 MG tablet 120 tablet 0     Sig: Take 1 tablet by mouth every 6 hours as needed (spasms)    gabapentin (NEURONTIN) 600 MG tablet 120 tablet 0     Sig: Take 1 tablet by mouth 4 times daily for 30 days.  oxyCODONE HCl (OXY-IR) 10 MG immediate release tablet 120 tablet 0     Sig: Take 1 tablet by mouth every 6 hours as needed for Pain for up to 30 days.  Intended supply: 30 days     Pharmacy verified:  [facility]    Date of last visit: 01-03-22  Date of next visit (if applicable): 1/84/2651

## 2022-02-28 NOTE — TELEPHONE ENCOUNTER
OARRS reviewed. UDS: + for  Zolpidem, Gabapentin, Oxycodone, Zolpidem-Carboxyl -consistent. Last seen: 1/3/2022.  Follow-up:   Future Appointments   Date Time Provider Laisha López   3/17/2022 12:15 PM RENNY Singleton - CNP [facility] - [address] [facility]

## 2022-03-01 NOTE — TELEPHONE ENCOUNTER
OARRS reviewed. UDS: + for  Percocet, gabapentin consistent. Last seen: 10/14/2020.  Follow-up: 12/01/2020
Patient denies hematochezia, melena or other acute complaints. Patient transferred from [place] ED after CT demonstrated 6.3 cm infrarenal abdominal aortic aneurysm w/ surrounding inflammatory changes suspicious for infection and/or bleeding. Patient ambulates at home without assistance./fall precautions

## 2022-03-17 ENCOUNTER — OFFICE VISIT (OUTPATIENT)
Dept: PHYSICAL MEDICINE AND REHAB | Age: 52
End: 2022-03-17
Payer: COMMERCIAL

## 2022-03-17 VITALS
WEIGHT: 203 LBS | HEIGHT: 66 IN | SYSTOLIC BLOOD PRESSURE: 126 MMHG | BODY MASS INDEX: 32.62 KG/M2 | DIASTOLIC BLOOD PRESSURE: 88 MMHG

## 2022-03-17 DIAGNOSIS — M79.18 MYOFASCIAL PAIN DYSFUNCTION SYNDROME: Primary | ICD-10-CM

## 2022-03-17 DIAGNOSIS — G89.4 CHRONIC PAIN SYNDROME: ICD-10-CM

## 2022-03-17 DIAGNOSIS — M51.36 DDD (DEGENERATIVE DISC DISEASE), LUMBAR: ICD-10-CM

## 2022-03-17 DIAGNOSIS — M51.26 LUMBAR DISC HERNIATION: ICD-10-CM

## 2022-03-17 DIAGNOSIS — M54.16 LUMBAR RADICULITIS: ICD-10-CM

## 2022-03-17 DIAGNOSIS — M48.062 SPINAL STENOSIS OF LUMBAR REGION WITH NEUROGENIC CLAUDICATION: ICD-10-CM

## 2022-03-17 PROCEDURE — 3017F COLORECTAL CA SCREEN DOC REV: CPT | Performed by: NURSE PRACTITIONER

## 2022-03-17 PROCEDURE — G8417 CALC BMI ABV UP PARAM F/U: HCPCS | Performed by: NURSE PRACTITIONER

## 2022-03-17 PROCEDURE — 1036F TOBACCO NON-USER: CPT | Performed by: NURSE PRACTITIONER

## 2022-03-17 PROCEDURE — 99214 OFFICE O/P EST MOD 30 MIN: CPT | Performed by: NURSE PRACTITIONER

## 2022-03-17 PROCEDURE — G8427 DOCREV CUR MEDS BY ELIG CLIN: HCPCS | Performed by: NURSE PRACTITIONER

## 2022-03-17 PROCEDURE — G8484 FLU IMMUNIZE NO ADMIN: HCPCS | Performed by: NURSE PRACTITIONER

## 2022-03-17 ASSESSMENT — ENCOUNTER SYMPTOMS
BLOOD IN STOOL: 0
ABDOMINAL DISTENTION: 0
COUGH: 0
BACK PAIN: 1
NAUSEA: 0
DIARRHEA: 1
VOMITING: 0
CONSTIPATION: 1
ABDOMINAL PAIN: 0

## 2022-03-17 NOTE — PROGRESS NOTES
[address] [facility] [place]  Dept: [phone]  Dept Fax: [phone]: [phone]    Visit Date: 3/17/2022    Functionality Assessment/Goals Worksheet     On a scale of 0 (Does not Interfere) to 10 (Completely Interferes)     1. Which number describes how during the past week pain has interfered with       the following:  A. General Activity:  9  B. Mood: 7  C. Walking Ability:  8  D. Normal Work (Includes both work outside the home and housework):  10  E. Relations with Other People:   6  F. Sleep:   5  G. Enjoyment of Life:   5    2. Patient Prefers to Take their Pain Medications:     [x]  On a regular basis   []  Only when necessary    []  Does not take pain medications    3. What are the Patient's Goals/Expectations for Visiting Pain Management? [x]  Learn about my pain    []  Receive Medication   []  Physical Therapy     []  Treat Depression   []  Receive Injections    []  Treat Sleep   []  Deal with Anxiety and Stress   []  Treat Opoid Dependence/Addiction   []  Other:      HPI:   Bhumika Jacques is a 46 y.o. female is here today for    Chief Complaint: back pain, leg pain     HPI   2.5 month FU. Continues to have pain in mid to low back throughout and pain radiates down bilateral legs- twisting, spasms aching in bad. Burning, shooting, pins and needles and numbness in legs. States has good and bad days   A lot of muscle spasms in the bad especially with movement. Today she did not tolerate my exam and states that I really increased her pain with strait leg raise test and just by barely palpating her back      Still not wanting recommended lumbar surgery. Pain increases with bending, lifting, twisting , walking, standing, stairs, getting up and down and housework or working at job. Medications help make pain more tolerable. able to get around and tolerate daily activities   Medications reviewed. Patient denies side effects with medications. Patient states she is taking medications as prescribed. Shedenies receiving pain medications from other sources. She had 1 ER visit since last visit     Pain scale with out pain medications or at its worst is 8/10. Pain scale with pain medications or at its best is 2-4/10. Last dose of Oxy IR was today and neuron  was yesterday   Drug screen reviewed from 8/3/2022 and was appropriate  Pill count completed  today and WNL: Yes      The patientis allergic to ketorolac, adhesive tape, compazine [prochlorperazine], erythromycin, haloperidol and related, lyrica [pregabalin], reglan [metoclopramide], and sulfa antibiotics. Subjective:      Review of Systems   Constitutional: Negative for appetite change and fever. Respiratory: Negative for cough. Cardiovascular: Negative for leg swelling. Gastrointestinal: Positive for constipation and diarrhea. Negative for abdominal distention, abdominal pain, blood in stool, nausea and vomiting. Musculoskeletal: Positive for arthralgias, back pain, gait problem and myalgias. Negative for joint swelling, neck pain and neck stiffness. Neurological: Positive for weakness and numbness. Psychiatric/Behavioral: Positive for decreased concentration and dysphoric mood. The patient is nervous/anxious. Objective:     Vitals:    03/17/22 1208   BP: 126/88   Site: Right Upper Arm   Position: Sitting   Weight: 203 lb (92.1 kg)   Height: 5' 6\" (1.676 m)       Physical Exam  Vitals and nursing note reviewed. Constitutional:       General: She is not in acute distress. Appearance: She is well-developed. She is not ill-appearing or diaphoretic. HENT:      Head: Normocephalic and atraumatic.       Right Ear: External ear normal.      Left Ear: External ear normal.      Nose: Nose normal.      Mouth/Throat:      Mouth: Mucous membranes are moist.      Pharynx: No oropharyngeal exudate. Eyes:      General: No scleral icterus. Right eye: No discharge. Left eye: No discharge. Conjunctiva/sclera: Conjunctivae normal.      Pupils: Pupils are equal, round, and reactive to light. Neck:      Thyroid: No thyromegaly. Cardiovascular:      Rate and Rhythm: Normal rate and regular rhythm. Heart sounds: Normal heart sounds. No murmur heard. No friction rub. No gallop. Pulmonary:      Effort: Pulmonary effort is normal. No respiratory distress. Breath sounds: Normal breath sounds. No wheezing or rales. Chest:      Chest wall: No tenderness. Abdominal:      General: Bowel sounds are normal. There is no distension. Palpations: Abdomen is soft. Tenderness: There is no abdominal tenderness. There is no guarding or rebound. Musculoskeletal:         General: Swelling and tenderness present. Left shoulder: Tenderness and bony tenderness present. Decreased range of motion. Decreased strength. Cervical back: Full passive range of motion without pain, normal range of motion and neck supple. No edema, erythema or rigidity. No muscular tenderness. Normal range of motion. Thoracic back: Spasms and tenderness present. Decreased range of motion. Lumbar back: Spasms, tenderness and bony tenderness present. Decreased range of motion. Positive right straight leg raise test and positive left straight leg raise test.        Back:       Right lower leg: No edema. Left lower leg: No edema. Skin:     General: Skin is warm. Coloration: Skin is pale. Findings: No erythema or rash. Neurological:      General: No focal deficit present. Mental Status: She is alert and oriented to person, place, and time. She is not disoriented. Cranial Nerves: No cranial nerve deficit. Sensory: Sensory deficit present. Motor: Weakness present. No atrophy or abnormal muscle tone.       Coordination: Coordination abnormal.      Gait:  Discussed with patient about risks with procedure including infection, reaction to medication, increased pain, or bleeding. · Procedure notes reviewed in detail. · Has had L-facet MBB and LESI with no improvement   · Not a candidate for SCS d/t insurance. 2 different surgeans recommended lumbar surgery   Plan thoracic and lumbar trigger point injections in office with [facility] with muscle relaxer. Helped a lot several times in past but steroid was not lasting. · Ordered physical therapy at [address] [place] [address] for dry needle therapy again she did not participate in past   · Medications continue to help. Continue oxy IR 10 mg QID prn- filled 3/5/2022  · Continue Zanaflex prn- instructed to hold if SBP less than 100, Neurontin 600 mg QID- filled 2/28/2022  · Education provided to try to take less prn pain medications and wean as able. Meds. Prescribed:   No orders of the defined types were placed in this encounter. Return for Thoracic and lumbar trigger point injections in office with [facility].  .               Electronically signed by RENNY Yeung CNP on3/17/2022 at 2:36 PM

## 2022-03-24 ENCOUNTER — OFFICE VISIT (OUTPATIENT)
Dept: PHYSICAL MEDICINE AND REHAB | Age: 52
End: 2022-03-24
Payer: COMMERCIAL

## 2022-03-24 VITALS
HEIGHT: 66 IN | WEIGHT: 203 LBS | BODY MASS INDEX: 32.62 KG/M2 | DIASTOLIC BLOOD PRESSURE: 74 MMHG | SYSTOLIC BLOOD PRESSURE: 126 MMHG

## 2022-03-24 DIAGNOSIS — M79.18 MYOFASCIAL PAIN: Primary | ICD-10-CM

## 2022-03-24 DIAGNOSIS — G89.4 CHRONIC PAIN SYNDROME: ICD-10-CM

## 2022-03-24 PROCEDURE — 20553 NJX 1/MLT TRIGGER POINTS 3/>: CPT | Performed by: NURSE PRACTITIONER

## 2022-04-01 ENCOUNTER — TELEPHONE (OUTPATIENT)
Dept: PHYSICAL MEDICINE AND REHAB | Age: 52
End: 2022-04-01

## 2022-04-01 DIAGNOSIS — G89.4 CHRONIC PAIN SYNDROME: ICD-10-CM

## 2022-04-01 DIAGNOSIS — M47.816 SPONDYLOSIS OF LUMBAR REGION WITHOUT MYELOPATHY OR RADICULOPATHY: ICD-10-CM

## 2022-04-01 DIAGNOSIS — M48.062 SPINAL STENOSIS OF LUMBAR REGION WITH NEUROGENIC CLAUDICATION: ICD-10-CM

## 2022-04-01 DIAGNOSIS — M79.18 MYOFASCIAL PAIN DYSFUNCTION SYNDROME: ICD-10-CM

## 2022-04-01 DIAGNOSIS — M54.16 LUMBAR RADICULITIS: ICD-10-CM

## 2022-04-01 DIAGNOSIS — M51.26 LUMBAR DISC HERNIATION: ICD-10-CM

## 2022-04-01 DIAGNOSIS — M51.36 DDD (DEGENERATIVE DISC DISEASE), LUMBAR: ICD-10-CM

## 2022-04-01 DIAGNOSIS — R10.84 GENERALIZED ABDOMINAL PAIN: ICD-10-CM

## 2022-04-01 RX ORDER — OXYCODONE HYDROCHLORIDE 10 MG/1
10 TABLET ORAL EVERY 6 HOURS PRN
Qty: 120 TABLET | Refills: 0 | Status: SHIPPED | OUTPATIENT
Start: 2022-04-03 | End: 2022-05-02 | Stop reason: SDUPTHER

## 2022-04-01 RX ORDER — TIZANIDINE 4 MG/1
4 TABLET ORAL EVERY 6 HOURS PRN
Qty: 120 TABLET | Refills: 0 | Status: SHIPPED | OUTPATIENT
Start: 2022-04-04 | End: 2022-05-02 | Stop reason: SDUPTHER

## 2022-04-01 RX ORDER — GABAPENTIN 600 MG/1
600 TABLET ORAL 4 TIMES DAILY
Qty: 120 TABLET | Refills: 0 | Status: SHIPPED | OUTPATIENT
Start: 2022-04-01 | End: 2022-05-02 | Stop reason: SDUPTHER

## 2022-04-01 NOTE — TELEPHONE ENCOUNTER
Yelena Juarez called requesting a refill on the following medications:  Requested Prescriptions     Pending Prescriptions Disp Refills    tiZANidine (ZANAFLEX) 4 MG tablet 120 tablet 0     Sig: Take 1 tablet by mouth every 6 hours as needed (spasms)    gabapentin (NEURONTIN) 600 MG tablet 120 tablet 0     Sig: Take 1 tablet by mouth 4 times daily for 30 days.  oxyCODONE HCl (OXY-IR) 10 MG immediate release tablet 120 tablet 0     Sig: Take 1 tablet by mouth every 6 hours as needed for Pain for up to 30 days. Intended supply: 30 days     Pharmacy verified: [facility]   . pv      Date of last visit: 3/17/2022  Date of next visit (if applicable): 2/18/8387

## 2022-04-01 NOTE — TELEPHONE ENCOUNTER
OARRS reviewed. UDS: + for  Oxycodone, gabapentin, ambien. Last seen: 3/17/2022.  Follow-up:   Future Appointments   Date Time Provider Laisha López   5/12/2022 12:00 PM RENNY Sharma - CNP [facility] [place]

## 2022-04-29 DIAGNOSIS — G89.4 CHRONIC PAIN SYNDROME: ICD-10-CM

## 2022-04-29 DIAGNOSIS — M48.062 SPINAL STENOSIS OF LUMBAR REGION WITH NEUROGENIC CLAUDICATION: ICD-10-CM

## 2022-04-29 DIAGNOSIS — M79.18 MYOFASCIAL PAIN DYSFUNCTION SYNDROME: ICD-10-CM

## 2022-04-29 DIAGNOSIS — M51.26 LUMBAR DISC HERNIATION: ICD-10-CM

## 2022-04-29 DIAGNOSIS — R10.84 GENERALIZED ABDOMINAL PAIN: ICD-10-CM

## 2022-04-29 DIAGNOSIS — M47.816 SPONDYLOSIS OF LUMBAR REGION WITHOUT MYELOPATHY OR RADICULOPATHY: ICD-10-CM

## 2022-04-29 DIAGNOSIS — M51.36 DDD (DEGENERATIVE DISC DISEASE), LUMBAR: ICD-10-CM

## 2022-04-29 DIAGNOSIS — M54.16 LUMBAR RADICULITIS: ICD-10-CM

## 2022-04-29 NOTE — TELEPHONE ENCOUNTER
James Holliday called requesting a refill on the following medications:  Requested Prescriptions     Pending Prescriptions Disp Refills    oxyCODONE HCl (OXY-IR) 10 MG immediate release tablet 120 tablet 0     Sig: Take 1 tablet by mouth every 6 hours as needed for Pain for up to 30 days. Intended supply: 30 days    tiZANidine (ZANAFLEX) 4 MG tablet 120 tablet 0     Sig: Take 1 tablet by mouth every 6 hours as needed (spasms)    gabapentin (NEURONTIN) 600 MG tablet 120 tablet 0     Sig: Take 1 tablet by mouth 4 times daily for 30 days.      Pharmacy verified:  .pv  [facility]    Date of last visit: 03/17/2022  Date of next visit (if applicable): 4/18/1030

## 2022-05-02 RX ORDER — OXYCODONE HYDROCHLORIDE 10 MG/1
10 TABLET ORAL EVERY 6 HOURS PRN
Qty: 120 TABLET | Refills: 0 | Status: SHIPPED | OUTPATIENT
Start: 2022-05-03 | End: 2022-06-01 | Stop reason: SDUPTHER

## 2022-05-02 RX ORDER — GABAPENTIN 600 MG/1
600 TABLET ORAL 4 TIMES DAILY
Qty: 120 TABLET | Refills: 0 | Status: SHIPPED | OUTPATIENT
Start: 2022-05-02 | End: 2022-06-01 | Stop reason: SDUPTHER

## 2022-05-02 RX ORDER — TIZANIDINE 4 MG/1
4 TABLET ORAL EVERY 6 HOURS PRN
Qty: 120 TABLET | Refills: 0 | Status: SHIPPED | OUTPATIENT
Start: 2022-05-04 | End: 2022-06-01 | Stop reason: SDUPTHER

## 2022-05-02 NOTE — TELEPHONE ENCOUNTER
OARRS reviewed. UDS: + for Gabapentin, Ambien, Oxycodone. Last seen: 3/17/2022.  Follow-up:   Future Appointments   Date Time Provider Laisha Maribel   5/12/2022 12:00 PM RNENY Ramon - CNP [facility] - [place]

## 2022-05-12 ENCOUNTER — TELEMEDICINE (OUTPATIENT)
Dept: PHYSICAL MEDICINE AND REHAB | Age: 52
End: 2022-05-12
Payer: COMMERCIAL

## 2022-05-12 DIAGNOSIS — G89.4 CHRONIC PAIN SYNDROME: ICD-10-CM

## 2022-05-12 DIAGNOSIS — M51.36 DDD (DEGENERATIVE DISC DISEASE), LUMBAR: ICD-10-CM

## 2022-05-12 DIAGNOSIS — K02.9 PAIN DUE TO DENTAL CARIES: ICD-10-CM

## 2022-05-12 DIAGNOSIS — M51.26 LUMBAR DISC HERNIATION: ICD-10-CM

## 2022-05-12 DIAGNOSIS — M79.18 MYOFASCIAL PAIN: ICD-10-CM

## 2022-05-12 DIAGNOSIS — M54.16 LUMBAR RADICULITIS: Primary | ICD-10-CM

## 2022-05-12 DIAGNOSIS — M47.816 SPONDYLOSIS OF LUMBAR REGION WITHOUT MYELOPATHY OR RADICULOPATHY: ICD-10-CM

## 2022-05-12 DIAGNOSIS — M48.062 SPINAL STENOSIS OF LUMBAR REGION WITH NEUROGENIC CLAUDICATION: ICD-10-CM

## 2022-05-12 PROCEDURE — G8428 CUR MEDS NOT DOCUMENT: HCPCS | Performed by: NURSE PRACTITIONER

## 2022-05-12 PROCEDURE — 3017F COLORECTAL CA SCREEN DOC REV: CPT | Performed by: NURSE PRACTITIONER

## 2022-05-12 PROCEDURE — 99214 OFFICE O/P EST MOD 30 MIN: CPT | Performed by: NURSE PRACTITIONER

## 2022-05-12 ASSESSMENT — ENCOUNTER SYMPTOMS
ABDOMINAL DISTENTION: 0
DIARRHEA: 0
BACK PAIN: 1
BLOOD IN STOOL: 0
ABDOMINAL PAIN: 0
VOMITING: 1
CONSTIPATION: 0
NAUSEA: 1
COUGH: 0

## 2022-05-12 NOTE — PROGRESS NOTES
HPI:   Sulema Johnson is a 46 y.o. female is here today for    Chief Complaint: Low back pain, leg pain, dental pain     HPI     Video Visit. TELEHEALTH EVALUATION -- Audio/Visual (During YZEMX-[id number] public health emergency). This visit was completed virtually using doxy. me. Location of visit: patients home, providers office.      Patient changed appointment to virtual d/t dealing with abscess tooth and infecton- on antibiotics and following with Dentist. Pain increased d/t acute infection  FU from trigger point injections from completed by Jennie SOSA from 3/17/2022. Patient reports 70% relief of muscle pain but only for a couple days from injections states that muscle knots were reduced. Feels that these were not as effective as receiving steroid injections. Continues to have pain in mid to low back throughout and pain radiates down bilateral legs- twisting, spasms aching .      Awaiting scheduling tooth extraction   Pain increases with bending, lifting, twisting , walking, standing, getting up and down and housework or working at job. Did not participate with therapy that was ordered last visit and dry needle therapy   Pain medications remain effective     Medications reviewed. Patient denies side effects with medications. Patient states she is taking medications as prescribed. Shedenies receiving pain medications from other sources. She had urgent care visit     Pain scale with out pain medications or at its worst is 7-10/10. Pain scale with pain medications or at its best is 6/10. Last dose of Oxy IR and neurontin was today   Drug screen reviewed from 3/17/2022 and was appropriate  Pill count was not  completed  today d/t virtual visit       The patientis allergic to ketorolac, adhesive tape, compazine [prochlorperazine], erythromycin, haloperidol and related, lyrica [pregabalin], reglan [metoclopramide], and sulfa antibiotics.       Subjective:      Review of Systems   Constitutional: Negative for appetite change, fatigue and fever. HENT: Positive for dental problem. Dental pain    Respiratory: Negative for cough. Cardiovascular: Negative for leg swelling. Gastrointestinal: Positive for nausea and vomiting. Negative for abdominal distention, abdominal pain, blood in stool, constipation and diarrhea. Musculoskeletal: Positive for arthralgias, back pain, gait problem and myalgias. Negative for joint swelling, neck pain and neck stiffness. Neurological: Positive for weakness and numbness. Psychiatric/Behavioral: Positive for decreased concentration, dysphoric mood and sleep disturbance. The patient is nervous/anxious. Objective: There were no vitals filed for this visit. Physical Exam  Constitutional:       General: She is in acute distress. Appearance: She is ill-appearing. HENT:      Head: Normocephalic and atraumatic. Neurological:      General: No focal deficit present. Mental Status: She is alert and oriented to person, place, and time. Psychiatric:      Comments: Anxious, flat        JOYCE test: negative   Yeomans test: negative   Gaenslen test: negative      Assessment:     1. Lumbar radiculitis    2. Spinal stenosis of lumbar region with neurogenic claudication    3. Lumbar disc herniation    4. DDD (degenerative disc disease), lumbar    5. Spondylosis of lumbar region without myelopathy or radiculopathy    6. Myofascial pain    7. Chronic pain syndrome    8. Pain due to dental caries            Plan:      · OARRS reviewed. Current MED: 60.00  · Patient was offered naloxone for home. · Discussed long term side effects of medications, tolerance, dependency and addiction. · Previous UDS reviewed  · UDS preformed today for compliance. · Patient told can not receive any pain medications from any other source. · No evidence of abuse, diversion or aberrant behavior.    Medications and/or procedures to improve function and quality of life- patient understanding with this and that may not be pain free   Discussed with patient about safe storage of medications at home   Discussed possible weaning of medication dosing dependent on treatment/procedure results.  Discussed with patient about risks with procedure including infection, reaction to medication, increased pain, or bleeding.  Procedure notes reviewed in detail   Short term relief from trigger point injections of muscle pain  ·  Has had L-facet MBB and LESI with no improvement   · Not a candidate for SCS d/t insurance. 2 different surgeans recommended lumbar surgery   Instructed to FU with Dentist for abscessed tooth to schedule planned removal  · Medications continue to help. Continue oxy IR 10 mg QID prn- filled recently 5/4/2022  · Continue Zanaflex prn- instructed to hold if SBP less than 100, Neurontin 600 mg QID- filled 5/4/2022  · Education provided to try to take less prn pain medications and wean as able.  Encouraged patient to participate with therapy and dry needle therapy when able to   FU in 1 month to reevaluate and get updated UDS    Meds. Prescribed:   No orders of the defined types were placed in this encounter. Return in about 1 month (around 6/12/2022), or if symptoms worsen or fail to improve, for follow up  for medications in person updated UDS.                Electronically signed by RENNY Martinez CNP on5/12/2022 at 12:08 PM

## 2022-06-01 ENCOUNTER — TELEPHONE (OUTPATIENT)
Dept: PHYSICAL MEDICINE AND REHAB | Age: 52
End: 2022-06-01

## 2022-06-01 DIAGNOSIS — R10.84 GENERALIZED ABDOMINAL PAIN: ICD-10-CM

## 2022-06-01 DIAGNOSIS — M51.36 DDD (DEGENERATIVE DISC DISEASE), LUMBAR: ICD-10-CM

## 2022-06-01 DIAGNOSIS — G89.4 CHRONIC PAIN SYNDROME: ICD-10-CM

## 2022-06-01 DIAGNOSIS — M79.18 MYOFASCIAL PAIN DYSFUNCTION SYNDROME: ICD-10-CM

## 2022-06-01 DIAGNOSIS — M51.26 LUMBAR DISC HERNIATION: ICD-10-CM

## 2022-06-01 DIAGNOSIS — M54.16 LUMBAR RADICULITIS: ICD-10-CM

## 2022-06-01 DIAGNOSIS — M48.062 SPINAL STENOSIS OF LUMBAR REGION WITH NEUROGENIC CLAUDICATION: ICD-10-CM

## 2022-06-01 DIAGNOSIS — M47.816 SPONDYLOSIS OF LUMBAR REGION WITHOUT MYELOPATHY OR RADICULOPATHY: ICD-10-CM

## 2022-06-01 RX ORDER — GABAPENTIN 600 MG/1
600 TABLET ORAL 4 TIMES DAILY
Qty: 120 TABLET | Refills: 0 | Status: SHIPPED | OUTPATIENT
Start: 2022-06-01 | End: 2022-06-27 | Stop reason: SDUPTHER

## 2022-06-01 RX ORDER — OXYCODONE HYDROCHLORIDE 10 MG/1
10 TABLET ORAL EVERY 6 HOURS PRN
Qty: 120 TABLET | Refills: 0 | Status: SHIPPED | OUTPATIENT
Start: 2022-06-01 | End: 2022-06-27 | Stop reason: SDUPTHER

## 2022-06-01 RX ORDER — TIZANIDINE 4 MG/1
4 TABLET ORAL EVERY 6 HOURS PRN
Qty: 120 TABLET | Refills: 0 | Status: SHIPPED | OUTPATIENT
Start: 2022-06-01 | End: 2022-06-27 | Stop reason: SDUPTHER

## 2022-06-01 NOTE — TELEPHONE ENCOUNTER
OARRS reviewed. UDS: + for . Oxycodone, ambien. gabapentin  Last seen: 5/12/2022.  Follow-up:   Future Appointments   Date Time Provider Laisha Maribel   6/27/2022 11:00 AM RENNY Jo - CNP [facility] - [place]

## 2022-06-22 ENCOUNTER — HOSPITAL ENCOUNTER (OUTPATIENT)
Age: 52
Setting detail: SPECIMEN
Discharge: HOME | End: 2022-06-22

## 2022-06-22 LAB
ABSOLUTE EOS #: 0.22 K/UL (ref 0–0.44)
ABSOLUTE IMMATURE GRANULOCYTE: <0.03 K/UL (ref 0–0.3)
ABSOLUTE LYMPH #: 2.93 K/UL (ref 1.1–3.7)
ABSOLUTE MONO #: 0.47 K/UL (ref 0.1–1.2)
ALBUMIN SERPL-MCNC: 4.3 G/DL (ref 3.5–5.2)
ALBUMIN/GLOBULIN RATIO: 1.7 (ref 1–2.5)
ALP BLD-CCNC: 99 U/L (ref 35–104)
ALT SERPL-CCNC: 29 U/L (ref 5–33)
ANION GAP SERPL CALCULATED.3IONS-SCNC: 18 MMOL/L (ref 9–17)
AST SERPL-CCNC: 36 U/L
BASOPHILS # BLD: 1 % (ref 0–2)
BASOPHILS ABSOLUTE: 0.07 K/UL (ref 0–0.2)
BILIRUB SERPL-MCNC: 0.16 MG/DL (ref 0.3–1.2)
BUN BLDV-MCNC: 37 MG/DL (ref 6–20)
CALCIUM SERPL-MCNC: 9.8 MG/DL (ref 8.6–10.4)
CHLORIDE BLD-SCNC: 102 MMOL/L (ref 98–107)
CO2: 23 MMOL/L (ref 20–31)
CREAT SERPL-MCNC: 1.67 MG/DL (ref 0.5–0.9)
EOSINOPHILS RELATIVE PERCENT: 3 % (ref 1–4)
GFR AFRICAN AMERICAN: 39 ML/MIN
GFR NON-AFRICAN AMERICAN: 32 ML/MIN
GFR SERPL CREATININE-BSD FRML MDRD: ABNORMAL ML/MIN/{1.73_M2}
GLUCOSE BLD-MCNC: 83 MG/DL (ref 70–99)
HCT VFR BLD CALC: 38.9 % (ref 36.3–47.1)
HEMOGLOBIN: 12.6 G/DL (ref 11.9–15.1)
IMMATURE GRANULOCYTES: 0 %
LYMPHOCYTES # BLD: 46 % (ref 24–43)
MCH RBC QN AUTO: 28.4 PG (ref 25.2–33.5)
MCHC RBC AUTO-ENTMCNC: 32.4 G/DL (ref 28.4–34.8)
MCV RBC AUTO: 87.8 FL (ref 82.6–102.9)
MONOCYTES # BLD: 7 % (ref 3–12)
NRBC AUTOMATED: 0 PER 100 WBC
PDW BLD-RTO: 13.8 % (ref 11.8–14.4)
PLATELET # BLD: 221 K/UL (ref 138–453)
PMV BLD AUTO: 13 FL (ref 8.1–13.5)
POTASSIUM SERPL-SCNC: 4.9 MMOL/L (ref 3.7–5.3)
RBC # BLD: 4.43 M/UL (ref 3.95–5.11)
SEG NEUTROPHILS: 43 % (ref 36–65)
SEGMENTED NEUTROPHILS ABSOLUTE COUNT: 2.75 K/UL (ref 1.5–8.1)
SODIUM BLD-SCNC: 143 MMOL/L (ref 135–144)
THYROXINE, FREE: 0.72 NG/DL (ref 0.93–1.7)
TOTAL PROTEIN: 6.9 G/DL (ref 6.4–8.3)
TSH SERPL DL<=0.05 MIU/L-ACNC: 3.69 UIU/ML (ref 0.3–5)
VITAMIN D 25-HYDROXY: 22.5 NG/ML
WBC # BLD: 6.5 K/UL (ref 3.5–11.3)

## 2022-06-27 ENCOUNTER — OFFICE VISIT (OUTPATIENT)
Dept: PHYSICAL MEDICINE AND REHAB | Age: 52
End: 2022-06-27
Payer: COMMERCIAL

## 2022-06-27 VITALS
HEIGHT: 66 IN | SYSTOLIC BLOOD PRESSURE: 124 MMHG | WEIGHT: 203 LBS | DIASTOLIC BLOOD PRESSURE: 74 MMHG | BODY MASS INDEX: 32.62 KG/M2

## 2022-06-27 DIAGNOSIS — M79.18 MYOFASCIAL PAIN: Primary | ICD-10-CM

## 2022-06-27 DIAGNOSIS — R10.84 GENERALIZED ABDOMINAL PAIN: ICD-10-CM

## 2022-06-27 DIAGNOSIS — M79.18 MYOFASCIAL PAIN DYSFUNCTION SYNDROME: ICD-10-CM

## 2022-06-27 DIAGNOSIS — M54.16 LUMBAR RADICULITIS: ICD-10-CM

## 2022-06-27 DIAGNOSIS — M48.062 SPINAL STENOSIS OF LUMBAR REGION WITH NEUROGENIC CLAUDICATION: ICD-10-CM

## 2022-06-27 DIAGNOSIS — G89.4 CHRONIC PAIN SYNDROME: ICD-10-CM

## 2022-06-27 DIAGNOSIS — M51.36 DDD (DEGENERATIVE DISC DISEASE), LUMBAR: ICD-10-CM

## 2022-06-27 DIAGNOSIS — M47.816 SPONDYLOSIS OF LUMBAR REGION WITHOUT MYELOPATHY OR RADICULOPATHY: ICD-10-CM

## 2022-06-27 DIAGNOSIS — M51.26 LUMBAR DISC HERNIATION: ICD-10-CM

## 2022-06-27 PROCEDURE — 1036F TOBACCO NON-USER: CPT | Performed by: NURSE PRACTITIONER

## 2022-06-27 PROCEDURE — G8417 CALC BMI ABV UP PARAM F/U: HCPCS | Performed by: NURSE PRACTITIONER

## 2022-06-27 PROCEDURE — 99214 OFFICE O/P EST MOD 30 MIN: CPT | Performed by: NURSE PRACTITIONER

## 2022-06-27 PROCEDURE — 3017F COLORECTAL CA SCREEN DOC REV: CPT | Performed by: NURSE PRACTITIONER

## 2022-06-27 PROCEDURE — G8427 DOCREV CUR MEDS BY ELIG CLIN: HCPCS | Performed by: NURSE PRACTITIONER

## 2022-06-27 RX ORDER — TIZANIDINE 4 MG/1
4 TABLET ORAL EVERY 6 HOURS PRN
Qty: 120 TABLET | Refills: 0 | Status: SHIPPED | OUTPATIENT
Start: 2022-07-01 | End: 2022-07-29 | Stop reason: SDUPTHER

## 2022-06-27 RX ORDER — GABAPENTIN 600 MG/1
600 TABLET ORAL 4 TIMES DAILY
Qty: 120 TABLET | Refills: 0 | Status: SHIPPED | OUTPATIENT
Start: 2022-07-01 | End: 2022-07-01

## 2022-06-27 RX ORDER — OXYCODONE HYDROCHLORIDE 10 MG/1
10 TABLET ORAL EVERY 6 HOURS PRN
Qty: 120 TABLET | Refills: 0 | Status: SHIPPED | OUTPATIENT
Start: 2022-07-01 | End: 2022-07-29 | Stop reason: SDUPTHER

## 2022-06-27 ASSESSMENT — ENCOUNTER SYMPTOMS
ABDOMINAL PAIN: 0
NAUSEA: 0
ABDOMINAL DISTENTION: 0
VOMITING: 0
COUGH: 0
DIARRHEA: 0
BACK PAIN: 1
CONSTIPATION: 0
BLOOD IN STOOL: 0

## 2022-06-27 NOTE — PROGRESS NOTES
[facility]  [address] [facility]  Dept: [phone]  Dept Fax: [phone]: [phone]    Visit Date: 6/27/2022    Functionality Assessment/Goals Worksheet     On a scale of 0 (Does not Interfere) to 10 (Completely Interferes)     1. Which number describes how during the past week pain has interfered with       the following:  A. General Activity:  10  B. Mood: 8  C. Walking Ability:  9  D. Normal Work (Includes both work outside the home and housework):  9  E. Relations with Other People:   6  F. Sleep:   9  G. Enjoyment of Life:   9    2. Patient Prefers to Take their Pain Medications:     [x]  On a regular basis   [x]  Only when necessary    []  Does not take pain medications    3. What are the Patient's Goals/Expectations for Visiting Pain Management? []  Learn about my pain    [x]  Receive Medication   []  Physical Therapy     []  Treat Depression   [x]  Receive Injections    []  Treat Sleep   []  Deal with Anxiety and Stress   []  Treat Opoid Dependence/Addiction   []  Other:      HPI:   Deb Olivas is a 46 y.o. female is here today for     Chief Complaint: Low back pain, leg pain, upper back pain     HPI   1.5 month FU. Continues to have pain in mid to low back throughout and pain radiates down bilateral legs- twisting, stabbing, aching . States pain is constant and elevated     Has complaints of muscle pain throughout back- spasms, tightness, aching   Pain increases with bending, lifting, twisting , turning torso, walking, standing, getting up and down and housework or working at job. Pain medications help take the edge off. Was given some valium for panic attacks at night from pcp. Discussed if this continues I cant continue current doses of oxy IR   Medications reviewed. Patient denies side effects with medications.  Patient states she is taking medications as prescribed. Shedenies receiving pain medications from other sources. She denies any ER visits since last visit. Pain scale with out pain medications or at its worst is 10/10. Pain scale with pain medications or at its best is 4-6/10. Last dose of Oxy IR was today   Drug screen reviewed from 3/17/2022 and was appropriate  Pill count completed  today and WNL: Yes      The patientis allergic to ketorolac, adhesive tape, compazine [prochlorperazine], erythromycin, haloperidol and related, lyrica [pregabalin], reglan [metoclopramide], and sulfa antibiotics. Subjective:      Review of Systems   Constitutional: Negative for appetite change and fever. HENT: Positive for dental problem. Respiratory: Negative for cough. Cardiovascular: Negative for leg swelling. Gastrointestinal: Negative for abdominal distention, abdominal pain, blood in stool, constipation, diarrhea, nausea and vomiting. Musculoskeletal: Positive for arthralgias, back pain, gait problem and myalgias. Negative for joint swelling, neck pain and neck stiffness. Neurological: Positive for weakness and numbness. Psychiatric/Behavioral: Positive for decreased concentration, dysphoric mood and sleep disturbance. The patient is nervous/anxious. Objective:     Vitals:    06/27/22 1102   BP: 124/74   Weight: 203 lb (92.1 kg)   Height: 5' 6\" (1.676 m)       Physical Exam  Vitals and nursing note reviewed. Constitutional:       General: She is not in acute distress. Appearance: She is well-developed. She is not ill-appearing or diaphoretic. HENT:      Head: Normocephalic and atraumatic. Right Ear: External ear normal.      Left Ear: External ear normal.      Nose: Nose normal.      Mouth/Throat:      Mouth: Mucous membranes are moist.      Pharynx: No oropharyngeal exudate. Eyes:      General: No scleral icterus. Right eye: No discharge. Left eye: No discharge.       Conjunctiva/sclera: Conjunctivae normal. Pupils: Pupils are equal, round, and reactive to light. Neck:      Thyroid: No thyromegaly. Cardiovascular:      Rate and Rhythm: Normal rate and regular rhythm. Heart sounds: Normal heart sounds. No murmur heard. No friction rub. No gallop. Pulmonary:      Effort: Pulmonary effort is normal. No respiratory distress. Breath sounds: Normal breath sounds. No wheezing or rales. Chest:      Chest wall: No tenderness. Abdominal:      General: Bowel sounds are normal. There is no distension. Palpations: Abdomen is soft. Tenderness: There is no abdominal tenderness. There is no guarding or rebound. Musculoskeletal:         General: Swelling and tenderness present. Left shoulder: Tenderness and bony tenderness present. Decreased range of motion. Decreased strength. Cervical back: Full passive range of motion without pain, normal range of motion and neck supple. No edema, erythema or rigidity. No muscular tenderness. Normal range of motion. Thoracic back: Spasms and tenderness present. Decreased range of motion. Lumbar back: Spasms, tenderness and bony tenderness present. Decreased range of motion. Positive right straight leg raise test and positive left straight leg raise test.        Back:       Right lower leg: No edema. Left lower leg: No edema. Skin:     General: Skin is warm. Coloration: Skin is pale. Findings: No erythema or rash. Neurological:      General: No focal deficit present. Mental Status: She is alert and oriented to person, place, and time. She is not disoriented. Cranial Nerves: No cranial nerve deficit. Sensory: Sensory deficit present. Motor: Weakness present. No atrophy or abnormal muscle tone. Coordination: Coordination abnormal.      Gait: Gait abnormal.      Deep Tendon Reflexes: Reflexes are normal and symmetric.       Comments:    4/5 strength bilateral lower extremities       Psychiatric: Attention and Perception: She is attentive. Mood and Affect: Mood normal. Affect is flat and tearful. Affect is not labile, blunt, angry or inappropriate. Speech: She is communicative. Speech is not rapid and pressured, delayed, slurred or tangential.         Behavior: Behavior is not agitated, slowed, aggressive, withdrawn, hyperactive or combative. Thought Content: Thought content is not paranoid or delusional. Thought content does not include homicidal or suicidal ideation. Thought content does not include homicidal or suicidal plan. Cognition and Memory: Memory is not impaired. She does not exhibit impaired recent memory or impaired remote memory. Judgment: Judgment is not impulsive or inappropriate. JOYCE test: negative   Yeomans test: negative   Gaenslen test: negative     Assessment:     1. Myofascial pain    2. Spinal stenosis of lumbar region with neurogenic claudication    3. Lumbar radiculitis    4. Spondylosis of lumbar region without myelopathy or radiculopathy    5. Lumbar disc herniation    6. DDD (degenerative disc disease), lumbar    7. Chronic pain syndrome    8. Generalized abdominal pain    9. Myofascial pain dysfunction syndrome            Plan:      · OARRS reviewed. Current MED: 60.00  · Patient was offered naloxone for home. · Discussed long term side effects of medications, tolerance, dependency and addiction. · Previous UDS reviewed  · UDS preformed today for compliance. · Patient told can not receive any pain medications from any other source. · No evidence of abuse, diversion or aberrant behavior.  Medications and/or procedures to improve function and quality of life- patient understanding with this and that may not be pain free   Discussed with patient about safe storage of medications at home   Discussed possible weaning of medication dosing dependent on treatment/procedure results.     Discussed with patient about risks with procedure including infection, reaction to medication, increased pain, or bleeding. · Plan to repeat thoracic and lumbar trigger point injections in office with [facility]  · Medications continue to help. Continue oxy IR 10 mg QID prn- ordered refill discussed needs to get off Benzo's or these medications will get greatly reduced. · Continue Zanaflex prn- instructed to hold if SBP less than 100, Neurontin 600 mg QID- ordered refill  · Education provided to try to take less prn pain medications and wean as able.  Still has not FU with Dentist for abscessed tooth and needs to FU.  If patient is on blood thinners will need approval to hold yes or no:   Does patient have implanted device Stimulator, AICD or Pacemaker etc?     Meds. Prescribed:   Orders Placed This Encounter   Medications    oxyCODONE HCl (OXY-IR) 10 MG immediate release tablet     Sig: Take 1 tablet by mouth every 6 hours as needed for Pain for up to 30 days. Intended supply: 30 days     Dispense:  120 tablet     Refill:  0     Reduce doses taken as pain becomes manageable    tiZANidine (ZANAFLEX) 4 MG tablet     Sig: Take 1 tablet by mouth every 6 hours as needed (spasms)     Dispense:  120 tablet     Refill:  0    gabapentin (NEURONTIN) 600 MG tablet     Sig: Take 1 tablet by mouth 4 times daily for 30 days. Dispense:  120 tablet     Refill:  0       Return in about 2 months (around 8/27/2022), or if symptoms worsen or fail to improve, for Thoracic and lumbar trigger point injections with [facility]. follow up  2 months with me .                Electronically signed by RENNY Prasad CNP on6/27/2022 at 11:26 AM

## 2022-06-29 DIAGNOSIS — M79.18 MYOFASCIAL PAIN DYSFUNCTION SYNDROME: ICD-10-CM

## 2022-06-29 DIAGNOSIS — M47.816 SPONDYLOSIS OF LUMBAR REGION WITHOUT MYELOPATHY OR RADICULOPATHY: ICD-10-CM

## 2022-06-29 DIAGNOSIS — G89.4 CHRONIC PAIN SYNDROME: ICD-10-CM

## 2022-07-01 RX ORDER — GABAPENTIN 600 MG/1
TABLET ORAL
Qty: 120 TABLET | Refills: 0 | Status: SHIPPED | OUTPATIENT
Start: 2022-07-01 | End: 2022-08-23

## 2022-07-01 NOTE — TELEPHONE ENCOUNTER
OARRS reviewed. UDS: + for Gabapentin, Ambien, Oxycodone. Last seen: 6/27/2022.  Follow-up:   Future Appointments   Date Time Provider Laisha López   7/5/2022  9:00 AM RENNY De Anda CNP [facility] - [address] [facility]   8/29/2022 11:15 AM Elora Curling, APRN - CNP [facility] - [address] [facility]

## 2022-07-12 ENCOUNTER — APPOINTMENT (OUTPATIENT)
Dept: INTERVENTIONAL RADIOLOGY/VASCULAR | Age: 52
End: 2022-07-12
Payer: COMMERCIAL

## 2022-07-12 ENCOUNTER — APPOINTMENT (OUTPATIENT)
Dept: CT IMAGING | Age: 52
End: 2022-07-12
Payer: COMMERCIAL

## 2022-07-12 ENCOUNTER — OFFICE VISIT (OUTPATIENT)
Dept: PHYSICAL MEDICINE AND REHAB | Age: 52
End: 2022-07-12
Payer: COMMERCIAL

## 2022-07-12 ENCOUNTER — APPOINTMENT (OUTPATIENT)
Dept: GENERAL RADIOLOGY | Age: 52
End: 2022-07-12
Payer: COMMERCIAL

## 2022-07-12 ENCOUNTER — HOSPITAL ENCOUNTER (EMERGENCY)
Age: 52
Discharge: OTHER INSTITUTION | End: 2022-07-12
Attending: EMERGENCY MEDICINE
Payer: COMMERCIAL

## 2022-07-12 VITALS
SYSTOLIC BLOOD PRESSURE: 165 MMHG | TEMPERATURE: 98.4 F | DIASTOLIC BLOOD PRESSURE: 92 MMHG | OXYGEN SATURATION: 98 % | RESPIRATION RATE: 16 BRPM | HEART RATE: 105 BPM

## 2022-07-12 VITALS
BODY MASS INDEX: 36.32 KG/M2 | DIASTOLIC BLOOD PRESSURE: 86 MMHG | SYSTOLIC BLOOD PRESSURE: 144 MMHG | WEIGHT: 226 LBS | HEIGHT: 66 IN

## 2022-07-12 DIAGNOSIS — M79.632 PAIN AND SWELLING OF FOREARM, LEFT: ICD-10-CM

## 2022-07-12 DIAGNOSIS — M79.89 PAIN AND SWELLING OF FOREARM, LEFT: ICD-10-CM

## 2022-07-12 DIAGNOSIS — F41.1 ANXIETY STATE: ICD-10-CM

## 2022-07-12 DIAGNOSIS — I10 ELEVATED BLOOD PRESSURE READING WITH DIAGNOSIS OF HYPERTENSION: ICD-10-CM

## 2022-07-12 DIAGNOSIS — M79.18 MYOFASCIAL PAIN: Primary | ICD-10-CM

## 2022-07-12 DIAGNOSIS — R07.89 ATYPICAL CHEST PAIN: Primary | ICD-10-CM

## 2022-07-12 DIAGNOSIS — Z53.29 LEFT AGAINST MEDICAL ADVICE: ICD-10-CM

## 2022-07-12 LAB
ALBUMIN SERPL-MCNC: 4.8 G/DL (ref 3.5–5.1)
ALP BLD-CCNC: 119 U/L (ref 38–126)
ALT SERPL-CCNC: 21 U/L (ref 11–66)
ANION GAP SERPL CALCULATED.3IONS-SCNC: 17 MEQ/L (ref 8–16)
AST SERPL-CCNC: 28 U/L (ref 5–40)
BASOPHILS # BLD: 0.6 %
BASOPHILS ABSOLUTE: 0 THOU/MM3 (ref 0–0.1)
BILIRUB SERPL-MCNC: 0.2 MG/DL (ref 0.3–1.2)
BILIRUBIN DIRECT: < 0.2 MG/DL (ref 0–0.3)
BUN BLDV-MCNC: 19 MG/DL (ref 7–22)
CALCIUM SERPL-MCNC: 10.2 MG/DL (ref 8.5–10.5)
CHLORIDE BLD-SCNC: 100 MEQ/L (ref 98–111)
CO2: 23 MEQ/L (ref 23–33)
CREAT SERPL-MCNC: 1 MG/DL (ref 0.4–1.2)
EKG ATRIAL RATE: 91 BPM
EKG P AXIS: 48 DEGREES
EKG P-R INTERVAL: 162 MS
EKG Q-T INTERVAL: 386 MS
EKG QRS DURATION: 80 MS
EKG QTC CALCULATION (BAZETT): 474 MS
EKG R AXIS: 28 DEGREES
EKG T AXIS: 27 DEGREES
EKG VENTRICULAR RATE: 91 BPM
EOSINOPHIL # BLD: 2.5 %
EOSINOPHILS ABSOLUTE: 0.2 THOU/MM3 (ref 0–0.4)
ERYTHROCYTE [DISTWIDTH] IN BLOOD BY AUTOMATED COUNT: 14.5 % (ref 11.5–14.5)
ERYTHROCYTE [DISTWIDTH] IN BLOOD BY AUTOMATED COUNT: 46.4 FL (ref 35–45)
FLU A ANTIGEN: NEGATIVE
FLU B ANTIGEN: NEGATIVE
GFR SERPL CREATININE-BSD FRML MDRD: 58 ML/MIN/1.73M2
GLUCOSE BLD-MCNC: 81 MG/DL (ref 70–108)
HCT VFR BLD CALC: 42.2 % (ref 37–47)
HEMOGLOBIN: 13.3 GM/DL (ref 12–16)
IMMATURE GRANS (ABS): 0.03 THOU/MM3 (ref 0–0.07)
IMMATURE GRANULOCYTES: 0.4 %
LACTIC ACID, SEPSIS: 1.3 MMOL/L (ref 0.5–1.9)
LIPASE: 17.4 U/L (ref 5.6–51.3)
LYMPHOCYTES # BLD: 32.7 %
LYMPHOCYTES ABSOLUTE: 2.7 THOU/MM3 (ref 1–4.8)
MCH RBC QN AUTO: 28 PG (ref 26–33)
MCHC RBC AUTO-ENTMCNC: 31.5 GM/DL (ref 32.2–35.5)
MCV RBC AUTO: 88.8 FL (ref 81–99)
MONOCYTES # BLD: 5.2 %
MONOCYTES ABSOLUTE: 0.4 THOU/MM3 (ref 0.4–1.3)
NUCLEATED RED BLOOD CELLS: 0 /100 WBC
OSMOLALITY CALCULATION: 280.7 MOSMOL/KG (ref 275–300)
PLATELET # BLD: 269 THOU/MM3 (ref 130–400)
PMV BLD AUTO: 11 FL (ref 9.4–12.4)
POTASSIUM SERPL-SCNC: 4 MEQ/L (ref 3.5–5.2)
PRO-BNP: 335.7 PG/ML (ref 0–900)
RBC # BLD: 4.75 MILL/MM3 (ref 4.2–5.4)
SARS-COV-2, NAAT: NOT  DETECTED
SEG NEUTROPHILS: 58.6 %
SEGMENTED NEUTROPHILS ABSOLUTE COUNT: 4.9 THOU/MM3 (ref 1.8–7.7)
SODIUM BLD-SCNC: 140 MEQ/L (ref 135–145)
TOTAL PROTEIN: 7.3 G/DL (ref 6.1–8)
TROPONIN T: < 0.01 NG/ML
TROPONIN T: < 0.01 NG/ML
WBC # BLD: 8.3 THOU/MM3 (ref 4.8–10.8)

## 2022-07-12 PROCEDURE — 93005 ELECTROCARDIOGRAM TRACING: CPT | Performed by: EMERGENCY MEDICINE

## 2022-07-12 PROCEDURE — 83690 ASSAY OF LIPASE: CPT

## 2022-07-12 PROCEDURE — 87635 SARS-COV-2 COVID-19 AMP PRB: CPT

## 2022-07-12 PROCEDURE — 93010 ELECTROCARDIOGRAM REPORT: CPT | Performed by: INTERNAL MEDICINE

## 2022-07-12 PROCEDURE — 20553 NJX 1/MLT TRIGGER POINTS 3/>: CPT | Performed by: NURSE PRACTITIONER

## 2022-07-12 PROCEDURE — 83605 ASSAY OF LACTIC ACID: CPT

## 2022-07-12 PROCEDURE — 96375 TX/PRO/DX INJ NEW DRUG ADDON: CPT

## 2022-07-12 PROCEDURE — 71046 X-RAY EXAM CHEST 2 VIEWS: CPT

## 2022-07-12 PROCEDURE — 96374 THER/PROPH/DIAG INJ IV PUSH: CPT

## 2022-07-12 PROCEDURE — 6360000004 HC RX CONTRAST MEDICATION: Performed by: PHYSICIAN ASSISTANT

## 2022-07-12 PROCEDURE — 80053 COMPREHEN METABOLIC PANEL: CPT

## 2022-07-12 PROCEDURE — 84484 ASSAY OF TROPONIN QUANT: CPT

## 2022-07-12 PROCEDURE — 99285 EMERGENCY DEPT VISIT HI MDM: CPT

## 2022-07-12 PROCEDURE — 85025 COMPLETE CBC W/AUTO DIFF WBC: CPT

## 2022-07-12 PROCEDURE — 6360000002 HC RX W HCPCS: Performed by: PHYSICIAN ASSISTANT

## 2022-07-12 PROCEDURE — 2580000003 HC RX 258: Performed by: PHYSICIAN ASSISTANT

## 2022-07-12 PROCEDURE — 82248 BILIRUBIN DIRECT: CPT

## 2022-07-12 PROCEDURE — 71275 CT ANGIOGRAPHY CHEST: CPT

## 2022-07-12 PROCEDURE — 36415 COLL VENOUS BLD VENIPUNCTURE: CPT

## 2022-07-12 PROCEDURE — 93971 EXTREMITY STUDY: CPT

## 2022-07-12 PROCEDURE — 87804 INFLUENZA ASSAY W/OPTIC: CPT

## 2022-07-12 PROCEDURE — 83880 ASSAY OF NATRIURETIC PEPTIDE: CPT

## 2022-07-12 RX ORDER — TRIAMCINOLONE ACETONIDE 40 MG/ML
40 INJECTION, SUSPENSION INTRA-ARTICULAR; INTRAMUSCULAR ONCE
Status: COMPLETED | OUTPATIENT
Start: 2022-07-12 | End: 2022-07-12

## 2022-07-12 RX ORDER — ASPIRIN 81 MG/1
324 TABLET, CHEWABLE ORAL ONCE
Status: DISCONTINUED | OUTPATIENT
Start: 2022-07-12 | End: 2022-07-12 | Stop reason: HOSPADM

## 2022-07-12 RX ORDER — DIPHENHYDRAMINE HYDROCHLORIDE 50 MG/ML
50 INJECTION INTRAMUSCULAR; INTRAVENOUS ONCE
Status: DISCONTINUED | OUTPATIENT
Start: 2022-07-12 | End: 2022-07-12 | Stop reason: HOSPADM

## 2022-07-12 RX ORDER — LORAZEPAM 2 MG/ML
1 INJECTION INTRAMUSCULAR ONCE
Status: COMPLETED | OUTPATIENT
Start: 2022-07-12 | End: 2022-07-12

## 2022-07-12 RX ORDER — DROPERIDOL 2.5 MG/ML
0.62 INJECTION, SOLUTION INTRAMUSCULAR; INTRAVENOUS EVERY 6 HOURS PRN
Status: DISCONTINUED | OUTPATIENT
Start: 2022-07-12 | End: 2022-07-12 | Stop reason: HOSPADM

## 2022-07-12 RX ORDER — HYDROXYZINE HYDROCHLORIDE 50 MG/ML
50 INJECTION, SOLUTION INTRAMUSCULAR ONCE
Status: DISCONTINUED | OUTPATIENT
Start: 2022-07-12 | End: 2022-07-12

## 2022-07-12 RX ORDER — ORPHENADRINE CITRATE 30 MG/ML
60 INJECTION INTRAMUSCULAR; INTRAVENOUS ONCE
Status: COMPLETED | OUTPATIENT
Start: 2022-07-12 | End: 2022-07-12

## 2022-07-12 RX ORDER — METHOCARBAMOL 100 MG/ML
100 INJECTION, SOLUTION INTRAMUSCULAR; INTRAVENOUS ONCE
Status: CANCELLED | OUTPATIENT
Start: 2022-07-12 | End: 2022-07-12

## 2022-07-12 RX ORDER — DEXAMETHASONE SODIUM PHOSPHATE 4 MG/ML
10 INJECTION, SOLUTION INTRA-ARTICULAR; INTRALESIONAL; INTRAMUSCULAR; INTRAVENOUS; SOFT TISSUE ONCE
Status: DISCONTINUED | OUTPATIENT
Start: 2022-07-12 | End: 2022-07-12

## 2022-07-12 RX ORDER — 0.9 % SODIUM CHLORIDE 0.9 %
1000 INTRAVENOUS SOLUTION INTRAVENOUS ONCE
Status: COMPLETED | OUTPATIENT
Start: 2022-07-12 | End: 2022-07-12

## 2022-07-12 RX ADMIN — ORPHENADRINE CITRATE 60 MG: 30 INJECTION INTRAMUSCULAR; INTRAVENOUS at 16:25

## 2022-07-12 RX ADMIN — TRIAMCINOLONE ACETONIDE 40 MG: 40 INJECTION, SUSPENSION INTRA-ARTICULAR; INTRAMUSCULAR at 12:16

## 2022-07-12 RX ADMIN — LORAZEPAM 1 MG: 2 INJECTION INTRAMUSCULAR; INTRAVENOUS at 15:52

## 2022-07-12 RX ADMIN — SODIUM CHLORIDE 1000 ML: 9 INJECTION, SOLUTION INTRAVENOUS at 16:27

## 2022-07-12 RX ADMIN — IOPAMIDOL 80 ML: 755 INJECTION, SOLUTION INTRAVENOUS at 17:14

## 2022-07-12 ASSESSMENT — PAIN DESCRIPTION - PAIN TYPE: TYPE: ACUTE PAIN

## 2022-07-12 ASSESSMENT — HEART SCORE: ECG: 0

## 2022-07-12 ASSESSMENT — ENCOUNTER SYMPTOMS
PHOTOPHOBIA: 0
COLOR CHANGE: 1
ABDOMINAL PAIN: 0
DIARRHEA: 0
SHORTNESS OF BREATH: 0
NAUSEA: 0
RHINORRHEA: 0
SORE THROAT: 0
VOMITING: 0
COUGH: 0
BACK PAIN: 1

## 2022-07-12 ASSESSMENT — PAIN DESCRIPTION - LOCATION: LOCATION: CHEST

## 2022-07-12 ASSESSMENT — PAIN DESCRIPTION - ORIENTATION: ORIENTATION: LEFT

## 2022-07-12 ASSESSMENT — PAIN SCALES - GENERAL: PAINLEVEL_OUTOF10: 8

## 2022-07-12 ASSESSMENT — PAIN DESCRIPTION - FREQUENCY: FREQUENCY: CONTINUOUS

## 2022-07-12 ASSESSMENT — PAIN - FUNCTIONAL ASSESSMENT: PAIN_FUNCTIONAL_ASSESSMENT: 0-10

## 2022-07-12 NOTE — ED NOTES
Patient to ED from PCP office. Patient was having routine trigger point injections in her back for chronic pain issues. While she was there BP was reported to be high. Patient was advised to come to ER for further evaluation. During her travel of coming to the ER patient developed chest pain that radiates into her left arm. Patient also concerned of a wound on her lower left leg that has not improved nor getting better.  Patient concerned wound could be causing her to be septic       Eloy Dnenison RN  07/12/22 [id number] Eduardo Tsang RN  07/12/22 [id number]

## 2022-07-12 NOTE — PROGRESS NOTES
Procedure  Visit Date: 22    Dinorah Thomas is a 46 y.o. female presents today in the office for the following:  Chief Complaint   Patient presents with    Follow-up     Thoracic and lumbar trigger point injections w/ Jennie       History of Present Illness   Leona Simmons is here today for trigger point injections. Pre-Op Diagnosis   Myofacial pain syndrome     Post-Op Diagnosis   Myofacial pain syndrome     Procedure: Trigger point injection(s)    Procedure Documentation   Patient identified. Consent signed. Site identified. Trigger points were identified in the bilateral thoracic paraspinals, bilateral lumbar paraspinals for a total of 20 trigger point injections. Then with a 25g needle entered each trigger point and after negative aspiration, a mixture containing 40 mg kenalo.5% bupivacaine was injected at each trigger point for a total of 20 trigger points. Procedural Complications: None      Vitals:    22 1139   BP: (!) 144/86   Site: Left Upper Arm   Position: Sitting   Cuff Size: Large Adult   Weight: 226 lb (102.5 kg)   Height: 5' 5.98\" (1.676 m)       Conclusion   No complications encountered. Patient discharged stable condition. Patient told if any problems to call office or go to ER. No orders of the defined types were placed in this encounter. Electronically signed by RENNY Mcbride CNP on 22 at 11:56 AM EDT      Addendum: on recheck of BP RN noticed change in color of left hand, and patient complain of discomfort. Fingers cool to touch, and continued dusky appearance. With history of circulation issues and blood clots, advised to go to ER. Patient states has transport and would like to go to ER or urgent care closer to home. Advised to seek medical care regarding color and temperature changes.

## 2022-07-12 NOTE — ED PROVIDER NOTES
[facility] EMERGENCY DEPT      CHIEF COMPLAINT       Chief Complaint   Patient presents with    Chest Pain       Nurses Notes reviewed and I agree except as noted in the HPI. HISTORY OF PRESENT ILLNESS    Agustin Moreno is a 46 y.o. female who presents for multiple complaints. #1- patient was seen at her pain management office and received trigger point injections for her chronic myofascial pain. Upon discharge nurse noted that her left distal forearm was discolored purple and her hand was cool. She was advised to come to the ER for evaluation for possible DVT. Patient has history of PE and DVT and maintains she is compliant with her Eliquis twice daily. Patient has had swelling and pain to left forearm for some time. Patient denies weakness or paresthesias. Complaint #2- In route to the ER patient developed left-sided chest pain radiating into her left upper extremity. The chest pain is constant, colicky, and stabbing. It has no modifying factors. Patient feels dizzy and lightheaded with the pain but denies dyspnea, diaphoresis, nausea, or presyncopal feeling. Patient has a history of atrial fibrillation and CHF but reports this feels different. Complaint #3- patient has a wound to the left lower extremity for a little over a month that has not been healing. Nursing staff at pain management advised the patient to bring the wound to her attention. The patient was concerned that it could cause sepsis. Patient denies pain to the area, swelling, or drainage. Complaint #4- patient has had fluid retention in her ankles for the past few weeks. Patient is able to walk and denies paresthesias. Patient denies fever, chills, URI symptoms, palpitations, or other complaints. Location/Symptom: Left-sided chest pain  Timing/Onset: In route to the ER  Context/Setting: Patient was advised to come to the ER for swelling and discoloration to the left forearm with concern for DVT.   In route patient developed left-sided chest pain radiating into the left arm. She reported this did not feel like her CHF or atrial fibrillation. Quality: Stabbing with a dull knife  Duration: Constant colicky pain  Modifying Factors: None  Severity: Moderate    In review of records, triage note reported patient was sent over for elevated blood pressure however office visit note did not reflect that and blood pressure was 144/86. REVIEW OF SYSTEMS     Review of Systems   Constitutional: Negative for chills, diaphoresis and fever. HENT: Negative for congestion, rhinorrhea and sore throat. Eyes: Negative for photophobia and visual disturbance. Respiratory: Negative for cough and shortness of breath. Cardiovascular: Positive for chest pain and leg swelling. Negative for palpitations. Gastrointestinal: Negative for abdominal pain, diarrhea, nausea and vomiting. Genitourinary: Negative for decreased urine volume and difficulty urinating. Musculoskeletal: Positive for back pain (chronic) and myalgias (chronic). Negative for gait problem. Skin: Positive for color change (Seen at pain management, not witnessed here) and wound. Negative for rash. Neurological: Positive for dizziness and light-headedness. Negative for weakness, numbness and headaches. Psychiatric/Behavioral: Negative for confusion. The patient is nervous/anxious. PAST MEDICAL HISTORY    has a past medical history of Anxiety, Arthritis, Asthma, Nuñez's esophagus, Blood circulation, collateral, GERD (gastroesophageal reflux disease), Hx of blood clots, Hypertension, Insomnia, Interstitial cystitis, Migraine, Pneumonia, Psychiatric problem, and Spinal stenosis of lumbar region. SURGICAL HISTORY      has a past surgical history that includes Abdomen surgery (93); ovarian cyst removal (93); Tubal ligation (93); Colonoscopy (2010); Endoscopy, colon, diagnostic (2010); Patellar tendon repair (Right, 1994); Dilatation, esophagus (2010);  Cardiac catheterization (unsure); other surgical history (21 Nov 2014); Cholecystectomy; Appendectomy; other surgical history (N/A, 03-21-16); other surgical history (N/A, 04-04-16); other surgical history (Bilateral, 6-13-16); Upper gastrointestinal endoscopy (2012); other surgical history (Left, 09/13/2016); Carpal tunnel release (Left); and Colonoscopy (N/A, 8/1/2021). CURRENT MEDICATIONS       Discharge Medication List as of 7/12/2022  6:13 PM      CONTINUE these medications which have NOT CHANGED    Details   RABEprazole Sodium (ACIPHEX PO) Take by mouthHistorical Med      gabapentin (NEURONTIN) 600 MG tablet Take 1 tablet by mouth 4 times daily, Disp-120 tablet, R-0Normal      oxyCODONE HCl (OXY-IR) 10 MG immediate release tablet Take 1 tablet by mouth every 6 hours as needed for Pain for up to 30 days. Intended supply: 30 days, Disp-120 tablet, R-0Normal      tiZANidine (ZANAFLEX) 4 MG tablet Take 1 tablet by mouth every 6 hours as needed (spasms), Disp-120 tablet, R-0Normal      furosemide (LASIX) 40 MG tablet Historical Med      Meth-Hyo-M Bl-Na Phos-Ph Sal (UTIRA-C PO) Take by mouthHistorical Med      albuterol (PROVENTIL) (2.5 MG/3ML) 0.083% nebulizer solution Take 3 mLs by nebulization every 6 hours as needed for Wheezing Use 1 vial in nebulizer QID, Disp-120 each, R-8Normal      budesonide-formoterol (SYMBICORT) 160-4.5 MCG/ACT AERO Inhale 2 puffs into the lungs 2 times daily Rinse mouth after its use., Disp-1 each, R-11Normal      rOPINIRole (REQUIP) 2 MG tablet Take 2 mg by mouth 3 times dailyHistorical Med      zolpidem (AMBIEN) 10 MG tablet Take by mouth nightly as needed for Sleep. Historical Med      Prenatal Vit-Fe Fumarate-FA (PRENATAL VITAMIN) 27-1 MG TABS tablet Take 1 tablet by mouth dailyHistorical Med      montelukast (SINGULAIR) 10 MG tablet Take 10 mg by mouth dailyHistorical Med      Rimegepant Sulfate (NURTEC) 75 MG TBDP Dissolve 1 tablet PO PRN for migraine no more than 1 a dayHistorical Med Cholecalciferol (VITAMIN D3) 50 MCG (2000) TABS Take 1 tablet by mouth dailyHistorical Med      ferrous sulfate (IRON 325) 325 (65 Fe) MG tablet Take 325 mg by mouth dailyHistorical Med      dicyclomine (BENTYL) 10 MG capsule Take 10 mg by mouth 3 times dailyHistorical Med      apixaban (ELIQUIS) 5 MG TABS tablet Take 5 mg by mouth 2 times dailyHistorical Med      Promethazine HCl (PHENERGAN PO) Take 25 mg by mouth 2 times daily as needed (for nausea) Historical Med      oxybutynin (DITROPAN) 5 MG tablet Take 1 tablet by mouth 3 times daily Historical Med      sucralfate (CARAFATE) 1 GM tablet Take 1 tablet by mouth 4 times daily, Disp-120 tablet, R-1Normal      naloxone (NARCAN) 4 MG/0.1ML LIQD nasal spray 1 spray by Nasal route as needed for Opioid Reversal, Disp-1 each, R-0Normal      topiramate (TOPAMAX) 100 MG tablet Take 1 tablet by mouth 2 times daily, Disp-60 tablet, R-5      albuterol (PROVENTIL HFA;VENTOLIN HFA) 108 (90 BASE) MCG/ACT inhaler Inhale 2 puffs into the lungs every 4 hours as needed for Wheezing or Shortness of Breath Whichever brand is covered by insurance, substitute as necessary. , Disp-1 Inhaler, R-2             ALLERGIES     is allergic to ketorolac, adhesive tape, compazine [prochlorperazine], erythromycin, haloperidol and related, lyrica [pregabalin], reglan [metoclopramide], and sulfa antibiotics. FAMILY HISTORY     She indicated that her mother is alive. She indicated that her father is . She indicated that the status of her brother is unknown.   family history includes Heart Attack in her brother; Heart Disease in her father and mother; Inflam Bowel Dis in her mother. SOCIAL HISTORY    reports that she has never smoked. She has never used smokeless tobacco. She reports current alcohol use. She reports that she does not use drugs. PHYSICAL EXAM     INITIAL VITALS:  oral temperature is 98.4 °F (36.9 °C).  Her blood pressure is 165/92 (abnormal) and her pulse is 105 (abnormal). Her respiration is 16 and oxygen saturation is 98%. Physical Exam  Constitutional:       Appearance: Normal appearance. She is well-developed. She is obese. She is not ill-appearing or diaphoretic. Comments: Patient appears anxious and panicked   HENT:      Head: Normocephalic and atraumatic. Right Ear: Hearing normal.      Left Ear: Hearing normal.      Nose: Nose normal. No rhinorrhea. Mouth/Throat:      Lips: Pink. Mouth: Mucous membranes are moist.      Pharynx: Oropharynx is clear. Eyes:      General: Lids are normal. No scleral icterus. Extraocular Movements: Extraocular movements intact. Conjunctiva/sclera: Conjunctivae normal.      Pupils: Pupils are equal, round, and reactive to light. Neck:      Trachea: Trachea normal.   Cardiovascular:      Rate and Rhythm: Regular rhythm. Tachycardia present. Pulses:           Radial pulses are 2+ on the right side and 2+ on the left side. Heart sounds: Normal heart sounds. No murmur heard. Pulmonary:      Effort: Pulmonary effort is normal.      Breath sounds: Normal breath sounds and air entry. No decreased breath sounds, wheezing or rhonchi. Comments: Patient appears to be hyperventilating  Chest:      Chest wall: No tenderness. Comments: Patient clutching her left side of chest frequently wincing in pain  Abdominal:      General: There is no distension. Palpations: Abdomen is soft. Tenderness: There is no abdominal tenderness. Musculoskeletal:      Right upper arm: Normal.      Left upper arm: Normal.      Right forearm: No swelling or tenderness. Left forearm: Swelling (Mild swelling to the distal left forearm as compared to the right) present. No tenderness. Cervical back: Normal range of motion and neck supple. No rigidity. Right lower leg: No edema. Left lower leg: No edema. Comments:  Well perfused; movement normal as observed; no signs of DVT Lymphadenopathy:      Cervical: No cervical adenopathy. Skin:     General: Skin is warm and dry. Findings: No rash. Neurological:      General: No focal deficit present. Mental Status: She is alert. Sensory: Sensation is intact. Motor: Motor function is intact. Gait: Gait is intact. Psychiatric:         Mood and Affect: Mood is anxious. Speech: Speech normal.         Behavior: Behavior is hyperactive. Behavior is cooperative. Comments: Patient crying without tears         DIFFERENTIAL DIAGNOSIS:   Including but not limited to: Anxiety with panic attack, costochondritis, ACS, PE, TAA, dependent edema, CHF, atrial fibrillation, GERD    DIAGNOSTIC RESULTS     EKG: All EKG's are [facility] who either signs or Co-signs this chart in the absence of a cardiologist.  Ventricular Rate BPM 91    Atrial Rate BPM 91    P-R Interval ms 162    QRS Duration ms 80    Q-T Interval ms 386    QTc Calculation (Bazett) ms 474    P Axis degrees 48    R Axis degrees 28    T Axis degrees 27         Narrative & Impression    Normal sinus rhythm  Cannot rule out Anterior infarct , age undetermined  Abnormal ECG  When compared with ECG of 11-JAN-2022 15:29,  No significant change was found               RADIOLOGY: non-plain film images(s) such as CT,Ultrasound and MRI are read by the radiologist.  Plain radiographic images are visualized and preliminarily interpreted by the emergency physician unless otherwise stated below. CTA CHEST W WO CONTRAST   Final Result   Impression:      1. No pulmonary embolism. 2. Mosaic attenuation of the bilateral lungs, nonspecific, diverse causes    of the small airways, pulmonary vasculature, alveoli and interstitium,    alone or in combination. This document has been electronically signed by:  Aramis Nolasco MD on 07/12/2022    05:47 PM      All CTs at this facility use dose modulation techniques and iterative SpO2: 100%  98% 98%     Heart Score for chest pain patients  History: Slightly Suspicious  ECG: Normal  Patient Age: > 39 and < 65 years  *Risk factors for Atherosclerotic disease: Positive family History,Hypertension,Obesity  Risk Factors: > 3 Risk factors or history of atherosclerotic disease*  Troponin: < 1X normal limit  Heart Score Total: 3    MDM:  The patient was seen and evaluated within the ED today for multiple complaints including chest pain radiating into the left upper extremity concern for DVT in the left upper extremity. On exam, I appreciated an extremely anxious patient clutching her left chest frequently wincing in pain. Patient was neurovascularly intact with no signs of DVT. Old records were reviewed. Within the department, I observed the patient's vital signs to be within acceptable range although tachycardic and hypertensive. Radiological studies within the department revealed no DVT of the left upper extremity, no PE, and no acute process in the chest. Laboratory work was reassuring including 2 negative delta troponin. Within the department the patient was treated with Ativan initially as her presentation was consistent with anxiety and panic and patient declined Vistaril. Patient reports Ativan did not help. Patient was medicated with Norflex. Patient continued to complain of worsening of pain while in the department. Patient subsequently declined nitroglycerin, steroids, aspirin, and Benadryl. I was in patient's room multiple times addressing her concerns of pain. I frankly explained to the patient that narcotics were not warranted for her chest pain. Patient became increasingly upset and was noted yelling at her nurse. Patient also called the charge nurse from her room voicing her displeasure. Myself and the charge nurse again went to patient's room and addressed her concerns of pain. I discussed this patient with my attending physician Dr. Carlos Watson who agreed with plan of care. Dr. Nilay Green also saw the patient in the department and attempted to address her concerns for pain. Ultimately patient ended up declining any further treatment, admission, or signing an [place] form. Patient left the department extremely angry despite our multiple efforts to make the patient comfortable. There were no sinister findings for patient's symptoms although we were unable to admit her for further observation. CRITICAL CARE:   None    CONSULTS:  None    PROCEDURES:  None    FINAL IMPRESSION      1. Atypical chest pain    2. Anxiety state    3. Elevated blood pressure reading with diagnosis of hypertension    4. Left against medical advice    5. Pain and swelling of forearm, left          DISPOSITION/PLAN     1. Atypical chest pain    2. Anxiety state    3. Elevated blood pressure reading with diagnosis of hypertension    4. Left against medical advice    5.  Pain and swelling of forearm, left    Patient left AGAINST MEDICAL ADVICE      (Please note that portions of this note were completed with a voice recognition program.  Efforts were made to edit the dictations but occasionally words are mis-transcribed.)    Kamini Diaz PA-C 07/12/22 8:34 PM    AYAD Green PA-C  07/12/22 2056

## 2022-07-12 NOTE — ED NOTES
Pt states chest pain remains 10/10, medications seem to be making it worse, provider informed of pt concerns, also spoke with charge nurse.   Will continue to monitor, NESTOR Alonzo RN  07/12/22 9522

## 2022-07-12 NOTE — ED NOTES
Bedside report taken from Geovanna Bower, this nurse assuming care  Pt down to [id number] Clemente Niño, RN  07/12/22 9732

## 2022-07-12 NOTE — LETTER
Dear Ronaldo Patterson,    A recent review of your medical records shows that you have had multiple visits to the emergency department at a [facility].   Many of these visits have been for pain related complaints for which you have received treatment with narcotic pain medication. We are concerned that you may have a chronic pain condition and that you are at high risk for developing an addiction to narcotics and a problem with controlled substance abuse. It is extremely important for you to follow up with your primary care provider so that you can work with him/her to optimize the treatment plan for your pain and so that they can begin monitoring the amount of controlled substance you are receiving. This letter is to respectfully inform you that we will no longer be able to offer you narcotic pain medication or other controlled substances in the emergency department without a documented treatment plan from your physician. This written care plan should include the followin) list of conditions we may treat with narcotic medications  2) type of pain medicine to be administered  3) route of administration (oral, IM, or IV)  4) amount of medication, if any, that can be prescribed for outpatient use  5) form must be dated and signed by medical care provider  6) form also must include contact number for medical care provider    The treatment plan from your physician will be considered valid for 1 year from the date signed. I hope that this letter finds you in good health and that you will continue to allow us the privilege of caring for you in the future. Please be assured that we will continue to offer you non-narcotic options for pain control. Additionally, at the discretion of the physician, narcotic medications will continue to be options for treatment of conditions determined to be life or limb threatening.      Sincerely,      Department of Emergency [facility] [address]      Cc: Radha Swanson MD

## 2022-07-12 NOTE — ED NOTES
Pt leaving AMA, Dr Letitia Castro spoke to pt at bedside   Educated pt on leaving AMA, refused to sign paperwork      AMA paperwork filled out and signed by Joey Muhammad PA-C, and [facility], RN at this time     Janeth Delaney RN  07/12/22 0958

## 2022-07-12 NOTE — ED NOTES
Pt refused Vistaril, states the medication makes her more anxious, and she does not want this medication      Angela Lorenz RN  07/12/22 1826

## 2022-07-13 NOTE — ED PROVIDER NOTES
Attending Supervising Physician's Attestation Statement  I performed a history and physical examination on the patient and discussed the management with the physician assistant. I reviewed and agree with the findings and plan as documented in the physician assistant note. This includes all diagnostic interpretations and treatment plans as written. I was present for the key portion of any procedures performed and the inclusive time noted in any critical care statement. Except as noted below. Brief H&P   This patient is a 46 y.o. Female with Arm swelling, left lower extremity abrasion. Patient was sent to the ED for evaluation of discoloration and swelling of the right hand noted while she was getting trigger injections today. These resolved PTA. She then developed chest pain- Across her arm and chest rated at an 8. Not tearing or ripping. She states she has had similar episodes in the past last was in January. She states she was     On my examination, patient with a colorful affect. HEENT: Normocephalic, Atraumatic. Neck is supple without TTP. Lungs: Clear and equal bilaterally. No increased work of breathing or respiratory distress. Heart: Rate and rhythm regular, no murmurs clicks or gallops Chest wall is non-tender. Abdomen: Soft non-tender, and non-distended abdomen. No rigidity. No Guarding. Radial and Ulnar Pulses are 2/4 + bilateral Upper Extremities. Lower extremities: No edema, no tenderness to palpation. 1 Cm healing abrasion.    Neuro: Awake and alert, no lateralizing deficits, cranial nerves II through XII grossly intact bilaterally    Diagnostics and Treatments      LABS / RADIOLOGY:     Results for orders placed or performed during the hospital encounter of 07/12/22   Rapid influenza A/B antigens    Specimen: Nasopharyngeal   Result Value Ref Range    Flu A Antigen Negative NEGATIVE    Flu B Antigen Negative NEGATIVE   COVID-19, Rapid    Specimen: Nasopharyngeal Swab   Result Value Ref Range    SARS-CoV-2, NAAT NOT  DETECTED NOT DETECTED   Basic Metabolic Panel   Result Value Ref Range    Sodium 140 135 - 145 meq/L    Potassium 4.0 3.5 - 5.2 meq/L    Chloride 100 98 - 111 meq/L    CO2 23 23 - 33 meq/L    Glucose 81 70 - 108 mg/dL    BUN 19 7 - 22 mg/dL    CREATININE 1.0 0.4 - 1.2 mg/dL    Calcium 10.2 8.5 - 10.5 mg/dL   CBC with Auto Differential   Result Value Ref Range    WBC 8.3 4.8 - 10.8 thou/mm3    RBC 4.75 4.20 - 5.40 mill/mm3    Hemoglobin 13.3 12.0 - 16.0 gm/dl    Hematocrit 42.2 37.0 - 47.0 %    MCV 88.8 81.0 - 99.0 fL    MCH 28.0 26.0 - 33.0 pg    MCHC 31.5 (L) 32.2 - 35.5 gm/dl    RDW-CV 14.5 11.5 - 14.5 %    RDW-SD 46.4 (H) 35.0 - 45.0 fL    Platelets 055 621 - 309 thou/mm3    MPV 11.0 9.4 - 12.4 fL    Seg Neutrophils 58.6 %    Lymphocytes 32.7 %    Monocytes 5.2 %    Eosinophils 2.5 %    Basophils 0.6 %    Immature Granulocytes 0.4 %    Segs Absolute 4.9 1.8 - 7.7 thou/mm3    Lymphocytes Absolute 2.7 1.0 - 4.8 thou/mm3    Monocytes Absolute 0.4 0.4 - 1.3 thou/mm3    Eosinophils Absolute 0.2 0.0 - 0.4 thou/mm3    Basophils Absolute 0.0 0.0 - 0.1 thou/mm3    Immature Grans (Abs) 0.03 0.00 - 0.07 thou/mm3    nRBC 0 /100 wbc   Brain Natriuretic Peptide   Result Value Ref Range    Pro-.7 0.0 - 900.0 pg/mL   Hepatic Function Panel   Result Value Ref Range    Albumin 4.8 3.5 - 5.1 g/dL    Total Bilirubin 0.2 (L) 0.3 - 1.2 mg/dL    Bilirubin, Direct <0.2 0.0 - 0.3 mg/dL    Alkaline Phosphatase 119 38 - 126 U/L    AST 28 5 - 40 U/L    ALT 21 11 - 66 U/L    Total Protein 7.3 6.1 - 8.0 g/dL   Lipase   Result Value Ref Range    Lipase 17.4 5.6 - 51.3 U/L   Troponin   Result Value Ref Range    Troponin T < 0.010 ng/ml   Lactate, Sepsis   Result Value Ref Range    Lactic Acid, Sepsis 1.3 0.5 - 1.9 mmol/L   Anion Gap   Result Value Ref Range    Anion Gap 17.0 (H) 8.0 - 16.0 meq/L   Glomerular Filtration Rate, Estimated   Result Value Ref Range    Est, Glom Filt Rate 58 (A) ml/min/1.73m2 Osmolality   Result Value Ref Range    Osmolality Calc 280.7 275.0 - 300.0 mOsmol/kg   Troponin   Result Value Ref Range    Troponin T < 0.010 ng/ml   EKG Chest Pain   Result Value Ref Range    Ventricular Rate 91 BPM    Atrial Rate 91 BPM    P-R Interval 162 ms    QRS Duration 80 ms    Q-T Interval 386 ms    QTc Calculation (Bazett) 474 ms    P Axis 48 degrees    R Axis 28 degrees    T Axis 27 degrees       CTA CHEST W WO CONTRAST   Final Result   Impression:      1. No pulmonary embolism. 2. Mosaic attenuation of the bilateral lungs, nonspecific, diverse causes    of the small airways, pulmonary vasculature, alveoli and interstitium,    alone or in combination. This document has been electronically signed by: Kirill Willingham MD on 07/12/2022    05:47 PM      All CTs at this facility use dose modulation techniques and iterative    reconstructions, and/or weight-based dosing   when appropriate to reduce radiation to a low as reasonably achievable. VL DUP UPPER EXTREMITY VENOUS LEFT   Final Result   Normal venous ultrasound of the upper extremity. No evidence for acute venous thrombosis. **This report has been created using voice recognition software. It may contain minor errors which are inherent in voice recognition technology. **      Final report electronically signed by Dr. Jessie Sahu on 7/12/2022 3:26 PM      XR CHEST (2 VW)   Final Result   1. Stable chest x-ray, no interval change since previous study dated 11 January 2022. **This report has been created using voice recognition software. It may contain minor errors which are inherent in voice recognition technology. **      Final report electronically signed by DR Damián Zapata on 7/12/2022 2:06 PM          MEDICATIONS GIVEN:  Orders Placed This Encounter   Medications    DISCONTD: hydrOXYzine (VISTARIL) injection 50 mg    LORazepam (ATIVAN) injection 1 mg    orphenadrine (NORFLEX) injection 60 mg    DISCONTD: Dexamethasone Sodium Phosphate injection 10 mg    0.9 % sodium chloride bolus    iopamidol (ISOVUE-370) 76 % injection 80 mL    DISCONTD: aspirin chewable tablet 324 mg    DISCONTD: diphenhydrAMINE (BENADRYL) injection 50 mg    DISCONTD: droperidol (INAPSINE) injection 0.625 mg       Medical Decision Making   Potential Diagnoses Considered: Reynaulds, Sympathetic dystrophy, PTX, ACS, PE, Dissection, DVT. Assessment and Plan   1. Chest pain  2. I was asked to see this patient in conjunction with Mary Lou Cunningham because the patient was verbally aggressive,  disrespectful to the staff, and adamant about receiving only opiate base analgesia for her pain. Refusing many attempted interventions. Patient ED work-up is unrevealing. Patient hand changes resolved PTA. Pulses Equal and CTA without dissection. US LUE negative. EKG and troponin negative times 2. Offered patient further diagnostics to further delineate the etiology of her pain, Continue multi-modality pain treat and hospital observation. She declined to continue care despite my best efforts. She was instructed to return at anytime to resume care. Please see the physician assistant completed note for final disposition except as documented on this attestation. I have reviewed and interpreted all available lab, radiology and ekg results available at the moment. Diagnosis, treatment and disposition plans were discussed and agreed upon by patient and/or their family. This transcription was electronically signed. It was dictated by use of voice recognition software and electronically transcribed. The transcription may contain errors not detected in proofreading.        Electronically signed by Medardo Sarmiento DO on 7/13/22 at 4:08 PM EDT      Medardo Sarmiento DO  07/13/22 9025

## 2022-07-28 ENCOUNTER — TELEPHONE (OUTPATIENT)
Dept: PHYSICAL MEDICINE AND REHAB | Age: 52
End: 2022-07-28

## 2022-07-28 DIAGNOSIS — M48.062 SPINAL STENOSIS OF LUMBAR REGION WITH NEUROGENIC CLAUDICATION: ICD-10-CM

## 2022-07-28 DIAGNOSIS — M51.26 LUMBAR DISC HERNIATION: ICD-10-CM

## 2022-07-28 DIAGNOSIS — G89.4 CHRONIC PAIN SYNDROME: ICD-10-CM

## 2022-07-28 DIAGNOSIS — M54.16 LUMBAR RADICULITIS: ICD-10-CM

## 2022-07-28 DIAGNOSIS — M79.18 MYOFASCIAL PAIN DYSFUNCTION SYNDROME: ICD-10-CM

## 2022-07-28 DIAGNOSIS — M51.36 DDD (DEGENERATIVE DISC DISEASE), LUMBAR: ICD-10-CM

## 2022-07-28 DIAGNOSIS — R10.84 GENERALIZED ABDOMINAL PAIN: ICD-10-CM

## 2022-07-28 DIAGNOSIS — M47.816 SPONDYLOSIS OF LUMBAR REGION WITHOUT MYELOPATHY OR RADICULOPATHY: ICD-10-CM

## 2022-07-29 RX ORDER — TIZANIDINE 4 MG/1
4 TABLET ORAL EVERY 6 HOURS PRN
Qty: 120 TABLET | Refills: 0 | Status: SHIPPED | OUTPATIENT
Start: 2022-07-30 | End: 2022-08-29 | Stop reason: SDUPTHER

## 2022-07-29 RX ORDER — OXYCODONE HYDROCHLORIDE 10 MG/1
10 TABLET ORAL EVERY 6 HOURS PRN
Qty: 120 TABLET | Refills: 0 | Status: SHIPPED | OUTPATIENT
Start: 2022-07-30 | End: 2022-08-29 | Stop reason: SDUPTHER

## 2022-07-29 NOTE — TELEPHONE ENCOUNTER
OARRS reviewed. UDS: + for  . Ambien, gabapentin, oxycodone,tizanidine. Negative benzodiazepine  Last seen: 6/27/2022.  Follow-up:   Future Appointments   Date Time Provider Laisha López   8/29/2022 11:15 AM RENNY Higgins - CNP N [facility] [address]

## 2022-08-19 DIAGNOSIS — M79.18 MYOFASCIAL PAIN DYSFUNCTION SYNDROME: ICD-10-CM

## 2022-08-19 DIAGNOSIS — M47.816 SPONDYLOSIS OF LUMBAR REGION WITHOUT MYELOPATHY OR RADICULOPATHY: ICD-10-CM

## 2022-08-19 DIAGNOSIS — G89.4 CHRONIC PAIN SYNDROME: ICD-10-CM

## 2022-08-22 NOTE — TELEPHONE ENCOUNTER
OARRS reviewed. UDS: + for Ambien, Gabapentin, Oxycodone, Tizanidine. Last seen: 6/27/2022.  Follow-up:   Future Appointments   Date Time Provider Laisha López   8/29/2022 11:15 AM RENNY Beck - CNP N [facility] [address] [facility]

## 2022-08-23 RX ORDER — GABAPENTIN 600 MG/1
TABLET ORAL
Qty: 120 TABLET | Refills: 0 | Status: SHIPPED | OUTPATIENT
Start: 2022-08-23 | End: 2022-09-19

## 2022-08-29 DIAGNOSIS — R10.84 GENERALIZED ABDOMINAL PAIN: ICD-10-CM

## 2022-08-29 DIAGNOSIS — G89.4 CHRONIC PAIN SYNDROME: ICD-10-CM

## 2022-08-29 DIAGNOSIS — M51.26 LUMBAR DISC HERNIATION: ICD-10-CM

## 2022-08-29 DIAGNOSIS — M47.816 SPONDYLOSIS OF LUMBAR REGION WITHOUT MYELOPATHY OR RADICULOPATHY: ICD-10-CM

## 2022-08-29 DIAGNOSIS — M79.18 MYOFASCIAL PAIN DYSFUNCTION SYNDROME: ICD-10-CM

## 2022-08-29 DIAGNOSIS — M51.36 DDD (DEGENERATIVE DISC DISEASE), LUMBAR: ICD-10-CM

## 2022-08-29 DIAGNOSIS — M48.062 SPINAL STENOSIS OF LUMBAR REGION WITH NEUROGENIC CLAUDICATION: ICD-10-CM

## 2022-08-29 DIAGNOSIS — M54.16 LUMBAR RADICULITIS: ICD-10-CM

## 2022-08-29 RX ORDER — OXYCODONE HYDROCHLORIDE 10 MG/1
10 TABLET ORAL EVERY 6 HOURS PRN
Qty: 120 TABLET | Refills: 0 | Status: SHIPPED | OUTPATIENT
Start: 2022-08-29 | End: 2022-09-28 | Stop reason: SDUPTHER

## 2022-08-29 RX ORDER — TIZANIDINE 4 MG/1
4 TABLET ORAL EVERY 6 HOURS PRN
Qty: 120 TABLET | Refills: 0 | Status: SHIPPED | OUTPATIENT
Start: 2022-08-29 | End: 2022-09-28 | Stop reason: SDUPTHER

## 2022-08-29 NOTE — TELEPHONE ENCOUNTER
OARRS reviewed. UDS: + for  ambien, gabapentin, oxycodone, tizanidine. Negative benzodiazepine. Last seen: 6/27/2022.  Follow-up:   Future Appointments   Date Time Provider Laisha Maribel   9/7/2022  8:45 AM RENNY Kathleen - CNP N [facility] [address]

## 2022-09-15 ENCOUNTER — OFFICE VISIT (OUTPATIENT)
Dept: PHYSICAL MEDICINE AND REHAB | Age: 52
End: 2022-09-15
Payer: COMMERCIAL

## 2022-09-15 VITALS
BODY MASS INDEX: 36.32 KG/M2 | SYSTOLIC BLOOD PRESSURE: 106 MMHG | HEIGHT: 66 IN | DIASTOLIC BLOOD PRESSURE: 74 MMHG | WEIGHT: 226 LBS

## 2022-09-15 DIAGNOSIS — M51.36 DDD (DEGENERATIVE DISC DISEASE), LUMBAR: ICD-10-CM

## 2022-09-15 DIAGNOSIS — K02.9 PAIN DUE TO DENTAL CARIES: ICD-10-CM

## 2022-09-15 DIAGNOSIS — G89.4 CHRONIC PAIN SYNDROME: ICD-10-CM

## 2022-09-15 DIAGNOSIS — M47.816 SPONDYLOSIS OF LUMBAR REGION WITHOUT MYELOPATHY OR RADICULOPATHY: ICD-10-CM

## 2022-09-15 DIAGNOSIS — M48.062 SPINAL STENOSIS OF LUMBAR REGION WITH NEUROGENIC CLAUDICATION: ICD-10-CM

## 2022-09-15 DIAGNOSIS — M54.16 LUMBAR RADICULITIS: Primary | ICD-10-CM

## 2022-09-15 DIAGNOSIS — M51.26 LUMBAR DISC HERNIATION: ICD-10-CM

## 2022-09-15 DIAGNOSIS — M79.18 MYOFASCIAL PAIN: ICD-10-CM

## 2022-09-15 PROCEDURE — G8417 CALC BMI ABV UP PARAM F/U: HCPCS | Performed by: NURSE PRACTITIONER

## 2022-09-15 PROCEDURE — 99214 OFFICE O/P EST MOD 30 MIN: CPT | Performed by: NURSE PRACTITIONER

## 2022-09-15 PROCEDURE — 1036F TOBACCO NON-USER: CPT | Performed by: NURSE PRACTITIONER

## 2022-09-15 PROCEDURE — 3017F COLORECTAL CA SCREEN DOC REV: CPT | Performed by: NURSE PRACTITIONER

## 2022-09-15 PROCEDURE — G8427 DOCREV CUR MEDS BY ELIG CLIN: HCPCS | Performed by: NURSE PRACTITIONER

## 2022-09-15 RX ORDER — CLINDAMYCIN HYDROCHLORIDE 300 MG/1
CAPSULE ORAL
COMMUNITY
Start: 2022-07-23

## 2022-09-15 ASSESSMENT — ENCOUNTER SYMPTOMS
DIARRHEA: 0
VOMITING: 0
ABDOMINAL PAIN: 0
ABDOMINAL DISTENTION: 0
CONSTIPATION: 0
BACK PAIN: 1
BLOOD IN STOOL: 0
COUGH: 0
NAUSEA: 0

## 2022-09-15 NOTE — PROGRESS NOTES
[address] [facility] [address]  Dept: [phone]  Dept Fax: [phone]: [phone]    Visit Date: 9/15/2022    Functionality Assessment/Goals Worksheet     On a scale of 0 (Does not Interfere) to 10 (Completely Interferes)     1. Which number describes how during the past week pain has interfered with       the following:  A. General Activity:  9  B. Mood: 9  C. Walking Ability:  8  D. Normal Work (Includes both work outside the home and housework):  9  E. Relations with Other People:   8  F. Sleep:   10  G. Enjoyment of Life:   7    2. Patient Prefers to Take their Pain Medications:     [x]  On a regular basis   []  Only when necessary    []  Does not take pain medications    3. What are the Patient's Goals/Expectations for Visiting Pain Management? [x]  Learn about my pain    []  Receive Medication   []  Physical Therapy     []  Treat Depression   []  Receive Injections    []  Treat Sleep   []  Deal with Anxiety and Stress   []  Treat Opoid Dependence/Addiction   []  Other:      HPI:   Elijah Meneses is a 46 y.o. female is here today for    Chief Complaint: Low back pain, leg pain, dental pain     HPI   2.5 month FU. Had to reschedule appointment d/t abscessed tooth and hospital visit at [facility]. Remains on antibiotics and still being treated for left side dental abscess. Pain and swelling in left side of  face. Thsi is main complaint worse than low back pain but states low back pain remains constant and horrible as well. - Continues to have pain in mid to low back throughout and pain radiates down bilateral legs- Pain in low bback is sharp and stabbing and shooting in legs and pins and needles.        Had trigger point injections 7/12/22 and feels that they really help muscle pain- reports good relief for about 1 month   Pain increases with bending, lifting, twisting , walking, standing, and housework or working at job. Medications remain effective in making pain more tolerable. Trying to hold off on back surgery still. Medications reviewed. Patient denies side effects with medications. Patient states she is taking medications as prescribed. Shedenies receiving pain medications from other sources. She  had 1 ER visist since last visit. Pain scale with out pain medications or at its worst is 7-10/10. Pain scale with pain medications or at its best is 4/10. Last dose of Oxy IR and Neurontin was today   Drug screen reviewed from 6/27/2022 and was appropriate  Pill count completed  today and WNL: Yes      The patientis allergic to ketorolac, adhesive tape, compazine [prochlorperazine], erythromycin, haloperidol and related, lyrica [pregabalin], reglan [metoclopramide], and sulfa antibiotics. Subjective:      Review of Systems   Constitutional:  Positive for chills. Negative for appetite change. HENT:  Positive for dental problem. Dental abscess on antibiotics, left facial swelling    Respiratory:  Negative for cough. Cardiovascular:  Positive for leg swelling. Gastrointestinal:  Negative for abdominal distention, abdominal pain, blood in stool, constipation, diarrhea, nausea and vomiting. Musculoskeletal:  Positive for arthralgias, back pain, gait problem, joint swelling and myalgias. Negative for neck pain and neck stiffness. Not using any assist devices    Neurological:  Positive for weakness and numbness. Psychiatric/Behavioral:  Positive for decreased concentration, dysphoric mood and sleep disturbance. The patient is nervous/anxious. Objective:     Vitals:    09/15/22 0912   BP: 106/74   Site: Left Upper Arm   Position: Sitting   Weight: 226 lb (102.5 kg)   Height: 5' 5.98\" (1.676 m)       Physical Exam  Vitals and nursing note reviewed. Constitutional:       General: She is not in acute distress.      Appearance: She Mental Status: She is alert and oriented to person, place, and time. She is not disoriented. Cranial Nerves: No cranial nerve deficit. Sensory: Sensory deficit present. Motor: Weakness present. No atrophy or abnormal muscle tone. Coordination: Coordination normal.      Gait: Gait normal.      Deep Tendon Reflexes: Reflexes are normal and symmetric. Comments:    4/5 strength bilateral lower extremities       Psychiatric:         Attention and Perception: She is attentive. Mood and Affect: Mood normal. Affect is flat and tearful. Affect is not labile, blunt, angry or inappropriate. Speech: She is communicative. Speech is not rapid and pressured, delayed, slurred or tangential.         Behavior: Behavior is not agitated, slowed, aggressive, withdrawn, hyperactive or combative. Thought Content: Thought content is not paranoid or delusional. Thought content does not include homicidal or suicidal ideation. Thought content does not include homicidal or suicidal plan. Cognition and Memory: Memory is not impaired. She does not exhibit impaired recent memory or impaired remote memory. Judgment: Judgment is not impulsive or inappropriate. JOYCE  Patricks test  negative  Yeoman's  or Gaenslen's negative       Assessment:     1. Lumbar radiculitis    2. Spinal stenosis of lumbar region with neurogenic claudication    3. Lumbar disc herniation    4. Spondylosis of lumbar region without myelopathy or radiculopathy    5. DDD (degenerative disc disease), lumbar    6. Pain due to dental caries    7. Chronic pain syndrome    8. Myofascial pain            Plan:      OARRS reviewed. Current MED:60.00  Patient was offered naloxone for home. Discussed long term side effects of medications, tolerance, dependency and addiction. Previous UDS reviewed  UDS preformed today for compliance. Patient told can not receive any pain medications from any other source.   No evidence of abuse, diversion or aberrant behavior. Medications and/or procedures to improve function and quality of life- patient understanding with this and that may not be pain free  Discussed with patient about safe storage of medications at home  Discussed possible weaning of medication dosing dependent on treatment/procedure results. Discussed with patient about risks with procedure including infection, reaction to medication, increased pain, or bleeding. Continue oxy IR 10 mg QID prn- Filled 8/31/2022  Continue Zanaflex prn- instructed to hold if SBP less than 100, Neurontin 600 mg instructed decrease to BID d/t kidney functio has plenty   Following with dentist and being treated for dental infection on antibiotics. Meds. Prescribed:   No orders of the defined types were placed in this encounter. Return in about 2 months (around 11/15/2022), or if symptoms worsen or fail to improve, for follow up  for medications.                Electronically signed by RENNY Rutherford CNP on9/15/2022 at 9:49 AM

## 2022-09-18 DIAGNOSIS — M79.18 MYOFASCIAL PAIN DYSFUNCTION SYNDROME: ICD-10-CM

## 2022-09-18 DIAGNOSIS — M47.816 SPONDYLOSIS OF LUMBAR REGION WITHOUT MYELOPATHY OR RADICULOPATHY: ICD-10-CM

## 2022-09-18 DIAGNOSIS — G89.4 CHRONIC PAIN SYNDROME: ICD-10-CM

## 2022-09-19 RX ORDER — GABAPENTIN 600 MG/1
600 TABLET ORAL 2 TIMES DAILY
Qty: 60 TABLET | Refills: 0 | Status: SHIPPED | OUTPATIENT
Start: 2022-09-22 | End: 2022-10-31 | Stop reason: SDUPTHER

## 2022-09-19 NOTE — TELEPHONE ENCOUNTER
Decraesed frequency of Neurontin to BID scheduled d/t decreased renal function.  This was discussed at appointment

## 2022-09-19 NOTE — TELEPHONE ENCOUNTER
OARRS reviewed. UDS: + for  Tizanidine, Zolpidem, Gabapentin, Oxycodone -consistent. + for  inconsistent. Last seen: 9/15/2022.  Follow-up:   Future Appointments   Date Time Provider Laisha López   11/15/2022  9:45 AM RENNY Barragan - CNP N [facility] - [address] [facility]

## 2022-09-19 NOTE — TELEPHONE ENCOUNTER
Phoned patient she has not been seen in over a year and canceled her last appt. Patient not eligible for refills. LM for pt to cb to schedule appt.

## 2022-09-28 ENCOUNTER — TELEPHONE (OUTPATIENT)
Dept: PHYSICAL MEDICINE AND REHAB | Age: 52
End: 2022-09-28

## 2022-09-28 DIAGNOSIS — M54.16 LUMBAR RADICULITIS: ICD-10-CM

## 2022-09-28 DIAGNOSIS — G89.4 CHRONIC PAIN SYNDROME: ICD-10-CM

## 2022-09-28 DIAGNOSIS — M51.26 LUMBAR DISC HERNIATION: ICD-10-CM

## 2022-09-28 DIAGNOSIS — M48.062 SPINAL STENOSIS OF LUMBAR REGION WITH NEUROGENIC CLAUDICATION: ICD-10-CM

## 2022-09-28 DIAGNOSIS — R10.84 GENERALIZED ABDOMINAL PAIN: ICD-10-CM

## 2022-09-28 DIAGNOSIS — M47.816 SPONDYLOSIS OF LUMBAR REGION WITHOUT MYELOPATHY OR RADICULOPATHY: ICD-10-CM

## 2022-09-28 DIAGNOSIS — M79.18 MYOFASCIAL PAIN DYSFUNCTION SYNDROME: ICD-10-CM

## 2022-09-28 DIAGNOSIS — M51.36 DDD (DEGENERATIVE DISC DISEASE), LUMBAR: ICD-10-CM

## 2022-09-28 RX ORDER — OXYCODONE HYDROCHLORIDE 10 MG/1
10 TABLET ORAL EVERY 6 HOURS PRN
Qty: 120 TABLET | Refills: 0 | Status: SHIPPED | OUTPATIENT
Start: 2022-09-30 | End: 2022-10-31 | Stop reason: SDUPTHER

## 2022-09-28 RX ORDER — TIZANIDINE 4 MG/1
4 TABLET ORAL EVERY 6 HOURS PRN
Qty: 120 TABLET | Refills: 0 | Status: SHIPPED | OUTPATIENT
Start: 2022-09-30 | End: 2022-10-31 | Stop reason: SDUPTHER

## 2022-09-28 NOTE — TELEPHONE ENCOUNTER
OARRS reviewed. UDS: + for  . Tizanidine, zolpidem, gabapentin. oxycodone  Last seen: 9/15/2022.  Follow-up:   Future Appointments   Date Time Provider Laisha Gallardoi   11/15/2022  9:45 AM RENNY Doyle - CNP N [facility]   11/22/2022  8:20 AM Mir El MD N ES Kidney [facility]

## 2022-10-04 RX ORDER — BUDESONIDE AND FORMOTEROL FUMARATE DIHYDRATE 160; 4.5 UG/1; UG/1
AEROSOL RESPIRATORY (INHALATION)
Qty: 11 G | Refills: 0 | OUTPATIENT
Start: 2022-10-04

## 2022-10-25 ENCOUNTER — HOSPITAL ENCOUNTER (OUTPATIENT)
Age: 52
Setting detail: SPECIMEN
Discharge: HOME | End: 2022-10-25

## 2022-10-25 LAB
ABSOLUTE EOS #: 0.17 K/UL (ref 0–0.44)
ABSOLUTE IMMATURE GRANULOCYTE: <0.03 K/UL (ref 0–0.3)
ABSOLUTE LYMPH #: 2.25 K/UL (ref 1.1–3.7)
ABSOLUTE MONO #: 0.4 K/UL (ref 0.1–1.2)
ALBUMIN SERPL-MCNC: 3.9 G/DL (ref 3.5–5.2)
ALBUMIN/GLOBULIN RATIO: 1.6 (ref 1–2.5)
ALP BLD-CCNC: 80 U/L (ref 35–104)
ALT SERPL-CCNC: 12 U/L (ref 5–33)
ANION GAP SERPL CALCULATED.3IONS-SCNC: 14 MMOL/L (ref 9–17)
AST SERPL-CCNC: 19 U/L
BASOPHILS # BLD: 1 % (ref 0–2)
BASOPHILS ABSOLUTE: 0.03 K/UL (ref 0–0.2)
BILIRUB SERPL-MCNC: 0.2 MG/DL (ref 0.3–1.2)
BUN BLDV-MCNC: 19 MG/DL (ref 6–20)
CALCIUM SERPL-MCNC: 9.4 MG/DL (ref 8.6–10.4)
CHLORIDE BLD-SCNC: 101 MMOL/L (ref 98–107)
CHOLESTEROL/HDL RATIO: 3.9
CHOLESTEROL: 181 MG/DL
CO2: 23 MMOL/L (ref 20–31)
CREAT SERPL-MCNC: 1 MG/DL (ref 0.5–0.9)
EOSINOPHILS RELATIVE PERCENT: 4 % (ref 1–4)
GFR SERPL CREATININE-BSD FRML MDRD: >60 ML/MIN/1.73M2
GLUCOSE BLD-MCNC: 79 MG/DL (ref 70–99)
HCT VFR BLD CALC: 39.1 % (ref 36.3–47.1)
HDLC SERPL-MCNC: 47 MG/DL
HEMOGLOBIN: 12.7 G/DL (ref 11.9–15.1)
IMMATURE GRANULOCYTES: 0 %
LDL CHOLESTEROL: 116 MG/DL (ref 0–130)
LYMPHOCYTES # BLD: 45 % (ref 24–43)
MCH RBC QN AUTO: 28 PG (ref 25.2–33.5)
MCHC RBC AUTO-ENTMCNC: 32.5 G/DL (ref 28.4–34.8)
MCV RBC AUTO: 86.3 FL (ref 82.6–102.9)
MONOCYTES # BLD: 8 % (ref 3–12)
NRBC AUTOMATED: 0 PER 100 WBC
PDW BLD-RTO: 13 % (ref 11.8–14.4)
PLATELET # BLD: 204 K/UL (ref 138–453)
PMV BLD AUTO: 12.3 FL (ref 8.1–13.5)
POTASSIUM SERPL-SCNC: 4.6 MMOL/L (ref 3.7–5.3)
RBC # BLD: 4.53 M/UL (ref 3.95–5.11)
SEG NEUTROPHILS: 42 % (ref 36–65)
SEGMENTED NEUTROPHILS ABSOLUTE COUNT: 2.03 K/UL (ref 1.5–8.1)
SODIUM BLD-SCNC: 138 MMOL/L (ref 135–144)
TOTAL PROTEIN: 6.3 G/DL (ref 6.4–8.3)
TRIGL SERPL-MCNC: 92 MG/DL
TSH SERPL DL<=0.05 MIU/L-ACNC: 3.34 UIU/ML (ref 0.3–5)
VITAMIN D 25-HYDROXY: 21.7 NG/ML
WBC # BLD: 4.9 K/UL (ref 3.5–11.3)

## 2022-10-31 ENCOUNTER — TELEPHONE (OUTPATIENT)
Dept: PHYSICAL MEDICINE AND REHAB | Age: 52
End: 2022-10-31

## 2022-10-31 DIAGNOSIS — M79.18 MYOFASCIAL PAIN DYSFUNCTION SYNDROME: ICD-10-CM

## 2022-10-31 DIAGNOSIS — M54.16 LUMBAR RADICULITIS: ICD-10-CM

## 2022-10-31 DIAGNOSIS — G89.4 CHRONIC PAIN SYNDROME: ICD-10-CM

## 2022-10-31 DIAGNOSIS — R10.84 GENERALIZED ABDOMINAL PAIN: ICD-10-CM

## 2022-10-31 DIAGNOSIS — M51.36 DDD (DEGENERATIVE DISC DISEASE), LUMBAR: ICD-10-CM

## 2022-10-31 DIAGNOSIS — M51.26 LUMBAR DISC HERNIATION: ICD-10-CM

## 2022-10-31 DIAGNOSIS — M47.816 SPONDYLOSIS OF LUMBAR REGION WITHOUT MYELOPATHY OR RADICULOPATHY: ICD-10-CM

## 2022-10-31 DIAGNOSIS — M48.062 SPINAL STENOSIS OF LUMBAR REGION WITH NEUROGENIC CLAUDICATION: ICD-10-CM

## 2022-10-31 RX ORDER — BUDESONIDE AND FORMOTEROL FUMARATE DIHYDRATE 160; 4.5 UG/1; UG/1
AEROSOL RESPIRATORY (INHALATION)
Qty: 11 G | Refills: 0 | OUTPATIENT
Start: 2022-10-31

## 2022-10-31 RX ORDER — OXYCODONE HYDROCHLORIDE 10 MG/1
10 TABLET ORAL EVERY 6 HOURS PRN
Qty: 120 TABLET | Refills: 0 | Status: SHIPPED | OUTPATIENT
Start: 2022-10-31 | End: 2022-11-28 | Stop reason: SDUPTHER

## 2022-10-31 RX ORDER — GABAPENTIN 600 MG/1
600 TABLET ORAL 2 TIMES DAILY
Qty: 60 TABLET | Refills: 0 | Status: SHIPPED | OUTPATIENT
Start: 2022-10-31 | End: 2022-11-28 | Stop reason: SDUPTHER

## 2022-10-31 RX ORDER — TIZANIDINE 4 MG/1
4 TABLET ORAL EVERY 6 HOURS PRN
Qty: 120 TABLET | Refills: 0 | Status: SHIPPED | OUTPATIENT
Start: 2022-10-31 | End: 2022-11-28 | Stop reason: SDUPTHER

## 2022-10-31 NOTE — TELEPHONE ENCOUNTER
OARRS reviewed. UDS: + for  tizanidine, zolpidem, gabapentin, oxycodone, trazodone. Last seen: 9/15/2022.  Follow-up:   Future Appointments   Date Time Provider Laisha López   11/10/2022  1:20 PM [facility] [place] [address]   11/15/2022  9:45 AM RENNY Bhatt CNP [facility] [place] [address]   12/20/2022  8:20 AM MD FAUSTINO Wolf [facility] [place] [address]

## 2022-11-28 ENCOUNTER — HOSPITAL ENCOUNTER (OUTPATIENT)
Dept: GENERAL RADIOLOGY | Age: 52
Discharge: HOME | End: 2022-11-28
Payer: COMMERCIAL

## 2022-11-28 ENCOUNTER — HOSPITAL ENCOUNTER (OUTPATIENT)
Age: 52
Discharge: HOME | End: 2022-11-28
Payer: COMMERCIAL

## 2022-11-28 ENCOUNTER — OFFICE VISIT (OUTPATIENT)
Dept: PHYSICAL MEDICINE AND REHAB | Age: 52
End: 2022-11-28
Payer: COMMERCIAL

## 2022-11-28 VITALS
DIASTOLIC BLOOD PRESSURE: 76 MMHG | BODY MASS INDEX: 36.32 KG/M2 | WEIGHT: 226 LBS | SYSTOLIC BLOOD PRESSURE: 110 MMHG | HEIGHT: 66 IN

## 2022-11-28 DIAGNOSIS — R10.84 GENERALIZED ABDOMINAL PAIN: ICD-10-CM

## 2022-11-28 DIAGNOSIS — M79.18 MYOFASCIAL PAIN DYSFUNCTION SYNDROME: ICD-10-CM

## 2022-11-28 DIAGNOSIS — M47.816 SPONDYLOSIS OF LUMBAR REGION WITHOUT MYELOPATHY OR RADICULOPATHY: ICD-10-CM

## 2022-11-28 DIAGNOSIS — M48.062 SPINAL STENOSIS OF LUMBAR REGION WITH NEUROGENIC CLAUDICATION: Primary | ICD-10-CM

## 2022-11-28 DIAGNOSIS — G89.4 CHRONIC PAIN SYNDROME: ICD-10-CM

## 2022-11-28 DIAGNOSIS — M25.532 LEFT WRIST PAIN: ICD-10-CM

## 2022-11-28 DIAGNOSIS — M51.26 LUMBAR DISC HERNIATION: ICD-10-CM

## 2022-11-28 DIAGNOSIS — M54.16 LUMBAR RADICULITIS: ICD-10-CM

## 2022-11-28 DIAGNOSIS — M47.816 LUMBAR SPONDYLOSIS: ICD-10-CM

## 2022-11-28 DIAGNOSIS — M51.36 DDD (DEGENERATIVE DISC DISEASE), LUMBAR: ICD-10-CM

## 2022-11-28 PROCEDURE — 3074F SYST BP LT 130 MM HG: CPT | Performed by: NURSE PRACTITIONER

## 2022-11-28 PROCEDURE — G8417 CALC BMI ABV UP PARAM F/U: HCPCS | Performed by: NURSE PRACTITIONER

## 2022-11-28 PROCEDURE — G8484 FLU IMMUNIZE NO ADMIN: HCPCS | Performed by: NURSE PRACTITIONER

## 2022-11-28 PROCEDURE — 1036F TOBACCO NON-USER: CPT | Performed by: NURSE PRACTITIONER

## 2022-11-28 PROCEDURE — 3017F COLORECTAL CA SCREEN DOC REV: CPT | Performed by: NURSE PRACTITIONER

## 2022-11-28 PROCEDURE — 99214 OFFICE O/P EST MOD 30 MIN: CPT | Performed by: NURSE PRACTITIONER

## 2022-11-28 PROCEDURE — 73110 X-RAY EXAM OF WRIST: CPT

## 2022-11-28 PROCEDURE — G8427 DOCREV CUR MEDS BY ELIG CLIN: HCPCS | Performed by: NURSE PRACTITIONER

## 2022-11-28 PROCEDURE — 3078F DIAST BP <80 MM HG: CPT | Performed by: NURSE PRACTITIONER

## 2022-11-28 RX ORDER — GABAPENTIN 600 MG/1
600 TABLET ORAL 2 TIMES DAILY
Qty: 60 TABLET | Refills: 0 | Status: SHIPPED | OUTPATIENT
Start: 2022-11-30 | End: 2022-12-30

## 2022-11-28 RX ORDER — OXYCODONE HYDROCHLORIDE 10 MG/1
10 TABLET ORAL EVERY 6 HOURS PRN
Qty: 120 TABLET | Refills: 0 | Status: SHIPPED | OUTPATIENT
Start: 2022-11-30 | End: 2022-12-30

## 2022-11-28 RX ORDER — TIZANIDINE 4 MG/1
4 TABLET ORAL EVERY 6 HOURS PRN
Qty: 120 TABLET | Refills: 0 | Status: SHIPPED | OUTPATIENT
Start: 2022-11-30 | End: 2022-12-30

## 2022-11-28 ASSESSMENT — ENCOUNTER SYMPTOMS
BACK PAIN: 1
BLOOD IN STOOL: 0
COUGH: 0
ABDOMINAL DISTENTION: 0
VOMITING: 0
NAUSEA: 0
CONSTIPATION: 0
ABDOMINAL PAIN: 0
DIARRHEA: 0

## 2022-11-28 NOTE — PROGRESS NOTES
[address] [facility] [address]  Dept: [phone]  Dept Fax: [phone]: [phone]    Visit Date: 11/28/2022    Functionality Assessment/Goals Worksheet     On a scale of 0 (Does not Interfere) to 10 (Completely Interferes)     1. Which number describes how during the past week pain has interfered with       the following:  A. General Activity:  9  B. Mood: 8  C. Walking Ability:  9  D. Normal Work (Includes both work outside the home and housework):  10  E. Relations with Other People:   7  F. Sleep:   0  G. Enjoyment of Life:   8    2. Patient Prefers to Take their Pain Medications:     [x]  On a regular basis   [x]  Only when necessary    []  Does not take pain medications    3. What are the Patient's Goals/Expectations for Visiting Pain Management? []  Learn about my pain    [x]  Receive Medication   []  Physical Therapy     []  Treat Depression   [x]  Receive Injections    []  Treat Sleep   []  Deal with Anxiety and Stress   []  Treat Opoid Dependence/Addiction   []  Other:      HPI:   Lillian Blue is a 45 y.o. female is here today for    Chief Complaint: Low back pain, leg pain     HPI   2.5 month FU. Continues to have pain in mid to low back throughout and pain radiates down bilateral legs- Pain in low back aching,  sharp and stabbing and shooting in legs and pins and needles. Pain is constant but up and down depending on activity. On antibiotics now being treated with Cipro for UTI and feels taht this is increasing pain and has flank pain      Current pain medications remain effective in making pain manageable. Still holding off on lumbar surgery. Pain increases with bending, lifting, twisting , walking, standing, getting up and down, and housework or working at job, weather changes, stress.      Having pain in left wrist and swelling- started about 1.5 months ago states she tripped over cat and caught herself with eft arm and pain and swelling since that has not improved. Medications reviewed. Patient denies side effects with medications. Patient states she is taking medications as prescribed. Shedenies receiving pain medications from other sources. She  had 1 urgent care visit for UTI since last visit     Pain scale with out pain medications or at its worst is 10/10. Pain scale with pain medications or at its best is 5-7/10. Last dose of Oxy IR was today and Neurontin was was yesterday   Drug screen reviewed from 9/15/2022 and was appropriate  Pill count completed  today and WNL: Yes      The patientis allergic to ketorolac, adhesive tape, compazine [prochlorperazine], erythromycin, haloperidol and related, lyrica [pregabalin], reglan [metoclopramide], and sulfa antibiotics. Subjective:      Review of Systems   Constitutional: Negative. Negative for appetite change and fever. HENT:  Positive for dental problem. Respiratory:  Negative for cough. Cardiovascular:  Negative for leg swelling. Gastrointestinal:  Negative for abdominal distention, abdominal pain, blood in stool, constipation, diarrhea, nausea and vomiting. Musculoskeletal:  Positive for arthralgias, back pain, gait problem and myalgias. Negative for joint swelling, neck pain and neck stiffness. No assist devices    Neurological:  Positive for weakness and numbness. Psychiatric/Behavioral:  Positive for dysphoric mood and sleep disturbance. Negative for decreased concentration. The patient is nervous/anxious. Objective:     Vitals:    11/28/22 1212   BP: 110/76   Weight: 226 lb (102.5 kg)   Height: 5' 5.98\" (1.676 m)       Physical Exam  Vitals and nursing note reviewed. Constitutional:       General: She is not in acute distress. Appearance: Normal appearance. She is well-developed. She is not ill-appearing or diaphoretic.    HENT:      Head: Normocephalic and atraumatic. Right Ear: External ear normal.      Left Ear: External ear normal.      Nose: Nose normal.      Mouth/Throat:      Mouth: Mucous membranes are moist.      Pharynx: No oropharyngeal exudate. Eyes:      General: No scleral icterus. Right eye: No discharge. Left eye: No discharge. Conjunctiva/sclera: Conjunctivae normal.      Pupils: Pupils are equal, round, and reactive to light. Neck:      Thyroid: No thyromegaly. Cardiovascular:      Rate and Rhythm: Normal rate and regular rhythm. Heart sounds: Normal heart sounds. No murmur heard. No friction rub. No gallop. Pulmonary:      Effort: Pulmonary effort is normal. No respiratory distress. Breath sounds: Normal breath sounds. No wheezing or rales. Chest:      Chest wall: No tenderness. Abdominal:      General: Bowel sounds are normal. There is no distension. Palpations: Abdomen is soft. Tenderness: There is no abdominal tenderness. There is no guarding or rebound. Musculoskeletal:         General: Swelling and tenderness present. Left shoulder: Tenderness and bony tenderness present. Decreased range of motion. Decreased strength. Left wrist: Swelling, tenderness, bony tenderness and snuff box tenderness present. Arms:       Cervical back: Full passive range of motion without pain, normal range of motion and neck supple. No edema, erythema or rigidity. No muscular tenderness. Normal range of motion. Thoracic back: Spasms and tenderness present. Decreased range of motion. Lumbar back: Spasms, tenderness and bony tenderness present. Decreased range of motion. Positive right straight leg raise test and positive left straight leg raise test.        Back:       Right lower leg: No edema. Left lower leg: No edema. Skin:     General: Skin is warm. Coloration: Skin is pale. Findings: No erythema or rash.    Neurological:      General: No focal compliance. Patient told can not receive any pain medications from any other source. No evidence of abuse, diversion or aberrant behavior. Medications and/or procedures to improve function and quality of life- patient understanding with this and that may not be pain free  Discussed with patient about safe storage of medications at home  Discussed possible weaning of medication dosing dependent on treatment/procedure results. Discussed with patient about risks with procedure including infection, reaction to medication, increased pain, or bleeding. Continue oxy IR 10 mg QID prn- ordered refill   Continue Zanaflex prn- instructed to hold if SBP less than 100- ordered refill , Neurontin 600 mg BID d/t kidney function- ordered refill   Procedure notes reviewed in detail  Short term relief from trigger point injections of muscle pain   Has had L-facet MBB and LESI with no improvement   Not a candidate for SCS d/t insurance. 2 different surgeans recommended lumbar surgery    Meds. Prescribed:   Orders Placed This Encounter   Medications    tiZANidine (ZANAFLEX) 4 MG tablet     Sig: Take 1 tablet by mouth every 6 hours as needed (spasms)     Dispense:  120 tablet     Refill:  0    oxyCODONE HCl (OXY-IR) 10 MG immediate release tablet     Sig: Take 1 tablet by mouth every 6 hours as needed for Pain for up to 30 days. Intended supply: 30 days     Dispense:  120 tablet     Refill:  0     Reduce doses taken as pain becomes manageable    gabapentin (NEURONTIN) 600 MG tablet     Sig: Take 1 tablet by mouth 2 times daily for 30 days. Dispense:  60 tablet     Refill:  0         Return in about 2 months (around 1/28/2023), or if symptoms worsen or fail to improve, for follow up  for medications.                Electronically signed by RENNY Salazar CNP on11/28/2022 at 12:50 PM

## 2022-12-30 DIAGNOSIS — M79.18 MYOFASCIAL PAIN DYSFUNCTION SYNDROME: ICD-10-CM

## 2022-12-30 DIAGNOSIS — M51.36 DDD (DEGENERATIVE DISC DISEASE), LUMBAR: ICD-10-CM

## 2022-12-30 DIAGNOSIS — M47.816 SPONDYLOSIS OF LUMBAR REGION WITHOUT MYELOPATHY OR RADICULOPATHY: ICD-10-CM

## 2022-12-30 DIAGNOSIS — G89.4 CHRONIC PAIN SYNDROME: ICD-10-CM

## 2022-12-30 DIAGNOSIS — M54.16 LUMBAR RADICULITIS: ICD-10-CM

## 2022-12-30 DIAGNOSIS — M51.26 LUMBAR DISC HERNIATION: ICD-10-CM

## 2022-12-30 DIAGNOSIS — M48.062 SPINAL STENOSIS OF LUMBAR REGION WITH NEUROGENIC CLAUDICATION: ICD-10-CM

## 2022-12-30 DIAGNOSIS — R10.84 GENERALIZED ABDOMINAL PAIN: ICD-10-CM

## 2022-12-30 NOTE — TELEPHONE ENCOUNTER
Jennifer Michele called requesting a refill on the following medications:  Requested Prescriptions     Pending Prescriptions Disp Refills    oxyCODONE HCl (OXY-IR) 10 MG immediate release tablet 120 tablet 0     Sig: Take 1 tablet by mouth every 6 hours as needed for Pain for up to 30 days.  Intended supply: 30 days     Pharmacy verified:  [facility]      Date of last visit: 11-28-22  Date of next visit (if applicable): 6/05/6090

## 2023-01-03 RX ORDER — GABAPENTIN 600 MG/1
TABLET ORAL
Qty: 60 TABLET | Refills: 0 | Status: SHIPPED | OUTPATIENT
Start: 2023-01-03 | End: 2023-02-02

## 2023-01-03 RX ORDER — GABAPENTIN 600 MG/1
600 TABLET ORAL 2 TIMES DAILY
Qty: 60 TABLET | Refills: 0 | Status: CANCELLED | OUTPATIENT
Start: 2023-01-03 | End: 2023-02-02

## 2023-01-03 RX ORDER — OXYCODONE HYDROCHLORIDE 10 MG/1
10 TABLET ORAL EVERY 6 HOURS PRN
Qty: 120 TABLET | Refills: 0 | Status: SHIPPED | OUTPATIENT
Start: 2023-01-03 | End: 2023-02-02

## 2023-01-03 NOTE — TELEPHONE ENCOUNTER
OARRS reviewed. UDS: + for zolpidem, trazodone, oxycodone, gabapentin, tizanidine. Negative topamax . Last seen: 11/28/2022.  Follow-up:   Future Appointments   Date Time Provider Laisha Maribel   1/31/2023 12:30 PM RENNY Rodriguez - CNP N SRPX Pain [facility] Lois Lam   2/14/2023  8:40 AM Mir Lynn MD N ES Kidney [facility] Lois Lam

## 2023-01-03 NOTE — TELEPHONE ENCOUNTER
OARRS reviewed. UDS: + Tizanidine, Gabapentin, Oxycodone, Topamax, Zolpidem  Present-- Trazodone  Last seen: 11/28/2022.  Follow-up:   Future Appointments   Date Time Provider Laisha Maribel   1/31/2023 12:30 PM RENNY Byrd - CNP N SRPX Pain [facility]   2/14/2023  8:40 AM Mir oFster MD N ES Kidney [facility]

## 2023-02-01 DIAGNOSIS — M51.36 DDD (DEGENERATIVE DISC DISEASE), LUMBAR: ICD-10-CM

## 2023-02-01 DIAGNOSIS — M51.26 LUMBAR DISC HERNIATION: ICD-10-CM

## 2023-02-01 DIAGNOSIS — M54.16 LUMBAR RADICULITIS: ICD-10-CM

## 2023-02-01 DIAGNOSIS — M48.062 SPINAL STENOSIS OF LUMBAR REGION WITH NEUROGENIC CLAUDICATION: ICD-10-CM

## 2023-02-01 DIAGNOSIS — M79.18 MYOFASCIAL PAIN DYSFUNCTION SYNDROME: ICD-10-CM

## 2023-02-01 DIAGNOSIS — M47.816 SPONDYLOSIS OF LUMBAR REGION WITHOUT MYELOPATHY OR RADICULOPATHY: ICD-10-CM

## 2023-02-01 DIAGNOSIS — R10.84 GENERALIZED ABDOMINAL PAIN: ICD-10-CM

## 2023-02-01 DIAGNOSIS — G89.4 CHRONIC PAIN SYNDROME: ICD-10-CM

## 2023-02-01 RX ORDER — GABAPENTIN 600 MG/1
600 TABLET ORAL 2 TIMES DAILY
Qty: 60 TABLET | Refills: 0 | Status: SHIPPED | OUTPATIENT
Start: 2023-02-02 | End: 2023-03-04

## 2023-02-01 RX ORDER — OXYCODONE HYDROCHLORIDE 10 MG/1
10 TABLET ORAL EVERY 6 HOURS PRN
Qty: 120 TABLET | Refills: 0 | Status: SHIPPED | OUTPATIENT
Start: 2023-02-02 | End: 2023-03-04

## 2023-02-01 RX ORDER — TIZANIDINE 4 MG/1
4 TABLET ORAL EVERY 6 HOURS PRN
Qty: 120 TABLET | Refills: 0 | Status: SHIPPED | OUTPATIENT
Start: 2023-02-01 | End: 2023-03-03

## 2023-02-01 NOTE — TELEPHONE ENCOUNTER
Noel Yee called requesting a refill on the following medications:  Requested Prescriptions     Pending Prescriptions Disp Refills    gabapentin (NEURONTIN) 600 MG tablet 60 tablet 0    tiZANidine (ZANAFLEX) 4 MG tablet 120 tablet 0     Sig: Take 1 tablet by mouth every 6 hours as needed (spasms)    oxyCODONE HCl (OXY-IR) 10 MG immediate release tablet 120 tablet 0     Sig: Take 1 tablet by mouth every 6 hours as needed for Pain for up to 30 days. Intended supply: 30 days     Pharmacy verified: [address] [facility] [place]  . pv      Date of last visit: 11/28/2022  Date of next visit (if applicable): 7/69/1590

## 2023-02-01 NOTE — TELEPHONE ENCOUNTER
OARRS reviewed. UDS: + for  Tizanidine, Gabapentin, Oxycodone, Trazodone, Zolpidem. Last seen: 11/28/2022.  Follow-up: 2/21/2023

## 2023-02-15 ENCOUNTER — TELEPHONE (OUTPATIENT)
Dept: PHYSICAL MEDICINE AND REHAB | Age: 53
End: 2023-02-15

## 2023-03-08 DIAGNOSIS — G89.4 CHRONIC PAIN SYNDROME: ICD-10-CM

## 2023-03-08 DIAGNOSIS — M54.16 LUMBAR RADICULITIS: ICD-10-CM

## 2023-03-08 DIAGNOSIS — M51.26 LUMBAR DISC HERNIATION: ICD-10-CM

## 2023-03-08 DIAGNOSIS — M48.062 SPINAL STENOSIS OF LUMBAR REGION WITH NEUROGENIC CLAUDICATION: ICD-10-CM

## 2023-03-08 DIAGNOSIS — M79.18 MYOFASCIAL PAIN DYSFUNCTION SYNDROME: ICD-10-CM

## 2023-03-08 DIAGNOSIS — M47.816 SPONDYLOSIS OF LUMBAR REGION WITHOUT MYELOPATHY OR RADICULOPATHY: ICD-10-CM

## 2023-03-08 RX ORDER — OXYCODONE HYDROCHLORIDE 5 MG/1
5-10 TABLET ORAL EVERY 6 HOURS PRN
Qty: 240 TABLET | Refills: 0 | Status: CANCELLED | OUTPATIENT
Start: 2023-03-08 | End: 2023-04-07

## 2023-03-08 RX ORDER — OXYCODONE HYDROCHLORIDE 10 MG/1
10 TABLET ORAL EVERY 6 HOURS PRN
Qty: 76 TABLET | Refills: 0 | Status: SHIPPED | OUTPATIENT
Start: 2023-03-08 | End: 2023-03-27

## 2023-03-08 NOTE — TELEPHONE ENCOUNTER
OARRS reviewed. UDS: + for  Tizanidine, Gabapentin, Oxycodone, Trazodone, Zolpidem. Last seen: 11/28/2022.  Follow-up:   Future Appointments   Date Time Provider Laisha Maribel   3/27/2023  9:15 AM RENNY Miranda - CNP N [facility] [place] [place]

## 2023-03-08 NOTE — TELEPHONE ENCOUNTER
This patient canceled last 2 FU appointments with me (1/31/23 and 2/21/23) I changed prescription to only give enough to this scheduled FU and she will not get anymore if she cancels or does not show.  If she does this again in past I will not refill

## 2023-03-08 NOTE — TELEPHONE ENCOUNTER
Imani Lim called requesting a refill on the following medications:  Requested Prescriptions     Pending Prescriptions Disp Refills    oxyCODONE (ROXICODONE) 5 MG immediate release tablet 240 tablet 0     Sig: Take 1-2 tablets by mouth every 6 hours as needed for Pain for up to 30 days. . Take lowest dose possible to manage pain Max Daily Amount: 40 mg     [facility]   . pv      Date of last visit:   Date of next visit (if applicable): 1/17/6341        Please call patient about the medication.  As she has a few questions [phone]

## 2023-03-09 RX ORDER — OXYCODONE HYDROCHLORIDE 5 MG/1
5-10 TABLET ORAL EVERY 6 HOURS PRN
Qty: 144 TABLET | Refills: 0 | Status: SHIPPED | OUTPATIENT
Start: 2023-03-10 | End: 2023-03-28

## 2023-03-09 NOTE — TELEPHONE ENCOUNTER
Script sent yesterday for [id number] will not be in stock at pharmacy and pt. Request 2- 5mg oxycontin q6 prn sent in . Refill date will be for 3/10 but only till gets to next aptm. 3/27 or 18 days. Last seen: 11/28/2022.  Follow-up:   Future Appointments   Date Time Provider Laisha López   3/27/2023  9:15 AM RENNY Ceballos - CNP N SRPX Pain [facility] - [facility] [place] [place]

## 2023-03-27 ENCOUNTER — OFFICE VISIT (OUTPATIENT)
Dept: PHYSICAL MEDICINE AND REHAB | Age: 53
End: 2023-03-27
Payer: COMMERCIAL

## 2023-03-27 VITALS — BODY MASS INDEX: 36.32 KG/M2 | WEIGHT: 226 LBS | HEIGHT: 66 IN

## 2023-03-27 DIAGNOSIS — M25.532 LEFT WRIST PAIN: ICD-10-CM

## 2023-03-27 DIAGNOSIS — M51.26 LUMBAR DISC HERNIATION: ICD-10-CM

## 2023-03-27 DIAGNOSIS — G89.4 CHRONIC PAIN SYNDROME: ICD-10-CM

## 2023-03-27 DIAGNOSIS — M47.816 SPONDYLOSIS OF LUMBAR REGION WITHOUT MYELOPATHY OR RADICULOPATHY: Primary | ICD-10-CM

## 2023-03-27 DIAGNOSIS — M48.062 SPINAL STENOSIS OF LUMBAR REGION WITH NEUROGENIC CLAUDICATION: ICD-10-CM

## 2023-03-27 DIAGNOSIS — M51.36 DDD (DEGENERATIVE DISC DISEASE), LUMBAR: ICD-10-CM

## 2023-03-27 DIAGNOSIS — M79.18 MYOFASCIAL PAIN DYSFUNCTION SYNDROME: ICD-10-CM

## 2023-03-27 DIAGNOSIS — M54.16 LUMBAR RADICULITIS: ICD-10-CM

## 2023-03-27 PROCEDURE — 3017F COLORECTAL CA SCREEN DOC REV: CPT | Performed by: NURSE PRACTITIONER

## 2023-03-27 PROCEDURE — G8427 DOCREV CUR MEDS BY ELIG CLIN: HCPCS | Performed by: NURSE PRACTITIONER

## 2023-03-27 PROCEDURE — G8417 CALC BMI ABV UP PARAM F/U: HCPCS | Performed by: NURSE PRACTITIONER

## 2023-03-27 PROCEDURE — G8484 FLU IMMUNIZE NO ADMIN: HCPCS | Performed by: NURSE PRACTITIONER

## 2023-03-27 PROCEDURE — 99214 OFFICE O/P EST MOD 30 MIN: CPT | Performed by: NURSE PRACTITIONER

## 2023-03-27 PROCEDURE — 1036F TOBACCO NON-USER: CPT | Performed by: NURSE PRACTITIONER

## 2023-03-27 RX ORDER — OXYCODONE HYDROCHLORIDE 5 MG/1
5-10 TABLET ORAL EVERY 6 HOURS PRN
Qty: 240 TABLET | Refills: 0 | Status: SHIPPED | OUTPATIENT
Start: 2023-03-28 | End: 2023-04-27

## 2023-03-27 RX ORDER — GABAPENTIN 600 MG/1
600 TABLET ORAL 2 TIMES DAILY
Qty: 60 TABLET | Refills: 0 | Status: SHIPPED | OUTPATIENT
Start: 2023-03-27 | End: 2023-04-26

## 2023-03-27 RX ORDER — TIZANIDINE 4 MG/1
4 TABLET ORAL EVERY 6 HOURS PRN
Qty: 120 TABLET | Refills: 0 | Status: SHIPPED | OUTPATIENT
Start: 2023-03-27 | End: 2023-04-26

## 2023-03-27 ASSESSMENT — ENCOUNTER SYMPTOMS
VOMITING: 0
COUGH: 0
BACK PAIN: 1
ABDOMINAL DISTENTION: 0
DIARRHEA: 0
ABDOMINAL PAIN: 0
CONSTIPATION: 0
NAUSEA: 0
BLOOD IN STOOL: 0

## 2023-03-27 NOTE — PROGRESS NOTES
[facility]  [address]  Dept: [phone]  Dept Fax: [phone]: [phone]    Visit Date: 3/27/2023    Functionality Assessment/Goals Worksheet     On a scale of 0 (Does not Interfere) to 10 (Completely Interferes)     1. Which number describes how during the past week pain has interfered with       the following:  A. General Activity:  10  B. Mood: 9  C. Walking Ability:  10  D. Normal Work (Includes both work outside the home and housework):  10  E. Relations with Other People:   9  F. Sleep:   10  G. Enjoyment of Life:   8    2. Patient Prefers to Take their Pain Medications:     [x]  On a regular basis   []  Only when necessary    []  Does not take pain medications    3. What are the Patient's Goals/Expectations for Visiting Pain Management? []  Learn about my pain    [x]  Receive Medication   []  Physical Therapy     []  Treat Depression   [x]  Receive Injections    []  Treat Sleep   []  Deal with Anxiety and Stress   []  Treat Opoid Dependence/Addiction   []  Other:      HPI:   Carola Martinez is a 45 y.o. female is here today for    Chief Complaint: Low back pain, leg pain     HPI   4 month FU. Patient canceled two previous appointments and we discussed she needs to be compliant with appointments. Continues to have pain in mid to low back throughout and pain radiates down bilateral legs- Pain in low back aching,  sharp and stabbing and shooting in legs and pins and needles. Pain is constant but worse with activity. Denies any new changes. Current pain medications remain effective in making pain manageable. Still holding off on lumbar surgery.  Pharmacy has been having issues stocking oxy IR 10 mg and so she is needing the 5 mg tabs with more per month     Pain increases with bending, lifting, twisting , walking, standing, getting up and down, and

## 2023-04-28 DIAGNOSIS — M47.816 SPONDYLOSIS OF LUMBAR REGION WITHOUT MYELOPATHY OR RADICULOPATHY: ICD-10-CM

## 2023-04-28 DIAGNOSIS — G89.4 CHRONIC PAIN SYNDROME: ICD-10-CM

## 2023-04-28 DIAGNOSIS — M54.16 LUMBAR RADICULITIS: ICD-10-CM

## 2023-04-28 DIAGNOSIS — M51.26 LUMBAR DISC HERNIATION: ICD-10-CM

## 2023-04-28 DIAGNOSIS — M79.18 MYOFASCIAL PAIN DYSFUNCTION SYNDROME: ICD-10-CM

## 2023-04-28 DIAGNOSIS — M48.062 SPINAL STENOSIS OF LUMBAR REGION WITH NEUROGENIC CLAUDICATION: ICD-10-CM

## 2023-05-01 RX ORDER — GABAPENTIN 600 MG/1
600 TABLET ORAL 2 TIMES DAILY
Qty: 60 TABLET | Refills: 0 | Status: SHIPPED | OUTPATIENT
Start: 2023-05-01 | End: 2023-05-31

## 2023-05-01 RX ORDER — TIZANIDINE 4 MG/1
4 TABLET ORAL EVERY 6 HOURS PRN
Qty: 120 TABLET | Refills: 0 | Status: SHIPPED | OUTPATIENT
Start: 2023-05-01 | End: 2023-05-31

## 2023-05-01 RX ORDER — OXYCODONE HYDROCHLORIDE 10 MG/1
10 TABLET ORAL EVERY 6 HOURS PRN
Qty: 120 TABLET | Refills: 0 | Status: SHIPPED | OUTPATIENT
Start: 2023-05-01 | End: 2023-05-31

## 2023-05-01 NOTE — TELEPHONE ENCOUNTER
OARRS reviewed. UDS: + for  Tizanidine, Topiramate, Gabapentin, Oxycodone. Last seen: 3/27/2023.  Follow-up:   Future Appointments   Date Time Provider Laisha Gallardoi   5/30/2023  9:30 AM RENNY Warren - CNP N SRPX Pain [facility] - [address]- On 04/19/2023, this patient filled a script for Hydrocodone-Acetamin 5-325 Mg, Qty 2, written by Adams Everett MD

## 2023-05-30 ENCOUNTER — TELEPHONE (OUTPATIENT)
Dept: PHYSICAL MEDICINE AND REHAB | Age: 53
End: 2023-05-30

## 2023-05-30 DIAGNOSIS — M48.062 SPINAL STENOSIS OF LUMBAR REGION WITH NEUROGENIC CLAUDICATION: ICD-10-CM

## 2023-05-30 DIAGNOSIS — M47.816 SPONDYLOSIS OF LUMBAR REGION WITHOUT MYELOPATHY OR RADICULOPATHY: ICD-10-CM

## 2023-05-30 DIAGNOSIS — M54.16 LUMBAR RADICULITIS: ICD-10-CM

## 2023-05-30 DIAGNOSIS — M79.18 MYOFASCIAL PAIN DYSFUNCTION SYNDROME: ICD-10-CM

## 2023-05-30 DIAGNOSIS — M51.26 LUMBAR DISC HERNIATION: ICD-10-CM

## 2023-05-30 DIAGNOSIS — G89.4 CHRONIC PAIN SYNDROME: ICD-10-CM

## 2023-05-30 RX ORDER — TIZANIDINE 4 MG/1
4 TABLET ORAL EVERY 6 HOURS PRN
Qty: 120 TABLET | Refills: 0 | Status: SHIPPED | OUTPATIENT
Start: 2023-05-31 | End: 2023-06-29 | Stop reason: SDUPTHER

## 2023-05-30 RX ORDER — GABAPENTIN 600 MG/1
600 TABLET ORAL 2 TIMES DAILY
Qty: 60 TABLET | Refills: 0 | Status: SHIPPED | OUTPATIENT
Start: 2023-05-31 | End: 2023-06-29 | Stop reason: SDUPTHER

## 2023-05-30 RX ORDER — OXYCODONE HYDROCHLORIDE 10 MG/1
10 TABLET ORAL EVERY 6 HOURS PRN
Qty: 120 TABLET | Refills: 0 | Status: SHIPPED | OUTPATIENT
Start: 2023-05-31 | End: 2023-06-29 | Stop reason: SDUPTHER

## 2023-06-29 DIAGNOSIS — M48.062 SPINAL STENOSIS OF LUMBAR REGION WITH NEUROGENIC CLAUDICATION: ICD-10-CM

## 2023-06-29 DIAGNOSIS — G89.4 CHRONIC PAIN SYNDROME: ICD-10-CM

## 2023-06-29 DIAGNOSIS — M54.16 LUMBAR RADICULITIS: ICD-10-CM

## 2023-06-29 DIAGNOSIS — M51.26 LUMBAR DISC HERNIATION: ICD-10-CM

## 2023-06-29 DIAGNOSIS — M79.18 MYOFASCIAL PAIN DYSFUNCTION SYNDROME: ICD-10-CM

## 2023-06-29 DIAGNOSIS — M47.816 SPONDYLOSIS OF LUMBAR REGION WITHOUT MYELOPATHY OR RADICULOPATHY: ICD-10-CM

## 2023-06-29 RX ORDER — GABAPENTIN 600 MG/1
600 TABLET ORAL 2 TIMES DAILY
Qty: 60 TABLET | Refills: 0 | Status: SHIPPED | OUTPATIENT
Start: 2023-06-30 | End: 2023-07-30

## 2023-06-29 RX ORDER — OXYCODONE HYDROCHLORIDE 10 MG/1
10 TABLET ORAL EVERY 6 HOURS PRN
Qty: 120 TABLET | Refills: 0 | Status: SHIPPED | OUTPATIENT
Start: 2023-06-30 | End: 2023-07-30

## 2023-06-29 RX ORDER — TIZANIDINE 4 MG/1
4 TABLET ORAL EVERY 6 HOURS PRN
Qty: 120 TABLET | Refills: 0 | Status: SHIPPED | OUTPATIENT
Start: 2023-06-30 | End: 2023-07-30

## 2023-07-27 ENCOUNTER — HOSPITAL ENCOUNTER (OUTPATIENT)
Dept: GENERAL RADIOLOGY | Age: 53
Discharge: HOME | End: 2023-07-27
Payer: COMMERCIAL

## 2023-07-27 ENCOUNTER — HOSPITAL ENCOUNTER (OUTPATIENT)
Age: 53
Discharge: HOME | End: 2023-07-27
Payer: COMMERCIAL

## 2023-07-27 DIAGNOSIS — M51.26 LUMBAR DISC HERNIATION: ICD-10-CM

## 2023-07-27 DIAGNOSIS — M54.16 LUMBAR RADICULITIS: ICD-10-CM

## 2023-07-27 DIAGNOSIS — M47.816 LUMBAR SPONDYLOSIS: ICD-10-CM

## 2023-07-27 DIAGNOSIS — M51.36 DDD (DEGENERATIVE DISC DISEASE), LUMBAR: ICD-10-CM

## 2023-07-27 DIAGNOSIS — M79.18 MYOFASCIAL PAIN DYSFUNCTION SYNDROME: ICD-10-CM

## 2023-07-27 DIAGNOSIS — M47.816 SPONDYLOSIS OF LUMBAR REGION WITHOUT MYELOPATHY OR RADICULOPATHY: ICD-10-CM

## 2023-07-27 DIAGNOSIS — M48.062 SPINAL STENOSIS OF LUMBAR REGION WITH NEUROGENIC CLAUDICATION: ICD-10-CM

## 2023-07-27 DIAGNOSIS — G89.4 CHRONIC PAIN SYNDROME: ICD-10-CM

## 2023-07-27 DIAGNOSIS — M54.2 NECK PAIN: ICD-10-CM

## 2023-07-27 PROCEDURE — 72100 X-RAY EXAM L-S SPINE 2/3 VWS: CPT

## 2023-07-27 PROCEDURE — 72040 X-RAY EXAM NECK SPINE 2-3 VW: CPT

## 2023-07-27 NOTE — TELEPHONE ENCOUNTER
Muna Adorno called requesting a refill on the following medications:  Requested Prescriptions     Pending Prescriptions Disp Refills    tiZANidine (ZANAFLEX) 4 MG tablet 120 tablet 0     Sig: Take 1 tablet by mouth every 6 hours as needed (spasms)    oxyCODONE HCl (OXY-IR) 10 MG immediate release tablet 120 tablet 0     Sig: Take 1 tablet by mouth every 6 hours as needed for Pain for up to 30 days. Intended supply: 30 days Max Daily Amount: 40 mg    gabapentin (NEURONTIN) 600 MG tablet 60 tablet 0     Sig: Take 1 tablet by mouth 2 times daily for 30 days. [facility]   . pv      Date of last visit:   Date of next visit (if applicable): Visit date not found

## 2023-07-28 RX ORDER — TIZANIDINE 4 MG/1
4 TABLET ORAL EVERY 6 HOURS PRN
Qty: 120 TABLET | Refills: 0 | Status: SHIPPED | OUTPATIENT
Start: 2023-07-30 | End: 2023-08-29

## 2023-07-28 RX ORDER — OXYCODONE HYDROCHLORIDE 10 MG/1
10 TABLET ORAL EVERY 6 HOURS PRN
Qty: 120 TABLET | Refills: 0 | Status: SHIPPED | OUTPATIENT
Start: 2023-07-30 | End: 2023-08-29

## 2023-07-28 RX ORDER — GABAPENTIN 600 MG/1
600 TABLET ORAL 2 TIMES DAILY
Qty: 60 TABLET | Refills: 0 | Status: SHIPPED | OUTPATIENT
Start: 2023-07-30 | End: 2023-08-29

## 2023-07-28 NOTE — TELEPHONE ENCOUNTER
OARRS reviewed. UDS: + for  Gabapentin, Tizanidine, Topiramate, Oxycodone. Last seen: 6/12/2023.  Follow-up:   Future Appointments   Date Time Provider [address]   8/29/2023 11:15 AM RENNY Shepard - CNP N SRPX Pain [facility]

## 2023-08-29 ENCOUNTER — OFFICE VISIT (OUTPATIENT)
Dept: PHYSICAL MEDICINE AND REHAB | Age: 53
End: 2023-08-29
Payer: COMMERCIAL

## 2023-08-29 VITALS
WEIGHT: 226 LBS | HEIGHT: 66 IN | BODY MASS INDEX: 36.32 KG/M2 | SYSTOLIC BLOOD PRESSURE: 106 MMHG | DIASTOLIC BLOOD PRESSURE: 62 MMHG

## 2023-08-29 DIAGNOSIS — M54.16 LUMBAR RADICULITIS: Primary | ICD-10-CM

## 2023-08-29 DIAGNOSIS — M25.552 LEFT HIP PAIN: ICD-10-CM

## 2023-08-29 DIAGNOSIS — M51.36 DDD (DEGENERATIVE DISC DISEASE), LUMBAR: ICD-10-CM

## 2023-08-29 DIAGNOSIS — M47.812 FACET ARTHRITIS OF CERVICAL REGION: ICD-10-CM

## 2023-08-29 DIAGNOSIS — M47.816 SPONDYLOSIS OF LUMBAR REGION WITHOUT MYELOPATHY OR RADICULOPATHY: ICD-10-CM

## 2023-08-29 DIAGNOSIS — M54.2 NECK PAIN: ICD-10-CM

## 2023-08-29 DIAGNOSIS — M47.816 LUMBAR SPONDYLOSIS: ICD-10-CM

## 2023-08-29 DIAGNOSIS — M48.062 SPINAL STENOSIS OF LUMBAR REGION WITH NEUROGENIC CLAUDICATION: ICD-10-CM

## 2023-08-29 DIAGNOSIS — M79.18 MYOFASCIAL PAIN DYSFUNCTION SYNDROME: ICD-10-CM

## 2023-08-29 DIAGNOSIS — M51.26 LUMBAR DISC HERNIATION: ICD-10-CM

## 2023-08-29 DIAGNOSIS — G89.4 CHRONIC PAIN SYNDROME: ICD-10-CM

## 2023-08-29 PROCEDURE — 3078F DIAST BP <80 MM HG: CPT | Performed by: NURSE PRACTITIONER

## 2023-08-29 PROCEDURE — G8427 DOCREV CUR MEDS BY ELIG CLIN: HCPCS | Performed by: NURSE PRACTITIONER

## 2023-08-29 PROCEDURE — 1036F TOBACCO NON-USER: CPT | Performed by: NURSE PRACTITIONER

## 2023-08-29 PROCEDURE — 3017F COLORECTAL CA SCREEN DOC REV: CPT | Performed by: NURSE PRACTITIONER

## 2023-08-29 PROCEDURE — G8417 CALC BMI ABV UP PARAM F/U: HCPCS | Performed by: NURSE PRACTITIONER

## 2023-08-29 PROCEDURE — 99214 OFFICE O/P EST MOD 30 MIN: CPT | Performed by: NURSE PRACTITIONER

## 2023-08-29 PROCEDURE — 3074F SYST BP LT 130 MM HG: CPT | Performed by: NURSE PRACTITIONER

## 2023-08-29 RX ORDER — TIZANIDINE 4 MG/1
4 TABLET ORAL EVERY 6 HOURS PRN
Qty: 120 TABLET | Refills: 0 | Status: SHIPPED | OUTPATIENT
Start: 2023-08-29 | End: 2023-09-28

## 2023-08-29 RX ORDER — OXYCODONE HYDROCHLORIDE 10 MG/1
10 TABLET ORAL EVERY 6 HOURS PRN
Qty: 120 TABLET | Refills: 0 | Status: SHIPPED | OUTPATIENT
Start: 2023-08-29 | End: 2023-09-28

## 2023-08-29 RX ORDER — GABAPENTIN 600 MG/1
600 TABLET ORAL 2 TIMES DAILY
Qty: 60 TABLET | Refills: 0 | Status: SHIPPED | OUTPATIENT
Start: 2023-08-29 | End: 2023-09-28

## 2023-08-29 ASSESSMENT — ENCOUNTER SYMPTOMS
ABDOMINAL DISTENTION: 0
BLOOD IN STOOL: 0
CHEST TIGHTNESS: 0
ABDOMINAL PAIN: 0
SHORTNESS OF BREATH: 1
COUGH: 1
BACK PAIN: 1
NAUSEA: 0
VOMITING: 0
WHEEZING: 0
DIARRHEA: 0
CONSTIPATION: 0

## 2023-08-29 NOTE — PROGRESS NOTES
[address] [place], [place] [address]  Dept: [phone]  Dept Fax: [phone]: [phone]    Visit Date: 8/29/2023    Functionality Assessment/Goals Worksheet     On a scale of 0 (Does not Interfere) to 10 (Completely Interferes)     1. Which number describes how during the past week pain has interfered with       the following:  A. General Activity:  9  B. Mood: 0  C. Walking Ability:  0  D. Normal Work (Includes both work outside the home and housework):  0  E. Relations with Other People:   0  F. Sleep:   0  G. Enjoyment of Life:   0    2. Patient Prefers to Take their Pain Medications:     [x]  On a regular basis   []  Only when necessary    []  Does not take pain medications    3. What are the Patient's Goals/Expectations for Visiting Pain Management? []  Learn about my pain    [x]  Receive Medication   []  Physical Therapy     []  Treat Depression   [x]  Receive Injections    []  Treat Sleep   []  Deal with Anxiety and Stress   []  Treat Opoid Dependence/Addiction   []  Other:        HPI:   Letha Infante is a 45 y.o. female is here today for    Chief Complaint: Low back pain, leg pain, neck pain     HPI   2.5 month FU for medications and to review xrays. Patient continues to have pain throughout neck and back- \"from neck down\" starts as burning sensation and fatigued and aching. Continues to have numbness and pins and needles through the legs. Also having spasm in buttock and legs  States that pain is always present and limits activity. Recently has been treated for pneumonia states 2.5 weeks ago and was admitted at [facility]. States still has productive cough \"thick and greenish\" and states she feel short of breath     I reviewed xrays and discussed with patient.  Patient asked what narrowing in left hip meant seen on lumbar xray and reports that she is having pain

## 2023-10-02 DIAGNOSIS — G89.4 CHRONIC PAIN SYNDROME: ICD-10-CM

## 2023-10-02 DIAGNOSIS — M51.26 LUMBAR DISC HERNIATION: ICD-10-CM

## 2023-10-02 DIAGNOSIS — M48.062 SPINAL STENOSIS OF LUMBAR REGION WITH NEUROGENIC CLAUDICATION: ICD-10-CM

## 2023-10-02 DIAGNOSIS — M54.16 LUMBAR RADICULITIS: ICD-10-CM

## 2023-10-02 DIAGNOSIS — M79.18 MYOFASCIAL PAIN DYSFUNCTION SYNDROME: ICD-10-CM

## 2023-10-02 DIAGNOSIS — M47.816 SPONDYLOSIS OF LUMBAR REGION WITHOUT MYELOPATHY OR RADICULOPATHY: ICD-10-CM

## 2023-10-02 RX ORDER — OXYCODONE HYDROCHLORIDE 10 MG/1
10 TABLET ORAL EVERY 6 HOURS PRN
Qty: 120 TABLET | Refills: 0 | Status: SHIPPED | OUTPATIENT
Start: 2023-10-02 | End: 2023-11-01

## 2023-10-02 RX ORDER — GABAPENTIN 600 MG/1
600 TABLET ORAL 2 TIMES DAILY
Qty: 60 TABLET | Refills: 0 | Status: SHIPPED | OUTPATIENT
Start: 2023-10-02 | End: 2023-11-01

## 2023-10-02 RX ORDER — TIZANIDINE 4 MG/1
4 TABLET ORAL EVERY 6 HOURS PRN
Qty: 120 TABLET | Refills: 0 | Status: SHIPPED | OUTPATIENT
Start: 2023-10-02 | End: 2023-11-01

## 2023-10-02 NOTE — TELEPHONE ENCOUNTER
Marcos Huynh called requesting a refill on the following medications:  Requested Prescriptions     Pending Prescriptions Disp Refills    gabapentin (NEURONTIN) 600 MG tablet 60 tablet 0     Sig: Take 1 tablet by mouth 2 times daily for 30 days. oxyCODONE HCl (OXY-IR) 10 MG immediate release tablet 120 tablet 0     Sig: Take 1 tablet by mouth every 6 hours as needed for Pain for up to 30 days. Intended supply: 30 days Max Daily Amount: 40 mg    tiZANidine (ZANAFLEX) 4 MG tablet 120 tablet 0     Sig: Take 1 tablet by mouth every 6 hours as needed (spasms)     [address]      Date of last visit: 8-29-23  Date of next visit (if applicable): 22/26/6894

## 2023-10-02 NOTE — TELEPHONE ENCOUNTER
OARRS reviewed. UDS: + for  gabapentin, tizanidine, oxycodone, topiramate. Last seen: 8/29/2023.  Follow-up:   Future Appointments   Date Time Provider [address] [place] [address]   10/30/2023 11:30 AM Maye Jin, APRN - CNP N SRPX Pain [facility] [place]

## 2023-10-27 ENCOUNTER — HOSPITAL ENCOUNTER (OUTPATIENT)
Dept: GENERAL RADIOLOGY | Age: 53
Discharge: HOME | End: 2023-10-27
Payer: COMMERCIAL

## 2023-10-27 ENCOUNTER — HOSPITAL ENCOUNTER (OUTPATIENT)
Age: 53
Discharge: HOME | End: 2023-10-27
Payer: COMMERCIAL

## 2023-10-27 DIAGNOSIS — M25.552 LEFT HIP PAIN: ICD-10-CM

## 2023-10-27 PROCEDURE — 73502 X-RAY EXAM HIP UNI 2-3 VIEWS: CPT

## 2023-10-30 ENCOUNTER — OFFICE VISIT (OUTPATIENT)
Dept: PHYSICAL MEDICINE AND REHAB | Age: 53
End: 2023-10-30
Payer: COMMERCIAL

## 2023-10-30 ENCOUNTER — TELEPHONE (OUTPATIENT)
Dept: PHYSICAL MEDICINE AND REHAB | Age: 53
End: 2023-10-30

## 2023-10-30 VITALS
HEIGHT: 66 IN | DIASTOLIC BLOOD PRESSURE: 84 MMHG | BODY MASS INDEX: 36.32 KG/M2 | WEIGHT: 225.97 LBS | SYSTOLIC BLOOD PRESSURE: 122 MMHG

## 2023-10-30 DIAGNOSIS — M25.552 LEFT HIP PAIN: Primary | ICD-10-CM

## 2023-10-30 DIAGNOSIS — M51.36 DDD (DEGENERATIVE DISC DISEASE), LUMBAR: ICD-10-CM

## 2023-10-30 DIAGNOSIS — M54.16 LUMBAR RADICULITIS: ICD-10-CM

## 2023-10-30 DIAGNOSIS — M47.816 SPONDYLOSIS OF LUMBAR REGION WITHOUT MYELOPATHY OR RADICULOPATHY: ICD-10-CM

## 2023-10-30 DIAGNOSIS — M48.062 SPINAL STENOSIS OF LUMBAR REGION WITH NEUROGENIC CLAUDICATION: ICD-10-CM

## 2023-10-30 DIAGNOSIS — M79.18 MYOFASCIAL PAIN DYSFUNCTION SYNDROME: ICD-10-CM

## 2023-10-30 DIAGNOSIS — M16.12 PRIMARY OSTEOARTHRITIS OF LEFT HIP: ICD-10-CM

## 2023-10-30 DIAGNOSIS — G89.4 CHRONIC PAIN SYNDROME: ICD-10-CM

## 2023-10-30 DIAGNOSIS — M54.2 NECK PAIN: ICD-10-CM

## 2023-10-30 DIAGNOSIS — M47.812 FACET ARTHRITIS OF CERVICAL REGION: ICD-10-CM

## 2023-10-30 DIAGNOSIS — M47.816 LUMBAR SPONDYLOSIS: ICD-10-CM

## 2023-10-30 DIAGNOSIS — M51.26 LUMBAR DISC HERNIATION: ICD-10-CM

## 2023-10-30 PROCEDURE — G8427 DOCREV CUR MEDS BY ELIG CLIN: HCPCS | Performed by: NURSE PRACTITIONER

## 2023-10-30 PROCEDURE — 3079F DIAST BP 80-89 MM HG: CPT | Performed by: NURSE PRACTITIONER

## 2023-10-30 PROCEDURE — G8484 FLU IMMUNIZE NO ADMIN: HCPCS | Performed by: NURSE PRACTITIONER

## 2023-10-30 PROCEDURE — 99214 OFFICE O/P EST MOD 30 MIN: CPT | Performed by: NURSE PRACTITIONER

## 2023-10-30 PROCEDURE — 3017F COLORECTAL CA SCREEN DOC REV: CPT | Performed by: NURSE PRACTITIONER

## 2023-10-30 PROCEDURE — 1036F TOBACCO NON-USER: CPT | Performed by: NURSE PRACTITIONER

## 2023-10-30 PROCEDURE — G8417 CALC BMI ABV UP PARAM F/U: HCPCS | Performed by: NURSE PRACTITIONER

## 2023-10-30 PROCEDURE — 3074F SYST BP LT 130 MM HG: CPT | Performed by: NURSE PRACTITIONER

## 2023-10-30 RX ORDER — OXYCODONE HYDROCHLORIDE 10 MG/1
10 TABLET ORAL EVERY 6 HOURS PRN
Qty: 120 TABLET | Refills: 0 | Status: SHIPPED | OUTPATIENT
Start: 2023-11-01 | End: 2023-12-01

## 2023-10-30 RX ORDER — TIZANIDINE 4 MG/1
4 TABLET ORAL EVERY 6 HOURS PRN
Qty: 120 TABLET | Refills: 0 | Status: SHIPPED | OUTPATIENT
Start: 2023-11-01 | End: 2023-12-01

## 2023-10-30 RX ORDER — GABAPENTIN 600 MG/1
600 TABLET ORAL 2 TIMES DAILY
Qty: 60 TABLET | Refills: 0 | Status: SHIPPED | OUTPATIENT
Start: 2023-11-01 | End: 2023-12-01

## 2023-10-30 ASSESSMENT — ENCOUNTER SYMPTOMS
DIARRHEA: 0
NAUSEA: 0
COUGH: 0
RESPIRATORY NEGATIVE: 1
WHEEZING: 0
BLOOD IN STOOL: 0
ABDOMINAL DISTENTION: 0
BACK PAIN: 1
SHORTNESS OF BREATH: 0
CONSTIPATION: 0
VOMITING: 0
CHEST TIGHTNESS: 0
ABDOMINAL PAIN: 0

## 2023-10-30 NOTE — PROGRESS NOTES
[address]  [facility]  [address] [facility]  Dept: [phone]  Dept Fax: [phone]: [phone]    Visit Date: 10/30/2023    Functionality Assessment/Goals Worksheet     On a scale of 0 (Does not Interfere) to 10 (Completely Interferes)     1. Which number describes how during the past week pain has interfered with       the following:  A. General Activity:  9  B. Mood: 6  C. Walking Ability:  8  D. Normal Work (Includes both work outside the home and housework):  8  E. Relations with Other People:   6  F. Sleep:   8  G. Enjoyment of Life:   7    2. Patient Prefers to Take their Pain Medications:     [x]  On a regular basis   []  Only when necessary    []  Does not take pain medications    3. What are the Patient's Goals/Expectations for Visiting Pain Management? []  Learn about my pain    [x]  Receive Medication   []  Physical Therapy     []  Treat Depression   [x]  Receive Injections    []  Treat Sleep   []  Deal with Anxiety and Stress   []  Treat Opoid Dependence/Addiction   [x]  Other: To prolong surgery       HPI:   Dallas Roy is a 45 y.o. female is here today for    Chief Complaint: Low back pain, leg pain,  left hip pain, neck pain     HPI   2 month FU. Patient continues to have pain throughout neck and back- \"from neck down\". States pain is worse in low back. Continues to have numbness and pins and needles through the legs. Pain is sometimes burning, somethimes throbbing, aching and fatigued feeling. States pain is constant and varies in intensity. Continues to have pain in left hip- aching and sometimes sharp   Pain increases with bending, lifting, twisting , walking, standing, getting up and down, and housework or working at job.     Current pain medications continue to help decrease pain to a tolerable level     Prior Injections:      Radiology:  Left hip xray:

## 2023-12-01 DIAGNOSIS — M47.816 SPONDYLOSIS OF LUMBAR REGION WITHOUT MYELOPATHY OR RADICULOPATHY: ICD-10-CM

## 2023-12-01 DIAGNOSIS — M79.18 MYOFASCIAL PAIN DYSFUNCTION SYNDROME: ICD-10-CM

## 2023-12-01 DIAGNOSIS — G89.4 CHRONIC PAIN SYNDROME: ICD-10-CM

## 2023-12-01 DIAGNOSIS — M54.16 LUMBAR RADICULITIS: ICD-10-CM

## 2023-12-01 DIAGNOSIS — M48.062 SPINAL STENOSIS OF LUMBAR REGION WITH NEUROGENIC CLAUDICATION: ICD-10-CM

## 2023-12-01 DIAGNOSIS — M51.26 LUMBAR DISC HERNIATION: ICD-10-CM

## 2023-12-01 NOTE — TELEPHONE ENCOUNTER
Gabriella Guan called requesting a refill on the following medications:  Requested Prescriptions     Pending Prescriptions Disp Refills    oxyCODONE HCl (OXY-IR) 10 MG immediate release tablet 120 tablet 0     Sig: Take 1 tablet by mouth every 6 hours as needed for Pain for up to 30 days. Intended supply: 30 days Max Daily Amount: 40 mg    tiZANidine (ZANAFLEX) 4 MG tablet 120 tablet 0     Sig: Take 1 tablet by mouth every 6 hours as needed (spasms)    gabapentin (NEURONTIN) 600 MG tablet 60 tablet 0     Sig: Take 1 tablet by mouth 2 times daily for 30 days. Pharmacy verified:[facility] [place]  . pv      Date of last visit: 10-30-23  Date of next visit (if applicable): 36/48/86

## 2023-12-02 DIAGNOSIS — G89.4 CHRONIC PAIN SYNDROME: ICD-10-CM

## 2023-12-02 DIAGNOSIS — M47.816 SPONDYLOSIS OF LUMBAR REGION WITHOUT MYELOPATHY OR RADICULOPATHY: ICD-10-CM

## 2023-12-02 DIAGNOSIS — M79.18 MYOFASCIAL PAIN DYSFUNCTION SYNDROME: ICD-10-CM

## 2023-12-04 RX ORDER — GABAPENTIN 600 MG/1
600 TABLET ORAL 2 TIMES DAILY
Qty: 60 TABLET | Refills: 0 | OUTPATIENT
Start: 2023-12-04

## 2023-12-04 RX ORDER — OXYCODONE HYDROCHLORIDE 10 MG/1
10 TABLET ORAL EVERY 6 HOURS PRN
Qty: 120 TABLET | Refills: 0 | Status: SHIPPED | OUTPATIENT
Start: 2023-12-04 | End: 2024-01-03

## 2023-12-04 RX ORDER — GABAPENTIN 600 MG/1
600 TABLET ORAL 2 TIMES DAILY
Qty: 60 TABLET | Refills: 0 | OUTPATIENT
Start: 2023-12-04 | End: 2024-01-03

## 2023-12-04 RX ORDER — TIZANIDINE 4 MG/1
4 TABLET ORAL EVERY 6 HOURS PRN
Qty: 120 TABLET | Refills: 0 | OUTPATIENT
Start: 2023-12-04

## 2023-12-04 RX ORDER — TIZANIDINE 4 MG/1
4 TABLET ORAL EVERY 6 HOURS PRN
Qty: 120 TABLET | Refills: 0 | Status: SHIPPED | OUTPATIENT
Start: 2023-12-04 | End: 2024-01-03

## 2023-12-04 RX ORDER — GABAPENTIN 600 MG/1
600 TABLET ORAL 2 TIMES DAILY
Qty: 60 TABLET | Refills: 0 | Status: SHIPPED | OUTPATIENT
Start: 2023-12-04 | End: 2024-01-03

## 2023-12-04 NOTE — TELEPHONE ENCOUNTER
OARRS reviewed. UDS: + for  Gabapentin, Tizanidine, Oxycodone. Last seen: 10/30/2023.  Follow-up:   Future Appointments   Date Time Provider [address]   12/28/2023  9:45 AM Abhay Baldwin Earing, APRN - CNP N SRPX Pain [facility]

## 2023-12-27 DIAGNOSIS — M47.816 SPONDYLOSIS OF LUMBAR REGION WITHOUT MYELOPATHY OR RADICULOPATHY: ICD-10-CM

## 2023-12-27 DIAGNOSIS — M79.18 MYOFASCIAL PAIN DYSFUNCTION SYNDROME: ICD-10-CM

## 2023-12-27 DIAGNOSIS — M51.26 LUMBAR DISC HERNIATION: ICD-10-CM

## 2023-12-27 DIAGNOSIS — M54.16 LUMBAR RADICULITIS: ICD-10-CM

## 2023-12-27 DIAGNOSIS — G89.4 CHRONIC PAIN SYNDROME: ICD-10-CM

## 2023-12-27 DIAGNOSIS — M48.062 SPINAL STENOSIS OF LUMBAR REGION WITH NEUROGENIC CLAUDICATION: ICD-10-CM

## 2023-12-27 NOTE — TELEPHONE ENCOUNTER
Benny Cotton called requesting a refill on the following medications:  Requested Prescriptions     Pending Prescriptions Disp Refills    tiZANidine (ZANAFLEX) 4 MG tablet 120 tablet 0     Sig: Take 1 tablet by mouth every 6 hours as needed (spasms)    gabapentin (NEURONTIN) 600 MG tablet 60 tablet 0     Sig: Take 1 tablet by mouth 2 times daily for 30 days. oxyCODONE HCl (OXY-IR) 10 MG immediate release tablet 120 tablet 0     Sig: Take 1 tablet by mouth every 6 hours as needed for Pain for up to 30 days.  Intended supply: 30 days Max Daily Amount: 40 mg     Pharmacy verified:  .[facility] [address] MADISON GARCIA [address] [phone]    Date of last visit: 10/30/2023  Date of next visit (if applicable): 9/8/9141

## 2023-12-28 RX ORDER — OXYCODONE HYDROCHLORIDE 10 MG/1
10 TABLET ORAL EVERY 6 HOURS PRN
Qty: 120 TABLET | Refills: 0 | Status: SHIPPED | OUTPATIENT
Start: 2024-01-03 | End: 2024-02-02

## 2023-12-28 RX ORDER — TIZANIDINE 4 MG/1
4 TABLET ORAL EVERY 6 HOURS PRN
Qty: 120 TABLET | Refills: 0 | Status: SHIPPED | OUTPATIENT
Start: 2024-01-03 | End: 2024-02-02

## 2023-12-28 RX ORDER — GABAPENTIN 600 MG/1
600 TABLET ORAL 2 TIMES DAILY
Qty: 60 TABLET | Refills: 0 | Status: SHIPPED | OUTPATIENT
Start: 2024-01-03 | End: 2024-02-02

## 2023-12-28 NOTE — TELEPHONE ENCOUNTER
OARRS reviewed. UDS: + for  Gabapentin, Tizanidine, Oxycodone. Last seen: 10/30/2023.  Follow-up:   Future Appointments   Date Time Provider [address] [place] [address]   1/9/2024 12:15 PM Luz Baldwin, APRN - CNP N SRPX Pain [facility] - [place]

## 2024-01-09 ENCOUNTER — OFFICE VISIT (OUTPATIENT)
Dept: PHYSICAL MEDICINE AND REHAB | Age: 54
End: 2024-01-09
Payer: COMMERCIAL

## 2024-01-09 VITALS
SYSTOLIC BLOOD PRESSURE: 122 MMHG | HEIGHT: 66 IN | BODY MASS INDEX: 36.32 KG/M2 | WEIGHT: 225.97 LBS | DIASTOLIC BLOOD PRESSURE: 82 MMHG

## 2024-01-09 DIAGNOSIS — M25.552 LEFT HIP PAIN: ICD-10-CM

## 2024-01-09 DIAGNOSIS — M48.062 SPINAL STENOSIS OF LUMBAR REGION WITH NEUROGENIC CLAUDICATION: Primary | ICD-10-CM

## 2024-01-09 DIAGNOSIS — M51.36 DDD (DEGENERATIVE DISC DISEASE), LUMBAR: ICD-10-CM

## 2024-01-09 DIAGNOSIS — R10.84 GENERALIZED ABDOMINAL PAIN: ICD-10-CM

## 2024-01-09 DIAGNOSIS — M54.16 LUMBAR RADICULITIS: ICD-10-CM

## 2024-01-09 DIAGNOSIS — T40.2X5A THERAPEUTIC OPIOID INDUCED CONSTIPATION: ICD-10-CM

## 2024-01-09 DIAGNOSIS — M16.12 PRIMARY OSTEOARTHRITIS OF LEFT HIP: ICD-10-CM

## 2024-01-09 DIAGNOSIS — M51.26 LUMBAR DISC HERNIATION: ICD-10-CM

## 2024-01-09 DIAGNOSIS — M79.18 MYOFASCIAL PAIN DYSFUNCTION SYNDROME: ICD-10-CM

## 2024-01-09 DIAGNOSIS — K59.03 THERAPEUTIC OPIOID INDUCED CONSTIPATION: ICD-10-CM

## 2024-01-09 DIAGNOSIS — M47.816 SPONDYLOSIS OF LUMBAR REGION WITHOUT MYELOPATHY OR RADICULOPATHY: ICD-10-CM

## 2024-01-09 PROCEDURE — G8484 FLU IMMUNIZE NO ADMIN: HCPCS | Performed by: NURSE PRACTITIONER

## 2024-01-09 PROCEDURE — 3017F COLORECTAL CA SCREEN DOC REV: CPT | Performed by: NURSE PRACTITIONER

## 2024-01-09 PROCEDURE — 3074F SYST BP LT 130 MM HG: CPT | Performed by: NURSE PRACTITIONER

## 2024-01-09 PROCEDURE — G8417 CALC BMI ABV UP PARAM F/U: HCPCS | Performed by: NURSE PRACTITIONER

## 2024-01-09 PROCEDURE — G8427 DOCREV CUR MEDS BY ELIG CLIN: HCPCS | Performed by: NURSE PRACTITIONER

## 2024-01-09 PROCEDURE — 99214 OFFICE O/P EST MOD 30 MIN: CPT | Performed by: NURSE PRACTITIONER

## 2024-01-09 PROCEDURE — 3079F DIAST BP 80-89 MM HG: CPT | Performed by: NURSE PRACTITIONER

## 2024-01-09 PROCEDURE — 1036F TOBACCO NON-USER: CPT | Performed by: NURSE PRACTITIONER

## 2024-01-09 ASSESSMENT — ENCOUNTER SYMPTOMS
BACK PAIN: 1
RESPIRATORY NEGATIVE: 1
BLOOD IN STOOL: 0
ABDOMINAL DISTENTION: 1
CONSTIPATION: 1
VOMITING: 0
NAUSEA: 1
WHEEZING: 0
SHORTNESS OF BREATH: 0
CHEST TIGHTNESS: 0
DIARRHEA: 0
ABDOMINAL PAIN: 1
COUGH: 0

## 2024-01-09 NOTE — PROGRESS NOTES
[facility]  [facility]  [address] [facility] [address]  [facility] [address]  Dept: [phone]  Dept Fax: [phone]  Loc: [phone]    Visit Date: 1/9/2024    Functionality Assessment/Goals Worksheet     On a scale of 0 (Does not Interfere) to 10 (Completely Interferes)     1.  Which number describes how during the past week pain has interfered with       the following:  A.  General Activity:  9  B.  Mood: 8  C.  Walking Ability:  9  D.  Normal Work (Includes both work outside the home and housework):  8  E.  Relations with Other People:   7  F.  Sleep:   8  G.  Enjoyment of Life:   7    2.  Patient Prefers to Take their Pain Medications:     []  On a regular basis   [x]  Only when necessary    []  Does not take pain medications    3.  What are the Patient's Goals/Expectations for Visiting Pain Management?     []  Learn about my pain    [x]  Receive Medication   []  Physical Therapy     []  Treat Depression   [x]  Receive Injections    []  Treat Sleep   []  Deal with Anxiety and Stress   []  Treat Opoid Dependence/Addiction   []  Other:        HPI:   Kiara Rodríguez is a 53 y.o. female is here today for    Chief Complaint: Low back pain, leg pain, neck pain, Abdominal pain     HPI   2 month FU. Patient continues to have pain throughout neck and back- \"from neck down\". Low back is main complaint- aching, burning, aching pain     Continues to have numbness and pins and needles through the legs.  states a lot of cramps in legs.   Today is having a main complaint of pain in her abdomen diffuse across states off and on for about a week. She has been having liquid stools last night. States she has been constipated and has not had a bowel movement in a week. Today she is focused on abdominal pain   Continues oxy IR prn which helps and Neurontin. Discussed oxy IR can make constipation and abdominal symptoms worse. She is taking Miralax

## 2024-02-02 DIAGNOSIS — G89.4 CHRONIC PAIN SYNDROME: ICD-10-CM

## 2024-02-02 DIAGNOSIS — M79.18 MYOFASCIAL PAIN DYSFUNCTION SYNDROME: ICD-10-CM

## 2024-02-02 DIAGNOSIS — M47.816 SPONDYLOSIS OF LUMBAR REGION WITHOUT MYELOPATHY OR RADICULOPATHY: ICD-10-CM

## 2024-02-02 DIAGNOSIS — M48.062 SPINAL STENOSIS OF LUMBAR REGION WITH NEUROGENIC CLAUDICATION: ICD-10-CM

## 2024-02-02 DIAGNOSIS — M54.16 LUMBAR RADICULITIS: ICD-10-CM

## 2024-02-02 DIAGNOSIS — M51.26 LUMBAR DISC HERNIATION: ICD-10-CM

## 2024-02-02 RX ORDER — GABAPENTIN 600 MG/1
600 TABLET ORAL 2 TIMES DAILY
Qty: 60 TABLET | Refills: 0 | Status: SHIPPED | OUTPATIENT
Start: 2024-02-02 | End: 2024-03-03

## 2024-02-02 RX ORDER — TIZANIDINE 4 MG/1
4 TABLET ORAL EVERY 6 HOURS PRN
Qty: 120 TABLET | Refills: 0 | Status: SHIPPED | OUTPATIENT
Start: 2024-02-02 | End: 2024-03-03

## 2024-02-02 RX ORDER — OXYCODONE HYDROCHLORIDE 10 MG/1
10 TABLET ORAL EVERY 6 HOURS PRN
Qty: 120 TABLET | Refills: 0 | Status: SHIPPED | OUTPATIENT
Start: 2024-02-02 | End: 2024-03-03

## 2024-02-02 NOTE — TELEPHONE ENCOUNTER
OARRS reviewed. UDS: + for  gabapentin, tizanidine, topiramate, oxycodone. Trazodone is PRN present. Naloxone is PRN not present.   Last seen: 1/9/2024. Follow-up: 3/11/2024

## 2024-03-03 DIAGNOSIS — M79.18 MYOFASCIAL PAIN DYSFUNCTION SYNDROME: ICD-10-CM

## 2024-03-03 DIAGNOSIS — G89.4 CHRONIC PAIN SYNDROME: ICD-10-CM

## 2024-03-03 DIAGNOSIS — M51.26 LUMBAR DISC HERNIATION: ICD-10-CM

## 2024-03-03 DIAGNOSIS — M47.816 SPONDYLOSIS OF LUMBAR REGION WITHOUT MYELOPATHY OR RADICULOPATHY: ICD-10-CM

## 2024-03-03 DIAGNOSIS — M48.062 SPINAL STENOSIS OF LUMBAR REGION WITH NEUROGENIC CLAUDICATION: ICD-10-CM

## 2024-03-03 DIAGNOSIS — M54.16 LUMBAR RADICULITIS: ICD-10-CM

## 2024-03-04 RX ORDER — TIZANIDINE 4 MG/1
4 TABLET ORAL EVERY 6 HOURS PRN
Qty: 120 TABLET | Refills: 0 | Status: SHIPPED | OUTPATIENT
Start: 2024-03-04 | End: 2024-04-03

## 2024-03-04 RX ORDER — GABAPENTIN 600 MG/1
600 TABLET ORAL 2 TIMES DAILY
Qty: 60 TABLET | Refills: 0 | Status: SHIPPED | OUTPATIENT
Start: 2024-03-05 | End: 2024-04-04

## 2024-03-04 RX ORDER — OXYCODONE HYDROCHLORIDE 10 MG/1
10 TABLET ORAL EVERY 6 HOURS PRN
Qty: 120 TABLET | Refills: 0 | Status: SHIPPED | OUTPATIENT
Start: 2024-03-04 | End: 2024-04-03

## 2024-03-04 NOTE — TELEPHONE ENCOUNTER
OARRS reviewed. UDS: + for  gabapentin, tizanidine, topiramate, oxycodone. Naloxone is PRN not present. Trazodone is PRN present.  Last seen: 1/9/2024. Follow-up: 3/11/24

## 2024-03-11 ENCOUNTER — OFFICE VISIT (OUTPATIENT)
Dept: PHYSICAL MEDICINE AND REHAB | Age: 54
End: 2024-03-11
Payer: COMMERCIAL

## 2024-03-11 VITALS
DIASTOLIC BLOOD PRESSURE: 94 MMHG | BODY MASS INDEX: 36.16 KG/M2 | WEIGHT: 225 LBS | SYSTOLIC BLOOD PRESSURE: 142 MMHG | HEIGHT: 66 IN

## 2024-03-11 DIAGNOSIS — M25.561 CHRONIC PAIN OF BOTH KNEES: ICD-10-CM

## 2024-03-11 DIAGNOSIS — K59.03 THERAPEUTIC OPIOID INDUCED CONSTIPATION: ICD-10-CM

## 2024-03-11 DIAGNOSIS — M54.16 LUMBAR RADICULITIS: ICD-10-CM

## 2024-03-11 DIAGNOSIS — M16.12 PRIMARY OSTEOARTHRITIS OF LEFT HIP: ICD-10-CM

## 2024-03-11 DIAGNOSIS — M51.36 DDD (DEGENERATIVE DISC DISEASE), LUMBAR: ICD-10-CM

## 2024-03-11 DIAGNOSIS — M48.062 SPINAL STENOSIS OF LUMBAR REGION WITH NEUROGENIC CLAUDICATION: Primary | ICD-10-CM

## 2024-03-11 DIAGNOSIS — M47.812 FACET ARTHRITIS OF CERVICAL REGION: ICD-10-CM

## 2024-03-11 DIAGNOSIS — M51.26 LUMBAR DISC HERNIATION: ICD-10-CM

## 2024-03-11 DIAGNOSIS — M47.816 SPONDYLOSIS OF LUMBAR REGION WITHOUT MYELOPATHY OR RADICULOPATHY: ICD-10-CM

## 2024-03-11 DIAGNOSIS — M54.2 NECK PAIN: ICD-10-CM

## 2024-03-11 DIAGNOSIS — M79.18 MYOFASCIAL PAIN DYSFUNCTION SYNDROME: ICD-10-CM

## 2024-03-11 DIAGNOSIS — M25.562 CHRONIC PAIN OF BOTH KNEES: ICD-10-CM

## 2024-03-11 DIAGNOSIS — G89.29 CHRONIC PAIN OF BOTH KNEES: ICD-10-CM

## 2024-03-11 DIAGNOSIS — G89.4 CHRONIC PAIN SYNDROME: ICD-10-CM

## 2024-03-11 DIAGNOSIS — M25.552 LEFT HIP PAIN: ICD-10-CM

## 2024-03-11 DIAGNOSIS — T40.2X5A THERAPEUTIC OPIOID INDUCED CONSTIPATION: ICD-10-CM

## 2024-03-11 PROCEDURE — G8484 FLU IMMUNIZE NO ADMIN: HCPCS | Performed by: NURSE PRACTITIONER

## 2024-03-11 PROCEDURE — G8427 DOCREV CUR MEDS BY ELIG CLIN: HCPCS | Performed by: NURSE PRACTITIONER

## 2024-03-11 PROCEDURE — 1036F TOBACCO NON-USER: CPT | Performed by: NURSE PRACTITIONER

## 2024-03-11 PROCEDURE — G8417 CALC BMI ABV UP PARAM F/U: HCPCS | Performed by: NURSE PRACTITIONER

## 2024-03-11 PROCEDURE — 3077F SYST BP >= 140 MM HG: CPT | Performed by: NURSE PRACTITIONER

## 2024-03-11 PROCEDURE — 3017F COLORECTAL CA SCREEN DOC REV: CPT | Performed by: NURSE PRACTITIONER

## 2024-03-11 PROCEDURE — 3080F DIAST BP >= 90 MM HG: CPT | Performed by: NURSE PRACTITIONER

## 2024-03-11 PROCEDURE — 99214 OFFICE O/P EST MOD 30 MIN: CPT | Performed by: NURSE PRACTITIONER

## 2024-03-11 ASSESSMENT — ENCOUNTER SYMPTOMS
CONSTIPATION: 1
RESPIRATORY NEGATIVE: 1
VOMITING: 0
BLOOD IN STOOL: 0
ABDOMINAL DISTENTION: 0
SHORTNESS OF BREATH: 0
ABDOMINAL PAIN: 0
DIARRHEA: 0
COUGH: 0
NAUSEA: 0
WHEEZING: 0
CHEST TIGHTNESS: 0
BACK PAIN: 1

## 2024-03-11 NOTE — PROGRESS NOTES
Functionality Assessment/Goals Worksheet     On a scale of 0 (Does not Interfere) to 10 (Completely Interferes)     1.  Which number describes how during the past week pain has interfered with           the following:  A.  General Activity:  10  B.  Mood: 10  C.  Walking Ability:  10  D.  Normal Work (Includes both work outside the home and housework):  10  E.  Relations with Other People:   10  F.  Sleep:   10  G.  Enjoyment of Life:   10    2.  Patient Prefers to Take their Pain Medications:     [x]  On a regular basis   []  Only when necessary    []  Does not take pain medications    3.  What are the Patient's Goals/Expectations for Visiting Pain Management?     [x]  Learn about my pain    [x]  Receive Medication   []  Physical Therapy     []  Treat Depression   []  Receive Injections    [x]  Treat Sleep   []  Deal with Anxiety and Stress   []  Treat Opoid Dependence/Addiction   [x]  Other: Prevent surgery      HPI:   Kiara Rodríguez is a 53 y.o. female is here today for    Chief Complaint: Low back pain, leg pain, neck pain    HPI   2 month FU. Patient continues to have pain hroughout neck and back. \"Constant burning, aching, needles and fatigue feeling\"  Continues to have numbness and pins and needles through the legs.  states a lot of cramps in legs. Today pain in right groin to about ankle cramping and sometimes feels that leg gives out on her. Not using any assist device.     States overall abdominal symptoms have improved. Still intermittent constipation on laxatives. Has not gotten Movantik filled because needs prior auth. On probiotics. She did not get abdominal KUB completed that I ordered last visit    Is having bilateralknee pain- aching and stabbing   Continues oxy IR prn which helps and Neurontin   Pain increases with bending, lifting, twisting , walking, standing, getting up and down, and housework or working at job.    Discussed physical therapy but she states transportation has been an issue

## 2024-03-12 ENCOUNTER — TELEPHONE (OUTPATIENT)
Dept: PHYSICAL MEDICINE AND REHAB | Age: 54
End: 2024-03-12

## 2024-03-12 NOTE — TELEPHONE ENCOUNTER
PA sent to plan today via CoverMyMeds (Key: [id number])    The plan will fax you a determination, typically within 1 to 5 business days.    MOVANTIK 12.5MG tablets

## 2024-04-05 DIAGNOSIS — G89.4 CHRONIC PAIN SYNDROME: ICD-10-CM

## 2024-04-05 DIAGNOSIS — M47.816 SPONDYLOSIS OF LUMBAR REGION WITHOUT MYELOPATHY OR RADICULOPATHY: ICD-10-CM

## 2024-04-05 DIAGNOSIS — M79.18 MYOFASCIAL PAIN DYSFUNCTION SYNDROME: ICD-10-CM

## 2024-04-05 RX ORDER — GABAPENTIN 600 MG/1
600 TABLET ORAL 2 TIMES DAILY
Qty: 60 TABLET | Refills: 0 | Status: SHIPPED | OUTPATIENT
Start: 2024-04-11 | End: 2024-05-11

## 2024-04-05 NOTE — TELEPHONE ENCOUNTER
tiZANidine (ZANAFLEX) 4 MG tablet Take 1 tablet by mouth every 6 hours as needed (spasms)       oxyCODONE HCl (OXY-IR) 10 MG immediate release tablet Take 1 tablet by mouth every 6 hours as needed for Pain for up to 30 days. Intended supply: 30 days Max Daily Amount: 40 mg       Patient also requesting Tizanidine and Oxycodone which were not in medication list but shown on outpatient medication     Kiara is requesting a refill of their   Requested Prescriptions     Pending Prescriptions Disp Refills    gabapentin (NEURONTIN) 600 MG tablet 60 tablet 0     Sig: Take 1 tablet by mouth 2 times daily for 30 days.    naloxegol (MOVANTIK) 12.5 MG TABS tablet 15 tablet 0     Sig: Take 1 tablet by mouth every morning (before breakfast)   . Please advise.      Last Appt:  Visit date not found  Next Appt:   Visit date not found  [facility] [place] [place] [facility] [address] [phone] -  [phone]

## 2024-04-05 NOTE — TELEPHONE ENCOUNTER
OARRS reviewed. UDS: + for  gabapentin, tizanidine, oxycodone.trazodone is present.   Last seen: 3/11/2024. Follow-up: 5/13/24

## 2024-04-10 DIAGNOSIS — G89.4 CHRONIC PAIN SYNDROME: ICD-10-CM

## 2024-04-10 DIAGNOSIS — M79.18 MYOFASCIAL PAIN DYSFUNCTION SYNDROME: ICD-10-CM

## 2024-04-10 DIAGNOSIS — M48.062 SPINAL STENOSIS OF LUMBAR REGION WITH NEUROGENIC CLAUDICATION: ICD-10-CM

## 2024-04-11 RX ORDER — TIZANIDINE 4 MG/1
4 TABLET ORAL EVERY 6 HOURS PRN
Qty: 120 TABLET | Refills: 0 | Status: SHIPPED | OUTPATIENT
Start: 2024-04-11 | End: 2024-05-11

## 2024-04-11 NOTE — TELEPHONE ENCOUNTER
OARRS reviewed. UDS: + for Gabapentin, Tizanidine, Topamax, Trazodone, Oxycodone  Last seen: 3/11/2024. Follow-up:   Future Appointments   Date Time Provider Department Center   5/13/2024 11:45 AM Rolando Baldwin, APRN - CNP N [facility] [place]

## 2024-04-25 DIAGNOSIS — G89.4 CHRONIC PAIN SYNDROME: ICD-10-CM

## 2024-04-25 DIAGNOSIS — M48.062 SPINAL STENOSIS OF LUMBAR REGION WITH NEUROGENIC CLAUDICATION: ICD-10-CM

## 2024-04-25 DIAGNOSIS — M79.18 MYOFASCIAL PAIN DYSFUNCTION SYNDROME: ICD-10-CM

## 2024-04-25 DIAGNOSIS — M54.16 LUMBAR RADICULITIS: ICD-10-CM

## 2024-04-25 DIAGNOSIS — M51.26 LUMBAR DISC HERNIATION: ICD-10-CM

## 2024-04-25 DIAGNOSIS — M47.816 SPONDYLOSIS OF LUMBAR REGION WITHOUT MYELOPATHY OR RADICULOPATHY: ICD-10-CM

## 2024-04-25 RX ORDER — OXYCODONE HYDROCHLORIDE 10 MG/1
10 TABLET ORAL EVERY 6 HOURS PRN
Qty: 120 TABLET | Refills: 0 | Status: SHIPPED | OUTPATIENT
Start: 2024-04-25 | End: 2024-05-25

## 2024-04-25 NOTE — TELEPHONE ENCOUNTER
OARRS reviewed. UDS: + for  gabapentin, tizanidine, topiramate, oxycodone. Trazodone is Prn present.  Last seen: 3/11/2024. Follow-up: 5/13/24

## 2024-04-25 NOTE — TELEPHONE ENCOUNTER
Kiara Rodríguez called requesting a refill on the following medications:  Requested Prescriptions     Pending Prescriptions Disp Refills    oxyCODONE HCl (OXY-IR) 10 MG immediate release tablet 120 tablet 0     Sig: Take 1 tablet by mouth every 6 hours as needed for Pain for up to 30 days. Intended supply: 30 days Max Daily Amount: 40 mg     Pharmacy verified:[facility]   [email or web address]      Date of last visit: 3/22/24   Date of next visit (if applicable): 5/13/2024

## 2024-05-13 ENCOUNTER — OFFICE VISIT (OUTPATIENT)
Dept: PHYSICAL MEDICINE AND REHAB | Age: 54
End: 2024-05-13
Payer: COMMERCIAL

## 2024-05-13 VITALS
WEIGHT: 225.09 LBS | BODY MASS INDEX: 36.17 KG/M2 | SYSTOLIC BLOOD PRESSURE: 132 MMHG | DIASTOLIC BLOOD PRESSURE: 80 MMHG | HEIGHT: 66 IN

## 2024-05-13 DIAGNOSIS — M54.16 LUMBAR RADICULITIS: ICD-10-CM

## 2024-05-13 DIAGNOSIS — M47.812 FACET ARTHRITIS OF CERVICAL REGION: ICD-10-CM

## 2024-05-13 DIAGNOSIS — M51.26 LUMBAR DISC HERNIATION: ICD-10-CM

## 2024-05-13 DIAGNOSIS — M25.562 CHRONIC PAIN OF BOTH KNEES: ICD-10-CM

## 2024-05-13 DIAGNOSIS — M25.561 CHRONIC PAIN OF BOTH KNEES: ICD-10-CM

## 2024-05-13 DIAGNOSIS — M51.36 DDD (DEGENERATIVE DISC DISEASE), LUMBAR: ICD-10-CM

## 2024-05-13 DIAGNOSIS — M25.552 LEFT HIP PAIN: ICD-10-CM

## 2024-05-13 DIAGNOSIS — M48.062 SPINAL STENOSIS OF LUMBAR REGION WITH NEUROGENIC CLAUDICATION: Primary | ICD-10-CM

## 2024-05-13 DIAGNOSIS — T40.2X5A THERAPEUTIC OPIOID INDUCED CONSTIPATION: ICD-10-CM

## 2024-05-13 DIAGNOSIS — G89.4 CHRONIC PAIN SYNDROME: ICD-10-CM

## 2024-05-13 DIAGNOSIS — M47.816 LUMBAR SPONDYLOSIS: ICD-10-CM

## 2024-05-13 DIAGNOSIS — M16.12 PRIMARY OSTEOARTHRITIS OF LEFT HIP: ICD-10-CM

## 2024-05-13 DIAGNOSIS — G89.29 CHRONIC PAIN OF BOTH KNEES: ICD-10-CM

## 2024-05-13 DIAGNOSIS — M54.2 NECK PAIN: ICD-10-CM

## 2024-05-13 DIAGNOSIS — K59.03 THERAPEUTIC OPIOID INDUCED CONSTIPATION: ICD-10-CM

## 2024-05-13 PROCEDURE — 3017F COLORECTAL CA SCREEN DOC REV: CPT | Performed by: NURSE PRACTITIONER

## 2024-05-13 PROCEDURE — 3075F SYST BP GE 130 - 139MM HG: CPT | Performed by: NURSE PRACTITIONER

## 2024-05-13 PROCEDURE — 1036F TOBACCO NON-USER: CPT | Performed by: NURSE PRACTITIONER

## 2024-05-13 PROCEDURE — G8427 DOCREV CUR MEDS BY ELIG CLIN: HCPCS | Performed by: NURSE PRACTITIONER

## 2024-05-13 PROCEDURE — 3079F DIAST BP 80-89 MM HG: CPT | Performed by: NURSE PRACTITIONER

## 2024-05-13 PROCEDURE — 99214 OFFICE O/P EST MOD 30 MIN: CPT | Performed by: NURSE PRACTITIONER

## 2024-05-13 PROCEDURE — G8417 CALC BMI ABV UP PARAM F/U: HCPCS | Performed by: NURSE PRACTITIONER

## 2024-05-13 ASSESSMENT — ENCOUNTER SYMPTOMS
ABDOMINAL DISTENTION: 0
VOMITING: 0
SHORTNESS OF BREATH: 0
NAUSEA: 0
WHEEZING: 0
ABDOMINAL PAIN: 0
RESPIRATORY NEGATIVE: 1
CHEST TIGHTNESS: 0
BLOOD IN STOOL: 0
COUGH: 0
CONSTIPATION: 1
BACK PAIN: 1

## 2024-05-13 NOTE — PROGRESS NOTES
[facility] [place] [facility]  [facility]  [address]  [facility] [address]  Dept: [phone]  Dept Fax: [phone]  Loc: [phone]    Visit Date: 5/13/2024    Functionality Assessment/Goals Worksheet     On a scale of 0 (Does not Interfere) to 10 (Completely Interferes)     1.  Which number describes how during the past week pain has interfered with       the following:  A.  General Activity:  8  B.  Mood: 7  C.  Walking Ability:  8  D.  Normal Work (Includes both work outside the home and housework):  9  E.  Relations with Other People:   7  F.  Sleep:   7  G.  Enjoyment of Life:   8    2.  Patient Prefers to Take their Pain Medications:     [x]  On a regular basis   [x]  Only when necessary    []  Does not take pain medications    3.  What are the Patient's Goals/Expectations for Visiting Pain Management?     []  Learn about my pain    [x]  Receive Medication   []  Physical Therapy     []  Treat Depression   [x]  Receive Injections    []  Treat Sleep   []  Deal with Anxiety and Stress   []  Treat Opoid Dependence/Addiction   []  Other:        HPI:   Kiara Rodríguez is a 53 y.o. female is here today for    Chief Complaint: Low back pain, leg pain, neck pain     HPI   2 month FU. continues to have pain hroughout neck and back. \"Constant pain\" sometimes aching, stabbing, throbbing, burning pain.   Continues to have numbness and pins and needles through the legs. Continues to have spasms in legs.     Continues to have joint pains in knees and hips.     Continues oxy IR prn which helps and Neurontin   Pain increases with bending, lifting, twisting , walking, standing, getting up and down, and housework or working at job.      Discussed physical therapy but she states transportation has been an issue       Radiology:   Left hip xray:      FINDINGS:      There is moderate degenerative change involving the left hip and sacroiliac

## 2024-05-16 DIAGNOSIS — M79.18 MYOFASCIAL PAIN DYSFUNCTION SYNDROME: ICD-10-CM

## 2024-05-16 DIAGNOSIS — G89.4 CHRONIC PAIN SYNDROME: ICD-10-CM

## 2024-05-16 DIAGNOSIS — M47.816 SPONDYLOSIS OF LUMBAR REGION WITHOUT MYELOPATHY OR RADICULOPATHY: ICD-10-CM

## 2024-05-16 RX ORDER — GABAPENTIN 600 MG/1
600 TABLET ORAL 2 TIMES DAILY
Qty: 60 TABLET | Refills: 0 | Status: SHIPPED | OUTPATIENT
Start: 2024-05-21 | End: 2024-07-09 | Stop reason: SDUPTHER

## 2024-05-29 DIAGNOSIS — M47.816 SPONDYLOSIS OF LUMBAR REGION WITHOUT MYELOPATHY OR RADICULOPATHY: ICD-10-CM

## 2024-05-29 DIAGNOSIS — M79.18 MYOFASCIAL PAIN DYSFUNCTION SYNDROME: ICD-10-CM

## 2024-05-29 DIAGNOSIS — M48.062 SPINAL STENOSIS OF LUMBAR REGION WITH NEUROGENIC CLAUDICATION: ICD-10-CM

## 2024-05-29 DIAGNOSIS — M51.26 LUMBAR DISC HERNIATION: ICD-10-CM

## 2024-05-29 DIAGNOSIS — M54.16 LUMBAR RADICULITIS: ICD-10-CM

## 2024-05-29 DIAGNOSIS — G89.4 CHRONIC PAIN SYNDROME: ICD-10-CM

## 2024-05-29 NOTE — TELEPHONE ENCOUNTER
Kiara Rdoríguez called requesting a refill on the following medications:  Requested Prescriptions     Pending Prescriptions Disp Refills    oxyCODONE HCl (OXY-IR) 10 MG immediate release tablet 120 tablet 0     Sig: Take 1 tablet by mouth every 6 hours as needed for Pain for up to 30 days. Intended supply: 30 days Max Daily Amount: 40 mg     Pharmacy verified:[facility]   [email or web address]      Date of last visit: 5/3/24   Date of next visit (if applicable): 7/15/2024

## 2024-05-30 RX ORDER — OXYCODONE HYDROCHLORIDE 10 MG/1
10 TABLET ORAL EVERY 6 HOURS PRN
Qty: 120 TABLET | Refills: 0 | Status: SHIPPED | OUTPATIENT
Start: 2024-05-30 | End: 2024-06-29

## 2024-05-30 NOTE — TELEPHONE ENCOUNTER
OARRS reviewed. UDS: + for Gabapentin, Tizanidine, Topiramate, Oxycodone.    Last seen: 5/13/2024. Follow-up:   Future Appointments   Date Time Provider Department Center   7/15/2024 11:30 AM Rolando Baldwin, APRN - CNP [facility] [place]

## 2024-07-09 DIAGNOSIS — M51.26 LUMBAR DISC HERNIATION: ICD-10-CM

## 2024-07-09 DIAGNOSIS — M48.062 SPINAL STENOSIS OF LUMBAR REGION WITH NEUROGENIC CLAUDICATION: ICD-10-CM

## 2024-07-09 DIAGNOSIS — M54.16 LUMBAR RADICULITIS: ICD-10-CM

## 2024-07-09 DIAGNOSIS — G89.4 CHRONIC PAIN SYNDROME: ICD-10-CM

## 2024-07-09 DIAGNOSIS — M47.816 SPONDYLOSIS OF LUMBAR REGION WITHOUT MYELOPATHY OR RADICULOPATHY: ICD-10-CM

## 2024-07-09 DIAGNOSIS — M79.18 MYOFASCIAL PAIN DYSFUNCTION SYNDROME: ICD-10-CM

## 2024-07-09 RX ORDER — TIZANIDINE 4 MG/1
4 TABLET ORAL EVERY 6 HOURS PRN
Qty: 120 TABLET | Refills: 0 | Status: SHIPPED | OUTPATIENT
Start: 2024-07-09 | End: 2024-08-08

## 2024-07-09 RX ORDER — OXYCODONE HYDROCHLORIDE 10 MG/1
10 TABLET ORAL EVERY 6 HOURS PRN
Qty: 120 TABLET | Refills: 0 | Status: SHIPPED | OUTPATIENT
Start: 2024-07-09 | End: 2024-08-08

## 2024-07-09 RX ORDER — GABAPENTIN 600 MG/1
600 TABLET ORAL 2 TIMES DAILY
Qty: 60 TABLET | Refills: 0 | Status: SHIPPED | OUTPATIENT
Start: 2024-07-09 | End: 2024-08-08

## 2024-07-09 NOTE — TELEPHONE ENCOUNTER
Kiara Rodríguez called requesting a refill on the following medications:  Requested Prescriptions     Pending Prescriptions Disp Refills    gabapentin (NEURONTIN) 600 MG tablet 60 tablet 0     Sig: Take 1 tablet by mouth 2 times daily for 30 days.    naloxegol (MOVANTIK) 12.5 MG TABS tablet 30 tablet 2     Sig: Take 1 tablet by mouth every morning (before breakfast)    oxyCODONE HCl (OXY-IR) 10 MG immediate release tablet 120 tablet 0     Sig: Take 1 tablet by mouth every 6 hours as needed for Pain for up to 30 days. Intended supply: 30 days Max Daily Amount: 40 mg    tiZANidine (ZANAFLEX) 4 MG tablet 120 tablet 0     Sig: Take 1 tablet by mouth every 6 hours as needed (spasms (hold if systolic blood pressure in less than 100).)     Pharmacy verified:  .[facility] [address] [place] [address] - [address] [place] [address] -  [phone] -  [phone]     Date of last visit: 05/13/2024  Date of next visit (if applicable): 7/15/2024    Patient left  on Friday 07/05/2024 and hasn't received a response.

## 2024-07-09 NOTE — TELEPHONE ENCOUNTER
OARRS reviewed. UDS: + for  Gabapentin, Tizanidine, Topamax, Oxycodone. Non Compliant Trazodone  Last seen: Visit date not found. Follow-up: 7-15-24    Too soon for refill on Movantik

## 2024-07-15 ENCOUNTER — OFFICE VISIT (OUTPATIENT)
Dept: PHYSICAL MEDICINE AND REHAB | Age: 54
End: 2024-07-15
Payer: COMMERCIAL

## 2024-07-15 VITALS
SYSTOLIC BLOOD PRESSURE: 118 MMHG | WEIGHT: 225.09 LBS | BODY MASS INDEX: 36.17 KG/M2 | HEIGHT: 66 IN | DIASTOLIC BLOOD PRESSURE: 80 MMHG

## 2024-07-15 DIAGNOSIS — M16.12 PRIMARY OSTEOARTHRITIS OF LEFT HIP: ICD-10-CM

## 2024-07-15 DIAGNOSIS — M48.062 SPINAL STENOSIS OF LUMBAR REGION WITH NEUROGENIC CLAUDICATION: ICD-10-CM

## 2024-07-15 DIAGNOSIS — M51.36 DDD (DEGENERATIVE DISC DISEASE), LUMBAR: ICD-10-CM

## 2024-07-15 DIAGNOSIS — G89.29 CHRONIC PAIN OF BOTH KNEES: ICD-10-CM

## 2024-07-15 DIAGNOSIS — M25.552 LEFT HIP PAIN: ICD-10-CM

## 2024-07-15 DIAGNOSIS — M25.562 CHRONIC PAIN OF BOTH KNEES: ICD-10-CM

## 2024-07-15 DIAGNOSIS — G89.4 CHRONIC PAIN SYNDROME: ICD-10-CM

## 2024-07-15 DIAGNOSIS — M51.26 LUMBAR DISC HERNIATION: ICD-10-CM

## 2024-07-15 DIAGNOSIS — M47.816 SPONDYLOSIS OF LUMBAR REGION WITHOUT MYELOPATHY OR RADICULOPATHY: ICD-10-CM

## 2024-07-15 DIAGNOSIS — M25.561 CHRONIC PAIN OF BOTH KNEES: ICD-10-CM

## 2024-07-15 DIAGNOSIS — M47.816 LUMBAR SPONDYLOSIS: ICD-10-CM

## 2024-07-15 DIAGNOSIS — M54.16 LUMBAR RADICULITIS: Primary | ICD-10-CM

## 2024-07-15 PROCEDURE — 1036F TOBACCO NON-USER: CPT | Performed by: NURSE PRACTITIONER

## 2024-07-15 PROCEDURE — 3079F DIAST BP 80-89 MM HG: CPT | Performed by: NURSE PRACTITIONER

## 2024-07-15 PROCEDURE — 3074F SYST BP LT 130 MM HG: CPT | Performed by: NURSE PRACTITIONER

## 2024-07-15 PROCEDURE — G8417 CALC BMI ABV UP PARAM F/U: HCPCS | Performed by: NURSE PRACTITIONER

## 2024-07-15 PROCEDURE — 99214 OFFICE O/P EST MOD 30 MIN: CPT | Performed by: NURSE PRACTITIONER

## 2024-07-15 PROCEDURE — G8427 DOCREV CUR MEDS BY ELIG CLIN: HCPCS | Performed by: NURSE PRACTITIONER

## 2024-07-15 PROCEDURE — 3017F COLORECTAL CA SCREEN DOC REV: CPT | Performed by: NURSE PRACTITIONER

## 2024-07-15 RX ORDER — LIDOCAINE 50 MG/G
1 PATCH TOPICAL DAILY
Qty: 30 PATCH | Refills: 0 | Status: SHIPPED | OUTPATIENT
Start: 2024-07-15

## 2024-07-15 ASSESSMENT — ENCOUNTER SYMPTOMS
ABDOMINAL DISTENTION: 0
BLOOD IN STOOL: 0
RESPIRATORY NEGATIVE: 1
WHEEZING: 0
CONSTIPATION: 1
DIARRHEA: 0
CHEST TIGHTNESS: 0
VOMITING: 0
BACK PAIN: 1
COUGH: 0
SHORTNESS OF BREATH: 0
ABDOMINAL PAIN: 0
NAUSEA: 0

## 2024-07-15 NOTE — PROGRESS NOTES
Radiology:  Left hip xray:      FINDINGS:      There is moderate degenerative change involving the left hip and sacroiliac joints.     There is no fracture or dislocation. . The superior and inferior pubic rami are normal. .  The soft tissues are normal.           IMPRESSION:  1. Moderate degenerative change involving the left hip and sacroiliac joints..     Lumbar xray:   FINDINGS:     The lumbar vertebral body heights and alignment are maintained. There is no acute compression fracture. There is no subluxation. There is disc space narrowing at L1-L2 and L5-S1. Anterior osteophytes are present at multiple levels. There is facet   arthropathy in the lower lumbar spine. Surgical clips are in the right upper quadrant of the abdomen. There is narrowing of the left hip joint.     IMPRESSION:   IMPRESSION:  Degenerative changes with no acute fracture or subluxation.     Cervical xray:   FINDINGS:      The C-spine is visualized on the lateral projection from the skull base through the mid C7 level. There is no prevertebral soft tissue swelling. Hypertrophic endplate plate changes are demonstrated at the mid to lower cervical spine. There is   degenerative disc space narrowing at C5-C6. The dens interval appears normal. The visualized dens appears intact. The lung apices appear unremarkable.     IMPRESSION:  1. No acute fracture is identified. However, there are degenerative changes at the mid and lower cervical spine as described above, most pronounced at the C5-C6 level.    Medications reviewed. Patient denies side effects with medications. Patient states she is taking medications as prescribed. Shedenies receiving pain medications from other sources. She denies any ER visits since last visit.    Pain scale with out pain medications or at its worst is 8-10/10.  Pain scale with pain medications or at its best is 4-5/10.  Last dose of Oxy IR and Neurontin was today   Drug screen reviewed from 5/13/2024 and was

## 2024-08-08 DIAGNOSIS — M47.816 SPONDYLOSIS OF LUMBAR REGION WITHOUT MYELOPATHY OR RADICULOPATHY: ICD-10-CM

## 2024-08-08 DIAGNOSIS — M51.26 LUMBAR DISC HERNIATION: ICD-10-CM

## 2024-08-08 DIAGNOSIS — G89.4 CHRONIC PAIN SYNDROME: ICD-10-CM

## 2024-08-08 DIAGNOSIS — M54.16 LUMBAR RADICULITIS: ICD-10-CM

## 2024-08-08 DIAGNOSIS — M79.18 MYOFASCIAL PAIN DYSFUNCTION SYNDROME: ICD-10-CM

## 2024-08-08 DIAGNOSIS — M48.062 SPINAL STENOSIS OF LUMBAR REGION WITH NEUROGENIC CLAUDICATION: ICD-10-CM

## 2024-08-08 RX ORDER — GABAPENTIN 600 MG/1
600 TABLET ORAL 2 TIMES DAILY
Qty: 60 TABLET | Refills: 0 | Status: SHIPPED | OUTPATIENT
Start: 2024-08-08 | End: 2024-09-07

## 2024-08-08 RX ORDER — TIZANIDINE 4 MG/1
4 TABLET ORAL EVERY 6 HOURS PRN
Qty: 120 TABLET | Refills: 0 | Status: SHIPPED | OUTPATIENT
Start: 2024-08-08 | End: 2024-09-07

## 2024-08-08 RX ORDER — OXYCODONE HYDROCHLORIDE 10 MG/1
10 TABLET ORAL EVERY 6 HOURS PRN
Qty: 120 TABLET | Refills: 0 | Status: SHIPPED | OUTPATIENT
Start: 2024-08-08 | End: 2024-09-07

## 2024-08-08 NOTE — TELEPHONE ENCOUNTER
OARRS reviewed. UDS: + for  Gabapentin, Tizanidine, Topiramate, Oxycodone.   Last seen: 7/15/2024. Follow-up: 9/17/2024

## 2024-08-08 NOTE — TELEPHONE ENCOUNTER
Kiara Rodríguez called requesting a refill on the following medications:  Requested Prescriptions     Pending Prescriptions Disp Refills    oxyCODONE HCl (OXY-IR) 10 MG immediate release tablet 120 tablet 0     Sig: Take 1 tablet by mouth every 6 hours as needed for Pain for up to 30 days. Intended supply: 30 days Max Daily Amount: 40 mg    tiZANidine (ZANAFLEX) 4 MG tablet 120 tablet 0     Sig: Take 1 tablet by mouth every 6 hours as needed (spasms (hold if systolic blood pressure in less than 100).)    gabapentin (NEURONTIN) 600 MG tablet 60 tablet 0     Sig: Take 1 tablet by mouth 2 times daily for 30 days.     [facility] [place]  .      Date of last visit: 7/15/2024  Date of next visit (if applicable): Visit date not found

## 2024-08-22 ENCOUNTER — PROCEDURE VISIT (OUTPATIENT)
Dept: NEUROLOGY | Age: 54
End: 2024-08-22
Payer: COMMERCIAL

## 2024-08-22 DIAGNOSIS — R20.0 LEFT LEG NUMBNESS: ICD-10-CM

## 2024-08-22 DIAGNOSIS — M54.16 LUMBAR RADICULOPATHY: Primary | ICD-10-CM

## 2024-08-22 DIAGNOSIS — R29.898 LEFT LEG WEAKNESS: ICD-10-CM

## 2024-08-22 PROCEDURE — 95886 MUSC TEST DONE W/N TEST COMP: CPT | Performed by: PSYCHIATRY & NEUROLOGY

## 2024-08-22 PROCEDURE — 95910 NRV CNDJ TEST 7-8 STUDIES: CPT | Performed by: PSYCHIATRY & NEUROLOGY

## 2024-08-29 ENCOUNTER — HOSPITAL ENCOUNTER (OUTPATIENT)
Dept: MRI IMAGING | Age: 54
Discharge: HOME | End: 2024-08-29
Payer: COMMERCIAL

## 2024-08-29 DIAGNOSIS — M48.062 SPINAL STENOSIS OF LUMBAR REGION WITH NEUROGENIC CLAUDICATION: ICD-10-CM

## 2024-08-29 DIAGNOSIS — M51.26 LUMBAR DISC HERNIATION: ICD-10-CM

## 2024-08-29 DIAGNOSIS — M54.16 LUMBAR RADICULITIS: ICD-10-CM

## 2024-08-29 PROCEDURE — 72148 MRI LUMBAR SPINE W/O DYE: CPT

## 2024-09-06 DIAGNOSIS — M51.26 LUMBAR DISC HERNIATION: ICD-10-CM

## 2024-09-06 DIAGNOSIS — M47.816 SPONDYLOSIS OF LUMBAR REGION WITHOUT MYELOPATHY OR RADICULOPATHY: ICD-10-CM

## 2024-09-06 DIAGNOSIS — M48.062 SPINAL STENOSIS OF LUMBAR REGION WITH NEUROGENIC CLAUDICATION: ICD-10-CM

## 2024-09-06 DIAGNOSIS — G89.4 CHRONIC PAIN SYNDROME: ICD-10-CM

## 2024-09-06 DIAGNOSIS — M79.18 MYOFASCIAL PAIN DYSFUNCTION SYNDROME: ICD-10-CM

## 2024-09-06 DIAGNOSIS — M54.16 LUMBAR RADICULITIS: ICD-10-CM

## 2024-09-06 RX ORDER — GABAPENTIN 600 MG/1
600 TABLET ORAL 2 TIMES DAILY
Qty: 60 TABLET | Refills: 0 | Status: SHIPPED | OUTPATIENT
Start: 2024-09-07 | End: 2024-10-07

## 2024-09-06 RX ORDER — OXYCODONE HYDROCHLORIDE 10 MG/1
10 TABLET ORAL EVERY 6 HOURS PRN
Qty: 120 TABLET | Refills: 0 | Status: SHIPPED | OUTPATIENT
Start: 2024-09-07 | End: 2024-10-07

## 2024-09-06 NOTE — TELEPHONE ENCOUNTER
OARRS reviewed. UDS: + for Gabapentin, Tizanidine, Topamax, Oxycodone. Non Compliant Trazodone   Last seen: 7/15/2024. Follow-up: 9-17-24

## 2024-09-09 ENCOUNTER — PATIENT MESSAGE (OUTPATIENT)
Dept: PHYSICAL MEDICINE AND REHAB | Age: 54
End: 2024-09-09

## 2024-09-13 ENCOUNTER — LAB (OUTPATIENT)
Dept: LAB | Age: 54
End: 2024-09-13

## 2024-09-13 ENCOUNTER — HOSPITAL ENCOUNTER (OUTPATIENT)
Dept: GENERAL RADIOLOGY | Age: 54
Discharge: HOME | End: 2024-09-13
Payer: COMMERCIAL

## 2024-09-13 ENCOUNTER — HOSPITAL ENCOUNTER (OUTPATIENT)
Age: 54
Discharge: HOME | End: 2024-09-13
Payer: COMMERCIAL

## 2024-09-13 DIAGNOSIS — M48.062 SPINAL STENOSIS OF LUMBAR REGION WITH NEUROGENIC CLAUDICATION: ICD-10-CM

## 2024-09-13 DIAGNOSIS — G89.29 CHRONIC PAIN OF BOTH KNEES: ICD-10-CM

## 2024-09-13 DIAGNOSIS — M25.561 CHRONIC PAIN OF BOTH KNEES: ICD-10-CM

## 2024-09-13 DIAGNOSIS — M25.562 CHRONIC PAIN OF BOTH KNEES: ICD-10-CM

## 2024-09-13 DIAGNOSIS — M47.812 FACET ARTHRITIS OF CERVICAL REGION: ICD-10-CM

## 2024-09-13 DIAGNOSIS — T40.2X5A THERAPEUTIC OPIOID INDUCED CONSTIPATION: ICD-10-CM

## 2024-09-13 DIAGNOSIS — M51.26 LUMBAR DISC HERNIATION: ICD-10-CM

## 2024-09-13 DIAGNOSIS — K59.03 THERAPEUTIC OPIOID INDUCED CONSTIPATION: ICD-10-CM

## 2024-09-13 DIAGNOSIS — M25.552 LEFT HIP PAIN: ICD-10-CM

## 2024-09-13 DIAGNOSIS — M16.12 PRIMARY OSTEOARTHRITIS OF LEFT HIP: ICD-10-CM

## 2024-09-13 DIAGNOSIS — M54.2 NECK PAIN: ICD-10-CM

## 2024-09-13 DIAGNOSIS — M51.36 DDD (DEGENERATIVE DISC DISEASE), LUMBAR: ICD-10-CM

## 2024-09-13 DIAGNOSIS — M54.16 LUMBAR RADICULITIS: ICD-10-CM

## 2024-09-13 DIAGNOSIS — M47.816 LUMBAR SPONDYLOSIS: ICD-10-CM

## 2024-09-13 DIAGNOSIS — G89.4 CHRONIC PAIN SYNDROME: ICD-10-CM

## 2024-09-13 LAB
ALBUMIN SERPL BCG-MCNC: 4.2 G/DL (ref 3.5–5.1)
ANION GAP SERPL CALC-SCNC: 12 MEQ/L (ref 8–16)
BUN SERPL-MCNC: 26 MG/DL (ref 7–22)
CALCIUM SERPL-MCNC: 9.5 MG/DL (ref 8.5–10.5)
CHLORIDE SERPL-SCNC: 107 MEQ/L (ref 98–111)
CO2 SERPL-SCNC: 22 MEQ/L (ref 23–33)
CREAT SERPL-MCNC: 1 MG/DL (ref 0.4–1.2)
GFR SERPL CREATININE-BSD FRML MDRD: 66 ML/MIN/1.73M2
GLUCOSE SERPL-MCNC: 73 MG/DL (ref 70–108)
PHOSPHATE SERPL-MCNC: 3.7 MG/DL (ref 2.4–4.7)
POTASSIUM SERPL-SCNC: 4.1 MEQ/L (ref 3.5–5.2)
SODIUM SERPL-SCNC: 141 MEQ/L (ref 135–145)

## 2024-09-13 PROCEDURE — 73562 X-RAY EXAM OF KNEE 3: CPT

## 2024-09-30 ENCOUNTER — OFFICE VISIT (OUTPATIENT)
Dept: PHYSICAL MEDICINE AND REHAB | Age: 54
End: 2024-09-30
Payer: COMMERCIAL

## 2024-09-30 VITALS
DIASTOLIC BLOOD PRESSURE: 70 MMHG | WEIGHT: 225.09 LBS | BODY MASS INDEX: 34.11 KG/M2 | SYSTOLIC BLOOD PRESSURE: 120 MMHG | HEIGHT: 68 IN

## 2024-09-30 DIAGNOSIS — M25.561 CHRONIC PAIN OF BOTH KNEES: ICD-10-CM

## 2024-09-30 DIAGNOSIS — M47.816 SPONDYLOSIS OF LUMBAR REGION WITHOUT MYELOPATHY OR RADICULOPATHY: ICD-10-CM

## 2024-09-30 DIAGNOSIS — M25.562 CHRONIC PAIN OF BOTH KNEES: ICD-10-CM

## 2024-09-30 DIAGNOSIS — M54.16 LUMBAR RADICULITIS: Primary | ICD-10-CM

## 2024-09-30 DIAGNOSIS — M54.2 NECK PAIN: ICD-10-CM

## 2024-09-30 DIAGNOSIS — M48.062 SPINAL STENOSIS OF LUMBAR REGION WITH NEUROGENIC CLAUDICATION: ICD-10-CM

## 2024-09-30 DIAGNOSIS — G89.29 CHRONIC PAIN OF BOTH KNEES: ICD-10-CM

## 2024-09-30 DIAGNOSIS — G89.4 CHRONIC PAIN SYNDROME: ICD-10-CM

## 2024-09-30 DIAGNOSIS — M51.26 LUMBAR DISC HERNIATION: ICD-10-CM

## 2024-09-30 DIAGNOSIS — M47.812 FACET ARTHRITIS OF CERVICAL REGION: ICD-10-CM

## 2024-09-30 DIAGNOSIS — M17.0 PRIMARY OSTEOARTHRITIS OF KNEES, BILATERAL: ICD-10-CM

## 2024-09-30 DIAGNOSIS — M25.552 LEFT HIP PAIN: ICD-10-CM

## 2024-09-30 DIAGNOSIS — M79.18 CHRONIC MYOFASCIAL PAIN: ICD-10-CM

## 2024-09-30 DIAGNOSIS — M47.816 LUMBAR SPONDYLOSIS: ICD-10-CM

## 2024-09-30 DIAGNOSIS — M79.18 MYOFASCIAL PAIN DYSFUNCTION SYNDROME: ICD-10-CM

## 2024-09-30 DIAGNOSIS — G89.29 CHRONIC MYOFASCIAL PAIN: ICD-10-CM

## 2024-09-30 PROCEDURE — G8417 CALC BMI ABV UP PARAM F/U: HCPCS | Performed by: NURSE PRACTITIONER

## 2024-09-30 PROCEDURE — 3017F COLORECTAL CA SCREEN DOC REV: CPT | Performed by: NURSE PRACTITIONER

## 2024-09-30 PROCEDURE — 99214 OFFICE O/P EST MOD 30 MIN: CPT | Performed by: NURSE PRACTITIONER

## 2024-09-30 PROCEDURE — 3074F SYST BP LT 130 MM HG: CPT | Performed by: NURSE PRACTITIONER

## 2024-09-30 PROCEDURE — G8427 DOCREV CUR MEDS BY ELIG CLIN: HCPCS | Performed by: NURSE PRACTITIONER

## 2024-09-30 PROCEDURE — 3078F DIAST BP <80 MM HG: CPT | Performed by: NURSE PRACTITIONER

## 2024-09-30 PROCEDURE — 1036F TOBACCO NON-USER: CPT | Performed by: NURSE PRACTITIONER

## 2024-09-30 RX ORDER — OXYCODONE HYDROCHLORIDE 10 MG/1
10 TABLET ORAL EVERY 6 HOURS PRN
Qty: 120 TABLET | Refills: 0 | Status: SHIPPED | OUTPATIENT
Start: 2024-10-08 | End: 2024-11-07

## 2024-09-30 RX ORDER — GABAPENTIN 600 MG/1
600 TABLET ORAL 2 TIMES DAILY
Qty: 60 TABLET | Refills: 0 | Status: SHIPPED | OUTPATIENT
Start: 2024-10-08 | End: 2024-11-07

## 2024-09-30 ASSESSMENT — ENCOUNTER SYMPTOMS
VOMITING: 0
COUGH: 0
NAUSEA: 0
BLOOD IN STOOL: 0
CHEST TIGHTNESS: 0
SHORTNESS OF BREATH: 0
DIARRHEA: 0
ABDOMINAL DISTENTION: 0
BACK PAIN: 1
WHEEZING: 0
ABDOMINAL PAIN: 0
CONSTIPATION: 1
RESPIRATORY NEGATIVE: 1

## 2024-09-30 NOTE — PROGRESS NOTES
[facility] [place] [facility]  [facility] AND [facility]  [address]  [facility] [address]  Dept: [phone]  Dept Fax: [phone]  Loc: [phone]    Visit Date: 9/30/2024    Functionality Assessment/Goals Worksheet     On a scale of 0 (Does not Interfere) to 10 (Completely Interferes)     1.  Which number describes how during the past week pain has interfered with       the following:  A.  General Activity:  9  B.  Mood: 8  C.  Walking Ability:  9  D.  Normal Work (Includes both work outside the home and housework):  9  E.  Relations with Other People:   7  F.  Sleep:   9  G.  Enjoyment of Life:   7    2.  Patient Prefers to Take their Pain Medications:     [x]  On a regular basis   []  Only when necessary    []  Does not take pain medications    3.  What are the Patient's Goals/Expectations for Visiting Pain Management?     [x]  Learn about my pain    [x]  Receive Medication   [x]  Physical Therapy     []  Treat Depression   [x]  Receive Injections    []  Treat Sleep   []  Deal with Anxiety and Stress   []  Treat Opoid Dependence/Addiction   []  Other:        HPI:   Kiara Rodríguez is a 53 y.o. female is here today for    Chief Complaint: Low back pain, leg pain , knee pain, neck pain     HPI   2.5 month follow up to review lumbar MRI, EMG and xrays and medications. Kiara continues to have pain throughout back from neck throughout low back- burning, aching, pins and needles sometimes throbbing. Pain radiating down bilateral legs worse left leg from groin down foot-  stabbing, sharp pins and needles and numbness     Continues to have pain in knees and hips aching and throbbing   She is scheduled to see a new ortho surgeon in [place] this Wednesday at [facility]. She is scheduled per my chart with Dr. Mulugeta Beal who is a PM& R physician   Continues oxy IR prn and Neurontin which helps takes the edge off of pain.      Pain increases with

## 2024-11-05 DIAGNOSIS — M51.26 LUMBAR DISC HERNIATION: ICD-10-CM

## 2024-11-05 DIAGNOSIS — M48.062 SPINAL STENOSIS OF LUMBAR REGION WITH NEUROGENIC CLAUDICATION: ICD-10-CM

## 2024-11-05 DIAGNOSIS — M54.16 LUMBAR RADICULITIS: ICD-10-CM

## 2024-11-05 DIAGNOSIS — M47.816 SPONDYLOSIS OF LUMBAR REGION WITHOUT MYELOPATHY OR RADICULOPATHY: ICD-10-CM

## 2024-11-05 DIAGNOSIS — M79.18 MYOFASCIAL PAIN DYSFUNCTION SYNDROME: ICD-10-CM

## 2024-11-05 DIAGNOSIS — G89.4 CHRONIC PAIN SYNDROME: ICD-10-CM

## 2024-11-06 RX ORDER — OXYCODONE HYDROCHLORIDE 10 MG/1
10 TABLET ORAL EVERY 6 HOURS PRN
Qty: 120 TABLET | Refills: 0 | Status: SHIPPED | OUTPATIENT
Start: 2024-11-07 | End: 2024-12-07

## 2024-11-06 RX ORDER — GABAPENTIN 600 MG/1
600 TABLET ORAL 2 TIMES DAILY
Qty: 60 TABLET | Refills: 0 | Status: SHIPPED | OUTPATIENT
Start: 2024-11-07 | End: 2024-12-07

## 2024-11-06 RX ORDER — LIDOCAINE 50 MG/G
1 PATCH TOPICAL DAILY
Qty: 30 PATCH | Refills: 0 | Status: SHIPPED | OUTPATIENT
Start: 2024-11-06

## 2024-11-06 NOTE — TELEPHONE ENCOUNTER
OARRS reviewed. UDS: + for  gabapentin,tizanidine,oxycodone. Topiramate is non-compliant. Trazodone is prn not present.   Last seen: 9/30/2024. Follow-up: 12/02/2024

## 2024-12-03 DIAGNOSIS — M79.18 MYOFASCIAL PAIN DYSFUNCTION SYNDROME: ICD-10-CM

## 2024-12-03 DIAGNOSIS — M54.16 LUMBAR RADICULITIS: ICD-10-CM

## 2024-12-03 DIAGNOSIS — M47.816 SPONDYLOSIS OF LUMBAR REGION WITHOUT MYELOPATHY OR RADICULOPATHY: ICD-10-CM

## 2024-12-03 DIAGNOSIS — M51.26 LUMBAR DISC HERNIATION: ICD-10-CM

## 2024-12-03 DIAGNOSIS — G89.4 CHRONIC PAIN SYNDROME: ICD-10-CM

## 2024-12-03 DIAGNOSIS — M48.062 SPINAL STENOSIS OF LUMBAR REGION WITH NEUROGENIC CLAUDICATION: ICD-10-CM

## 2024-12-04 RX ORDER — OXYCODONE HYDROCHLORIDE 10 MG/1
10 TABLET ORAL EVERY 6 HOURS PRN
Qty: 120 TABLET | Refills: 0 | Status: SHIPPED | OUTPATIENT
Start: 2024-12-07 | End: 2025-01-06 | Stop reason: SDUPTHER

## 2024-12-04 RX ORDER — LIDOCAINE 50 MG/G
2 PATCH TOPICAL DAILY
Qty: 60 PATCH | Refills: 0 | Status: SHIPPED | OUTPATIENT
Start: 2024-12-04 | End: 2025-01-06 | Stop reason: SDUPTHER

## 2024-12-04 RX ORDER — GABAPENTIN 600 MG/1
600 TABLET ORAL 2 TIMES DAILY
Qty: 60 TABLET | Refills: 0 | Status: SHIPPED | OUTPATIENT
Start: 2024-12-07 | End: 2025-01-02

## 2024-12-04 NOTE — TELEPHONE ENCOUNTER
OARRS reviewed. UDS: + for  gabapentin,tizanidine,oxycodone. Topiramate is non-compliant. Trazodone is prn not present.  Last seen: 9/30/2024. Follow-up: 12/12/2024      Pt sent a message attached to refill asking if there was any way that we can up this to two patches every 12 hours? And therefore increasing it to 60 patches a month.   Due to the fact that one does not cover the whole area where I am in pain.

## 2024-12-12 ENCOUNTER — OFFICE VISIT (OUTPATIENT)
Dept: PHYSICAL MEDICINE AND REHAB | Age: 54
End: 2024-12-12
Payer: COMMERCIAL

## 2024-12-12 VITALS
HEIGHT: 68 IN | WEIGHT: 225.09 LBS | SYSTOLIC BLOOD PRESSURE: 124 MMHG | BODY MASS INDEX: 34.11 KG/M2 | DIASTOLIC BLOOD PRESSURE: 74 MMHG

## 2024-12-12 DIAGNOSIS — M51.26 LUMBAR DISC HERNIATION: ICD-10-CM

## 2024-12-12 DIAGNOSIS — M47.812 FACET ARTHRITIS OF CERVICAL REGION: ICD-10-CM

## 2024-12-12 DIAGNOSIS — M48.062 SPINAL STENOSIS OF LUMBAR REGION WITH NEUROGENIC CLAUDICATION: ICD-10-CM

## 2024-12-12 DIAGNOSIS — M25.562 CHRONIC PAIN OF BOTH KNEES: ICD-10-CM

## 2024-12-12 DIAGNOSIS — M47.816 LUMBAR SPONDYLOSIS: ICD-10-CM

## 2024-12-12 DIAGNOSIS — M25.552 LEFT HIP PAIN: ICD-10-CM

## 2024-12-12 DIAGNOSIS — G89.4 CHRONIC PAIN SYNDROME: ICD-10-CM

## 2024-12-12 DIAGNOSIS — M54.16 LUMBAR RADICULITIS: Primary | ICD-10-CM

## 2024-12-12 DIAGNOSIS — G89.29 CHRONIC MYOFASCIAL PAIN: ICD-10-CM

## 2024-12-12 DIAGNOSIS — M54.41 CHRONIC BILATERAL LOW BACK PAIN WITH BILATERAL SCIATICA: ICD-10-CM

## 2024-12-12 DIAGNOSIS — M54.42 CHRONIC BILATERAL LOW BACK PAIN WITH BILATERAL SCIATICA: ICD-10-CM

## 2024-12-12 DIAGNOSIS — M25.561 CHRONIC PAIN OF BOTH KNEES: ICD-10-CM

## 2024-12-12 DIAGNOSIS — G89.29 CHRONIC PAIN OF BOTH KNEES: ICD-10-CM

## 2024-12-12 DIAGNOSIS — M79.18 CHRONIC MYOFASCIAL PAIN: ICD-10-CM

## 2024-12-12 DIAGNOSIS — G89.29 CHRONIC BILATERAL LOW BACK PAIN WITH BILATERAL SCIATICA: ICD-10-CM

## 2024-12-12 DIAGNOSIS — M17.0 PRIMARY OSTEOARTHRITIS OF KNEES, BILATERAL: ICD-10-CM

## 2024-12-12 DIAGNOSIS — M54.2 NECK PAIN: ICD-10-CM

## 2024-12-12 PROCEDURE — 3074F SYST BP LT 130 MM HG: CPT | Performed by: NURSE PRACTITIONER

## 2024-12-12 PROCEDURE — G8417 CALC BMI ABV UP PARAM F/U: HCPCS | Performed by: NURSE PRACTITIONER

## 2024-12-12 PROCEDURE — G8427 DOCREV CUR MEDS BY ELIG CLIN: HCPCS | Performed by: NURSE PRACTITIONER

## 2024-12-12 PROCEDURE — 3017F COLORECTAL CA SCREEN DOC REV: CPT | Performed by: NURSE PRACTITIONER

## 2024-12-12 PROCEDURE — 1036F TOBACCO NON-USER: CPT | Performed by: NURSE PRACTITIONER

## 2024-12-12 PROCEDURE — G8484 FLU IMMUNIZE NO ADMIN: HCPCS | Performed by: NURSE PRACTITIONER

## 2024-12-12 PROCEDURE — 3078F DIAST BP <80 MM HG: CPT | Performed by: NURSE PRACTITIONER

## 2024-12-12 PROCEDURE — 99214 OFFICE O/P EST MOD 30 MIN: CPT | Performed by: NURSE PRACTITIONER

## 2024-12-12 ASSESSMENT — ENCOUNTER SYMPTOMS
ABDOMINAL PAIN: 0
DIARRHEA: 0
ABDOMINAL DISTENTION: 0
CONSTIPATION: 1
CHEST TIGHTNESS: 0
VOMITING: 0
BLOOD IN STOOL: 0
WHEEZING: 0
SHORTNESS OF BREATH: 0
NAUSEA: 0
RESPIRATORY NEGATIVE: 1
COUGH: 0
BACK PAIN: 1

## 2024-12-12 NOTE — PROGRESS NOTES
[facility]OS[facility]HABIL[facility]ATION [facility]  [address]  [place] [id number]  Dept: [phone]  Dept Fax: [phone]  Loc: [phone]    Visit Date: 12/12/2024    Functionality Assessment/Goals Worksheet     On a scale of 0 (Does not Interfere) to 10 (Completely Interferes)     1.  Which number describes how during the past week pain has interfered with       the following:  A.  General Activity:  10  B.  Mood: 9  C.  Walking Ability:  9  D.  Normal Work (Includes both work outside the home and housework):  9  E.  Relations with Other People:   8  F.  Sleep:   9  G.  Enjoyment of Life:   8    2.  Patient Prefers to Take their Pain Medications:     [x]  On a regular basis   [x]  Only when necessary    []  Does not take pain medications    3.  What are the Patient's Goals/Expectations for Visiting Pain Management?     [x]  Learn about my pain    [x]  Receive Medication   [x]  Physical Therapy     []  Treat Depression   [x]  Receive Injections    []  Treat Sleep   [x]  Deal with Anxiety and Stress   []  Treat Opoid Dependence/Addiction   []  Other:      HPI:   Kiara Rodríguez is a 54 y.o. female is here today for    Chief Complaint: Low back pain, leg pain, neck pain, right knee pain     HPI   2.5 month follow up. This patient rescheduled her follow up scheduled on 12/2/2024 due to being out of town. Kiara has a main compliant of pain in her low back constant aching, burning. , and sharp and throbbing radiating down bilateral legs worse left leg from groin down foot- stabbing, sharp pins and needles and numbness.    She is asking to see a new surgeon as she does not want to see Dr. Padilla again but voiced she is trying to avoid back surgery but also wants \"my pinched nerve fixed\"   Has pain in mid back and neck. States she is having flank pain and feels she may have a UTI and is going to check it out.     Had a fall after Thanksgiving and

## 2025-01-01 DIAGNOSIS — M79.18 MYOFASCIAL PAIN DYSFUNCTION SYNDROME: ICD-10-CM

## 2025-01-01 DIAGNOSIS — G89.4 CHRONIC PAIN SYNDROME: ICD-10-CM

## 2025-01-01 DIAGNOSIS — M47.816 SPONDYLOSIS OF LUMBAR REGION WITHOUT MYELOPATHY OR RADICULOPATHY: ICD-10-CM

## 2025-01-02 RX ORDER — GABAPENTIN 600 MG/1
600 TABLET ORAL 2 TIMES DAILY
Qty: 60 TABLET | Refills: 0 | Status: SHIPPED | OUTPATIENT
Start: 2025-01-06 | End: 2025-02-05

## 2025-01-02 NOTE — TELEPHONE ENCOUNTER
OARRS reviewed. UDS: + for Gabapentin, Tizanidine, Topiramate, Trazodone, Oxycodone and Naloxone.   Last seen: 12/12/2024. Follow-up:   Future Appointments   Date Time Provider Department Center   2/12/2025  1:40 PM Rolando Baldwin, RENNY - CNP N [facility] [place]

## 2025-01-06 DIAGNOSIS — M79.18 MYOFASCIAL PAIN DYSFUNCTION SYNDROME: ICD-10-CM

## 2025-01-06 DIAGNOSIS — M48.062 SPINAL STENOSIS OF LUMBAR REGION WITH NEUROGENIC CLAUDICATION: ICD-10-CM

## 2025-01-06 DIAGNOSIS — M51.26 LUMBAR DISC HERNIATION: ICD-10-CM

## 2025-01-06 DIAGNOSIS — M54.16 LUMBAR RADICULITIS: ICD-10-CM

## 2025-01-06 DIAGNOSIS — M47.816 SPONDYLOSIS OF LUMBAR REGION WITHOUT MYELOPATHY OR RADICULOPATHY: ICD-10-CM

## 2025-01-06 DIAGNOSIS — G89.4 CHRONIC PAIN SYNDROME: ICD-10-CM

## 2025-01-06 RX ORDER — OXYCODONE HYDROCHLORIDE 10 MG/1
10 TABLET ORAL EVERY 6 HOURS PRN
Qty: 120 TABLET | Refills: 0 | Status: SHIPPED | OUTPATIENT
Start: 2025-01-06 | End: 2025-02-05

## 2025-01-06 RX ORDER — LIDOCAINE 50 MG/G
2 PATCH TOPICAL DAILY
Qty: 60 PATCH | Refills: 0 | Status: SHIPPED | OUTPATIENT
Start: 2025-01-06 | End: 2025-02-05

## 2025-01-06 NOTE — TELEPHONE ENCOUNTER
OARRS reviewed. UDS: + for  Gabapentin, Tizanidine, Topiramate, Trazodone, Oxycodone, Naloxone.   Last seen: 12/12/2024. Follow-up:   Future Appointments   Date Time Provider Department Center   2/12/2025  1:40 PM Rolando Baldwin, RENNY - CNP [facility] [place]

## 2025-02-03 DIAGNOSIS — G89.4 CHRONIC PAIN SYNDROME: ICD-10-CM

## 2025-02-03 DIAGNOSIS — M48.062 SPINAL STENOSIS OF LUMBAR REGION WITH NEUROGENIC CLAUDICATION: ICD-10-CM

## 2025-02-03 DIAGNOSIS — M51.26 LUMBAR DISC HERNIATION: ICD-10-CM

## 2025-02-03 DIAGNOSIS — M47.816 SPONDYLOSIS OF LUMBAR REGION WITHOUT MYELOPATHY OR RADICULOPATHY: ICD-10-CM

## 2025-02-03 DIAGNOSIS — M79.18 MYOFASCIAL PAIN DYSFUNCTION SYNDROME: ICD-10-CM

## 2025-02-03 DIAGNOSIS — M54.16 LUMBAR RADICULITIS: ICD-10-CM

## 2025-02-04 DIAGNOSIS — M79.18 MYOFASCIAL PAIN DYSFUNCTION SYNDROME: ICD-10-CM

## 2025-02-04 DIAGNOSIS — G89.4 CHRONIC PAIN SYNDROME: ICD-10-CM

## 2025-02-04 DIAGNOSIS — M51.26 LUMBAR DISC HERNIATION: ICD-10-CM

## 2025-02-04 DIAGNOSIS — M48.062 SPINAL STENOSIS OF LUMBAR REGION WITH NEUROGENIC CLAUDICATION: ICD-10-CM

## 2025-02-04 DIAGNOSIS — M54.16 LUMBAR RADICULITIS: ICD-10-CM

## 2025-02-04 DIAGNOSIS — M47.816 SPONDYLOSIS OF LUMBAR REGION WITHOUT MYELOPATHY OR RADICULOPATHY: ICD-10-CM

## 2025-02-04 RX ORDER — OXYCODONE HYDROCHLORIDE 10 MG/1
10 TABLET ORAL EVERY 6 HOURS PRN
Qty: 120 TABLET | Refills: 0 | Status: SHIPPED | OUTPATIENT
Start: 2025-02-05 | End: 2025-03-03 | Stop reason: SDUPTHER

## 2025-02-04 RX ORDER — LIDOCAINE 50 MG/G
2 PATCH TOPICAL DAILY
Qty: 60 PATCH | Refills: 0 | Status: CANCELLED | OUTPATIENT
Start: 2025-02-05 | End: 2025-03-07

## 2025-02-04 RX ORDER — OXYCODONE HYDROCHLORIDE 10 MG/1
10 TABLET ORAL EVERY 6 HOURS PRN
Qty: 120 TABLET | Refills: 0 | Status: CANCELLED | OUTPATIENT
Start: 2025-02-05 | End: 2025-03-07

## 2025-02-04 RX ORDER — GABAPENTIN 600 MG/1
600 TABLET ORAL 2 TIMES DAILY
Qty: 60 TABLET | Refills: 0 | Status: SHIPPED | OUTPATIENT
Start: 2025-02-05 | End: 2025-03-03 | Stop reason: SDUPTHER

## 2025-02-04 RX ORDER — GABAPENTIN 600 MG/1
600 TABLET ORAL 2 TIMES DAILY
Qty: 60 TABLET | Refills: 0 | Status: CANCELLED | OUTPATIENT
Start: 2025-02-05 | End: 2025-03-07

## 2025-02-04 RX ORDER — LIDOCAINE 50 MG/G
2 PATCH TOPICAL DAILY
Qty: 60 PATCH | Refills: 0 | Status: SHIPPED | OUTPATIENT
Start: 2025-02-05 | End: 2025-03-03 | Stop reason: SDUPTHER

## 2025-02-04 NOTE — TELEPHONE ENCOUNTER
Kiara Rodríguez called requesting a refill on the following medications:  Requested Prescriptions     Pending Prescriptions Disp Refills    tiZANidine (ZANAFLEX) 4 MG tablet 120 tablet 0     Sig: Take 1 tablet by mouth every 6 hours as needed (spasms (hold if systolic blood pressure in less than 100).)    oxyCODONE HCl (OXY-IR) 10 MG immediate release tablet 120 tablet 0     Sig: Take 1 tablet by mouth every 6 hours as needed for Pain for up to 30 days. Intended supply: 30 days Max Daily Amount: 40 mg    gabapentin (NEURONTIN) 600 MG tablet 60 tablet 0     Sig: Take 1 tablet by mouth 2 times daily for 30 days.    naloxegol (MOVANTIK) 12.5 MG TABS tablet 30 tablet 2     Sig: Take 1 tablet by mouth every morning (before breakfast)    lidocaine (LIDODERM) 5 % 60 patch 0     Sig: Place 2 patches onto the skin daily 12 hours on, 12 hours off. To painful areas on low back     Pharm[facility] verified: [facility] in [place]  .      Date of last visit: 12/12/2024  Date of next visit (if applicable): Visit date not found

## 2025-02-04 NOTE — TELEPHONE ENCOUNTER
OARRS reviewed. UDS: + for  gabapentin, tizanidine, topiramate. Trazodone,oxycodone.   Last seen: 12/12/2024. Follow-up:   Future Appointments   Date Time Provider Department Center   2/12/2025  1:40 PM Rolando Baldwin, APRN - CNP [facility] [place]

## 2025-02-04 NOTE — TELEPHONE ENCOUNTER
OARRS reviewed. UDS: + for  gabapentin,tizanidine,topiramate,oxycodone. Trazodone is prn present. Naloxone is present.  Last seen: 12/12/2024. Follow-up: 2/12/25

## 2025-03-03 DIAGNOSIS — M47.816 SPONDYLOSIS OF LUMBAR REGION WITHOUT MYELOPATHY OR RADICULOPATHY: ICD-10-CM

## 2025-03-03 DIAGNOSIS — M79.18 MYOFASCIAL PAIN DYSFUNCTION SYNDROME: ICD-10-CM

## 2025-03-03 DIAGNOSIS — M48.062 SPINAL STENOSIS OF LUMBAR REGION WITH NEUROGENIC CLAUDICATION: ICD-10-CM

## 2025-03-03 DIAGNOSIS — M54.16 LUMBAR RADICULITIS: ICD-10-CM

## 2025-03-03 DIAGNOSIS — M51.26 LUMBAR DISC HERNIATION: ICD-10-CM

## 2025-03-03 DIAGNOSIS — G89.4 CHRONIC PAIN SYNDROME: ICD-10-CM

## 2025-03-03 NOTE — TELEPHONE ENCOUNTER
Pt was sick and had to cancel her appt, RS for next week but refills are due 03/05, can these be refilled or partially refilled until appt?

## 2025-03-04 RX ORDER — LIDOCAINE 50 MG/G
2 PATCH TOPICAL DAILY
Qty: 60 PATCH | Refills: 0 | Status: SHIPPED | OUTPATIENT
Start: 2025-03-04 | End: 2025-04-09 | Stop reason: SDUPTHER

## 2025-03-04 RX ORDER — OXYCODONE HYDROCHLORIDE 10 MG/1
10 TABLET ORAL EVERY 6 HOURS PRN
Qty: 20 TABLET | Refills: 0 | Status: SHIPPED | OUTPATIENT
Start: 2025-03-07 | End: 2025-03-11 | Stop reason: SDUPTHER

## 2025-03-04 RX ORDER — GABAPENTIN 600 MG/1
600 TABLET ORAL 2 TIMES DAILY
Qty: 12 TABLET | Refills: 0 | Status: SHIPPED | OUTPATIENT
Start: 2025-03-06 | End: 2025-03-11 | Stop reason: SDUPTHER

## 2025-03-04 NOTE — TELEPHONE ENCOUNTER
She canceled appointment on 2/12/2025. In the future she can't go over 2 months to continue medications and if she cancels or no shows without any good reason medications will not be continued until seen. I changed prescriptions to give enough medication for her to just get to her follow up appointment with Percocet and Neurontin.

## 2025-03-04 NOTE — TELEPHONE ENCOUNTER
OARRS reviewed. UDS: + for  gabapentin,tizanidine,topiramate,oxycodone. Trazodone is prn present. Naloxone is present.  Last seen: 12/12/2024. Follow-up: 3/11/2025

## 2025-03-11 ENCOUNTER — OFFICE VISIT (OUTPATIENT)
Dept: PHYSICAL MEDICINE AND REHAB | Age: 55
End: 2025-03-11
Payer: COMMERCIAL

## 2025-03-11 VITALS
SYSTOLIC BLOOD PRESSURE: 132 MMHG | HEIGHT: 68 IN | BODY MASS INDEX: 34.11 KG/M2 | DIASTOLIC BLOOD PRESSURE: 80 MMHG | WEIGHT: 225.09 LBS

## 2025-03-11 DIAGNOSIS — M51.26 LUMBAR DISC HERNIATION: ICD-10-CM

## 2025-03-11 DIAGNOSIS — M54.16 LUMBAR RADICULITIS: Primary | ICD-10-CM

## 2025-03-11 DIAGNOSIS — M47.816 SPONDYLOSIS OF LUMBAR REGION WITHOUT MYELOPATHY OR RADICULOPATHY: ICD-10-CM

## 2025-03-11 DIAGNOSIS — G89.4 CHRONIC PAIN SYNDROME: ICD-10-CM

## 2025-03-11 DIAGNOSIS — M48.062 SPINAL STENOSIS OF LUMBAR REGION WITH NEUROGENIC CLAUDICATION: ICD-10-CM

## 2025-03-11 DIAGNOSIS — M47.816 LUMBAR SPONDYLOSIS: ICD-10-CM

## 2025-03-11 DIAGNOSIS — M25.562 CHRONIC PAIN OF BOTH KNEES: ICD-10-CM

## 2025-03-11 DIAGNOSIS — G89.29 CHRONIC PAIN OF BOTH KNEES: ICD-10-CM

## 2025-03-11 DIAGNOSIS — M79.18 MYOFASCIAL PAIN DYSFUNCTION SYNDROME: ICD-10-CM

## 2025-03-11 DIAGNOSIS — G89.29 CHRONIC MYOFASCIAL PAIN: ICD-10-CM

## 2025-03-11 DIAGNOSIS — M17.0 PRIMARY OSTEOARTHRITIS OF KNEES, BILATERAL: ICD-10-CM

## 2025-03-11 DIAGNOSIS — M54.41 CHRONIC BILATERAL LOW BACK PAIN WITH BILATERAL SCIATICA: ICD-10-CM

## 2025-03-11 DIAGNOSIS — G89.29 CHRONIC BILATERAL LOW BACK PAIN WITH BILATERAL SCIATICA: ICD-10-CM

## 2025-03-11 DIAGNOSIS — M79.18 CHRONIC MYOFASCIAL PAIN: ICD-10-CM

## 2025-03-11 DIAGNOSIS — M54.42 CHRONIC BILATERAL LOW BACK PAIN WITH BILATERAL SCIATICA: ICD-10-CM

## 2025-03-11 DIAGNOSIS — M25.561 CHRONIC PAIN OF BOTH KNEES: ICD-10-CM

## 2025-03-11 PROCEDURE — 3079F DIAST BP 80-89 MM HG: CPT | Performed by: NURSE PRACTITIONER

## 2025-03-11 PROCEDURE — 3075F SYST BP GE 130 - 139MM HG: CPT | Performed by: NURSE PRACTITIONER

## 2025-03-11 PROCEDURE — G8417 CALC BMI ABV UP PARAM F/U: HCPCS | Performed by: NURSE PRACTITIONER

## 2025-03-11 PROCEDURE — G8427 DOCREV CUR MEDS BY ELIG CLIN: HCPCS | Performed by: NURSE PRACTITIONER

## 2025-03-11 PROCEDURE — 1036F TOBACCO NON-USER: CPT | Performed by: NURSE PRACTITIONER

## 2025-03-11 PROCEDURE — 99214 OFFICE O/P EST MOD 30 MIN: CPT | Performed by: NURSE PRACTITIONER

## 2025-03-11 PROCEDURE — 3017F COLORECTAL CA SCREEN DOC REV: CPT | Performed by: NURSE PRACTITIONER

## 2025-03-11 RX ORDER — OXYCODONE HYDROCHLORIDE 10 MG/1
10 TABLET ORAL EVERY 6 HOURS PRN
Qty: 120 TABLET | Refills: 0 | Status: SHIPPED | OUTPATIENT
Start: 2025-03-12 | End: 2025-04-11

## 2025-03-11 RX ORDER — GABAPENTIN 600 MG/1
600 TABLET ORAL 2 TIMES DAILY
Qty: 60 TABLET | Refills: 0 | Status: SHIPPED | OUTPATIENT
Start: 2025-03-12 | End: 2025-04-11

## 2025-03-11 ASSESSMENT — ENCOUNTER SYMPTOMS
VOMITING: 0
NAUSEA: 0
ABDOMINAL DISTENTION: 0
BLOOD IN STOOL: 0
COUGH: 0
CONSTIPATION: 0
SHORTNESS OF BREATH: 0
DIARRHEA: 0
RESPIRATORY NEGATIVE: 1
GASTROINTESTINAL NEGATIVE: 1
WHEEZING: 0
BACK PAIN: 1
CHEST TIGHTNESS: 0
ABDOMINAL PAIN: 0

## 2025-03-11 NOTE — PROGRESS NOTES
[facility] [place] [facility]  [facility] NEUROSCIENCE AND REHABILITATION CENTER  [address]  [facility] [address]  Dept: [phone]  Dept Fax: [phone]  Loc: [phone]    Visit Date: 3/11/2025    Functionality Assessment/Goals Worksheet     On a scale of 0 (Does not Interfere) to 10 (Completely Interferes)     1.  Which number describes how during the past week pain has interfered with       the following:  A.  General Activity:  9  B.  Mood: 8  C.  Walking Ability:  9  D.  Normal Work (Includes both work outside the home and housework):  9  E.  Relations with Other People:   0  F.  Sleep:   6  G.  Enjoyment of Life:   6    2.  Patient Prefers to Take their Pain Medications:     [x]  On a regular basis   []  Only when necessary    []  Does not take pain medications    3.  What are the Patient's Goals/Expectations for Visiting Pain Management?     [x]  Learn about my pain    [x]  Receive Medication   [x]  Physical Therapy     []  Treat Depression   [x]  Receive Injections    []  Treat Sleep   []  Deal with Anxiety and Stress   []  Treat Opoid Dependence/Addiction   [x]  Other:  \"To prevent having surgery\"      HPI:   Kiara Rodríguez is a 54 y.o. female is here today for    Chief Complaint: Low back pain, leg pain, neck pain     HPI   Has been 3 months since patient was last seen She canceled appointment last month as she states she was really sick with bronchitis.   She continues to have a main complaint of pain in her low back- constant aching stabbing, burning, sometimes sharp, and sometimes throbbing. States pain is always present but varies in severity Pain radiates from back down her buttocks and down her her legs  left leg from groin down foot- stabbing, sharp pins and needles and numbness and right leg laterally down to foot.     I ordered a referral to neurosurgery last visit to Dr. Caballero but she did not schedule an appointment because she states she was

## 2025-04-09 DIAGNOSIS — G89.4 CHRONIC PAIN SYNDROME: ICD-10-CM

## 2025-04-09 DIAGNOSIS — M48.062 SPINAL STENOSIS OF LUMBAR REGION WITH NEUROGENIC CLAUDICATION: ICD-10-CM

## 2025-04-09 DIAGNOSIS — M79.18 MYOFASCIAL PAIN DYSFUNCTION SYNDROME: ICD-10-CM

## 2025-04-09 RX ORDER — LIDOCAINE 50 MG/G
2 PATCH TOPICAL DAILY
Qty: 60 PATCH | Refills: 0 | Status: SHIPPED | OUTPATIENT
Start: 2025-04-09 | End: 2025-05-09

## 2025-04-09 NOTE — TELEPHONE ENCOUNTER
Kiara Rodríguez called requesting a refill on the following medications:  Requested Prescriptions     Pending Prescriptions Disp Refills    tiZANidine (ZANAFLEX) 4 MG tablet 120 tablet 0     Sig: Take 1 tablet by mouth every 6 hours as needed (spasms (hold if systolic blood pressure in less than 100).)    lidocaine (LIDODERM) 5 % 60 patch 0     Sig: Place 2 patches onto the skin daily 12 hours on, 12 hours off. To painful areas on low back     Pharm[facility] verified: [facility] in [place]  [place]      Date of last visit: 3/11/2025  Date of next visit (if applicable): Visit date not found

## 2025-04-09 NOTE — TELEPHONE ENCOUNTER
OARRS reviewed. UDS: + for  Gabapentin, Tizanidine, Topiramate, Oxycodone.   Last seen: 3/11/2025. Follow-up:   Future Appointments   Date Time Provider Department Center   5/12/2025  2:00 PM Rolando Baldwin, APRN - CNP N [facility] [place]

## 2025-04-10 DIAGNOSIS — M47.816 SPONDYLOSIS OF LUMBAR REGION WITHOUT MYELOPATHY OR RADICULOPATHY: ICD-10-CM

## 2025-04-10 DIAGNOSIS — G89.4 CHRONIC PAIN SYNDROME: ICD-10-CM

## 2025-04-10 DIAGNOSIS — M79.18 MYOFASCIAL PAIN DYSFUNCTION SYNDROME: ICD-10-CM

## 2025-04-10 DIAGNOSIS — M51.26 LUMBAR DISC HERNIATION: ICD-10-CM

## 2025-04-10 DIAGNOSIS — M54.16 LUMBAR RADICULITIS: ICD-10-CM

## 2025-04-10 DIAGNOSIS — M48.062 SPINAL STENOSIS OF LUMBAR REGION WITH NEUROGENIC CLAUDICATION: ICD-10-CM

## 2025-04-10 NOTE — TELEPHONE ENCOUNTER
Kiara is requesting a refill of their   Requested Prescriptions     Pending Prescriptions Disp Refills    oxyCODONE HCl (OXY-IR) 10 MG immediate release tablet 120 tablet 0     Sig: Take 1 tablet by mouth every 6 hours as needed for Pain for up to 30 days. Intended supply: 30 days Max Daily Amount: 40 mg    gabapentin (NEURONTIN) 600 MG tablet 60 tablet 0     Sig: Take 1 tablet by mouth 2 times daily for 30 days.   . Please advise.      Last Appt:  Visit date not found  Next Appt:   Visit date not found  Preferred pharmacy:     [facility] [address] [place], [place] [address] [phone] -  [phone]

## 2025-04-10 NOTE — TELEPHONE ENCOUNTER
OARRS reviewed. UDS: + for  Gabapentin, Zanaflex, Topamax, Oxycodone. - Trazodone.   Last seen: Visit date not found. Follow-up:   Future Appointments   Date Time Provider Department Center   5/12/2025  2:00 PM Rolando Baldwin, APRN - CNP [facility] [place]

## 2025-04-14 RX ORDER — OXYCODONE HYDROCHLORIDE 10 MG/1
10 TABLET ORAL EVERY 6 HOURS PRN
Qty: 120 TABLET | Refills: 0 | Status: SHIPPED | OUTPATIENT
Start: 2025-04-14 | End: 2025-05-14

## 2025-04-14 RX ORDER — GABAPENTIN 600 MG/1
600 TABLET ORAL 2 TIMES DAILY
Qty: 60 TABLET | Refills: 0 | Status: SHIPPED | OUTPATIENT
Start: 2025-04-14 | End: 2025-05-14

## 2025-04-14 NOTE — TELEPHONE ENCOUNTER
OARRS reviewed. UDS: + for Gabapentin, Tizanidine, Topiramate, Oxycodone   Last seen: 3/11/25. Follow-up:   Future Appointments   Date Time Provider Department Center   5/12/2025  2:00 PM Rolando Baldwin, APRN - CNP [facility] [place]

## 2025-04-14 NOTE — TELEPHONE ENCOUNTER
Pt's refill request was sent to the wrong provider. Pt states that she sees Josias Baldwin. Request refills Gabapentin  and Oxycodone.   Quality 111:Pneumonia Vaccination Status For Older Adults: Pneumococcal Vaccination Previously Received Quality 431: Preventive Care And Screening: Unhealthy Alcohol Use - Screening: Patient not identified as an unhealthy alcohol user when screened for unhealthy alcohol use using a systematic screening method Quality 226: Preventive Care And Screening: Tobacco Use: Screening And Cessation Intervention: Patient screened for tobacco use and is an ex/non-smoker Quality 47: Advance Care Plan: Advance Care Planning discussed and documented; advance care plan or surrogate decision maker documented in the medical record. Quality 110: Preventive Care And Screening: Influenza Immunization: Influenza Immunization Administered during Influenza season Detail Level: Detailed Quality 134: Screening For Clinical Depression And Follow-Up Plan: The patient was screened for depression and the screen was negative and no follow up required

## 2025-05-07 DIAGNOSIS — M79.18 MYOFASCIAL PAIN DYSFUNCTION SYNDROME: ICD-10-CM

## 2025-05-07 DIAGNOSIS — G89.4 CHRONIC PAIN SYNDROME: ICD-10-CM

## 2025-05-07 DIAGNOSIS — M48.062 SPINAL STENOSIS OF LUMBAR REGION WITH NEUROGENIC CLAUDICATION: ICD-10-CM

## 2025-05-15 DIAGNOSIS — M79.18 MYOFASCIAL PAIN DYSFUNCTION SYNDROME: ICD-10-CM

## 2025-05-15 DIAGNOSIS — M51.26 LUMBAR DISC HERNIATION: ICD-10-CM

## 2025-05-15 DIAGNOSIS — G89.4 CHRONIC PAIN SYNDROME: ICD-10-CM

## 2025-05-15 DIAGNOSIS — M47.816 SPONDYLOSIS OF LUMBAR REGION WITHOUT MYELOPATHY OR RADICULOPATHY: ICD-10-CM

## 2025-05-15 DIAGNOSIS — M48.062 SPINAL STENOSIS OF LUMBAR REGION WITH NEUROGENIC CLAUDICATION: ICD-10-CM

## 2025-05-15 DIAGNOSIS — M54.16 LUMBAR RADICULITIS: ICD-10-CM

## 2025-05-15 RX ORDER — GABAPENTIN 600 MG/1
600 TABLET ORAL 2 TIMES DAILY
Qty: 60 TABLET | Refills: 0 | Status: SHIPPED | OUTPATIENT
Start: 2025-05-15 | End: 2025-06-12 | Stop reason: SDUPTHER

## 2025-05-15 RX ORDER — OXYCODONE HYDROCHLORIDE 10 MG/1
10 TABLET ORAL EVERY 6 HOURS PRN
Qty: 120 TABLET | Refills: 0 | Status: SHIPPED | OUTPATIENT
Start: 2025-05-15 | End: 2025-06-12 | Stop reason: SDUPTHER

## 2025-05-15 RX ORDER — LIDOCAINE 50 MG/G
2 PATCH TOPICAL DAILY
Qty: 60 PATCH | Refills: 0 | Status: SHIPPED | OUTPATIENT
Start: 2025-05-15 | End: 2025-06-12 | Stop reason: SDUPTHER

## 2025-05-15 NOTE — TELEPHONE ENCOUNTER
OARRS reviewed. UDS: + for  gabapentin, tizanidine,topiramate, oxycodone. Trazodone is prn not present.   Last seen: 3/11/2025. Follow-up: 5/22/2025

## 2025-05-15 NOTE — TELEPHONE ENCOUNTER
Kiara Rodríguez called requesting a refill on the following medications:  Requested Prescriptions     Pending Prescriptions Disp Refills    oxyCODONE HCl (OXY-IR) 10 MG immediate release tablet 120 tablet 0     Sig: Take 1 tablet by mouth every 6 hours as needed for Pain for up to 30 days. Intended supply: 30 days Max Daily Amount: 40 mg    gabapentin (NEURONTIN) 600 MG tablet 60 tablet 0     Sig: Take 1 tablet by mouth 2 times daily for 30 days.    naloxegol (MOVANTIK) 12.5 MG TABS tablet 30 tablet 2     Sig: Take 1 tablet by mouth every morning (before breakfast)    lidocaine (LIDODERM) 5 % 60 patch 0     Sig: Place 2 patches onto the skin daily 12 hours on, 12 hours off. To painful areas on low back     Pharmacy verified:    [address] [phone] -  [phone]     Date of last visit: 03/11/2025  Date of next visit (if applicable): 5/22/2025

## 2025-05-22 ENCOUNTER — OFFICE VISIT (OUTPATIENT)
Dept: PHYSICAL MEDICINE AND REHAB | Age: 55
End: 2025-05-22
Payer: COMMERCIAL

## 2025-05-22 VITALS
HEIGHT: 68 IN | BODY MASS INDEX: 34.11 KG/M2 | WEIGHT: 225.09 LBS | SYSTOLIC BLOOD PRESSURE: 124 MMHG | DIASTOLIC BLOOD PRESSURE: 82 MMHG

## 2025-05-22 DIAGNOSIS — M51.26 LUMBAR DISC HERNIATION: ICD-10-CM

## 2025-05-22 DIAGNOSIS — M54.42 CHRONIC BILATERAL LOW BACK PAIN WITH BILATERAL SCIATICA: ICD-10-CM

## 2025-05-22 DIAGNOSIS — M48.062 SPINAL STENOSIS OF LUMBAR REGION WITH NEUROGENIC CLAUDICATION: ICD-10-CM

## 2025-05-22 DIAGNOSIS — G89.4 CHRONIC PAIN SYNDROME: ICD-10-CM

## 2025-05-22 DIAGNOSIS — G89.29 CHRONIC BILATERAL LOW BACK PAIN WITH BILATERAL SCIATICA: ICD-10-CM

## 2025-05-22 DIAGNOSIS — M17.0 PRIMARY OSTEOARTHRITIS OF KNEES, BILATERAL: ICD-10-CM

## 2025-05-22 DIAGNOSIS — M54.41 CHRONIC BILATERAL LOW BACK PAIN WITH BILATERAL SCIATICA: ICD-10-CM

## 2025-05-22 DIAGNOSIS — M47.816 LUMBAR SPONDYLOSIS: ICD-10-CM

## 2025-05-22 DIAGNOSIS — M54.16 LUMBAR RADICULITIS: Primary | ICD-10-CM

## 2025-05-22 DIAGNOSIS — M25.561 CHRONIC PAIN OF BOTH KNEES: ICD-10-CM

## 2025-05-22 DIAGNOSIS — M79.18 CHRONIC MYOFASCIAL PAIN: ICD-10-CM

## 2025-05-22 DIAGNOSIS — G89.29 CHRONIC PAIN OF BOTH KNEES: ICD-10-CM

## 2025-05-22 DIAGNOSIS — G89.29 CHRONIC MYOFASCIAL PAIN: ICD-10-CM

## 2025-05-22 DIAGNOSIS — M25.562 CHRONIC PAIN OF BOTH KNEES: ICD-10-CM

## 2025-05-22 PROCEDURE — G8417 CALC BMI ABV UP PARAM F/U: HCPCS | Performed by: NURSE PRACTITIONER

## 2025-05-22 PROCEDURE — 3074F SYST BP LT 130 MM HG: CPT | Performed by: NURSE PRACTITIONER

## 2025-05-22 PROCEDURE — 3079F DIAST BP 80-89 MM HG: CPT | Performed by: NURSE PRACTITIONER

## 2025-05-22 PROCEDURE — G8427 DOCREV CUR MEDS BY ELIG CLIN: HCPCS | Performed by: NURSE PRACTITIONER

## 2025-05-22 PROCEDURE — 99214 OFFICE O/P EST MOD 30 MIN: CPT | Performed by: NURSE PRACTITIONER

## 2025-05-22 PROCEDURE — 1036F TOBACCO NON-USER: CPT | Performed by: NURSE PRACTITIONER

## 2025-05-22 PROCEDURE — 3017F COLORECTAL CA SCREEN DOC REV: CPT | Performed by: NURSE PRACTITIONER

## 2025-05-22 ASSESSMENT — ENCOUNTER SYMPTOMS
DIARRHEA: 0
BACK PAIN: 1
ABDOMINAL PAIN: 0
CHEST TIGHTNESS: 1
BLOOD IN STOOL: 0
NAUSEA: 0
CONSTIPATION: 0
VOMITING: 0
WHEEZING: 0
GASTROINTESTINAL NEGATIVE: 1
ABDOMINAL DISTENTION: 0
COUGH: 0
SHORTNESS OF BREATH: 1

## 2025-05-22 NOTE — PROGRESS NOTES
addiction.  Previous UDS reviewed  UDS preformed today for compliance.  Patient told can not receive any pain medications from any other source.  No evidence of abuse, diversion or aberrant behavior.  Medications and/or procedures to improve function and quality of life- patient understanding with this and that may not be pain free  Discussed with patient about safe storage of medications at home  Discussed possible weaning of medication dosing dependent on treatment/procedure results.   Discussed with patient about risks with procedure including infection, reaction to medication, increased pain, or bleeding.  Reviewed lumbar MRI in detail, reviewed EMG, reviewed bilateral knee xrays again  Again offered and recommended therapy again but she refuses as it makes pain worse.   Short term relief from trigger point injections of muscle pain  Has had L-facet MBB and LESI with no improvement or relief. Not a candidate for SCS d/t insurance. 2 different surgeans recommended lumbar surgery but patient continues to try to hold off. Discussed could try another LESI or TFLESI  or bilateral therapeutic SI injection but she declined states \"I need to be completely sedated for this\" which we do not offer complete sedation again discussed joint injections left hip, knee but she refuses for same reason  Again discussed to see neurosurgery if needed. May need another referral   Is getting worked up by cardiology at [facility]   Medications remain effective continue oxy IR 10 mg QID prn- filled 5/15/2025  Continue Zanaflex QID prn prn for spasms- instructed to hold if SBP less than 100- filled 5/9/2025, Neurontin 600 mg BID- filled 5/15/2025  Continue Movantik 12.5 mg daily- filled 5/15/2025 for 90 days . Lidoderm patch daily - filled 5/15/205  Is compliant     Meds. Prescribed:   No orders of the defined types were placed in this encounter.      Return in about 2 months (around 7/22/2025), or if symptoms worsen or fail to improve, for

## 2025-06-12 DIAGNOSIS — M51.26 LUMBAR DISC HERNIATION: ICD-10-CM

## 2025-06-12 DIAGNOSIS — G89.4 CHRONIC PAIN SYNDROME: ICD-10-CM

## 2025-06-12 DIAGNOSIS — M54.16 LUMBAR RADICULITIS: ICD-10-CM

## 2025-06-12 DIAGNOSIS — M47.816 SPONDYLOSIS OF LUMBAR REGION WITHOUT MYELOPATHY OR RADICULOPATHY: ICD-10-CM

## 2025-06-12 DIAGNOSIS — M48.062 SPINAL STENOSIS OF LUMBAR REGION WITH NEUROGENIC CLAUDICATION: ICD-10-CM

## 2025-06-12 DIAGNOSIS — M79.18 MYOFASCIAL PAIN DYSFUNCTION SYNDROME: ICD-10-CM

## 2025-06-12 RX ORDER — GABAPENTIN 600 MG/1
600 TABLET ORAL 2 TIMES DAILY
Qty: 60 TABLET | Refills: 0 | Status: SHIPPED | OUTPATIENT
Start: 2025-06-14 | End: 2025-07-14

## 2025-06-12 RX ORDER — OXYCODONE HYDROCHLORIDE 10 MG/1
10 TABLET ORAL EVERY 6 HOURS PRN
Qty: 120 TABLET | Refills: 0 | Status: SHIPPED | OUTPATIENT
Start: 2025-06-14 | End: 2025-07-14

## 2025-06-12 RX ORDER — LIDOCAINE 50 MG/G
2 PATCH TOPICAL DAILY
Qty: 60 PATCH | Refills: 0 | Status: SHIPPED | OUTPATIENT
Start: 2025-06-14 | End: 2025-07-14

## 2025-06-12 NOTE — TELEPHONE ENCOUNTER
OARRS reviewed. UDS: + for  Gabapentin, Oxycodone, Tizanidine, .   Last seen: 5/22/2025. Follow-up:   Future Appointments   Date Time Provider Department Center   7/22/2025 11:00 AM Rolanod Baldwin, APRN - CNP [facility] [place]

## 2025-06-12 NOTE — TELEPHONE ENCOUNTER
Kiara is requesting a refill of their   Requested Prescriptions     Pending Prescriptions Disp Refills    oxyCODONE HCl (OXY-IR) 10 MG immediate release tablet 120 tablet 0     Sig: Take 1 tablet by mouth every 6 hours as needed for Pain for up to 30 days. Intended supply: 30 days Max Daily Amount: 40 mg    gabapentin (NEURONTIN) 600 MG tablet 60 tablet 0     Sig: Take 1 tablet by mouth 2 times daily for 30 days.    naloxegol (MOVANTIK) 12.5 MG TABS tablet 30 tablet 2     Sig: Take 1 tablet by mouth every morning (before breakfast)    lidocaine (LIDODERM) 5 % 60 patch 0     Sig: Place 2 patches onto the skin daily 12 hours on, 12 hours off. To painful areas on low back    tiZANidine (ZANAFLEX) 4 MG tablet       Sig: Take 1 tablet by mouth every 6 hours as needed   . Please advise.      Last Appt:  Visit date not found  Next Appt:   7/22/25  [place] [address] [address] [facility] [phone] -  [phone]

## 2025-06-16 ENCOUNTER — TELEPHONE (OUTPATIENT)
Dept: PHYSICAL MEDICINE AND REHAB | Age: 55
End: 2025-06-16

## 2025-06-16 NOTE — TELEPHONE ENCOUNTER
Prior Auth is apparently being needed for this med:  MOVANTIK     She is out of this medication and unable to get the refill because it is needing the prior auth. Please f/u with pt to advise

## 2025-06-16 NOTE — TELEPHONE ENCOUNTER
PA sent to plan  (Key: [id number])  PA Case ID #: [id number]  MOVANTIK 12.5MG tablets  [place] [employer] Electronic PA Form

## 2025-07-10 DIAGNOSIS — M51.26 LUMBAR DISC HERNIATION: ICD-10-CM

## 2025-07-10 DIAGNOSIS — M48.062 SPINAL STENOSIS OF LUMBAR REGION WITH NEUROGENIC CLAUDICATION: ICD-10-CM

## 2025-07-10 DIAGNOSIS — M54.16 LUMBAR RADICULITIS: ICD-10-CM

## 2025-07-10 DIAGNOSIS — G89.4 CHRONIC PAIN SYNDROME: ICD-10-CM

## 2025-07-10 DIAGNOSIS — M79.18 MYOFASCIAL PAIN DYSFUNCTION SYNDROME: ICD-10-CM

## 2025-07-10 DIAGNOSIS — M47.816 SPONDYLOSIS OF LUMBAR REGION WITHOUT MYELOPATHY OR RADICULOPATHY: ICD-10-CM

## 2025-07-10 RX ORDER — GABAPENTIN 600 MG/1
600 TABLET ORAL 2 TIMES DAILY
Qty: 60 TABLET | Refills: 0 | Status: SHIPPED | OUTPATIENT
Start: 2025-07-14 | End: 2025-08-13

## 2025-07-10 RX ORDER — OXYCODONE HYDROCHLORIDE 10 MG/1
10 TABLET ORAL EVERY 6 HOURS PRN
Qty: 120 TABLET | Refills: 0 | Status: SHIPPED | OUTPATIENT
Start: 2025-07-14 | End: 2025-08-13

## 2025-07-10 RX ORDER — LIDOCAINE 50 MG/G
2 PATCH TOPICAL DAILY
Qty: 60 PATCH | Refills: 0 | Status: SHIPPED | OUTPATIENT
Start: 2025-07-14 | End: 2025-08-13

## 2025-07-10 NOTE — TELEPHONE ENCOUNTER
OARRS reviewed. UDS: + for  Gabapentin, Oxycodone, Tizanidine. Topaman and Trazodone are not present.   Last seen: 5/22/2025. Follow-up:   Future Appointments   Date Time Provider Department Center   7/22/2025 11:00 AM Rolando Baldwin APRN - CNP [facility] [place]

## 2025-07-10 NOTE — TELEPHONE ENCOUNTER
Kiara Rodríguez called requesting a refill on the following medications:  Requested Prescriptions     Pending Prescriptions Disp Refills    oxyCODONE HCl (OXY-IR) 10 MG immediate release tablet 120 tablet 0     Sig: Take 1 tablet by mouth every 6 hours as needed for Pain for up to 30 days. Intended supply: 30 days Max Daily Amount: 40 mg    tiZANidine (ZANAFLEX) 4 MG tablet 120 tablet 0     Sig: Take 1 tablet by mouth every 6 hours as needed (muscle spasms)    gabapentin (NEURONTIN) 600 MG tablet 60 tablet 0     Sig: Take 1 tablet by mouth 2 times daily for 30 days.    lidocaine (LIDODERM) 5 % 60 patch 0     Sig: Place 2 patches onto the skin daily 12 hours on, 12 hours off. To painful areas on low back    naloxegol (MOVANTIK) 12.5 MG TABS tablet 30 tablet 2     Sig: Take 1 tablet by mouth every morning (before breakfast)     Pharmacy verified: [facility] in [facility]  [email or web address]      Date of last visit: 5/22/2025  Date of next visit (if applicable): 7/22/2025

## 2025-07-22 ENCOUNTER — OFFICE VISIT (OUTPATIENT)
Dept: PHYSICAL MEDICINE AND REHAB | Age: 55
End: 2025-07-22
Payer: COMMERCIAL

## 2025-07-22 VITALS
BODY MASS INDEX: 34.11 KG/M2 | HEIGHT: 68 IN | SYSTOLIC BLOOD PRESSURE: 124 MMHG | DIASTOLIC BLOOD PRESSURE: 80 MMHG | WEIGHT: 225.09 LBS

## 2025-07-22 DIAGNOSIS — M47.812 FACET ARTHRITIS OF CERVICAL REGION: ICD-10-CM

## 2025-07-22 DIAGNOSIS — M54.42 CHRONIC BILATERAL LOW BACK PAIN WITH BILATERAL SCIATICA: ICD-10-CM

## 2025-07-22 DIAGNOSIS — G89.4 CHRONIC PAIN SYNDROME: ICD-10-CM

## 2025-07-22 DIAGNOSIS — M47.816 LUMBAR SPONDYLOSIS: ICD-10-CM

## 2025-07-22 DIAGNOSIS — M48.062 SPINAL STENOSIS OF LUMBAR REGION WITH NEUROGENIC CLAUDICATION: ICD-10-CM

## 2025-07-22 DIAGNOSIS — G89.29 CHRONIC BILATERAL LOW BACK PAIN WITH BILATERAL SCIATICA: ICD-10-CM

## 2025-07-22 DIAGNOSIS — M51.26 LUMBAR DISC HERNIATION: ICD-10-CM

## 2025-07-22 DIAGNOSIS — M54.2 NECK PAIN: ICD-10-CM

## 2025-07-22 DIAGNOSIS — M54.41 CHRONIC BILATERAL LOW BACK PAIN WITH BILATERAL SCIATICA: ICD-10-CM

## 2025-07-22 DIAGNOSIS — M79.18 MYOFASCIAL PAIN DYSFUNCTION SYNDROME: ICD-10-CM

## 2025-07-22 DIAGNOSIS — M54.16 LUMBAR RADICULITIS: Primary | ICD-10-CM

## 2025-07-22 PROCEDURE — 3017F COLORECTAL CA SCREEN DOC REV: CPT | Performed by: NURSE PRACTITIONER

## 2025-07-22 PROCEDURE — 3079F DIAST BP 80-89 MM HG: CPT | Performed by: NURSE PRACTITIONER

## 2025-07-22 PROCEDURE — 3074F SYST BP LT 130 MM HG: CPT | Performed by: NURSE PRACTITIONER

## 2025-07-22 PROCEDURE — 99214 OFFICE O/P EST MOD 30 MIN: CPT | Performed by: NURSE PRACTITIONER

## 2025-07-22 PROCEDURE — G8417 CALC BMI ABV UP PARAM F/U: HCPCS | Performed by: NURSE PRACTITIONER

## 2025-07-22 PROCEDURE — 1036F TOBACCO NON-USER: CPT | Performed by: NURSE PRACTITIONER

## 2025-07-22 PROCEDURE — G8427 DOCREV CUR MEDS BY ELIG CLIN: HCPCS | Performed by: NURSE PRACTITIONER

## 2025-07-22 ASSESSMENT — ENCOUNTER SYMPTOMS
VOMITING: 0
WHEEZING: 0
NAUSEA: 0
DIARRHEA: 0
BACK PAIN: 1
CHEST TIGHTNESS: 0
SHORTNESS OF BREATH: 1
GASTROINTESTINAL NEGATIVE: 1
BLOOD IN STOOL: 0
COUGH: 0
ABDOMINAL PAIN: 0
CONSTIPATION: 0
ABDOMINAL DISTENTION: 0

## 2025-07-22 NOTE — PROGRESS NOTES
[facility] [place] [facility]  [facility]  [address]  [facility] [address]  Dept: [phone]  Dept Fax: [phone]  Loc: [phone]    Visit Date: 7/22/2025    Functionality Assessment/Goals Worksheet     On a scale of 0 (Does not Interfere) to 10 (Completely Interferes)     1.  Which number describes how during the past week pain has interfered with       the following:  A.  General Activity:  8  B.  Mood: 7  C.  Walking Ability:  8  D.  Normal Work (Includes both work outside the home and housework):  9  E.  Relations with Other People:   5  F.  Sleep:   8  G.  Enjoyment of Life:   7    2.  Patient Prefers to Take their Pain Medications:     [x]  On a regular basis   [x]  Only when necessary    []  Does not take pain medications    3.  What are the Patient's Goals/Expectations for Visiting Pain Management?     [x]  Learn about my pain    [x]  Receive Medication   [x]  Physical Therapy     []  Treat Depression   [x]  Receive Injections    []  Treat Sleep   []  Deal with Anxiety and Stress   []  Treat Opoid Dependence/Addiction   []  Other:      HPI:   Kiara Rodríguez is a 54 y.o. female is here today for    Chief Complaint: Low back pain, leg pain, neck pain     HPI   2 month follow up. Kiara continues to have a main compliant of pain in her low back- constant aching, burning, stabbing pain and sharp at times. States varies in intensity depending on activity, Pain continues to radiate down her buttocks and down her her legs  left leg from groin down foot, right leg laterally down to foot. Sharp and pins and needles     She continues to have intermittent neck pain aching and stiff \"at the moment it is good\" deneis neck pain today fatigue feeling at times.     States she feels she has a UTI as she is having painful urination, burning, dark urine and some flank pain. She does not have a pcp but she states she is going to have a

## (undated) DEVICE — BIOGUARD A/W CLEANING ADAPTER

## (undated) DEVICE — GLOVE ORTHO 8   MSG9480